# Patient Record
Sex: FEMALE | Race: WHITE | NOT HISPANIC OR LATINO | Employment: FULL TIME | URBAN - NONMETROPOLITAN AREA
[De-identification: names, ages, dates, MRNs, and addresses within clinical notes are randomized per-mention and may not be internally consistent; named-entity substitution may affect disease eponyms.]

---

## 2017-01-03 ENCOUNTER — TREATMENT (OUTPATIENT)
Dept: CARDIAC REHAB | Facility: HOSPITAL | Age: 57
End: 2017-01-03

## 2017-01-03 VITALS — DIASTOLIC BLOOD PRESSURE: 56 MMHG | SYSTOLIC BLOOD PRESSURE: 114 MMHG | HEART RATE: 79 BPM

## 2017-01-03 DIAGNOSIS — I21.09 ST ELEVATION MYOCARDIAL INFARCTION (STEMI) OF ANTERIOR WALL (HCC): Primary | ICD-10-CM

## 2017-01-03 PROCEDURE — 93798 PHYS/QHP OP CAR RHAB W/ECG: CPT

## 2017-01-03 NOTE — PROGRESS NOTES
Phase II patient, see  [facility] documentation for exercise data documentation.  Dr. Gonzalez physician immediately available. NAD note, skin w/p/d, denies CP, SOA, tolerated exercise well. V/S WIDL.

## 2017-01-05 ENCOUNTER — TREATMENT (OUTPATIENT)
Dept: CARDIAC REHAB | Facility: HOSPITAL | Age: 57
End: 2017-01-05

## 2017-01-05 VITALS — DIASTOLIC BLOOD PRESSURE: 60 MMHG | HEART RATE: 81 BPM | SYSTOLIC BLOOD PRESSURE: 110 MMHG

## 2017-01-05 DIAGNOSIS — I21.09 ST ELEVATION MYOCARDIAL INFARCTION (STEMI) OF ANTERIOR WALL (HCC): Primary | ICD-10-CM

## 2017-01-05 PROCEDURE — 93798 PHYS/QHP OP CAR RHAB W/ECG: CPT

## 2017-01-06 NOTE — PROGRESS NOTES
Phase II patient, see  [facility] documentation for exercise data documentation.  Dr. Zuñiga physician immediately available. NAD note, skin w/p/d, denies CP, SOA, tolerated exercise well. V/S WIDL.

## 2017-01-10 ENCOUNTER — APPOINTMENT (OUTPATIENT)
Dept: CARDIAC REHAB | Facility: HOSPITAL | Age: 57
End: 2017-01-10

## 2017-01-11 ENCOUNTER — TREATMENT (OUTPATIENT)
Dept: CARDIAC REHAB | Facility: HOSPITAL | Age: 57
End: 2017-01-11

## 2017-01-11 VITALS — SYSTOLIC BLOOD PRESSURE: 114 MMHG | HEART RATE: 72 BPM | DIASTOLIC BLOOD PRESSURE: 60 MMHG

## 2017-01-11 DIAGNOSIS — I21.09 ST ELEVATION MYOCARDIAL INFARCTION (STEMI) OF ANTERIOR WALL (HCC): Primary | ICD-10-CM

## 2017-01-11 PROCEDURE — 93798 PHYS/QHP OP CAR RHAB W/ECG: CPT

## 2017-01-11 NOTE — PROGRESS NOTES
Phase II patient, see  [place] documentation for exercise data documentation.  Dr. Zuñiga physician immediately available. NAD note, skin w/p/d, denies CP, SOA, tolerated exercise well. V/S WIDL. BG after treadmill 73.  Pt asymptomatic, no changes in assessment.  Given 8 oz of juice, rechecked, BG 67, continues to be asymptomatic.  No changes in assessment.  Pt given 2 glucose tablets. Rechecked, BG 86. No changes in assessment, pt states she is going to eat as soon as she leaves cardiac rehab.  Will call and notify Dr. Huerta.

## 2017-01-12 ENCOUNTER — APPOINTMENT (OUTPATIENT)
Dept: CARDIAC REHAB | Facility: HOSPITAL | Age: 57
End: 2017-01-12

## 2017-01-12 ENCOUNTER — DOCUMENTATION (OUTPATIENT)
Dept: CARDIAC REHAB | Facility: HOSPITAL | Age: 57
End: 2017-01-12

## 2017-01-12 NOTE — PROGRESS NOTES
Spoke with Portia in Dr. Huerta's office.  Notified of pt's recent hypoglycemic episodes.  Attempted to fax glucose log numerous times without success.  Will attempt to fax tomorrow.  Pt notified of call to office about BG.

## 2017-01-17 ENCOUNTER — APPOINTMENT (OUTPATIENT)
Dept: CARDIAC REHAB | Facility: HOSPITAL | Age: 57
End: 2017-01-17

## 2017-01-19 ENCOUNTER — APPOINTMENT (OUTPATIENT)
Dept: CARDIAC REHAB | Facility: HOSPITAL | Age: 57
End: 2017-01-19

## 2017-01-24 ENCOUNTER — APPOINTMENT (OUTPATIENT)
Dept: CARDIAC REHAB | Facility: HOSPITAL | Age: 57
End: 2017-01-24

## 2017-01-26 ENCOUNTER — APPOINTMENT (OUTPATIENT)
Dept: CARDIAC REHAB | Facility: HOSPITAL | Age: 57
End: 2017-01-26

## 2017-01-31 ENCOUNTER — APPOINTMENT (OUTPATIENT)
Dept: CARDIAC REHAB | Facility: HOSPITAL | Age: 57
End: 2017-01-31

## 2017-02-02 ENCOUNTER — APPOINTMENT (OUTPATIENT)
Dept: CARDIAC REHAB | Facility: HOSPITAL | Age: 57
End: 2017-02-02

## 2017-02-07 ENCOUNTER — APPOINTMENT (OUTPATIENT)
Dept: CARDIAC REHAB | Facility: HOSPITAL | Age: 57
End: 2017-02-07

## 2017-02-09 ENCOUNTER — OFFICE VISIT (OUTPATIENT)
Dept: CARDIOLOGY | Facility: CLINIC | Age: 57
End: 2017-02-09

## 2017-02-09 ENCOUNTER — APPOINTMENT (OUTPATIENT)
Dept: CARDIAC REHAB | Facility: HOSPITAL | Age: 57
End: 2017-02-09

## 2017-02-09 VITALS
WEIGHT: 271 LBS | HEIGHT: 66 IN | RESPIRATION RATE: 16 BRPM | HEART RATE: 79 BPM | DIASTOLIC BLOOD PRESSURE: 74 MMHG | BODY MASS INDEX: 43.55 KG/M2 | SYSTOLIC BLOOD PRESSURE: 113 MMHG

## 2017-02-09 DIAGNOSIS — I25.10 ASCVD (ARTERIOSCLEROTIC CARDIOVASCULAR DISEASE): Primary | ICD-10-CM

## 2017-02-09 PROCEDURE — 99213 OFFICE O/P EST LOW 20 MIN: CPT | Performed by: INTERNAL MEDICINE

## 2017-02-09 PROCEDURE — 93000 ELECTROCARDIOGRAM COMPLETE: CPT | Performed by: INTERNAL MEDICINE

## 2017-02-09 NOTE — PROGRESS NOTES
Akhil Huerta MD  Jannette Tsanggs  1960 12/08/2016    Patient Active Problem List   Diagnosis   • ST elevation myocardial infarction (STEMI) of anterior wall, 11/29/16 s/p stenting proximal LAD occlusion, clinically stable.   • Type 2 diabetes mellitus   • Dyslipidemia       Dear Akhil Huerta MD:    Enrique Onofre is a 56 y.o. female with the problems as listed above, presents for cardiology follow-up for ASCVD.        History of Present Illness: Ms. Onofre is a 56-year-old  female with recent anterior wall ST elevation or infarction, status post stenting of the LAD on 11/29/2016.  She denies any recent chest pains or shortness of breath, PND, orthopnea or pedal edema.she denies any palpitations, dizziness or syncope.  She says she is feeling well and is continuing to exercise on her exercise bike at home, after completing a few visits of cardiac rehab.     No Known Allergies:      Current Outpatient Prescriptions:   •  amLODIPine (NORVASC) 10 MG tablet, Take 1 tablet by mouth Daily., Disp: 90 tablet, Rfl: 3  •  aspirin 81 MG chewable tablet, Chew 4 tablets Daily. (Patient taking differently: Chew 325 mg Daily.), Disp: 100 tablet, Rfl: 3  •  atorvastatin (LIPITOR) 80 MG tablet, Take 1 tablet by mouth Every Night. (Patient taking differently: Take 80 mg by mouth Daily.), Disp: 90 tablet, Rfl: 3  •  carvedilol (COREG) 25 MG tablet, Take 1 tablet by mouth Every 12 (Twelve) Hours., Disp: 180 tablet, Rfl: 3  •  clopidogrel (PLAVIX) 75 MG tablet, Take 1 tablet by mouth Daily., Disp: 90 tablet, Rfl: 3  •  glipiZIDE (GLUCOTROL) 10 MG 24 hr tablet, Take 1 tablet by mouth 2 (Two) Times a Day., Disp: 180 tablet, Rfl: 4  •  glucose blood test strip, Use as instructed twice daily., Disp: 300 each, Rfl: 3  •  Insulin Lispro Prot & Lispro (HUMALOG MIX 50/50 KWIKPEN) (50-50) 100 UNIT/ML suspension pen-injector, Inject 40 units Subcutaneously before meals. (Patient taking differently: Inject 30  "Units under the skin 3 (Three) Times a Day Before Meals.), Disp: 30 mL, Rfl: 5  •  lisinopril (PRINIVIL,ZESTRIL) 20 MG tablet, Take 1 tablet by mouth Daily. (Patient taking differently: Take 20 mg by mouth Daily.), Disp: 90 tablet, Rfl: 3  •  metFORMIN (GLUCOPHAGE) 1000 MG tablet, Take 1 tablet by mouth 2 (Two) Times a Day., Disp: 180 tablet, Rfl: 5  •  TANZEUM 30 MG pen-injector, Inject 30 mg under the skin 1 (One) Time Per Week. (Patient taking differently: Inject 30 mg under the skin 1 (One) Time Per Week. Patient takes on Wednesdays.), Disp: 12 each, Rfl: 6  •  TRUEPLUS LANCETS 28G misc, USE TO TEST BLOOD SUGAR TWICE DAILY, Disp: 300 each, Rfl: 3  •  nitrofurantoin, macrocrystal-monohydrate, (MACROBID) 100 MG capsule, Take 1 capsule by mouth 2 (Two) Times a Day., Disp: 10 capsule, Rfl: 0      The following portions of the patient's history were reviewed and updated as appropriate: allergies, current medications, past family history, past medical history, past social history, past surgical history and problem list.    Social History   Substance Use Topics   • Smoking status: Former Smoker     Packs/day: 0.25     Types: Cigarettes     Quit date: 6/27/1989   • Smokeless tobacco: Never Used   • Alcohol use No       Review of Systems   Constitution: Negative for chills and fever.   HENT: Negative for headaches, nosebleeds and sore throat.    Cardiovascular: Positive for leg swelling. Negative for chest pain.   Respiratory: Positive for shortness of breath. Negative for cough, hemoptysis and wheezing.    Gastrointestinal: Negative for abdominal pain, hematemesis, hematochezia, melena, nausea and vomiting.   Genitourinary: Negative for dysuria and hematuria.   Neurological: Positive for dizziness.       Objective   Vitals:    02/09/17 1531   BP: 113/74   Pulse: 79   Resp: 16   Weight: 271 lb (123 kg)   Height: 66\" (167.6 cm)     Body mass index is 43.74 kg/(m^2).        Physical Exam   Constitutional: She is oriented " to person, place, and time. She appears well-developed and well-nourished.   HENT:   Head: Normocephalic.   Mouth/Throat: Oropharynx is clear and moist.   Eyes: Conjunctivae and EOM are normal. Pupils are equal, round, and reactive to light.   Neck: Normal range of motion. Neck supple. No JVD present. No tracheal deviation present. No thyromegaly present.   Cardiovascular: Normal rate, regular rhythm, S1 normal and S2 normal.  Exam reveals no gallop and no friction rub.    No murmur heard.  Pulmonary/Chest: Effort normal and breath sounds normal. No respiratory distress. She has no wheezes. She has no rales.   Abdominal: Soft. Bowel sounds are normal. She exhibits no mass. There is no tenderness.   Musculoskeletal: Normal range of motion. She exhibits no edema.   Lymphadenopathy:     She has no cervical adenopathy.   Neurological: She is alert and oriented to person, place, and time. No cranial nerve deficit.   Skin: Skin is warm and dry. No rash noted.   Psychiatric: She has a normal mood and affect.       Lab Results   Component Value Date     12/29/2016    K 4.5 12/29/2016     12/29/2016    CO2 25.3 12/29/2016    BUN 20 12/29/2016    CREATININE 0.89 12/29/2016    GLUCOSE 147 (H) 12/29/2016    CALCIUM 10.2 (H) 12/29/2016    AST 22 12/29/2016    ALT 19 12/29/2016    ALKPHOS 69 12/29/2016    LABIL2 1.5 12/29/2016     Lab Results   Component Value Date    CKTOTAL 80 12/01/2016     Lab Results   Component Value Date    WBC 4.67 12/29/2016    HGB 12.3 12/29/2016    HCT 39.1 12/29/2016     12/29/2016     Lab Results   Component Value Date    INR 0.89 11/29/2016    INR 0.82 11/27/2016       Lab Results   Component Value Date    CHLPL 213 (H) 03/14/2016    TRIG 732 (H) 10/26/2016    HDL 36 (L) 10/26/2016          ECG 12 Lead  Date/Time: 2/9/2017 3:42 PM  Performed by: IRASEMA ROSENBERG  Authorized by: IRASEMA ROSENBERG   Rhythm: sinus rhythm  BPM: 81  Other findings: LVH  Comments: QTc  429            Assessment/Plan :  1. ST elevation myocardial infarction (STEMI) of anterior wall, 11/29/16 s/p stenting proxismal % occlusion, clinically stable.   continue with  Plavix, atorvastatin, carvedilol and lisinopril at the current doses.  Increase aspirin to 325 mg daily.     2. Dyslipidemia with hypertriglyceridemia.    continue with atorvastatin 80 mg daily.     3. Type 2 diabetes mellitus with complication, unspecified long term insulin use status       Ms. Onofre seems to be doing very well from cardiac standpoint post anterior wall STEMI with acute PCI.  She was encouraged to start cardiac rehabilitation soon.  She was concerned about the expense but she is willing to try.       Recommendations:     Continue Aspirin, Plavix and Lipitor.  Take Aspirin 325 mg po daily.  May d/c Isosorbide 60 mg po daily.  Encouraged regular activities as tolerated.     Return in about 5 months (around 7/9/2017) for or sooner if needed.    As always, I appreciate very much the opportunity to participate in the cardiovascular care of your patients.      With Best Regards,    Lebron Ogden MD, FACC  2/9/2017  4:14 PM

## 2017-02-23 ENCOUNTER — LAB (OUTPATIENT)
Dept: LAB | Facility: HOSPITAL | Age: 57
End: 2017-02-23
Attending: INTERNAL MEDICINE

## 2017-02-23 ENCOUNTER — TRANSCRIBE ORDERS (OUTPATIENT)
Dept: ADMINISTRATIVE | Facility: HOSPITAL | Age: 57
End: 2017-02-23

## 2017-02-23 DIAGNOSIS — R53.83 MALAISE AND FATIGUE: ICD-10-CM

## 2017-02-23 DIAGNOSIS — I10 ESSENTIAL HYPERTENSION, BENIGN: ICD-10-CM

## 2017-02-23 DIAGNOSIS — E78.5 HYPERLIPIDEMIA, UNSPECIFIED HYPERLIPIDEMIA TYPE: ICD-10-CM

## 2017-02-23 DIAGNOSIS — R53.83 MALAISE AND FATIGUE: Primary | ICD-10-CM

## 2017-02-23 DIAGNOSIS — R53.81 MALAISE AND FATIGUE: ICD-10-CM

## 2017-02-23 DIAGNOSIS — E11.9 DIABETES MELLITUS WITHOUT COMPLICATION (HCC): ICD-10-CM

## 2017-02-23 DIAGNOSIS — R53.81 MALAISE AND FATIGUE: Primary | ICD-10-CM

## 2017-02-23 LAB
ALBUMIN SERPL-MCNC: 4.3 G/DL (ref 3.5–5)
ALBUMIN UR-MCNC: 7.6 MG/L
ALBUMIN/GLOB SERPL: 1.4 G/DL (ref 1.5–2.5)
ALP SERPL-CCNC: 77 U/L (ref 35–104)
ALT SERPL W P-5'-P-CCNC: 21 U/L (ref 10–36)
ANION GAP SERPL CALCULATED.3IONS-SCNC: 8.3 MMOL/L (ref 3.6–11.2)
AST SERPL-CCNC: 20 U/L (ref 10–30)
BASOPHILS # BLD AUTO: 0.03 10*3/MM3 (ref 0–0.3)
BASOPHILS NFR BLD AUTO: 0.5 % (ref 0–2)
BILIRUB SERPL-MCNC: 0.5 MG/DL (ref 0.2–1.8)
BUN BLD-MCNC: 18 MG/DL (ref 7–21)
BUN/CREAT SERPL: 16.5 (ref 7–25)
CALCIUM SPEC-SCNC: 9.8 MG/DL (ref 7.7–10)
CHLORIDE SERPL-SCNC: 106 MMOL/L (ref 99–112)
CHOLEST SERPL-MCNC: 133 MG/DL (ref 0–200)
CO2 SERPL-SCNC: 25.7 MMOL/L (ref 24.3–31.9)
CREAT BLD-MCNC: 1.09 MG/DL (ref 0.43–1.29)
CREAT UR-MCNC: 135.6 MG/DL
DEPRECATED RDW RBC AUTO: 43 FL (ref 37–54)
EOSINOPHIL # BLD AUTO: 0.13 10*3/MM3 (ref 0–0.7)
EOSINOPHIL NFR BLD AUTO: 2 % (ref 0–5)
ERYTHROCYTE [DISTWIDTH] IN BLOOD BY AUTOMATED COUNT: 13.4 % (ref 11.5–14.5)
GFR SERPL CREATININE-BSD FRML MDRD: 52 ML/MIN/1.73
GLOBULIN UR ELPH-MCNC: 3 GM/DL
GLUCOSE BLD-MCNC: 269 MG/DL (ref 70–110)
HBA1C MFR BLD: 7 % (ref 4.5–5.7)
HCT VFR BLD AUTO: 40.2 % (ref 37–47)
HDLC SERPL-MCNC: 30 MG/DL (ref 60–100)
HGB BLD-MCNC: 12.8 G/DL (ref 12–16)
IMM GRANULOCYTES # BLD: 0.01 10*3/MM3 (ref 0–0.03)
IMM GRANULOCYTES NFR BLD: 0.2 % (ref 0–0.5)
LDLC SERPL CALC-MCNC: ABNORMAL MG/DL (ref 0–100)
LDLC/HDLC SERPL: ABNORMAL {RATIO}
LYMPHOCYTES # BLD AUTO: 1.51 10*3/MM3 (ref 1–3)
LYMPHOCYTES NFR BLD AUTO: 23.7 % (ref 21–51)
MCH RBC QN AUTO: 28.4 PG (ref 27–33)
MCHC RBC AUTO-ENTMCNC: 31.8 G/DL (ref 33–37)
MCV RBC AUTO: 89.1 FL (ref 80–94)
MICROALBUMIN/CREAT UR: 5.6 MG/G
MONOCYTES # BLD AUTO: 0.41 10*3/MM3 (ref 0.1–0.9)
MONOCYTES NFR BLD AUTO: 6.4 % (ref 0–10)
NEUTROPHILS # BLD AUTO: 4.27 10*3/MM3 (ref 1.4–6.5)
NEUTROPHILS NFR BLD AUTO: 67.2 % (ref 30–70)
OSMOLALITY SERPL CALC.SUM OF ELEC: 290.8 MOSM/KG (ref 273–305)
PLATELET # BLD AUTO: 212 10*3/MM3 (ref 130–400)
PMV BLD AUTO: 10.3 FL (ref 6–10)
POTASSIUM BLD-SCNC: 4.8 MMOL/L (ref 3.5–5.3)
PROT SERPL-MCNC: 7.3 G/DL (ref 6–8)
RBC # BLD AUTO: 4.51 10*6/MM3 (ref 4.2–5.4)
SODIUM BLD-SCNC: 140 MMOL/L (ref 135–153)
TRIGL SERPL-MCNC: 431 MG/DL (ref 0–150)
VLDLC SERPL-MCNC: ABNORMAL MG/DL
WBC NRBC COR # BLD: 6.36 10*3/MM3 (ref 4.5–12.5)

## 2017-02-23 PROCEDURE — 80053 COMPREHEN METABOLIC PANEL: CPT | Performed by: INTERNAL MEDICINE

## 2017-02-23 PROCEDURE — 36415 COLL VENOUS BLD VENIPUNCTURE: CPT

## 2017-02-23 PROCEDURE — 80061 LIPID PANEL: CPT | Performed by: INTERNAL MEDICINE

## 2017-02-23 PROCEDURE — 83036 HEMOGLOBIN GLYCOSYLATED A1C: CPT | Performed by: INTERNAL MEDICINE

## 2017-02-23 PROCEDURE — 85025 COMPLETE CBC W/AUTO DIFF WBC: CPT | Performed by: INTERNAL MEDICINE

## 2017-02-23 PROCEDURE — 82570 ASSAY OF URINE CREATININE: CPT | Performed by: INTERNAL MEDICINE

## 2017-02-23 PROCEDURE — 82043 UR ALBUMIN QUANTITATIVE: CPT | Performed by: INTERNAL MEDICINE

## 2017-03-13 ENCOUNTER — TRANSCRIBE ORDERS (OUTPATIENT)
Dept: ADMINISTRATIVE | Facility: HOSPITAL | Age: 57
End: 2017-03-13

## 2017-03-13 DIAGNOSIS — R09.89 BILATERAL CAROTID BRUITS: Primary | ICD-10-CM

## 2017-03-14 ENCOUNTER — HOSPITAL ENCOUNTER (OUTPATIENT)
Dept: ULTRASOUND IMAGING | Facility: HOSPITAL | Age: 57
Discharge: HOME | End: 2017-03-14
Attending: INTERNAL MEDICINE | Admitting: INTERNAL MEDICINE

## 2017-03-14 ENCOUNTER — OFFICE VISIT (OUTPATIENT)
Dept: BARIATRICS/WEIGHT MGMT | Facility: CLINIC | Age: 57
End: 2017-03-14

## 2017-03-14 VITALS
OXYGEN SATURATION: 99 % | BODY MASS INDEX: 42.91 KG/M2 | RESPIRATION RATE: 18 BRPM | WEIGHT: 267 LBS | HEIGHT: 66 IN | DIASTOLIC BLOOD PRESSURE: 79 MMHG | SYSTOLIC BLOOD PRESSURE: 127 MMHG | HEART RATE: 79 BPM | TEMPERATURE: 98.2 F

## 2017-03-14 DIAGNOSIS — Z98.84 S/P BARIATRIC SURGERY: ICD-10-CM

## 2017-03-14 DIAGNOSIS — I21.09 ST ELEVATION MYOCARDIAL INFARCTION (STEMI) OF ANTERIOR WALL (HCC): Primary | ICD-10-CM

## 2017-03-14 DIAGNOSIS — R09.89 BILATERAL CAROTID BRUITS: ICD-10-CM

## 2017-03-14 DIAGNOSIS — E11.8 TYPE 2 DIABETES MELLITUS WITH COMPLICATION, UNSPECIFIED LONG TERM INSULIN USE STATUS: ICD-10-CM

## 2017-03-14 DIAGNOSIS — Z99.89 OSA ON CPAP: ICD-10-CM

## 2017-03-14 DIAGNOSIS — E66.01 MORBID OBESITY, UNSPECIFIED OBESITY TYPE (HCC): ICD-10-CM

## 2017-03-14 DIAGNOSIS — G47.33 OSA ON CPAP: ICD-10-CM

## 2017-03-14 PROCEDURE — S2083 ADJUSTMENT GASTRIC BAND: HCPCS | Performed by: PHYSICIAN ASSISTANT

## 2017-03-14 PROCEDURE — 93880 EXTRACRANIAL BILAT STUDY: CPT | Performed by: RADIOLOGY

## 2017-03-14 PROCEDURE — 99214 OFFICE O/P EST MOD 30 MIN: CPT | Performed by: PHYSICIAN ASSISTANT

## 2017-03-14 PROCEDURE — 93880 EXTRACRANIAL BILAT STUDY: CPT

## 2017-03-14 NOTE — PROGRESS NOTES
[facility]  [address]  [facility] [address] [phone]        Patient Name: Jannette Onofre.  YOB: 1960      Date of Visit: 3/17/2017      Reason for Visit:  AGB f/up    HPI:  Jannette Onofre is a 56 y.o. female s/p LAGB 4/2013 by Dr. Russell.  Transferred care to [facility] 7/2016 b/c she says Dr. Russell made her feel like a failure.    Presurgery weight 340 lbs.  Lowest weight achieved 224 lbs in first 18 months.  Then started to regain d/t stressors at work w/ associated poor food choices and bad habits.  Was working to get back on track, but since LOV she had an MI and is now s/p LAD stenting, on ASA + Plavix.  Says this has really set her into a depression and she just can't seem to get refocused.  Worries all the time that she is going to have another heart attack.  Knows she needs to get healthy to prevent future events, but has not been able to motivate herself.  Wants a band adjustment - feels that will help her both mentally and physically.    Band vol unknown, suspected 5cc per patient report (no ADJ office notes obtainable).  Says she is able to eat too much, but other times is too depressed to even think about eating.  Denies dysphagia/N/V/AP.  Last UGI 7/2016 okay.    Past Medical History   Diagnosis Date   • Coronary artery disease      s/p LAD stenting, follows w/ Dr. Ogden   • Diabetes mellitus      dx 1998, initially improved s/p AGB but now back on insulin    • Fatigue    • Hyperlipidemia    • Hypertension    • Melanoma      1998   • Morbid obesity    • Myocardial infarction      11/2016   • BRICE on CPAP      Past Surgical History   Procedure Laterality Date   • Laparoscopic gastric banding  04/2013     s/p LAGB by Dr Russell   • Pr rt/lt heart catheters N/A 11/29/2016     Procedure: Percutaneous Coronary Intervention;  Surgeon: Akhil Zuñiga MD;  Location: [facility] INVASIVE LOCATION;  Service: Cardiology   • Cardiac  catheterization N/A 2016     Procedure: Left Heart Cath;  Surgeon: Akhil Zuñiga MD;  Location:  [facility] CATH INVASIVE LOCATION;  Service:    • Laparoscopic cholecystectomy     • Umbilical hernia repair     •  section     • Colonoscopy     • Other surgical history       melanoma removal/back   • Cataract extraction N/A      rt eye , left eye    • Septoplasty         Current Outpatient Prescriptions:   •  clopidogrel (PLAVIX) 75 MG tablet, Take 1 tablet by mouth Daily., Disp: 90 tablet, Rfl: 3  •  Albiglutide 30 MG pen-injector, Inject 1 Pen Subcutaneously Once a Week., Disp: 12 each, Rfl: 6  •  amLODIPine (NORVASC) 10 MG tablet, Take 1 tablet by mouth Daily., Disp: 90 tablet, Rfl: 3  •  amLODIPine (NORVASC) 10 MG tablet, Take 1 tablet by mouth Daily., Disp: 90 tablet, Rfl: 4  •  aspirin 81 MG chewable tablet, Chew 4 tablets Daily. (Patient taking differently: Chew 325 mg Daily.), Disp: 100 tablet, Rfl: 3  •  atorvastatin (LIPITOR) 80 MG tablet, Take 1 tablet by mouth Every Night. (Patient taking differently: Take 80 mg by mouth Daily.), Disp: 90 tablet, Rfl: 3  •  atorvastatin (LIPITOR) 80 MG tablet, Take 1 tablet by mouth Daily., Disp: 90 tablet, Rfl: 5  •  azithromycin (ZITHROMAX [place]ON) 500 MG tablet, Take 1 tablet by mouth Daily., Disp: 3 tablet, Rfl: 0  •  carvedilol (COREG) 25 MG tablet, Take 1 tablet by mouth Every 12 (Twelve) Hours., Disp: 180 tablet, Rfl: 3  •  carvedilol (COREG) 25 MG tablet, Take 1 tablet by mouth 2 (Two) Times a Day., Disp: 180 tablet, Rfl: 4  •  clopidogrel (PLAVIX) 75 MG tablet, Take 1 tablet by mouth Daily., Disp: 90 tablet, Rfl: 5  •  Empagliflozin 25 MG tablet, Take 1 tablet by mouth Daily., Disp: 90 tablet, Rfl: 6  •  glipiZIDE (GLUCOTROL) 10 MG 24 hr tablet, Take 1 tablet by mouth 2 (Two) Times a Day., Disp: 180 tablet, Rfl: 4  •  glucose blood test strip, Use as instructed twice daily., Disp: 300 each, Rfl: 3  •  Insulin Lispro  Prot & Lispro (HUMALOG MIX 50/50 KWIKPEN) (50-50) 100 UNIT/ML suspension pen-injector, Inject 40 units Subcutaneously before meals. (Patient taking differently: Inject 30 Units under the skin 3 (Three) Times a Day Before Meals.), Disp: 30 mL, Rfl: 5  •  Insulin Lispro Prot & Lispro (HUMALOG MIX 50/50 KWIKPEN) (50-50) 100 UNIT/ML suspension pen-injector, Inject 30 units Subcutaneous before Meals., Disp: 30 mL, Rfl: 5  •  lisinopril (PRINIVIL,ZESTRIL) 20 MG tablet, Take 1 tablet by mouth Daily. (Patient taking differently: Take 20 mg by mouth Daily.), Disp: 90 tablet, Rfl: 3  •  lisinopril (PRINIVIL,ZESTRIL) 20 MG tablet, Take 1 tablet by mouth Daily., Disp: 90 tablet, Rfl: 3  •  meclizine (ANTIVERT) 25 MG tablet, Take 1 tablet by mouth 3 (Three) Times a Day As Needed fir Dizziness., Disp: 90 tablet, Rfl: 0  •  metFORMIN (GLUCOPHAGE) 1000 MG tablet, Take 1 tablet by mouth 2 (Two) Times a Day., Disp: 180 tablet, Rfl: 5  •  metFORMIN (GLUCOPHAGE) 1000 MG tablet, Take 1 tablet by mouth 2 (Two) Times a Day., Disp: 180 tablet, Rfl: 3  •  nitrofurantoin, macrocrystal-monohydrate, (MACROBID) 100 MG capsule, Take 1 capsule by mouth 2 (Two) Times a Day., Disp: 10 capsule, Rfl: 0  •  predniSONE (DELTASONE) 20 MG tablet, Take 1 tablet by mouth Daily., Disp: 5 tablet, Rfl: 0  •  TANZEUM 30 MG pen-injector, Inject 30 mg under the skin 1 (One) Time Per Week. (Patient taking differently: Inject 30 mg under the skin 1 (One) Time Per Week. Patient takes on Wednesdays.), Disp: 12 each, Rfl: 6  •  TRUEPLUS LANCETS 28G misc, USE TO TEST BLOOD SUGAR TWICE DAILY, Disp: 300 each, Rfl: 3     No Known Allergies    Social History     Social History   • Marital status:      Spouse name: N/A   • Number of children: N/A   • Years of education: N/A     Occupational History   • Not on file.     Social History Main Topics   • Smoking status: Former Smoker     Packs/day: 0.25     Types: Cigarettes     Quit date: 6/27/1989   • Smokeless  "tobacco: Never Used   • Alcohol use No   • Drug use: No   • Sexual activity: Defer     Other Topics Concern   • Not on file     Social History Narrative    .  Lives in [facility].   @ [employer].       Visit Vitals   • /79 (BP Location: Left arm, Patient Position: Sitting, Cuff Size: Large Adult)   • Pulse 79   • Temp 98.2 °F (36.8 °C)   • Resp 18   • Ht 66\" (167.6 cm)   • Wt 267 lb (121 kg)   • SpO2 99%   • BMI 43.09 kg/m2       Physical Exam   Constitutional: She appears well-developed and well-nourished. She is cooperative.   HENT:   Mouth/Throat: Oropharynx is clear and moist and mucous membranes are normal.   Eyes: Conjunctivae are normal. No scleral icterus.   Cardiovascular: Normal rate.    Pulmonary/Chest: Effort normal.   Abdominal: Soft. There is no tenderness.   RLQ port - site unremarkable   Musculoskeletal: Normal range of motion. She exhibits no edema.   Neurological: She is alert.   Skin: Skin is warm and dry. No rash noted.   Psychiatric: She has a normal mood and affect. Judgment normal.       Band Adjustment Info   Band Type: ?? APS ??   Port Location: RLQ   Procedure Position: supine   Needle Type: short    Local Anesthesia: with 1% lidocaine and bicarb       Amount Expected (cc): ?? 5.0 ??   Amount Added (cc):      0.5   Amount Removed (cc):     Total Amount (cc): ?? 5.5 ??       Able to lashae water after ADJ? Yes   Other Notes: No [facility] office notes available to confirm band vol.  At least 2.5cc aspirated prior to fill.           Assessment:  56 y.o. female s/p LAGB 4/2013 by Dr. Russell    ICD-10-CM ICD-9-CM   1. ST elevation myocardial infarction (STEMI) of anterior wall, 11/29/16 s/p stenting proxismal % occlusion, clinically stable. I21.09 410.10   2. Type 2 diabetes mellitus with complication, unspecified long term insulin use status E11.8 250.90   3. BRICE on CPAP G47.33 327.23   4. Morbid obesity, unspecified obesity type E66.01 278.01   5. S/P " bariatric surgery Z98.84 V45.86       Plan: Long discussion today.  Again reviewed necessary dietary/lifestyle modifications.  Encouraged patient to pursue counseling as planned.  Increase routine exercise.  Focus on healthy habits.      Lapband adjustment today w/out difficulty.  Reviewed green zone/red zone.  Instructed to RTC for dysphagia/N/V/AP.  Discussed eating w/ the band and reminded patient that it is only a tool, and should not be used as a physical stop.    The patient was instructed to follow up on an as needed basis..     note: approx 15 of the 25 minute visit was spent counseling on dietary/lifestyle modifications

## 2017-04-02 ENCOUNTER — OFFICE VISIT (OUTPATIENT)
Dept: RETAIL CLINIC | Facility: CLINIC | Age: 57
End: 2017-04-02

## 2017-04-02 VITALS
OXYGEN SATURATION: 96 % | HEART RATE: 90 BPM | WEIGHT: 267 LBS | TEMPERATURE: 97.8 F | HEIGHT: 66 IN | DIASTOLIC BLOOD PRESSURE: 82 MMHG | SYSTOLIC BLOOD PRESSURE: 128 MMHG | BODY MASS INDEX: 42.91 KG/M2

## 2017-04-02 DIAGNOSIS — J06.9 ACUTE URI: Primary | ICD-10-CM

## 2017-04-02 PROCEDURE — 99213 OFFICE O/P EST LOW 20 MIN: CPT | Performed by: NURSE PRACTITIONER

## 2017-04-02 RX ORDER — ISOSORBIDE MONONITRATE 60 MG/1
60 TABLET, EXTENDED RELEASE ORAL DAILY PRN
COMMUNITY
End: 2018-02-05

## 2017-04-02 RX ORDER — BENZONATATE 100 MG/1
100 CAPSULE ORAL 3 TIMES DAILY PRN
Qty: 21 CAPSULE | Refills: 0 | Status: SHIPPED | OUTPATIENT
Start: 2017-04-02 | End: 2017-04-10

## 2017-04-02 RX ORDER — AMOXICILLIN 875 MG/1
875 TABLET, COATED ORAL 2 TIMES DAILY
Qty: 14 TABLET | Refills: 0 | Status: SHIPPED | OUTPATIENT
Start: 2017-04-02 | End: 2017-04-10

## 2017-04-02 NOTE — PROGRESS NOTES
"Subjective   Jannette Onofre is a 56 y.o. female. Presents to the clinic today with a chief complaint of   Chief Complaint   Patient presents with   • Cough   • Sinusitis        History of Present Illness     Ms Onofre presents with three day history of cough and congestion.  Symptoms have worsened today and she states that her chest is tight.  She reports productive cough and shortness of breath.  She denies fever.  She has taken Robitussin for the cough but it does not seem to help.  See ROS    The following portions of the patient's history were reviewed and updated as appropriate: allergies, current medications, past family history, past medical history, past social history, past surgical history and problem list.    Review of Systems   Constitutional: Positive for appetite change (decreased). Negative for chills, diaphoresis and fever.   HENT: Positive for congestion, ear pain (feels like the air is just going straight thru them), rhinorrhea and sore throat (feels swollen). Negative for ear discharge, hearing loss, postnasal drip, sinus pressure, trouble swallowing and voice change.    Eyes: Negative for redness and itching.   Respiratory: Positive for cough (productive of green sputum) and shortness of breath. Negative for chest tightness and wheezing.    Gastrointestinal: Positive for nausea. Negative for abdominal pain, diarrhea and vomiting.   Musculoskeletal: Positive for myalgias.   Skin: Negative for rash.   Neurological: Positive for headaches. Negative for dizziness.       Objective   No Known Allergies  Blood pressure 128/82, pulse 90, temperature 97.8 °F (36.6 °C), height 66\" (167.6 cm), weight 267 lb (121 kg), SpO2 96 %.    Physical Exam  General Appearance: Mildly ill appearing, No acute distress, Pleasant and Cooperative  Head/face:  Normocephalic, atraumatic  Eyes:   no conjunctival injection and non-icteric  Ears:   Bilateral ear canals without erythema, edema, scaling, or drainage  Nose/Sinuses:   " clear nasal drainage  Mouth/Throat:  Posterior pharynx is mildly erythematous with a small amount of clear drainage  Neck:  Supple without lymphadenopathy; trachea is midline  Lungs:  Clear to auscultation bilaterally  Heart:   Regular rate and rhythm without murmur    Assessment/Plan     URI    Tessalon Perles 100 mg  TID as needed for cough  Amoxicillin 875 mg BID x 7 days               Tracee Padilla, APRN

## 2017-04-02 NOTE — PATIENT INSTRUCTIONS
"Upper Respiratory Infection, Adult  Most upper respiratory infections (URIs) are caused by a virus. A URI affects the nose, throat, and upper air passages. The most common type of URI is often called \"the common cold.\"  HOME CARE   · Take medicines only as told by your doctor.  · Gargle warm saltwater or take cough drops to comfort your throat as told by your doctor.  · Use a warm mist humidifier or inhale steam from a shower to increase air moisture. This may make it easier to breathe.  · Drink enough fluid to keep your pee (urine) clear or pale yellow.  · Eat soups and other clear broths.  · Have a healthy diet.  · Rest as needed.  · Go back to work when your fever is gone or your doctor says it is okay.  ¨ You may need to stay home longer to avoid giving your URI to others.  ¨ You can also wear a face mask and wash your hands often to prevent spread of the virus.  · Use your inhaler more if you have asthma.  · Do not use any tobacco products, including cigarettes, chewing tobacco, or electronic cigarettes. If you need help quitting, ask your doctor.  GET HELP IF:  · You are getting worse, not better.  · Your symptoms are not helped by medicine.  · You have chills.  · You are getting more short of breath.  · You have brown or red mucus.  · You have yellow or brown discharge from your nose.  · You have pain in your face, especially when you bend forward.  · You have a fever.  · You have puffy (swollen) neck glands.  · You have pain while swallowing.  · You have white areas in the back of your throat.  GET HELP RIGHT AWAY IF:   · You have very bad or constant:    Headache.    Ear pain.    Pain in your forehead, behind your eyes, and over your cheekbones (sinus pain).    Chest pain.  · You have long-lasting (chronic) lung disease and any of the following:    Wheezing.    Long-lasting cough.    Coughing up blood.    A change in your usual mucus.  · You have a stiff neck.  · You have changes in your:    Vision.   "  Hearing.    Thinking.    Mood.  MAKE SURE YOU:   · Understand these instructions.  · Will watch your condition.  · Will get help right away if you are not doing well or get worse.     This information is not intended to replace advice given to you by your health care provider. Make sure you discuss any questions you have with your health care provider.     Document Released: 06/05/2009 Document Revised: 05/03/2016 Document Reviewed: 03/25/2015  [employer] Interactive Patient Education ©2016 [employer]

## 2017-04-10 ENCOUNTER — OFFICE VISIT (OUTPATIENT)
Dept: PSYCHIATRY | Facility: CLINIC | Age: 57
End: 2017-04-10

## 2017-04-10 VITALS
HEIGHT: 66 IN | DIASTOLIC BLOOD PRESSURE: 68 MMHG | WEIGHT: 268 LBS | SYSTOLIC BLOOD PRESSURE: 111 MMHG | HEART RATE: 74 BPM | BODY MASS INDEX: 43.07 KG/M2

## 2017-04-10 DIAGNOSIS — F43.23 ADJUSTMENT DISORDER WITH MIXED ANXIETY AND DEPRESSED MOOD: Primary | ICD-10-CM

## 2017-04-10 PROCEDURE — 90791 PSYCH DIAGNOSTIC EVALUATION: CPT | Performed by: COUNSELOR
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
## 2017-04-25 ENCOUNTER — OFFICE VISIT (OUTPATIENT)
Dept: BARIATRICS/WEIGHT MGMT | Facility: CLINIC | Age: 57
End: 2017-04-25

## 2017-04-25 VITALS
HEART RATE: 78 BPM | RESPIRATION RATE: 18 BRPM | TEMPERATURE: 98.2 F | BODY MASS INDEX: 43.31 KG/M2 | HEIGHT: 66 IN | WEIGHT: 269.5 LBS | OXYGEN SATURATION: 99 % | SYSTOLIC BLOOD PRESSURE: 122 MMHG | DIASTOLIC BLOOD PRESSURE: 76 MMHG

## 2017-04-25 DIAGNOSIS — R10.13 DYSPEPSIA: Primary | ICD-10-CM

## 2017-04-25 DIAGNOSIS — E66.01 MORBID OBESITY, UNSPECIFIED OBESITY TYPE (HCC): ICD-10-CM

## 2017-04-25 DIAGNOSIS — Z98.84 S/P BARIATRIC SURGERY: ICD-10-CM

## 2017-04-25 PROCEDURE — 99213 OFFICE O/P EST LOW 20 MIN: CPT | Performed by: PHYSICIAN ASSISTANT

## 2017-04-25 NOTE — PROGRESS NOTES
[facility] Bariatric Surgery  [address]  [facility] [address]  [phone]        Patient Name: Jannette Onofre.  YOB: 1960      Date of Visit: 4/25/2017      Reason for Visit:  AGB f/up    HPI:  Jannette Onofre is a 56 y.o. female s/p LAGB 4/2013 by Dr. Russell.  Transferred care to [facility] 7/2016 b/c she says Dr. Russell made her feel like a failure.    Presurgery weight 340 lbs.  Lowest weight achieved 224 lbs in first 18 months.  Then started to regain d/t stressors at work w/ associated poor food choices and bad habits.  Was working to get back on track, but then at the end of 2016 she had an MI w/ LAD stenting, now on ASA + Plavix.  Returned to us last month saying that really set her back.  She was struggling w/ depression and anxiety and she just couldn't seem to get refocused on getting healthy.  She wanted a band adjustment b/c she felt that would help her both mentally and physically.    Unfortunately actual band vol is unknown b/c no ADJ office notes are available from Dr. Russell, but patien suspected she had a 5cc band vol.  We were able to aspirate at least 2.5cc at last visit prior to fill, but band vol was not measured d/t concern for reactive swelling/tightness if a fill was then attempted.  Added 0.5cc to band at last visit w/out difficulty.  Last UGI 7/2016 okay.    Says she feels like it really helped.  Has better satiety/fullness.  Denies any issues w/ dysphagia/N/V/AP.  Has also gone to therapy since LOV and now has a much better outcome.  Has realized it is her choice as to whether or not she is healthy.  Has stopped blaming her frustrations on other things.  Is trying not to compare herself to her friends who have had gastric sleeve and her losing more weight than she is.      Since LOV has modified her diet and is making healthy food choices.      Diet Recall:  B- boiled eggs  L- grilled chicken and ashford beans  Snack - 1/2 protein  bar  D-grilled pork chop w/ salad  Drinking 64oz water w/ SF jasmine-aid     Is also exercising more consistently - walking 2 days/week.  Has lost 3.5 lbs of body fat since last visit.  Is overall feeling really good.      Past Medical History:   Diagnosis Date   • Anxiety    • Coronary artery disease     s/p LAD stenting, follows w/ Dr. Ogden   • Depression    • Diabetes mellitus     dx , initially improved s/p AGB but now back on insulin    • Fatigue    • Hyperlipidemia    • Hypertension    • Melanoma        • Morbid obesity    • Myocardial infarction     2016   • BRICE on CPAP      Past Surgical History:   Procedure Laterality Date   • CARDIAC CATHETERIZATION N/A 2016    Procedure: Left Heart Cath;  Surgeon: Akhil Zuñiga MD;  Location:  [facility] CATH INVASIVE LOCATION;  Service:    • CATARACT EXTRACTION N/A     rt eye , left eye    •  SECTION     • COLONOSCOPY     • LAPAROSCOPIC CHOLECYSTECTOMY     • LAPAROSCOPIC GASTRIC BANDING  2013    s/p LAGB by Dr Russell   • OTHER SURGICAL HISTORY      melanoma removal/back   • AR RT/LT HEART CATHETERS N/A 2016    Procedure: Percutaneous Coronary Intervention;  Surgeon: Akhil Zuñiga MD;  Location:  [facility] CATH INVASIVE LOCATION;  Service: Cardiology   • SEPTOPLASTY     • UMBILICAL HERNIA REPAIR         Current Outpatient Prescriptions:   •  Albiglutide 30 MG pen-injector, Inject 1 Pen Subcutaneously Once a Week., Disp: 12 each, Rfl: 6  •  amLODIPine (NORVASC) 10 MG tablet, Take 1 tablet by mouth Daily., Disp: 90 tablet, Rfl: 3  •  aspirin 81 MG chewable tablet, Chew 4 tablets Daily. (Patient taking differently: Chew 325 mg Daily.), Disp: 100 tablet, Rfl: 3  •  atorvastatin (LIPITOR) 80 MG tablet, Take 1 tablet by mouth Every Night. (Patient taking differently: Take 80 mg by mouth Daily.), Disp: 90 tablet, Rfl: 3  •  carvedilol (COREG) 25 MG tablet, Take 1 tablet by mouth Every 12 (Twelve) Hours., Disp: 180  tablet, Rfl: 3  •  clopidogrel (PLAVIX) 75 MG tablet, Take 1 tablet by mouth Daily., Disp: 90 tablet, Rfl: 3  •  glucose blood test strip, Use as instructed twice daily., Disp: 300 each, Rfl: 3  •  Insulin Lispro Prot & Lispro (HUMALOG MIX 50/50 KWIKPEN) (50-50) 100 UNIT/ML suspension pen-injector, Inject 40 units Subcutaneously before meals. (Patient taking differently: Inject 30 Units under the skin 3 (Three) Times a Day Before Meals.), Disp: 30 mL, Rfl: 5  •  isosorbide mononitrate (IMDUR) 60 MG 24 hr tablet, Take 60 mg by mouth Daily., Disp: , Rfl:   •  lisinopril (PRINIVIL,ZESTRIL) 20 MG tablet, Take 1 tablet by mouth Daily., Disp: 90 tablet, Rfl: 3  •  meclizine (ANTIVERT) 25 MG tablet, Take 1 tablet by mouth 3 (Three) Times a Day As Needed fir Dizziness., Disp: 90 tablet, Rfl: 0  •  metFORMIN (GLUCOPHAGE) 1000 MG tablet, Take 1 tablet by mouth 2 (Two) Times a Day., Disp: 180 tablet, Rfl: 3  •  TANZEUM 30 MG pen-injector, Inject 30 mg under the skin 1 (One) Time Per Week. (Patient taking differently: Inject 30 mg under the skin 1 (One) Time Per Week. Patient takes on Wednesdays.), Disp: 12 each, Rfl: 6  •  TRUEPLUS LANCETS 28G misc, USE TO TEST BLOOD SUGAR TWICE DAILY, Disp: 300 each, Rfl: 3     No Known Allergies    Social History     Social History   • Marital status:      Spouse name: N/A   • Number of children: N/A   • Years of education: N/A     Occupational History   • Not on file.     Social History Main Topics   • Smoking status: Former Smoker     Packs/day: 0.25     Types: Cigarettes     Quit date: 6/27/1989   • Smokeless tobacco: Never Used   • Alcohol use No   • Drug use: No   • Sexual activity: Defer     Other Topics Concern   • Not on file     Social History Narrative    .  Lives in [facility].   @ [employer].       /76 (BP Location: Left arm, Patient Position: Sitting, Cuff Size: Large Adult)  Pulse 78  Temp 98.2 °F (36.8 °C) (Temporal Artery )   Resp  "18  Ht 66\" (167.6 cm)  Wt 269 lb 8 oz (122 kg)  SpO2 99%  BMI 43.5 kg/m2    Physical Exam   Constitutional: She appears well-developed and well-nourished. She is cooperative.   HENT:   Mouth/Throat: Oropharynx is clear and moist and mucous membranes are normal.   Eyes: Conjunctivae are normal. No scleral icterus.   Cardiovascular: Normal rate.    Pulmonary/Chest: Effort normal.   Abdominal: Soft. There is no tenderness.   RLQ port - site unremarkable   Musculoskeletal: Normal range of motion. She exhibits no edema.   Neurological: She is alert.   Skin: Skin is warm and dry. No rash noted.   Psychiatric: She has a normal mood and affect. Judgment normal.       Band Adjustment Info   Band Type: ?? APS ??   Port Location: RLQ   Procedure Position: supine   Needle Type: short    Local Anesthesia: with 1% lidocaine and bicarb       Amount Expected (cc): ?? 5.5 ??  (suspected)   Amount Added (cc):         Amount Removed (cc):     Total Amount (cc): ?? 5.5 ??  (suspected)       Able to lashae water after ADJ?    Other Notes: No Yvonne office notes available to confirm band vol.             Assessment:  56 y.o. female s/p LAGB 4/2013 by Dr. Russell    ICD-10-CM ICD-9-CM   1. Dyspepsia R10.13 536.8   2. Morbid obesity, unspecified obesity type E66.01 278.01   3. S/P bariatric surgery Z98.84 V45.86       Plan:  No ADJ today.  Encouraged to continue w/ positive progress.  Increase routine exercise.  Focus on healthy habits.  Call w/ issues/concerns.    Follow up in 3 months - after cardiology eval.          "

## 2017-06-16 ENCOUNTER — LAB (OUTPATIENT)
Dept: LAB | Facility: HOSPITAL | Age: 57
End: 2017-06-16
Attending: INTERNAL MEDICINE

## 2017-06-16 ENCOUNTER — TRANSCRIBE ORDERS (OUTPATIENT)
Dept: ADMINISTRATIVE | Facility: HOSPITAL | Age: 57
End: 2017-06-16

## 2017-06-16 DIAGNOSIS — E11.9 DIABETES MELLITUS WITHOUT COMPLICATION (HCC): ICD-10-CM

## 2017-06-16 DIAGNOSIS — E78.5 HYPERLIPIDEMIA, UNSPECIFIED HYPERLIPIDEMIA TYPE: ICD-10-CM

## 2017-06-16 DIAGNOSIS — E53.8 VITAMIN B 12 DEFICIENCY: ICD-10-CM

## 2017-06-16 DIAGNOSIS — R53.83 MALAISE AND FATIGUE: ICD-10-CM

## 2017-06-16 DIAGNOSIS — I10 BENIGN HYPERTENSION: ICD-10-CM

## 2017-06-16 DIAGNOSIS — R53.81 MALAISE AND FATIGUE: ICD-10-CM

## 2017-06-16 DIAGNOSIS — E11.9 DIABETES MELLITUS WITHOUT COMPLICATION (HCC): Primary | ICD-10-CM

## 2017-06-16 LAB
ALBUMIN SERPL-MCNC: 4.4 G/DL (ref 3.5–5)
ALBUMIN/GLOB SERPL: 1.5 G/DL (ref 1.5–2.5)
ALP SERPL-CCNC: 69 U/L (ref 35–104)
ALT SERPL W P-5'-P-CCNC: 18 U/L (ref 10–36)
ANION GAP SERPL CALCULATED.3IONS-SCNC: 3.4 MMOL/L (ref 3.6–11.2)
AST SERPL-CCNC: 20 U/L (ref 10–30)
BASOPHILS # BLD AUTO: 0.03 10*3/MM3 (ref 0–0.3)
BASOPHILS NFR BLD AUTO: 0.4 % (ref 0–2)
BILIRUB SERPL-MCNC: 0.7 MG/DL (ref 0.2–1.8)
BUN BLD-MCNC: 22 MG/DL (ref 7–21)
BUN/CREAT SERPL: 19.1 (ref 7–25)
CALCIUM SPEC-SCNC: 9.8 MG/DL (ref 7.7–10)
CHLORIDE SERPL-SCNC: 112 MMOL/L (ref 99–112)
CHOLEST SERPL-MCNC: 122 MG/DL (ref 0–200)
CO2 SERPL-SCNC: 25.6 MMOL/L (ref 24.3–31.9)
CREAT BLD-MCNC: 1.15 MG/DL (ref 0.43–1.29)
DEPRECATED RDW RBC AUTO: 46.1 FL (ref 37–54)
EOSINOPHIL # BLD AUTO: 0.18 10*3/MM3 (ref 0–0.7)
EOSINOPHIL NFR BLD AUTO: 2.5 % (ref 0–5)
ERYTHROCYTE [DISTWIDTH] IN BLOOD BY AUTOMATED COUNT: 14.4 % (ref 11.5–14.5)
FOLATE SERPL-MCNC: 15.03 NG/ML (ref 5.4–20)
GFR SERPL CREATININE-BSD FRML MDRD: 48 ML/MIN/1.73
GLOBULIN UR ELPH-MCNC: 2.9 GM/DL
GLUCOSE BLD-MCNC: 255 MG/DL (ref 70–110)
HBA1C MFR BLD: 8.7 % (ref 4.5–5.7)
HCT VFR BLD AUTO: 40 % (ref 37–47)
HDLC SERPL-MCNC: 29 MG/DL (ref 60–100)
HGB BLD-MCNC: 12.6 G/DL (ref 12–16)
IMM GRANULOCYTES # BLD: 0.01 10*3/MM3 (ref 0–0.03)
IMM GRANULOCYTES NFR BLD: 0.1 % (ref 0–0.5)
LDLC SERPL CALC-MCNC: 41 MG/DL (ref 0–100)
LDLC/HDLC SERPL: 1.4 {RATIO}
LYMPHOCYTES # BLD AUTO: 1.97 10*3/MM3 (ref 1–3)
LYMPHOCYTES NFR BLD AUTO: 27.9 % (ref 21–51)
MCH RBC QN AUTO: 27.9 PG (ref 27–33)
MCHC RBC AUTO-ENTMCNC: 31.5 G/DL (ref 33–37)
MCV RBC AUTO: 88.5 FL (ref 80–94)
MONOCYTES # BLD AUTO: 0.41 10*3/MM3 (ref 0.1–0.9)
MONOCYTES NFR BLD AUTO: 5.8 % (ref 0–10)
NEUTROPHILS # BLD AUTO: 4.46 10*3/MM3 (ref 1.4–6.5)
NEUTROPHILS NFR BLD AUTO: 63.3 % (ref 30–70)
OSMOLALITY SERPL CALC.SUM OF ELEC: 293.3 MOSM/KG (ref 273–305)
PLATELET # BLD AUTO: 203 10*3/MM3 (ref 130–400)
PMV BLD AUTO: 10.5 FL (ref 6–10)
POTASSIUM BLD-SCNC: 5.1 MMOL/L (ref 3.5–5.3)
PROT SERPL-MCNC: 7.3 G/DL (ref 6–8)
RBC # BLD AUTO: 4.52 10*6/MM3 (ref 4.2–5.4)
SODIUM BLD-SCNC: 141 MMOL/L (ref 135–153)
TRIGL SERPL-MCNC: 262 MG/DL (ref 0–150)
TSH SERPL DL<=0.05 MIU/L-ACNC: 2.35 MIU/ML (ref 0.55–4.78)
VIT B12 BLD-MCNC: 363 PG/ML (ref 211–911)
VLDLC SERPL-MCNC: 52.4 MG/DL
WBC NRBC COR # BLD: 7.06 10*3/MM3 (ref 4.5–12.5)

## 2017-06-16 PROCEDURE — 36415 COLL VENOUS BLD VENIPUNCTURE: CPT

## 2017-06-16 PROCEDURE — 82746 ASSAY OF FOLIC ACID SERUM: CPT | Performed by: INTERNAL MEDICINE

## 2017-06-16 PROCEDURE — 80061 LIPID PANEL: CPT | Performed by: INTERNAL MEDICINE

## 2017-06-16 PROCEDURE — 83036 HEMOGLOBIN GLYCOSYLATED A1C: CPT | Performed by: INTERNAL MEDICINE

## 2017-06-16 PROCEDURE — 85025 COMPLETE CBC W/AUTO DIFF WBC: CPT | Performed by: INTERNAL MEDICINE

## 2017-06-16 PROCEDURE — 80053 COMPREHEN METABOLIC PANEL: CPT | Performed by: INTERNAL MEDICINE

## 2017-06-16 PROCEDURE — 84443 ASSAY THYROID STIM HORMONE: CPT | Performed by: INTERNAL MEDICINE

## 2017-06-16 PROCEDURE — 82607 VITAMIN B-12: CPT | Performed by: INTERNAL MEDICINE

## 2017-06-19 ENCOUNTER — OFFICE VISIT (OUTPATIENT)
Dept: PSYCHIATRY | Facility: CLINIC | Age: 57
End: 2017-06-19

## 2017-06-19 DIAGNOSIS — F43.23 ADJUSTMENT DISORDER WITH MIXED ANXIETY AND DEPRESSED MOOD: Primary | ICD-10-CM

## 2017-06-19 PROCEDURE — 90834 PSYTX W PT 45 MINUTES: CPT | Performed by: COUNSELOR
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
## 2017-07-05 ENCOUNTER — DOCUMENTATION (OUTPATIENT)
Dept: PSYCHIATRY | Facility: CLINIC | Age: 57
End: 2017-07-05

## 2017-07-05 NOTE — TREATMENT PLAN
Multi-Disciplinary Problems (from Behavioral Health Treatment Plan)    Active Problems     Problem: Anger (Priority: --)  (Start Date: 07/05/17) (Resolve Date: --)    Problem Details:  The patient self-scales this problem as a 8 with 10 being the worst.         Goal Start Date End Date    Patient will develop specific, socially acceptable way to manage anger. 07/05/17 --    Goal Details:  Progress toward goal:  The patient self-scales their progress related to this goal as a 6 with 10 being the worst.         Goal Intervention Frequency Start Date End Date    Process patient's angry feelings or outbursts that have recently occurred and review alternative behaviors Weekly 07/05/17 --    Intervention Details:  Duration of treatment until until remission of symptoms.         Goal Intervention Frequency Start Date End Date    Work with patient to develop constructive way to handle anger. Weekly 07/05/17 --    Intervention Details:  Duration of treatment until until discharged.               Problem: Ineffective Communication (Priority: --)  (Start Date: 07/05/17) (Resolve Date: --)    Problem Details:  The patient self-scales this problem as a 8 with 10 being the worst.         Goal Start Date End Date    Patient will demonstrate the ability to initiate new communication patterns outside of sessions on a consistent basis. 07/05/17 --    Goal Details:  Progress toward goal:  The patient self-scales their progress related to this goal as a 8 with 10 being the worst.         Goal Intervention Frequency Start Date End Date    Encourage understanding of past experiences that affect current communication style and educate about healthy communication patterns. Weekly 07/05/17 --    Intervention Details:  Duration of treatment until until remission of symptoms.               Problem: Relationship Issues (Priority: --)  (Start Date: 07/05/17) (Resolve Date: --)    Problem Details:  The patient self-scales this problem as a 10  with 10 being the worst.         Goal Start Date End Date    Patient will initiate personal change to improve relationships. 07/05/17 --    Goal Details:  Progress toward goal:  The patient self-scales their progress related to this goal as a 8 with 10 being the worst.         Goal Intervention Frequency Start Date End Date    Assist patient in identifying behaviors that focus on relationship building and process the changes needed to improve relationships. Weekly 07/05/17 --    Intervention Details:  Duration of treatment until until discharged.                     Reviewed By     Rcahelle Branham [facility] 07/05/17 2142                 I have discussed and reviewed this treatment plan with the patient.  It has been printed for signatures.

## 2017-07-13 ENCOUNTER — OFFICE VISIT (OUTPATIENT)
Dept: PSYCHIATRY | Facility: CLINIC | Age: 57
End: 2017-07-13

## 2017-07-13 VITALS
BODY MASS INDEX: 42.43 KG/M2 | HEIGHT: 66 IN | HEART RATE: 78 BPM | SYSTOLIC BLOOD PRESSURE: 124 MMHG | DIASTOLIC BLOOD PRESSURE: 79 MMHG | WEIGHT: 264 LBS

## 2017-07-13 DIAGNOSIS — F43.23 ADJUSTMENT DISORDER WITH MIXED ANXIETY AND DEPRESSED MOOD: ICD-10-CM

## 2017-07-13 DIAGNOSIS — F99 INSOMNIA DUE TO OTHER MENTAL DISORDER: ICD-10-CM

## 2017-07-13 DIAGNOSIS — Z79.899 MEDICATION MANAGEMENT: Primary | ICD-10-CM

## 2017-07-13 DIAGNOSIS — F51.05 INSOMNIA DUE TO OTHER MENTAL DISORDER: ICD-10-CM

## 2017-07-13 LAB
AMPHETAMINE CUT-OFF: NORMAL
BENZODIAZIPINE CUT-OFF: NORMAL
BUPRENORPHINE CUT-OFF: NORMAL
COCAINE CUT-OFF: NORMAL
EXTERNAL AMPHETAMINE SCREEN URINE: NEGATIVE
EXTERNAL BENZODIAZEPINE SCREEN URINE: NEGATIVE
EXTERNAL BUPRENORPHINE SCREEN URINE: NEGATIVE
EXTERNAL COCAINE SCREEN URINE: NEGATIVE
EXTERNAL MDMA: NEGATIVE
EXTERNAL METHADONE SCREEN URINE: NEGATIVE
EXTERNAL METHAMPHETAMINE SCREEN URINE: NEGATIVE
EXTERNAL OPIATES SCREEN URINE: NEGATIVE
EXTERNAL OXYCODONE SCREEN URINE: NEGATIVE
EXTERNAL THC SCREEN URINE: NEGATIVE
MDMA CUT-OFF: NORMAL
METHADONE CUT-OFF: NORMAL
METHAMPHETAMINE CUT-OFF: NORMAL
OPIATES CUT-OFF: NORMAL
OXYCODONE CUT-OFF: NORMAL
THC CUT-OFF: NORMAL

## 2017-07-13 PROCEDURE — 99214 OFFICE O/P EST MOD 30 MIN: CPT | Performed by: NURSE PRACTITIONER

## 2017-07-13 RX ORDER — MIRTAZAPINE 15 MG/1
15 TABLET, FILM COATED ORAL NIGHTLY
Qty: 30 TABLET | Refills: 0 | Status: SHIPPED | OUTPATIENT
Start: 2017-07-13 | End: 2017-08-14
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
## 2017-07-20 ENCOUNTER — OFFICE VISIT (OUTPATIENT)
Dept: CARDIOLOGY | Facility: CLINIC | Age: 57
End: 2017-07-20

## 2017-07-20 VITALS
HEIGHT: 66 IN | WEIGHT: 262.8 LBS | RESPIRATION RATE: 16 BRPM | DIASTOLIC BLOOD PRESSURE: 83 MMHG | SYSTOLIC BLOOD PRESSURE: 128 MMHG | BODY MASS INDEX: 42.23 KG/M2 | HEART RATE: 81 BPM

## 2017-07-20 DIAGNOSIS — I25.10 ASCVD (ARTERIOSCLEROTIC CARDIOVASCULAR DISEASE): ICD-10-CM

## 2017-07-20 DIAGNOSIS — I21.09 ST ELEVATION MYOCARDIAL INFARCTION (STEMI) OF ANTERIOR WALL (HCC): ICD-10-CM

## 2017-07-20 DIAGNOSIS — I21.02 ST ELEVATION MYOCARDIAL INFARCTION INVOLVING LEFT ANTERIOR DESCENDING (LAD) CORONARY ARTERY (HCC): ICD-10-CM

## 2017-07-20 DIAGNOSIS — E11.8 TYPE 2 DIABETES MELLITUS WITH COMPLICATION, UNSPECIFIED LONG TERM INSULIN USE STATUS: ICD-10-CM

## 2017-07-20 DIAGNOSIS — E78.5 HYPERLIPIDEMIA, UNSPECIFIED HYPERLIPIDEMIA TYPE: ICD-10-CM

## 2017-07-20 DIAGNOSIS — E78.5 DYSLIPIDEMIA: ICD-10-CM

## 2017-07-20 DIAGNOSIS — R06.09 DYSPNEA ON EXERTION: ICD-10-CM

## 2017-07-20 DIAGNOSIS — I10 ESSENTIAL HYPERTENSION: ICD-10-CM

## 2017-07-20 DIAGNOSIS — E66.01 MORBID OBESITY, UNSPECIFIED OBESITY TYPE (HCC): ICD-10-CM

## 2017-07-20 DIAGNOSIS — R53.83 FATIGUE, UNSPECIFIED TYPE: ICD-10-CM

## 2017-07-20 DIAGNOSIS — I20.0 UNSTABLE ANGINA PECTORIS (HCC): Primary | ICD-10-CM

## 2017-07-20 PROCEDURE — 99213 OFFICE O/P EST LOW 20 MIN: CPT | Performed by: INTERNAL MEDICINE

## 2017-07-20 PROCEDURE — 93000 ELECTROCARDIOGRAM COMPLETE: CPT | Performed by: INTERNAL MEDICINE

## 2017-07-20 RX ORDER — AMLODIPINE BESYLATE 10 MG/1
5 TABLET ORAL DAILY
Qty: 90 TABLET | Refills: 3 | Status: SHIPPED | OUTPATIENT
Start: 2017-07-20 | End: 2018-06-11 | Stop reason: ALTCHOICE

## 2017-07-20 NOTE — PROGRESS NOTES
Akhil Huerta MD  Jannette Onofre  1960 07/20/2017    Patient Active Problem List   Diagnosis   • Unstable angina pectoris   • ST elevation myocardial infarction (STEMI) of anterior wall, 11/29/16 s/p stenting proxismal % occlusion, clinically stable.   • Type 2 diabetes mellitus   • Dyslipidemia   • Chest pain   • Diabetes mellitus   • Coronary artery disease   • Fatigue   • Hyperlipidemia   • Hypertension   • Morbid obesity   • Myocardial infarction   • Dyspnea on exertion (NYHA class 2-3)       Dear Akhil Huerta MD:    Subjective     Jannette Onofre is a 57 y.o. female with the problems as listed above, presents      History of Present Illness:Ms. Onofre is a very pleasant 57-year-old  female with history of known ASCVD, status post acute anterior wall ST elevation myocardial infarction followed by stenting of the LAD in November 2016.  She is here for regular cardiac follow-up.  She denies any chest pains since the last visit.  She says she sometimes gets short of breath.  She also has some intermittent leg edema.  She says her blood pressure tends to run low at times, as low as 80 mm Hg on the top and she feels real drained out.  She  denies any syncope as such.  She feels tired frequently.  He states he has had some episodes of depression for which she has obtained counseling.  She is taking Remeron to help her sleep.  Still has problem falling asleep.        No Known Allergies:      Current Outpatient Prescriptions:   •  amLODIPine (NORVASC) 10 MG tablet, Take 1/2 tablet by mouth Daily., Disp: 90 tablet, Rfl: 3  •  aspirin 81 MG chewable tablet, Chew 4 tablets Daily. (Patient taking differently: Chew 325 mg Daily.), Disp: 100 tablet, Rfl: 3  •  atorvastatin (LIPITOR) 80 MG tablet, Take 1 tablet by mouth Daily., Disp: 90 tablet, Rfl: 3  •  carvedilol (COREG) 25 MG tablet, Take 1 tablet by mouth Every 12 (Twelve) Hours., Disp: 180 tablet, Rfl: 3  •  clopidogrel (PLAVIX) 75 MG tablet,  Take 1 tablet by mouth Daily., Disp: 90 tablet, Rfl: 3  •  cyclobenzaprine (FLEXERIL) 10 MG tablet, Take 1 tablet by mouth 2 (Two) Times a Day As Needed for back pain., Disp: 60 tablet, Rfl: 1  •  Empagliflozin (JARDIANCE) 25 MG tablet, Take one tablet by mouth Daily., Disp: 90 tablet, Rfl: 4  •  glucose blood test strip, Use as instructed twice daily., Disp: 300 each, Rfl: 3  •  Insulin Lispro Prot & Lispro (HUMALOG MIX 50/50 KWIKPEN) (50-50) 100 UNIT/ML suspension pen-injector, Inject 40 units Subcutaneously before meals. (Patient taking differently: Inject 30 Units under the skin 3 (Three) Times a Day Before Meals.), Disp: 30 mL, Rfl: 5  •  isosorbide mononitrate (IMDUR) 60 MG 24 hr tablet, Take 60 mg by mouth Daily., Disp: , Rfl:   •  lisinopril (PRINIVIL,ZESTRIL) 20 MG tablet, Take 1 tablet by mouth Daily., Disp: 90 tablet, Rfl: 3  •  meclizine (ANTIVERT) 25 MG tablet, Take 1 tablet by mouth 3 (Three) Times a Day As Needed fir Dizziness., Disp: 90 tablet, Rfl: 0  •  metFORMIN (GLUCOPHAGE) 1000 MG tablet, Take 1 tablet by mouth 2 (Two) Times a Day., Disp: 180 tablet, Rfl: 3  •  mirtazapine (REMERON) 15 MG tablet, Take 1 tablet by mouth Every Night., Disp: 30 tablet, Rfl: 0  •  TANZEUM 30 MG pen-injector, Inject 30 mg under the skin 1 (One) Time Per Week. (Patient taking differently: Inject 30 mg under the skin 1 (One) Time Per Week. Patient takes on Wednesdays.), Disp: 12 each, Rfl: 6  •  TRUEPLUS LANCETS 28G misc, USE TO TEST BLOOD SUGAR TWICE DAILY, Disp: 300 each, Rfl: 3      The following portions of the patient's history were reviewed and updated as appropriate: allergies, current medications, past family history, past medical history, past social history, past surgical history and problem list.    Social History   Substance Use Topics   • Smoking status: Former Smoker     Packs/day: 0.25     Types: Cigarettes     Quit date: 6/27/1989   • Smokeless tobacco: Never Used   • Alcohol use No       Review of  "Systems   Constitution: Negative for chills and fever.   HENT: Negative for headaches, nosebleeds and sore throat.    Cardiovascular: Positive for leg swelling. Negative for chest pain, palpitations and syncope.   Respiratory: Positive for shortness of breath. Negative for cough, hemoptysis and wheezing.    Gastrointestinal: Negative for abdominal pain, hematemesis, hematochezia, melena, nausea and vomiting.   Genitourinary: Negative for dysuria and hematuria.   Neurological: Positive for dizziness.       Objective   Vitals:    07/20/17 1501   BP: 128/83   BP Location: Left arm   Pulse: 81   Resp: 16   Weight: 262 lb 12.8 oz (119 kg)   Height: 66\" (167.6 cm)     Body mass index is 42.42 kg/(m^2).        Physical Exam   Constitutional: She is oriented to person, place, and time. She appears well-developed and well-nourished.   HENT:   Head: Normocephalic.   Eyes: Conjunctivae and EOM are normal.   Neck: Normal range of motion. Neck supple. No JVD present. No tracheal deviation present. No thyromegaly present.   Cardiovascular: Normal rate and regular rhythm.  Exam reveals no gallop and no friction rub.    No murmur heard.  Pulses:       Dorsalis pedis pulses are 1+ on the right side, and 1+ on the left side.        Posterior tibial pulses are 1+ on the right side, and 1+ on the left side.   Pulmonary/Chest: Breath sounds normal. No respiratory distress. She has no wheezes. She has no rales.   Abdominal: Soft. Bowel sounds are normal. She exhibits no mass. There is no tenderness.   Musculoskeletal: She exhibits no edema.   Neurological: She is alert and oriented to person, place, and time. No cranial nerve deficit.   Skin: Skin is warm and dry.   Psychiatric: She has a normal mood and affect.       Lab Results   Component Value Date     06/16/2017    K 5.1 06/16/2017     06/16/2017    CO2 25.6 06/16/2017    BUN 22 (H) 06/16/2017    CREATININE 1.15 06/16/2017    GLUCOSE 255 (H) 06/16/2017    CALCIUM 9.8 " 06/16/2017    AST 20 06/16/2017    ALT 18 06/16/2017    ALKPHOS 69 06/16/2017    LABIL2 1.5 06/16/2017     Lab Results   Component Value Date    CKTOTAL 80 12/01/2016     Lab Results   Component Value Date    WBC 7.06 06/16/2017    HGB 12.6 06/16/2017    HCT 40.0 06/16/2017     06/16/2017     Lab Results   Component Value Date    INR 0.89 11/29/2016    INR 0.82 11/27/2016     No results found for: MG  Lab Results   Component Value Date    TSH 2.353 06/16/2017    CHLPL 213 (H) 03/14/2016    TRIG 262 (H) 06/16/2017    HDL 29 (L) 06/16/2017    LDL No Calculation 03/14/2016      Lab Results   Component Value Date    BNP 27.0 12/29/2016     Echo   Lab Results   Component Value Date    ECHOEFEST 65 11/28/2016       ECG 12 Lead  Date/Time: 7/20/2017 3:08 PM  Performed by: IRASEMA ROSENBERG  Authorized by: IRASEMA ROSENBERG   Rhythm: sinus rhythm  Conduction: right bundle branch block  Other findings: PRWP  Comments: Probable progression with low voltage QRS complexes in leads V3 through V6.  This is unchanged from previous EKGs.  The computer reading of acute ST elevation is not true.            Assessment/Plan      1. ASCVD, S/P acute ST elevation myocardial infarction (STEMI) of anterior wall, 11/29/16 s/p stenting proximal % occlusion, clinically stable.     2. Hyperlipidemia, unspecified hyperlipidemia type     3. Essential hypertension     4. ST elevation myocardial infarction involving left anterior descending (LAD) coronary artery     5. Type 2 diabetes mellitus with complication, unspecified long term insulin use status     6. Dyslipidemia     7. Fatigue, unspecified type     9.  Obesity    10. Dyspnea on exertion (NYHA class 2-3)            Recommendations:    1. Decrease amlodipine to 5 mg daily since her blood pressure seems to be running low at home.  2. We will obtain echo Doppler study to evaluate her LV wall motion and systolic function as patient is complaining of some shortness of  breath.  3. Continue with aspirin, Plavix, carvedilol, lisinopril and atorvastatin at the same doses for now.  4. Follow-up in 3 months.       Return in about 3 months (around 10/20/2017).    As always, I appreciate very much the opportunity to participate in the cardiovascular care of your patients.      With Best Regards,    Lebron Ogden MD, [facility]    Dragon disclaimer:  Much of this encounter note is an electronic transcription/translation of spoken language to printed text. The electronic translation of spoken language may permit erroneous, or at times, nonsensical words or phrases to be inadvertently transcribed; Although I have reviewed the note for such errors, some may still exist.

## 2017-07-21 ENCOUNTER — TRANSCRIBE ORDERS (OUTPATIENT)
Dept: CARDIOLOGY | Facility: HOSPITAL | Age: 57
End: 2017-07-21

## 2017-07-24 ENCOUNTER — TELEPHONE (OUTPATIENT)
Dept: BARIATRICS/WEIGHT MGMT | Facility: CLINIC | Age: 57
End: 2017-07-24

## 2017-07-24 ENCOUNTER — OFFICE VISIT (OUTPATIENT)
Dept: BARIATRICS/WEIGHT MGMT | Facility: CLINIC | Age: 57
End: 2017-07-24

## 2017-07-24 VITALS
HEIGHT: 66 IN | SYSTOLIC BLOOD PRESSURE: 130 MMHG | TEMPERATURE: 96.5 F | OXYGEN SATURATION: 96 % | RESPIRATION RATE: 16 BRPM | BODY MASS INDEX: 41.62 KG/M2 | WEIGHT: 259 LBS | HEART RATE: 82 BPM | DIASTOLIC BLOOD PRESSURE: 86 MMHG

## 2017-07-24 DIAGNOSIS — R10.13 DYSPEPSIA: Primary | ICD-10-CM

## 2017-07-24 DIAGNOSIS — E66.01 MORBID OBESITY, UNSPECIFIED OBESITY TYPE (HCC): ICD-10-CM

## 2017-07-24 DIAGNOSIS — Z98.84 S/P BARIATRIC SURGERY: ICD-10-CM

## 2017-07-24 PROCEDURE — 99214 OFFICE O/P EST MOD 30 MIN: CPT | Performed by: PHYSICIAN ASSISTANT

## 2017-07-24 NOTE — PROGRESS NOTES
[facility]  [address]  [facility] [address]  [phone]        Patient Name: Jannette Onofre.  YOB: 1960      Date of Visit: 2017      Reason for Visit:  AGB f/up    HPI:  Jannette Onofre is a 57 y.o. female s/p LAGB 2013 by Dr. Russell.  Transferred care to [facility] 2016 b/c she says Dr. Russell made her feel like a failure.    Presurgery weight 340 lbs.  Lowest weight achieved 224 lbs in first 18 months.  Then started to regain d/t stressors at work w/ associated poor food choices and bad habits.  Was working to get back on track, but then at the end of  she had an MI w/ LAD stenting that really set her back.    Now working to get back on track.  Following w/ a therapist and her cardiologist.  Committed to getting healthy again.  Making good food choices and is exercising routinely.    Expected band vol 5.5cc.  Denies dysphagia/N/V/AP.  Last UGI 2016 okay.  Down 10 lbs in the last 3 months.    Diet Recall:  B- 2 boiled eggs, satisfied  L- steak and veggies, satisfied  D- steak and veggies    No snacking.  Drinking 64oz water w/ SF jasmine-aid     Past Medical History:   Diagnosis Date   • Anxiety    • Coronary artery disease     s/p LAD stenting, follows w/ Dr. Ogden   • Depression    • Diabetes mellitus     dx , initially improved s/p AGB but now back on insulin    • Fatigue    • Hyperlipidemia    • Hypertension    • Melanoma        • Morbid obesity    • Myocardial infarction     2016   • BRICE on CPAP      Past Surgical History:   Procedure Laterality Date   • CARDIAC CATHETERIZATION N/A 2016    Procedure: Left Heart Cath;  Surgeon: Akhil Zuñiga MD;  Location: [facility] CATH INVASIVE LOCATION;  Service:    • CATARACT EXTRACTION N/A     rt eye , left eye    •  SECTION     • COLONOSCOPY     • LAPAROSCOPIC CHOLECYSTECTOMY     • LAPAROSCOPIC GASTRIC BANDING  2013    s/p LAGB by Dr Russell   •  OTHER SURGICAL HISTORY  1998    melanoma removal/back   • MO RT/LT HEART CATHETERS N/A 11/29/2016    Procedure: Percutaneous Coronary Intervention;  Surgeon: Akhil Zuñiga MD;  Location: [facility] INVASIVE LOCATION;  Service: Cardiology   • SEPTOPLASTY  2006   • UMBILICAL HERNIA REPAIR  2002       Current Outpatient Prescriptions:   •  amLODIPine (NORVASC) 10 MG tablet, Take 1/2 tablet by mouth Daily., Disp: 90 tablet, Rfl: 3  •  aspirin 81 MG chewable tablet, Chew 4 tablets Daily. (Patient taking differently: Chew 325 mg Daily.), Disp: 100 tablet, Rfl: 3  •  atorvastatin (LIPITOR) 80 MG tablet, Take 1 tablet by mouth Daily., Disp: 90 tablet, Rfl: 3  •  carvedilol (COREG) 25 MG tablet, Take 1 tablet by mouth Every 12 (Twelve) Hours., Disp: 180 tablet, Rfl: 3  •  clopidogrel (PLAVIX) 75 MG tablet, Take 1 tablet by mouth Daily., Disp: 90 tablet, Rfl: 3  •  cyclobenzaprine (FLEXERIL) 10 MG tablet, Take 1 tablet by mouth 2 (Two) Times a Day As Needed for back pain., Disp: 60 tablet, Rfl: 1  •  Empagliflozin (JARDIANCE) 25 MG tablet, Take one tablet by mouth Daily., Disp: 90 tablet, Rfl: 4  •  glucose blood test strip, Use as instructed twice daily., Disp: 300 each, Rfl: 3  •  Insulin Lispro Prot & Lispro (HUMALOG MIX 50/50 KWIKPEN) (50-50) 100 UNIT/ML suspension pen-injector, Inject 40 units Subcutaneously before meals. (Patient taking differently: Inject 30 Units under the skin 3 (Three) Times a Day Before Meals.), Disp: 30 mL, Rfl: 5  •  isosorbide mononitrate (IMDUR) 60 MG 24 hr tablet, Take 60 mg by mouth Daily., Disp: , Rfl:   •  lisinopril (PRINIVIL,ZESTRIL) 20 MG tablet, Take 1 tablet by mouth Daily., Disp: 90 tablet, Rfl: 3  •  meclizine (ANTIVERT) 25 MG tablet, Take 1 tablet by mouth 3 (Three) Times a Day As Needed fir Dizziness., Disp: 90 tablet, Rfl: 0  •  metFORMIN (GLUCOPHAGE) 1000 MG tablet, Take 1 tablet by mouth 2 (Two) Times a Day., Disp: 180 tablet, Rfl: 3  •  mirtazapine (REMERON) 15 MG tablet,  "Take 1 tablet by mouth Every Night., Disp: 30 tablet, Rfl: 0  •  TANZEUM 30 MG pen-injector, Inject 30 mg under the skin 1 (One) Time Per Week. (Patient taking differently: Inject 30 mg under the skin 1 (One) Time Per Week. Patient takes on Wednesdays.), Disp: 12 each, Rfl: 6  •  TRUEPLUS LANCETS 28G misc, USE TO TEST BLOOD SUGAR TWICE DAILY, Disp: 300 each, Rfl: 3     No Known Allergies    Social History     Social History   • Marital status:      Spouse name: N/A   • Number of children: N/A   • Years of education: N/A     Occupational History   • Not on file.     Social History Main Topics   • Smoking status: Former Smoker     Packs/day: 0.25     Types: Cigarettes     Quit date: 6/27/1989   • Smokeless tobacco: Never Used   • Alcohol use No   • Drug use: No   • Sexual activity: Defer     Other Topics Concern   • Not on file     Social History Narrative    .  Lives in [facility].   @ [employer].       /86 (BP Location: Right arm, Patient Position: Sitting, Cuff Size: Large Adult)  Pulse 82  Temp 96.5 °F (35.8 °C) (Temporal Artery )   Resp 16  Ht 66\" (167.6 cm)  Wt 259 lb (117 kg)  SpO2 96%  BMI 41.8 kg/m2    Physical Exam   Constitutional: She appears well-developed and well-nourished. She is cooperative.   HENT:   Mouth/Throat: Oropharynx is clear and moist and mucous membranes are normal.   Eyes: Conjunctivae are normal. No scleral icterus.   Cardiovascular: Normal rate.    Pulmonary/Chest: Effort normal.   Abdominal: Soft. There is no tenderness.   RLQ port - site unremarkable   Musculoskeletal: Normal range of motion. She exhibits no edema.   Neurological: She is alert.   Skin: Skin is warm and dry. No rash noted.   Psychiatric: She has a normal mood and affect. Judgment normal.       Band Adjustment Info   Band Type: ?? APS ??   Port Location: RLQ   Procedure Position: supine   Needle Type: short    Local Anesthesia: with 1% lidocaine and bicarb       Amount " Expected (cc): ?? 5.5 ??  (suspected)   Amount Added (cc):         Amount Removed (cc):     Total Amount (cc): ?? 5.5 ??  (suspected)       Able to lashae water after ADJ?    Other Notes: No Yvonne office notes available to confirm band vol.             Assessment:  56 y.o. female s/p LAGB 4/2013 by Dr. Russell    ICD-10-CM ICD-9-CM   1. Dyspepsia R10.13 536.8   2. Morbid obesity, unspecified obesity type E66.01 278.01   3. S/P bariatric surgery Z98.84 V45.86       Plan:  No ADJ today.  Annual UGI ordered.  Encouraged to continue w/ positive progress.  Increase routine exercise.  Focus on healthy habits.  Call w/ issues/concerns.    Follow up in 4 months - after cardiology eval.

## 2017-07-24 NOTE — TELEPHONE ENCOUNTER
----- Message from JOSHUA Aguillon sent at 7/24/2017  3:13 PM EDT -----  Regarding: HILTON @ [facility] ordered today.  Please schedule @ [facility] in next 2 weeks.

## 2017-07-27 ENCOUNTER — HOSPITAL ENCOUNTER (OUTPATIENT)
Dept: GENERAL RADIOLOGY | Facility: HOSPITAL | Age: 57
Discharge: HOME | End: 2017-07-27
Admitting: PHYSICIAN ASSISTANT

## 2017-07-27 DIAGNOSIS — R10.13 DYSPEPSIA: ICD-10-CM

## 2017-07-27 PROCEDURE — 74241 FL UPPER GI SINGLE CONTRAST W KUB: CPT | Performed by: RADIOLOGY

## 2017-07-27 PROCEDURE — 74241: CPT

## 2017-08-01 ENCOUNTER — APPOINTMENT (OUTPATIENT)
Dept: CT IMAGING | Facility: HOSPITAL | Age: 57
End: 2017-08-01

## 2017-08-01 ENCOUNTER — APPOINTMENT (OUTPATIENT)
Dept: GENERAL RADIOLOGY | Facility: HOSPITAL | Age: 57
End: 2017-08-01

## 2017-08-01 ENCOUNTER — HOSPITAL ENCOUNTER (EMERGENCY)
Facility: HOSPITAL | Age: 57
Discharge: HOME | End: 2017-08-01
Attending: EMERGENCY MEDICINE | Admitting: EMERGENCY MEDICINE

## 2017-08-01 VITALS
DIASTOLIC BLOOD PRESSURE: 68 MMHG | HEIGHT: 66 IN | OXYGEN SATURATION: 99 % | BODY MASS INDEX: 43.23 KG/M2 | HEART RATE: 93 BPM | RESPIRATION RATE: 18 BRPM | WEIGHT: 269 LBS | TEMPERATURE: 98.5 F | SYSTOLIC BLOOD PRESSURE: 131 MMHG

## 2017-08-01 DIAGNOSIS — S62.347A CLOSED NONDISPLACED FRACTURE OF BASE OF FIFTH METACARPAL BONE OF LEFT HAND, INITIAL ENCOUNTER: ICD-10-CM

## 2017-08-01 DIAGNOSIS — R73.9 HYPERGLYCEMIA: Primary | ICD-10-CM

## 2017-08-01 DIAGNOSIS — N28.9 RENAL INSUFFICIENCY: ICD-10-CM

## 2017-08-01 LAB
6-ACETYL MORPHINE: NEGATIVE
ALBUMIN SERPL-MCNC: 4.3 G/DL (ref 3.5–5)
ALBUMIN/GLOB SERPL: 1.6 G/DL (ref 1.5–2.5)
ALP SERPL-CCNC: 68 U/L (ref 35–104)
ALT SERPL W P-5'-P-CCNC: 29 U/L (ref 10–36)
AMPHET+METHAMPHET UR QL: NEGATIVE
AMYLASE SERPL-CCNC: 17 U/L (ref 28–100)
ANION GAP SERPL CALCULATED.3IONS-SCNC: 8.5 MMOL/L (ref 3.6–11.2)
AST SERPL-CCNC: 30 U/L (ref 10–30)
BARBITURATES UR QL SCN: NEGATIVE
BASOPHILS # BLD AUTO: 0.01 10*3/MM3 (ref 0–0.3)
BASOPHILS NFR BLD AUTO: 0.2 % (ref 0–2)
BENZODIAZ UR QL SCN: NEGATIVE
BILIRUB SERPL-MCNC: 1.3 MG/DL (ref 0.2–1.8)
BILIRUB UR QL STRIP: NEGATIVE
BUN BLD-MCNC: 20 MG/DL (ref 7–21)
BUN/CREAT SERPL: 14.7 (ref 7–25)
BUPRENORPHINE SERPL-MCNC: NEGATIVE NG/ML
CALCIUM SPEC-SCNC: 9.1 MG/DL (ref 7.7–10)
CANNABINOIDS SERPL QL: NEGATIVE
CHLORIDE SERPL-SCNC: 109 MMOL/L (ref 99–112)
CK MB SERPL-CCNC: 0.73 NG/ML (ref 0–5)
CK MB SERPL-RTO: 0.6 % (ref 0–3)
CK SERPL-CCNC: 132 U/L (ref 24–173)
CLARITY UR: CLEAR
CO2 SERPL-SCNC: 19.5 MMOL/L (ref 24.3–31.9)
COCAINE UR QL: NEGATIVE
COLOR UR: YELLOW
CREAT BLD-MCNC: 1.36 MG/DL (ref 0.43–1.29)
DEPRECATED RDW RBC AUTO: 45.3 FL (ref 37–54)
EOSINOPHIL # BLD AUTO: 0.02 10*3/MM3 (ref 0–0.7)
EOSINOPHIL NFR BLD AUTO: 0.4 % (ref 0–5)
ERYTHROCYTE [DISTWIDTH] IN BLOOD BY AUTOMATED COUNT: 14.4 % (ref 11.5–14.5)
GFR SERPL CREATININE-BSD FRML MDRD: 40 ML/MIN/1.73
GLOBULIN UR ELPH-MCNC: 2.7 GM/DL
GLUCOSE BLD-MCNC: 502 MG/DL (ref 70–110)
GLUCOSE BLDC GLUCOMTR-MCNC: 362 MG/DL (ref 70–130)
GLUCOSE UR STRIP-MCNC: ABNORMAL MG/DL
HCT VFR BLD AUTO: 42.5 % (ref 37–47)
HGB BLD-MCNC: 13.2 G/DL (ref 12–16)
HGB UR QL STRIP.AUTO: NEGATIVE
IMM GRANULOCYTES # BLD: 0.01 10*3/MM3 (ref 0–0.03)
IMM GRANULOCYTES NFR BLD: 0.2 % (ref 0–0.5)
KETONES UR QL STRIP: ABNORMAL
LEUKOCYTE ESTERASE UR QL STRIP.AUTO: NEGATIVE
LIPASE SERPL-CCNC: 41 U/L (ref 13–60)
LYMPHOCYTES # BLD AUTO: 0.65 10*3/MM3 (ref 1–3)
LYMPHOCYTES NFR BLD AUTO: 12.4 % (ref 21–51)
MCH RBC QN AUTO: 27.6 PG (ref 27–33)
MCHC RBC AUTO-ENTMCNC: 31.1 G/DL (ref 33–37)
MCV RBC AUTO: 88.7 FL (ref 80–94)
METHADONE UR QL SCN: NEGATIVE
MONOCYTES # BLD AUTO: 0.33 10*3/MM3 (ref 0.1–0.9)
MONOCYTES NFR BLD AUTO: 6.3 % (ref 0–10)
MYOGLOBIN SERPL-MCNC: 160 NG/ML (ref 0–109)
NEUTROPHILS # BLD AUTO: 4.23 10*3/MM3 (ref 1.4–6.5)
NEUTROPHILS NFR BLD AUTO: 80.5 % (ref 30–70)
NITRITE UR QL STRIP: NEGATIVE
OPIATES UR QL: NEGATIVE
OSMOLALITY SERPL CALC.SUM OF ELEC: 298.9 MOSM/KG (ref 273–305)
OXYCODONE UR QL SCN: NEGATIVE
PCP UR QL SCN: NEGATIVE
PH UR STRIP.AUTO: <=5 [PH] (ref 5–8)
PLATELET # BLD AUTO: 171 10*3/MM3 (ref 130–400)
PMV BLD AUTO: 10.9 FL (ref 6–10)
POTASSIUM BLD-SCNC: 4.3 MMOL/L (ref 3.5–5.3)
PROT SERPL-MCNC: 7 G/DL (ref 6–8)
PROT UR QL STRIP: NEGATIVE
RBC # BLD AUTO: 4.79 10*6/MM3 (ref 4.2–5.4)
SODIUM BLD-SCNC: 137 MMOL/L (ref 135–153)
SP GR UR STRIP: >1.03 (ref 1–1.03)
TROPONIN I SERPL-MCNC: <0.006 NG/ML
UROBILINOGEN UR QL STRIP: ABNORMAL
WBC NRBC COR # BLD: 5.25 10*3/MM3 (ref 4.5–12.5)

## 2017-08-01 PROCEDURE — 36415 COLL VENOUS BLD VENIPUNCTURE: CPT

## 2017-08-01 PROCEDURE — 63710000001 INSULIN REGULAR HUMAN PER 5 UNITS: Performed by: PHYSICIAN ASSISTANT

## 2017-08-01 PROCEDURE — 83874 ASSAY OF MYOGLOBIN: CPT | Performed by: PHYSICIAN ASSISTANT

## 2017-08-01 PROCEDURE — 85025 COMPLETE CBC W/AUTO DIFF WBC: CPT | Performed by: PHYSICIAN ASSISTANT

## 2017-08-01 PROCEDURE — 80053 COMPREHEN METABOLIC PANEL: CPT | Performed by: PHYSICIAN ASSISTANT

## 2017-08-01 PROCEDURE — 73630 X-RAY EXAM OF FOOT: CPT

## 2017-08-01 PROCEDURE — 82150 ASSAY OF AMYLASE: CPT | Performed by: PHYSICIAN ASSISTANT

## 2017-08-01 PROCEDURE — 81003 URINALYSIS AUTO W/O SCOPE: CPT | Performed by: PHYSICIAN ASSISTANT

## 2017-08-01 PROCEDURE — 70450 CT HEAD/BRAIN W/O DYE: CPT | Performed by: RADIOLOGY

## 2017-08-01 PROCEDURE — 80307 DRUG TEST PRSMV CHEM ANLYZR: CPT | Performed by: PHYSICIAN ASSISTANT

## 2017-08-01 PROCEDURE — 73562 X-RAY EXAM OF KNEE 3: CPT

## 2017-08-01 PROCEDURE — 73630 X-RAY EXAM OF FOOT: CPT | Performed by: RADIOLOGY

## 2017-08-01 PROCEDURE — 70450 CT HEAD/BRAIN W/O DYE: CPT

## 2017-08-01 PROCEDURE — 82550 ASSAY OF CK (CPK): CPT | Performed by: PHYSICIAN ASSISTANT

## 2017-08-01 PROCEDURE — 71010 HC CHEST PA OR AP: CPT

## 2017-08-01 PROCEDURE — 96374 THER/PROPH/DIAG INJ IV PUSH: CPT

## 2017-08-01 PROCEDURE — 96361 HYDRATE IV INFUSION ADD-ON: CPT

## 2017-08-01 PROCEDURE — 71010 XR CHEST 1 VW: CPT | Performed by: RADIOLOGY

## 2017-08-01 PROCEDURE — 25010000002 MORPHINE PER 10 MG: Performed by: EMERGENCY MEDICINE

## 2017-08-01 PROCEDURE — 82553 CREATINE MB FRACTION: CPT | Performed by: PHYSICIAN ASSISTANT

## 2017-08-01 PROCEDURE — 73562 X-RAY EXAM OF KNEE 3: CPT | Performed by: RADIOLOGY

## 2017-08-01 PROCEDURE — 84484 ASSAY OF TROPONIN QUANT: CPT | Performed by: PHYSICIAN ASSISTANT

## 2017-08-01 PROCEDURE — 99284 EMERGENCY DEPT VISIT MOD MDM: CPT

## 2017-08-01 PROCEDURE — 83690 ASSAY OF LIPASE: CPT | Performed by: PHYSICIAN ASSISTANT

## 2017-08-01 PROCEDURE — 93010 ELECTROCARDIOGRAM REPORT: CPT | Performed by: INTERNAL MEDICINE

## 2017-08-01 PROCEDURE — 73130 X-RAY EXAM OF HAND: CPT | Performed by: RADIOLOGY

## 2017-08-01 PROCEDURE — 82962 GLUCOSE BLOOD TEST: CPT

## 2017-08-01 PROCEDURE — 73130 X-RAY EXAM OF HAND: CPT

## 2017-08-01 PROCEDURE — 93005 ELECTROCARDIOGRAM TRACING: CPT | Performed by: PHYSICIAN ASSISTANT

## 2017-08-01 RX ORDER — SODIUM CHLORIDE 0.9 % (FLUSH) 0.9 %
10 SYRINGE (ML) INJECTION AS NEEDED
Status: DISCONTINUED | OUTPATIENT
Start: 2017-08-01 | End: 2017-08-01 | Stop reason: HOSPADM

## 2017-08-01 RX ORDER — ONDANSETRON 4 MG/1
4 TABLET, ORALLY DISINTEGRATING ORAL EVERY 6 HOURS PRN
Qty: 12 TABLET | Refills: 0 | Status: ON HOLD | OUTPATIENT
Start: 2017-08-01 | End: 2017-10-29

## 2017-08-01 RX ORDER — HYDROCODONE BITARTRATE AND ACETAMINOPHEN 5; 325 MG/1; MG/1
1 TABLET ORAL EVERY 6 HOURS PRN
Qty: 12 TABLET | Refills: 0 | Status: SHIPPED | OUTPATIENT
Start: 2017-08-01 | End: 2017-08-30 | Stop reason: ALTCHOICE

## 2017-08-01 RX ADMIN — MORPHINE SULFATE 4 MG: 4 INJECTION, SOLUTION INTRAMUSCULAR; INTRAVENOUS at 09:17

## 2017-08-01 RX ADMIN — HUMAN INSULIN 8 UNITS: 100 INJECTION, SOLUTION SUBCUTANEOUS at 07:36

## 2017-08-01 RX ADMIN — SODIUM CHLORIDE 1000 ML: 9 INJECTION, SOLUTION INTRAVENOUS at 07:38

## 2017-08-01 NOTE — ED PROVIDER NOTES
Subjective   History of Present Illness    Review of Systems    Past Medical History:   Diagnosis Date   • Anxiety    • Coronary artery disease     s/p LAD stenting, follows w/ Dr. Ogden   • Depression    • Diabetes mellitus     dx , initially improved s/p AGB but now back on insulin    • Fatigue    • Hyperlipidemia    • Hypertension    • Melanoma        • Morbid obesity    • Myocardial infarction     2016   • BRICE on CPAP    • Past heart attack        No Known Allergies    Past Surgical History:   Procedure Laterality Date   • CARDIAC CATHETERIZATION N/A 2016    Procedure: Left Heart Cath;  Surgeon: Akhil Zuñiga MD;  Location:  [facility] LOCATION;  Service:    • CATARACT EXTRACTION N/A     rt eye , left eye    •  SECTION     • COLONOSCOPY     • LAPAROSCOPIC CHOLECYSTECTOMY     • LAPAROSCOPIC GASTRIC BANDING  2013    s/p LAGB by Dr Russell   • OTHER SURGICAL HISTORY      melanoma removal/back   • NE RT/LT HEART CATHETERS N/A 2016    Procedure: Percutaneous Coronary Intervention;  Surgeon: Akhil Zuñiga MD;  Location:  [facility] LOCATION;  Service: Cardiology   • SEPTOPLASTY     • UMBILICAL HERNIA REPAIR         Family History   Problem Relation Age of Onset   • Cancer Mother      skin   • Diabetes Mother    • Heart disease Mother    • Hyperlipidemia Mother    • Hypertension Mother    • COPD Father    • Heart disease Father    • Hyperlipidemia Father    • Heart attack Father    • Diabetes Maternal Aunt    • Diabetes Maternal Grandmother    • Heart disease Maternal Grandfather    • Heart attack Maternal Grandfather    • Hypertension Maternal Grandfather    • Hypertension Sister        Social History     Social History   • Marital status:      Spouse name: N/A   • Number of children: N/A   • Years of education: N/A     Social History Main Topics   • Smoking status: Former Smoker     Packs/day: 0.25     Types: Cigarettes      Quit date: 6/27/1989   • Smokeless tobacco: Never Used   • Alcohol use No   • Drug use: No   • Sexual activity: Defer     Other Topics Concern   • None     Social History Narrative    .  Lives in [facility].   @ [employer].           Objective   Physical Exam    Procedures         ED Course  ED Course   Value Comment By Time   ECG 12 Lead 0600- Reviewed by Dr. Mendieta , rate 91, NSR, no ischemia JOSHUA Wilson 08/01 0644    Patient refused orthostatics JOSHUA Wilson 08/01 0651    Negative head CT per JOSHUA Kirkland 08/01 0733    Patient has had no episodes of vomiting or diarrhea in ED.  Reports last episode of each was yesterday before bed. Discussed case with Dr. Kaur, recommends fluid replacement and dc home to f/u with Dr. Huerta. JOSHUA Wilson 08/01 0734    Patient denies any need for pain medication at this time. JOSHUA Wilson 08/01 0747                  MDM  Number of Diagnoses or Management Options  Closed nondisplaced fracture of base of fifth metacarpal bone of left hand, initial encounter: new and requires workup  Hyperglycemia: new and requires workup  Renal insufficiency: new and requires workup     Amount and/or Complexity of Data Reviewed  Clinical lab tests: reviewed and ordered  Tests in the radiology section of CPT®: ordered and reviewed  Decide to obtain previous medical records or to obtain history from someone other than the patient: yes  Independent visualization of images, tracings, or specimens: yes    Risk of Complications, Morbidity, and/or Mortality  Presenting problems: moderate  Diagnostic procedures: moderate  Management options: moderate    Patient Progress  Patient progress: stable      Final diagnoses:   Hyperglycemia   Closed nondisplaced fracture of base of fifth metacarpal bone of left hand, initial encounter   Renal insufficiency            Emanuel Norman PA-C  08/02/17  0295

## 2017-08-01 NOTE — ED PROVIDER NOTES
Subjective   HPI Comments: Patient presents to the ED after two syncopal episodes and complaining of left hand, knee, and foot pain after falling. Patient reports that she has had diarrhea all day with a stomach virus and two episodes of vomiting.  Patient stated that has also subsided but she thinks that she might be dehydrated because she did not eat or drink very much yesterday due to the stomach virus.  Patient states that she fell and hit her head, left hand, knee, and foot when she passed out. Patient reports that she is taking plavix for CAD.      Patient is a 57 y.o. female presenting with syncope.   History provided by:  Patient   used: No    Syncope   Episode history:  Multiple  Most recent episode:  Today  Timing:  Constant  Progression:  Unchanged  Chronicity:  New  Context: dehydration and standing up    Witnessed: no    Relieved by:  Nothing  Worsened by:  Nothing  Ineffective treatments:  None tried      Review of Systems   Constitutional: Negative.    HENT: Negative.    Eyes: Negative.    Respiratory: Negative.    Cardiovascular: Positive for syncope.   Gastrointestinal: Negative.    Endocrine: Negative.    Genitourinary: Negative.    Musculoskeletal: Negative.    Skin: Negative.    Allergic/Immunologic: Negative.    Hematological: Negative.    Psychiatric/Behavioral: Negative.    All other systems reviewed and are negative.      Past Medical History:   Diagnosis Date   • Anxiety    • Coronary artery disease     s/p LAD stenting, follows w/ Dr. Ogden   • Depression    • Diabetes mellitus     dx 1998, initially improved s/p AGB but now back on insulin    • Fatigue    • Hyperlipidemia    • Hypertension    • Melanoma     1998   • Morbid obesity    • Myocardial infarction     11/2016   • BRICE on CPAP        No Known Allergies    Past Surgical History:   Procedure Laterality Date   • CARDIAC CATHETERIZATION N/A 11/29/2016    Procedure: Left Heart Cath;  Surgeon: Akhil Zuñiga MD;   Location:  [facility];  Service:    • CATARACT EXTRACTION N/A     rt eye , left eye    •  SECTION     • COLONOSCOPY     • LAPAROSCOPIC CHOLECYSTECTOMY     • LAPAROSCOPIC GASTRIC BANDING  2013    s/p LAGB by Dr Russell   • OTHER SURGICAL HISTORY      melanoma removal/back   • OK RT/LT HEART CATHETERS N/A 2016    Procedure: Percutaneous Coronary Intervention;  Surgeon: Akhil Zuñiga MD;  Location: [facility] INVASIVE LOCATION;  Service: Cardiology   • SEPTOPLASTY     • UMBILICAL HERNIA REPAIR         Family History   Problem Relation Age of Onset   • Cancer Mother      skin   • Diabetes Mother    • Heart disease Mother    • Hyperlipidemia Mother    • Hypertension Mother    • COPD Father    • Heart disease Father    • Hyperlipidemia Father    • Heart attack Father    • Diabetes Maternal Aunt    • Diabetes Maternal Grandmother    • Heart disease Maternal Grandfather    • Heart attack Maternal Grandfather    • Hypertension Maternal Grandfather    • Hypertension Sister        Social History     Social History   • Marital status:      Spouse name: N/A   • Number of children: N/A   • Years of education: N/A     Social History Main Topics   • Smoking status: Former Smoker     Packs/day: 0.25     Types: Cigarettes     Quit date: 1989   • Smokeless tobacco: Never Used   • Alcohol use No   • Drug use: No   • Sexual activity: Defer     Other Topics Concern   • None     Social History Narrative    .  Lives in [facility].   @ [employer].           Objective   Physical Exam   Constitutional: She is oriented to person, place, and time. She appears well-developed and well-nourished.   HENT:   Head: Normocephalic and atraumatic.   Right Ear: External ear normal.   Left Ear: External ear normal.   Nose: Nose normal.   Mouth/Throat: Oropharynx is clear and moist.   Eyes: Conjunctivae and EOM are normal. Pupils are equal, round,  and reactive to light.   Neck: Normal range of motion. Neck supple.   Cardiovascular: Normal rate, regular rhythm, normal heart sounds and intact distal pulses.    Pulmonary/Chest: Effort normal and breath sounds normal.   Abdominal: Soft. Bowel sounds are normal.   Musculoskeletal: Normal range of motion.   Neurological: She is alert and oriented to person, place, and time.   Skin: Skin is warm and dry.   Psychiatric: She has a normal mood and affect. Her behavior is normal. Judgment and thought content normal.   Nursing note and vitals reviewed.      Procedures         ED Course  ED Course   Value Comment By Time   ECG 12 Lead 0600- Reviewed by Dr. Mendieta , rate 91, NSR, no ischemia JOSHUA Wilson 08/01 0644    Patient refused orthostatics JOSHUA Wilson 08/01 0651    Negative head CT per JOSHUA Kirkland 08/01 0707    Patient has had no episodes of vomiting or diarrhea in ED.  Reports last episode of each was yesterday before bed. Discussed case with Dr. Kaur, recommends fluid replacement and dc home to f/u with Dr. Huerta. JOSHUA Wilson 08/01 0701    Patient denies any need for pain medication at this time. JOSHUA Wilson 08/01 0778                  MDM    Final diagnoses:   Hyperglycemia   Closed nondisplaced fracture of base of fifth metacarpal bone of left hand, initial encounter   Renal insufficiency            JOSHUA Wilson  08/01/17 [id number]

## 2017-08-02 ENCOUNTER — TELEPHONE (OUTPATIENT)
Dept: ORTHOPEDIC SURGERY | Facility: CLINIC | Age: 57
End: 2017-08-02

## 2017-08-02 ENCOUNTER — PREP FOR SURGERY (OUTPATIENT)
Dept: OTHER | Facility: HOSPITAL | Age: 57
End: 2017-08-02

## 2017-08-02 ENCOUNTER — OFFICE VISIT (OUTPATIENT)
Dept: ORTHOPEDIC SURGERY | Facility: CLINIC | Age: 57
End: 2017-08-02

## 2017-08-02 VITALS
HEIGHT: 66 IN | HEART RATE: 84 BPM | BODY MASS INDEX: 43.23 KG/M2 | SYSTOLIC BLOOD PRESSURE: 84 MMHG | WEIGHT: 269 LBS | DIASTOLIC BLOOD PRESSURE: 59 MMHG

## 2017-08-02 DIAGNOSIS — S62.327A CLOSED DISPLACED FRACTURE OF SHAFT OF FIFTH METACARPAL BONE OF LEFT HAND, INITIAL ENCOUNTER: Primary | ICD-10-CM

## 2017-08-02 PROCEDURE — 99204 OFFICE O/P NEW MOD 45 MIN: CPT | Performed by: ORTHOPAEDIC SURGERY

## 2017-08-02 RX ORDER — SODIUM CHLORIDE 0.9 % (FLUSH) 0.9 %
1-10 SYRINGE (ML) INJECTION AS NEEDED
Status: CANCELLED | OUTPATIENT
Start: 2017-08-03

## 2017-08-02 NOTE — PROGRESS NOTES
History and Physical      Patient: Jannette Onofre  YOB: 1960  Date of Encounter: 08/02/2017      HPI:    Mrs. Onofre is a 57-year-old white female seen today referred by [facility] emergency department due to an acute fall injury suffered 8/1/17. The patient states that she gotten up around midnight to go to the bathroom and upon getting up she lost her balance and stumbled striking her foot on a dresser as well as her knee and jamming her left hand into this as well. Patient had immediate pain and decreased range of motion as well as progressive swelling into the left hand painful range of motion weightbearing to the left foot and knee. She presented to the emergency department which radiographs were obtained of the left foot, knee, hand. CT scan of the head was also completed. Patient was placed in an ulnar gutter splint due to notable findings of fifth metacarpal fracture. She was as follow with orthopedics. pain medication was provided from the ER physician. Today, patient complains of progressive swelling and bruising to the volar aspect of the hand. She denies any paresthesias. She has continued to ambulate on the left knee and foot however she states that it is painful. Patient is right-hand dominant. Nonsmoker.    Past Medical History:   Diagnosis Date   • Anxiety    • Coronary artery disease     s/p LAD stenting, follows w/ Dr. Ogden   • Depression    • Diabetes mellitus     dx 1998, initially improved s/p AGB but now back on insulin    • Fatigue    • Hyperlipidemia    • Hypertension    • Melanoma     1998   • Morbid obesity    • Myocardial infarction     11/2016   • BRICE on CPAP    • Past heart attack      Patient Active Problem List   Diagnosis   • Unstable angina pectoris   • ST elevation myocardial infarction (STEMI) of anterior wall, 11/29/16 s/p stenting proxismal % occlusion, clinically stable.   • Type 2 diabetes mellitus   • Dyslipidemia   • Chest pain   • Diabetes  mellitus   • Coronary artery disease   • Fatigue   • Hyperlipidemia   • Hypertension   • Morbid obesity   • Myocardial infarction   • Dyspnea on exertion (NYHA class 2-3)   • Closed displaced fracture of shaft of fifth metacarpal bone of left hand         Past Surgical History:   Procedure Laterality Date   • CARDIAC CATHETERIZATION N/A 2016    Procedure: Left Heart Cath;  Surgeon: Akhil Zuñiga MD;  Location:  [facility];  Service:    • CATARACT EXTRACTION N/A     rt eye , left eye    •  SECTION     • COLONOSCOPY     • LAPAROSCOPIC CHOLECYSTECTOMY     • LAPAROSCOPIC GASTRIC BANDING  2013    s/p LAGB by Dr Russell   • OTHER SURGICAL HISTORY      melanoma removal/back   • MA RT/LT HEART CATHETERS N/A 2016    Procedure: Percutaneous Coronary Intervention;  Surgeon: Akhil Zuñiga MD;  Location:  [facility];  Service: Cardiology   • SEPTOPLASTY     • UMBILICAL HERNIA REPAIR         Family History   Problem Relation Age of Onset   • Cancer Mother      skin   • Diabetes Mother    • Heart disease Mother    • Hyperlipidemia Mother    • Hypertension Mother    • COPD Father    • Heart disease Father    • Hyperlipidemia Father    • Heart attack Father    • Diabetes Maternal Aunt    • Diabetes Maternal Grandmother    • Heart disease Maternal Grandfather    • Heart attack Maternal Grandfather    • Hypertension Maternal Grandfather    • Hypertension Sister        Social History     Social History   • Marital status:      Spouse name: N/A   • Number of children: N/A   • Years of education: N/A     Occupational History   • Not on file.     Social History Main Topics   • Smoking status: Former Smoker     Packs/day: 0.25     Types: Cigarettes     Quit date: 1989   • Smokeless tobacco: Never Used   • Alcohol use No   • Drug use: No   • Sexual activity: Defer     Other Topics Concern   • Not on file     Social History Narrative     .  Lives in [facility].   @ [employer].       Current Outpatient Prescriptions   Medication Sig Dispense Refill   • amLODIPine (NORVASC) 10 MG tablet Take 1/2 tablet by mouth Daily. 90 tablet 3   • aspirin 81 MG chewable tablet Chew 4 tablets Daily. (Patient taking differently: Chew 325 mg Daily.) 100 tablet 3   • atorvastatin (LIPITOR) 80 MG tablet Take 1 tablet by mouth Daily. 90 tablet 3   • carvedilol (COREG) 25 MG tablet Take 1 tablet by mouth Every 12 (Twelve) Hours. 180 tablet 3   • clopidogrel (PLAVIX) 75 MG tablet Take 1 tablet by mouth Daily. 90 tablet 3   • cyclobenzaprine (FLEXERIL) 10 MG tablet Take 1 tablet by mouth 2 (Two) Times a Day As Needed for back pain. 60 tablet 1   • Empagliflozin (JARDIANCE) 25 MG tablet Take one tablet by mouth Daily. 90 tablet 4   • glucose blood test strip Use as instructed twice daily. 300 each 3   • HYDROcodone-acetaminophen (NORCO) 5-325 MG per tablet Take 1 tablet by mouth Every 6 (Six) Hours As Needed for Mild Pain. 12 tablet 0   • Insulin Lispro Prot & Lispro (HUMALOG MIX 50/50 KWIKPEN) (50-50) 100 UNIT/ML suspension pen-injector Inject 40 units Subcutaneously before meals. (Patient taking differently: Inject 30 Units under the skin 3 (Three) Times a Day Before Meals.) 30 mL 5   • isosorbide mononitrate (IMDUR) 60 MG 24 hr tablet Take 60 mg by mouth Daily.     • lisinopril (PRINIVIL,ZESTRIL) 20 MG tablet Take 1 tablet by mouth Daily. 90 tablet 3   • meclizine (ANTIVERT) 25 MG tablet Take 1 tablet by mouth 3 (Three) Times a Day As Needed fir Dizziness. 90 tablet 0   • metFORMIN (GLUCOPHAGE) 1000 MG tablet Take 1 tablet by mouth 2 (Two) Times a Day. 180 tablet 3   • mirtazapine (REMERON) 15 MG tablet Take 1 tablet by mouth Every Night. 30 tablet 0   • ondansetron ODT (ZOFRAN-ODT) 4 MG disintegrating tablet Take 1 tablet by mouth Every 6 (Six) Hours As Needed for Nausea or Vomiting. 12 tablet 0   • TANZEUM 30 MG pen-injector Inject 30 mg  "under the skin 1 (One) Time Per Week. (Patient taking differently: Inject 30 mg under the skin 1 (One) Time Per Week. Patient takes on Wednesdays.) 12 each 6   • TRUEPLUS LANCETS 28G misc USE TO TEST BLOOD SUGAR TWICE DAILY 300 each 3     No current facility-administered medications for this visit.        No Known Allergies    Review of Systems   Constitution: Negative. Negative for fever and malaise/fatigue.        Activity change   HENT: Negative.  Negative for congestion, ear discharge, ear pain, headaches, hearing loss, nosebleeds, odynophagia and sore throat.    Eyes: Negative.    Cardiovascular: Positive for leg swelling. Negative for chest pain and palpitations.   Respiratory: Negative.  Negative for cough, shortness of breath, sputum production and wheezing.    Endocrine: Positive for cold intolerance. Negative for polydipsia, polyphagia and polyuria.   Hematologic/Lymphatic: Negative.  Negative for adenopathy and bleeding problem. Does not bruise/bleed easily.   Skin: Negative.    Musculoskeletal:        Pertinent positives listed in HPI   Gastrointestinal: Negative.  Negative for bloating, abdominal pain, constipation, diarrhea, heartburn, nausea and vomiting.   Genitourinary: Negative.    Neurological: Positive for loss of balance. Negative for dizziness, focal weakness, light-headedness and vertigo.   Psychiatric/Behavioral: Negative.  Negative for altered mental status, depression and memory loss. The patient is not nervous/anxious.    Allergic/Immunologic: Negative.          Vitals:    08/02/17 0809   BP: (!) 84/59   BP Location: Right arm   Patient Position: Sitting   Pulse: 84   Weight: 269 lb (122 kg)   Height: 66\" (167.6 cm)             Physical Exam   Constitutional: She is oriented to person, place, and time. She appears well-developed and well-nourished. No distress.   HENT:   Head: Normocephalic and atraumatic.   Nose: Nose normal.   Mouth/Throat: Oropharynx is clear and moist.   Eyes: " Conjunctivae and EOM are normal. Pupils are equal, round, and reactive to light. No scleral icterus.   Neck: Normal range of motion. Neck supple. No JVD present. No tracheal deviation present. No thyromegaly present.   Cardiovascular: Normal rate, regular rhythm, normal heart sounds and intact distal pulses.  Exam reveals no gallop and no friction rub.    No murmur heard.  Pulmonary/Chest: Effort normal and breath sounds normal. No respiratory distress. She has no wheezes. She has no rales.   Abdominal: Soft. Bowel sounds are normal.   Musculoskeletal:   Left foot on examination today reveals no significant swelling, ecchymosis or erythema. The patient does have pain upon palpation at the IP joint. Limited range of motion upon dorsiflexion plantarflexion of the great toe. Examination of the patient's left knee reveals there is a mild generalized swelling and ecchymosis to the anterior portion of the knee with a small abrasion. Patient exhibits full range of motion with good integrity of the collateral and cruciate ligaments. No significant joint line tenderness.  Examination the patient's left hand today reveals there is mild generalized swelling to the dorsum and palmar aspect of the hand and palm or ecchymosis. Patient has a loss of prominence fifth MCP joint. Patient quickly make a full fist with slight rotational underlapping. Patient can fully extend. Neurovascular status grossly intact left upper and lower extremities.   Lymphadenopathy:     She has no cervical adenopathy.   Neurological: She is alert and oriented to person, place, and time.   Skin: Skin is warm and dry. No erythema.   Psychiatric: She has a normal mood and affect.   Nursing note and vitals reviewed.      Radiology/Labs:       AP, lateral, oblique x-rays of the left hand reveals comminuted proximal 20% displaced and volarly angulated fifth metacarpal shaft fracture. Questionable rotational abnormality.    3 views x-ray left foot reveals no  acute fractures or dislocations. No significant degenerative changes.    AP, lateral x-rays left knee reveals no acute abnormalities. No visible fracture/dislocations    Procedures          Assessment:    ICD-10-CM ICD-9-CM   1. Closed displaced fracture of shaft of fifth metacarpal bone of left hand, initial encounter S62.327A 815.03         Plan:    Situation was discussed with the patient in regards to the degree of displacement as well as the rotational component on exam with the under lapping of the fifth digit into the fourth. For surgical correction of this the patient will undergo closed reduction percutaneous pinning. I reviewed details of procedure with patient today and discussed risks, benefits, alternatives, and limitations of the procedure in laymen's terms with the risks including but not limited to:  neurovascular damage, bleeding, infection, chronic pain, worsening of pain, swelling, loss of motion, weakness, stiffness, instability, DVT, loss of life/limb, and potential need for additional procedures.  No guarantees were given regarding results of surgery.     Patient was given the opportunity to ask and have all questions answered today.  The patient voiced understanding of the risks, benefits, and recalcitrance from conservative forms of treatment that were discussed and the patient consents to proceed with Closed reduction with percutaneous pinning left fifth metacarpal shaft. Patient was given instructions on nothing by mouth status after midnight. Patient will be taken to the OR 8/3/17.      Discussion/Summary:    Written by Antony Stern PA-C, acting as scribe for Dr. León      This document was signed by Oscar León MD.  08/02/20171:01 PM    Cc: Akhil Huerta MD

## 2017-08-02 NOTE — TELEPHONE ENCOUNTER
Notified patient of surgery arrival time 8-3-17 @ 8:30 nothing to eat or drink after midnight hold blood pressure medication per anesthesia but bring with her and gave directions to hospital. Patient verbalized understanding.

## 2017-08-02 NOTE — H&P
History and Physical      Patient: Jannette Onofre  YOB: 1960  Date of Encounter: 08/02/2017      HPI:    Mrs. Onofre is a 57-year-old white female seen today referred by [facility] emergency department due to an acute fall injury suffered 8/1/17. The patient states that she gotten up around midnight to go to the bathroom and upon getting up she lost her balance and stumbled striking her foot on a dresser as well as her knee and jamming her left hand into this as well. Patient had immediate pain and decreased range of motion as well as progressive swelling into the left hand painful range of motion weightbearing to the left foot and knee. She presented to the emergency department which radiographs were obtained of the left foot, knee, hand. CT scan of the head was also completed. Patient was placed in an ulnar gutter splint due to notable findings of fifth metacarpal fracture. She was as follow with orthopedics. pain medication was provided from the ER physician. Today, patient complains of progressive swelling and bruising to the volar aspect of the hand. She denies any paresthesias. She has continued to ambulate on the left knee and foot however she states that it is painful. Patient is right-hand dominant. Nonsmoker.    Past Medical History:   Diagnosis Date   • Anxiety    • Coronary artery disease     s/p LAD stenting, follows w/ Dr. Ogden   • Depression    • Diabetes mellitus     dx 1998, initially improved s/p AGB but now back on insulin    • Fatigue    • Hyperlipidemia    • Hypertension    • Melanoma     1998   • Morbid obesity    • Myocardial infarction     11/2016   • BRICE on CPAP    • Past heart attack      Patient Active Problem List   Diagnosis   • Unstable angina pectoris   • ST elevation myocardial infarction (STEMI) of anterior wall, 11/29/16 s/p stenting proxismal % occlusion, clinically stable.   • Type 2 diabetes mellitus   • Dyslipidemia   • Chest pain   • Diabetes  mellitus   • Coronary artery disease   • Fatigue   • Hyperlipidemia   • Hypertension   • Morbid obesity   • Myocardial infarction   • Dyspnea on exertion (NYHA class 2-3)   • Closed displaced fracture of shaft of fifth metacarpal bone of left hand         Past Surgical History:   Procedure Laterality Date   • CARDIAC CATHETERIZATION N/A 2016    Procedure: Left Heart Cath;  Surgeon: Akhil Zuñiga MD;  Location:  [facility]ATH [facility]VAS[facility] LOCATION;  Service:    • CATARACT EXTRACTION N/A     rt eye , left eye    •  SECTION     • COLONOSCOPY     • LAPAROSCOPIC CHOLECYSTECTOMY     • LAPAROSCOPIC GASTRIC BANDING  2013    s/p LAGB by Dr Russell   • OTHER SURGICAL HISTORY      melanoma removal/back   • MS RT/LT HEART CATHETERS N/A 2016    Procedure: Percutaneous Coronary Intervention;  Surgeon: Akhil Zuñiga MD;  Location:  [facility]ATH [facility];  Service: Cardiology   • SEPTOPLASTY     • UMBILICAL HERNIA REPAIR         Family History   Problem Relation Age of Onset   • Cancer Mother      skin   • Diabetes Mother    • Heart disease Mother    • Hyperlipidemia Mother    • Hypertension Mother    • COPD Father    • Heart disease Father    • Hyperlipidemia Father    • Heart attack Father    • Diabetes Maternal Aunt    • Diabetes Maternal Grandmother    • Heart disease Maternal Grandfather    • Heart attack Maternal Grandfather    • Hypertension Maternal Grandfather    • Hypertension Sister        Social History     Social History   • Marital status:      Spouse name: N/A   • Number of children: N/A   • Years of education: N/A     Occupational History   • Not on file.     Social History Main Topics   • Smoking status: Former Smoker     Packs/day: 0.25     Types: Cigarettes     Quit date: 1989   • Smokeless tobacco: Never Used   • Alcohol use No   • Drug use: No   • Sexual activity: Defer     Other Topics Concern   • Not on file     Social History Narrative     .  Lives in [facility].   @ [employer].       Current Outpatient Prescriptions   Medication Sig Dispense Refill   • amLODIPine (NORVASC) 10 MG tablet Take 1/2 tablet by mouth Daily. 90 tablet 3   • aspirin 81 MG chewable tablet Chew 4 tablets Daily. (Patient taking differently: Chew 325 mg Daily.) 100 tablet 3   • atorvastatin (LIPITOR) 80 MG tablet Take 1 tablet by mouth Daily. 90 tablet 3   • carvedilol (COREG) 25 MG tablet Take 1 tablet by mouth Every 12 (Twelve) Hours. 180 tablet 3   • clopidogrel (PLAVIX) 75 MG tablet Take 1 tablet by mouth Daily. 90 tablet 3   • cyclobenzaprine (FLEXERIL) 10 MG tablet Take 1 tablet by mouth 2 (Two) Times a Day As Needed for back pain. 60 tablet 1   • Empagliflozin (JARDIANCE) 25 MG tablet Take one tablet by mouth Daily. 90 tablet 4   • glucose blood test strip Use as instructed twice daily. 300 each 3   • HYDROcodone-acetaminophen (NORCO) 5-325 MG per tablet Take 1 tablet by mouth Every 6 (Six) Hours As Needed for Mild Pain. 12 tablet 0   • Insulin Lispro Prot & Lispro (HUMALOG MIX 50/50 KWIKPEN) (50-50) 100 UNIT/ML suspension pen-injector Inject 40 units Subcutaneously before meals. (Patient taking differently: Inject 30 Units under the skin 3 (Three) Times a Day Before Meals.) 30 mL 5   • isosorbide mononitrate (IMDUR) 60 MG 24 hr tablet Take 60 mg by mouth Daily.     • lisinopril (PRINIVIL,ZESTRIL) 20 MG tablet Take 1 tablet by mouth Daily. 90 tablet 3   • meclizine (ANTIVERT) 25 MG tablet Take 1 tablet by mouth 3 (Three) Times a Day As Needed fir Dizziness. 90 tablet 0   • metFORMIN (GLUCOPHAGE) 1000 MG tablet Take 1 tablet by mouth 2 (Two) Times a Day. 180 tablet 3   • mirtazapine (REMERON) 15 MG tablet Take 1 tablet by mouth Every Night. 30 tablet 0   • ondansetron ODT (ZOFRAN-ODT) 4 MG disintegrating tablet Take 1 tablet by mouth Every 6 (Six) Hours As Needed for Nausea or Vomiting. 12 tablet 0   • TANZEUM 30 MG pen-injector Inject 30 mg  "under the skin 1 (One) Time Per Week. (Patient taking differently: Inject 30 mg under the skin 1 (One) Time Per Week. Patient takes on Wednesdays.) 12 each 6   • TRUEPLUS LANCETS 28G misc USE TO TEST BLOOD SUGAR TWICE DAILY 300 each 3     No current facility-administered medications for this visit.        No Known Allergies    Review of Systems   Constitution: Negative. Negative for fever and malaise/fatigue.        Activity change   HENT: Negative.  Negative for congestion, ear discharge, ear pain, headaches, hearing loss, nosebleeds, odynophagia and sore throat.    Eyes: Negative.    Cardiovascular: Positive for leg swelling. Negative for chest pain and palpitations.   Respiratory: Negative.  Negative for cough, shortness of breath, sputum production and wheezing.    Endocrine: Positive for cold intolerance. Negative for polydipsia, polyphagia and polyuria.   Hematologic/Lymphatic: Negative.  Negative for adenopathy and bleeding problem. Does not bruise/bleed easily.   Skin: Negative.    Musculoskeletal:        Pertinent positives listed in HPI   Gastrointestinal: Negative.  Negative for bloating, abdominal pain, constipation, diarrhea, heartburn, nausea and vomiting.   Genitourinary: Negative.    Neurological: Positive for loss of balance. Negative for dizziness, focal weakness, light-headedness and vertigo.   Psychiatric/Behavioral: Negative.  Negative for altered mental status, depression and memory loss. The patient is not nervous/anxious.    Allergic/Immunologic: Negative.          Vitals:    08/02/17 0809   BP: (!) 84/59   BP Location: Right arm   Patient Position: Sitting   Pulse: 84   Weight: 269 lb (122 kg)   Height: 66\" (167.6 cm)             Physical Exam   Constitutional: She is oriented to person, place, and time. She appears well-developed and well-nourished. No distress.   HENT:   Head: Normocephalic and atraumatic.   Nose: Nose normal.   Mouth/Throat: Oropharynx is clear and moist.   Eyes: " Conjunctivae and EOM are normal. Pupils are equal, round, and reactive to light. No scleral icterus.   Neck: Normal range of motion. Neck supple. No JVD present. No tracheal deviation present. No thyromegaly present.   Cardiovascular: Normal rate, regular rhythm, normal heart sounds and intact distal pulses.  Exam reveals no gallop and no friction rub.    No murmur heard.  Pulmonary/Chest: Effort normal and breath sounds normal. No respiratory distress. She has no wheezes. She has no rales.   Abdominal: Soft. Bowel sounds are normal.   Musculoskeletal:   Left foot on examination today reveals no significant swelling, ecchymosis or erythema. The patient does have pain upon palpation at the IP joint. Limited range of motion upon dorsiflexion plantarflexion of the great toe. Examination of the patient's left knee reveals there is a mild generalized swelling and ecchymosis to the anterior portion of the knee with a small abrasion. Patient exhibits full range of motion with good integrity of the collateral and cruciate ligaments. No significant joint line tenderness.  Examination the patient's left hand today reveals there is mild generalized swelling to the dorsum and palmar aspect of the hand and palm or ecchymosis. Patient has a loss of prominence fifth MCP joint. Patient quickly make a full fist with slight rotational underlapping. Patient can fully extend. Neurovascular status grossly intact left upper and lower extremities.   Lymphadenopathy:     She has no cervical adenopathy.   Neurological: She is alert and oriented to person, place, and time.   Skin: Skin is warm and dry. No erythema.   Psychiatric: She has a normal mood and affect.   Nursing note and vitals reviewed.      Radiology/Labs:       AP, lateral, oblique x-rays of the left hand reveals comminuted proximal 20% displaced and volarly angulated fifth metacarpal shaft fracture. Questionable rotational abnormality.    3 views x-ray left foot reveals no  acute fractures or dislocations. No significant degenerative changes.    AP, lateral x-rays left knee reveals no acute abnormalities. No visible fracture/dislocations    Procedures          Assessment:    ICD-10-CM ICD-9-CM   1. Closed displaced fracture of shaft of fifth metacarpal bone of left hand, initial encounter S62.327A 815.03         Plan:    Situation was discussed with the patient in regards to the degree of displacement as well as the rotational component on exam with the under lapping of the fifth digit into the fourth. For surgical correction of this the patient will undergo closed reduction percutaneous pinning. I reviewed details of procedure with patient today and discussed risks, benefits, alternatives, and limitations of the procedure in laymen's terms with the risks including but not limited to:  neurovascular damage, bleeding, infection, chronic pain, worsening of pain, swelling, loss of motion, weakness, stiffness, instability, DVT, loss of life/limb, and potential need for additional procedures.  No guarantees were given regarding results of surgery.     Patient was given the opportunity to ask and have all questions answered today.  The patient voiced understanding of the risks, benefits, and recalcitrance from conservative forms of treatment that were discussed and the patient consents to proceed with Closed reduction with percutaneous pinning left fifth metacarpal shaft. Patient was given instructions on nothing by mouth status after midnight. Patient will be taken to the OR 8/3/17.      Discussion/Summary:    Written by Antony Stern PA-C, acting as scribe for Dr. León      This document was signed by Oscar León MD.  08/02/20171:01 PM    Cc: Akhil Huerta MD

## 2017-08-03 ENCOUNTER — APPOINTMENT (OUTPATIENT)
Dept: GENERAL RADIOLOGY | Facility: HOSPITAL | Age: 57
End: 2017-08-03

## 2017-08-03 ENCOUNTER — HOSPITAL ENCOUNTER (OUTPATIENT)
Facility: HOSPITAL | Age: 57
Setting detail: HOSPITAL OUTPATIENT SURGERY
Discharge: HOME | End: 2017-08-03
Attending: ORTHOPAEDIC SURGERY | Admitting: ORTHOPAEDIC SURGERY

## 2017-08-03 ENCOUNTER — ANESTHESIA EVENT (OUTPATIENT)
Dept: PERIOP | Facility: HOSPITAL | Age: 57
End: 2017-08-03

## 2017-08-03 ENCOUNTER — ANESTHESIA (OUTPATIENT)
Dept: PERIOP | Facility: HOSPITAL | Age: 57
End: 2017-08-03

## 2017-08-03 ENCOUNTER — APPOINTMENT (OUTPATIENT)
Dept: PREADMISSION TESTING | Facility: HOSPITAL | Age: 57
End: 2017-08-03

## 2017-08-03 VITALS
OXYGEN SATURATION: 98 % | TEMPERATURE: 98.9 F | RESPIRATION RATE: 20 BRPM | HEART RATE: 78 BPM | WEIGHT: 259 LBS | SYSTOLIC BLOOD PRESSURE: 119 MMHG | BODY MASS INDEX: 41.62 KG/M2 | DIASTOLIC BLOOD PRESSURE: 65 MMHG | HEIGHT: 66 IN

## 2017-08-03 DIAGNOSIS — S62.327A CLOSED DISPLACED FRACTURE OF SHAFT OF FIFTH METACARPAL BONE OF LEFT HAND, INITIAL ENCOUNTER: ICD-10-CM

## 2017-08-03 LAB
GLUCOSE BLDC GLUCOMTR-MCNC: 196 MG/DL (ref 70–130)
GLUCOSE BLDC GLUCOMTR-MCNC: 205 MG/DL (ref 70–130)
GLUCOSE BLDC GLUCOMTR-MCNC: 240 MG/DL (ref 70–130)

## 2017-08-03 PROCEDURE — 25010000002 MIDAZOLAM PER 1 MG: Performed by: NURSE ANESTHETIST, CERTIFIED REGISTERED

## 2017-08-03 PROCEDURE — 25010000002 PHENYLEPHRINE PER 1 ML: Performed by: NURSE ANESTHETIST, CERTIFIED REGISTERED

## 2017-08-03 PROCEDURE — 26615 TREAT METACARPAL FRACTURE: CPT | Performed by: ORTHOPAEDIC SURGERY

## 2017-08-03 PROCEDURE — 76000 FLUOROSCOPY <1 HR PHYS/QHP: CPT

## 2017-08-03 PROCEDURE — C1713 ANCHOR/SCREW BN/BN,TIS/BN: HCPCS | Performed by: ORTHOPAEDIC SURGERY

## 2017-08-03 PROCEDURE — 25010000002 ONDANSETRON PER 1 MG: Performed by: NURSE ANESTHETIST, CERTIFIED REGISTERED

## 2017-08-03 PROCEDURE — 73120 X-RAY EXAM OF HAND: CPT | Performed by: RADIOLOGY

## 2017-08-03 PROCEDURE — 82962 GLUCOSE BLOOD TEST: CPT

## 2017-08-03 PROCEDURE — 63710000001 INSULIN REGULAR HUMAN PER 5 UNITS: Performed by: ANESTHESIOLOGY

## 2017-08-03 PROCEDURE — 25010000002 FENTANYL CITRATE (PF) 100 MCG/2ML SOLUTION: Performed by: NURSE ANESTHETIST, CERTIFIED REGISTERED

## 2017-08-03 PROCEDURE — 25010000002 PROPOFOL 10 MG/ML EMULSION: Performed by: NURSE ANESTHETIST, CERTIFIED REGISTERED

## 2017-08-03 PROCEDURE — 25010000002 DEXAMETHASONE PER 1 MG: Performed by: NURSE ANESTHETIST, CERTIFIED REGISTERED

## 2017-08-03 RX ORDER — SODIUM CHLORIDE, SODIUM LACTATE, POTASSIUM CHLORIDE, CALCIUM CHLORIDE 600; 310; 30; 20 MG/100ML; MG/100ML; MG/100ML; MG/100ML
125 INJECTION, SOLUTION INTRAVENOUS CONTINUOUS
Status: DISCONTINUED | OUTPATIENT
Start: 2017-08-03 | End: 2017-08-03 | Stop reason: HOSPADM

## 2017-08-03 RX ORDER — FENTANYL CITRATE 50 UG/ML
50 INJECTION, SOLUTION INTRAMUSCULAR; INTRAVENOUS
Status: DISCONTINUED | OUTPATIENT
Start: 2017-08-03 | End: 2017-08-03 | Stop reason: HOSPADM

## 2017-08-03 RX ORDER — ONDANSETRON 2 MG/ML
INJECTION INTRAMUSCULAR; INTRAVENOUS AS NEEDED
Status: DISCONTINUED | OUTPATIENT
Start: 2017-08-03 | End: 2017-08-03 | Stop reason: SURG

## 2017-08-03 RX ORDER — LIDOCAINE HYDROCHLORIDE 20 MG/ML
INJECTION, SOLUTION INFILTRATION; PERINEURAL AS NEEDED
Status: DISCONTINUED | OUTPATIENT
Start: 2017-08-03 | End: 2017-08-03 | Stop reason: SURG

## 2017-08-03 RX ORDER — PROPOFOL 10 MG/ML
VIAL (ML) INTRAVENOUS AS NEEDED
Status: DISCONTINUED | OUTPATIENT
Start: 2017-08-03 | End: 2017-08-03 | Stop reason: SURG

## 2017-08-03 RX ORDER — SODIUM CHLORIDE 0.9 % (FLUSH) 0.9 %
1-10 SYRINGE (ML) INJECTION AS NEEDED
Status: DISCONTINUED | OUTPATIENT
Start: 2017-08-03 | End: 2017-08-03 | Stop reason: HOSPADM

## 2017-08-03 RX ORDER — OXYCODONE HYDROCHLORIDE AND ACETAMINOPHEN 5; 325 MG/1; MG/1
1 TABLET ORAL ONCE AS NEEDED
Status: DISCONTINUED | OUTPATIENT
Start: 2017-08-03 | End: 2017-08-03 | Stop reason: HOSPADM

## 2017-08-03 RX ORDER — ONDANSETRON 2 MG/ML
4 INJECTION INTRAMUSCULAR; INTRAVENOUS ONCE AS NEEDED
Status: DISCONTINUED | OUTPATIENT
Start: 2017-08-03 | End: 2017-08-03 | Stop reason: HOSPADM

## 2017-08-03 RX ORDER — FENTANYL CITRATE 50 UG/ML
INJECTION, SOLUTION INTRAMUSCULAR; INTRAVENOUS AS NEEDED
Status: DISCONTINUED | OUTPATIENT
Start: 2017-08-03 | End: 2017-08-03 | Stop reason: SURG

## 2017-08-03 RX ORDER — MEPERIDINE HYDROCHLORIDE 25 MG/ML
12.5 INJECTION INTRAMUSCULAR; INTRAVENOUS; SUBCUTANEOUS
Status: DISCONTINUED | OUTPATIENT
Start: 2017-08-03 | End: 2017-08-03 | Stop reason: HOSPADM

## 2017-08-03 RX ORDER — FAMOTIDINE 10 MG/ML
INJECTION, SOLUTION INTRAVENOUS AS NEEDED
Status: DISCONTINUED | OUTPATIENT
Start: 2017-08-03 | End: 2017-08-03 | Stop reason: SURG

## 2017-08-03 RX ORDER — SODIUM CHLORIDE 9 MG/ML
INJECTION, SOLUTION INTRAVENOUS CONTINUOUS PRN
Status: DISCONTINUED | OUTPATIENT
Start: 2017-08-03 | End: 2017-08-03 | Stop reason: SURG

## 2017-08-03 RX ORDER — DEXAMETHASONE SODIUM PHOSPHATE 10 MG/ML
INJECTION INTRAMUSCULAR; INTRAVENOUS AS NEEDED
Status: DISCONTINUED | OUTPATIENT
Start: 2017-08-03 | End: 2017-08-03 | Stop reason: SURG

## 2017-08-03 RX ORDER — IPRATROPIUM BROMIDE AND ALBUTEROL SULFATE 2.5; .5 MG/3ML; MG/3ML
3 SOLUTION RESPIRATORY (INHALATION) ONCE AS NEEDED
Status: DISCONTINUED | OUTPATIENT
Start: 2017-08-03 | End: 2017-08-03 | Stop reason: HOSPADM

## 2017-08-03 RX ORDER — MIDAZOLAM HYDROCHLORIDE 1 MG/ML
INJECTION INTRAMUSCULAR; INTRAVENOUS AS NEEDED
Status: DISCONTINUED | OUTPATIENT
Start: 2017-08-03 | End: 2017-08-03 | Stop reason: SURG

## 2017-08-03 RX ORDER — OXYCODONE HYDROCHLORIDE AND ACETAMINOPHEN 5; 325 MG/1; MG/1
1-2 TABLET ORAL EVERY 4 HOURS PRN
Qty: 12 TABLET | Refills: 0 | Status: SHIPPED | OUTPATIENT
Start: 2017-08-03 | End: 2017-08-30

## 2017-08-03 RX ADMIN — DEXAMETHASONE SODIUM PHOSPHATE 4 MG: 10 INJECTION INTRAMUSCULAR; INTRAVENOUS at 11:40

## 2017-08-03 RX ADMIN — PHENYLEPHRINE HYDROCHLORIDE 100 MCG: 10 INJECTION, SOLUTION INTRAMUSCULAR; INTRAVENOUS; SUBCUTANEOUS at 11:53

## 2017-08-03 RX ADMIN — PHENYLEPHRINE HYDROCHLORIDE 100 MCG: 10 INJECTION, SOLUTION INTRAMUSCULAR; INTRAVENOUS; SUBCUTANEOUS at 11:38

## 2017-08-03 RX ADMIN — HUMAN INSULIN 3 UNITS: 100 INJECTION, SOLUTION SUBCUTANEOUS at 09:53

## 2017-08-03 RX ADMIN — PHENYLEPHRINE HYDROCHLORIDE 100 MCG: 10 INJECTION, SOLUTION INTRAMUSCULAR; INTRAVENOUS; SUBCUTANEOUS at 11:25

## 2017-08-03 RX ADMIN — MIDAZOLAM HYDROCHLORIDE 2 MG: 1 INJECTION, SOLUTION INTRAMUSCULAR; INTRAVENOUS at 11:20

## 2017-08-03 RX ADMIN — FENTANYL CITRATE 100 MCG: 50 INJECTION INTRAMUSCULAR; INTRAVENOUS at 11:20

## 2017-08-03 RX ADMIN — ONDANSETRON 4 MG: 2 INJECTION, SOLUTION INTRAMUSCULAR; INTRAVENOUS at 11:40

## 2017-08-03 RX ADMIN — SODIUM CHLORIDE, POTASSIUM CHLORIDE, SODIUM LACTATE AND CALCIUM CHLORIDE: 600; 310; 30; 20 INJECTION, SOLUTION INTRAVENOUS at 11:20

## 2017-08-03 RX ADMIN — FAMOTIDINE 20 MG: 10 INJECTION, SOLUTION INTRAVENOUS at 11:40

## 2017-08-03 RX ADMIN — PROPOFOL 100 MG: 10 INJECTION, EMULSION INTRAVENOUS at 11:25

## 2017-08-03 RX ADMIN — PHENYLEPHRINE HYDROCHLORIDE 100 MCG: 10 INJECTION, SOLUTION INTRAMUSCULAR; INTRAVENOUS; SUBCUTANEOUS at 12:04

## 2017-08-03 RX ADMIN — PHENYLEPHRINE HYDROCHLORIDE 100 MCG: 10 INJECTION, SOLUTION INTRAMUSCULAR; INTRAVENOUS; SUBCUTANEOUS at 11:34

## 2017-08-03 RX ADMIN — SODIUM CHLORIDE, POTASSIUM CHLORIDE, SODIUM LACTATE AND CALCIUM CHLORIDE 125 ML/HR: 600; 310; 30; 20 INJECTION, SOLUTION INTRAVENOUS at 09:33

## 2017-08-03 RX ADMIN — SODIUM CHLORIDE: 9 INJECTION, SOLUTION INTRAVENOUS at 11:45

## 2017-08-03 RX ADMIN — LIDOCAINE HYDROCHLORIDE 40 MG: 20 INJECTION, SOLUTION INFILTRATION; PERINEURAL at 11:20

## 2017-08-03 RX ADMIN — PHENYLEPHRINE HYDROCHLORIDE 100 MCG: 10 INJECTION, SOLUTION INTRAMUSCULAR; INTRAVENOUS; SUBCUTANEOUS at 11:44

## 2017-08-03 RX ADMIN — FENTANYL CITRATE 50 MCG: 50 INJECTION INTRAMUSCULAR; INTRAVENOUS at 11:48

## 2017-08-03 RX ADMIN — PHENYLEPHRINE HYDROCHLORIDE 100 MCG: 10 INJECTION, SOLUTION INTRAMUSCULAR; INTRAVENOUS; SUBCUTANEOUS at 11:59

## 2017-08-03 NOTE — OP NOTE
DATE OF SURGERY: 08/03/17    PREOPERATIVE DIAGNOSIS: Displaced left fifth metacarpal shaft fracture     POSTOPERATIVE DIAGNOSIS: Same     OPERATIONS PERFORMED: Intramedullary pinning left fifth metacarpal shaft fracture     SURGEON: Oscar León MD      ASSISTANT: red erickson     ANESTHESIA: Gen.     ESTIMATED BLOOD LOSS: None     ANTIBIOTICS: Ancef 2 g     DESCRIPTION OF PROCEDURE: With the patient in the operating theater general anesthetic administered and 2 g of administered intravenously left hand and arm sterilely prepped and draped in the usual manner.  The extremity was exsanguinated and the tourniquet inflated to 200 mmHg.  Under image discussion the fracture was reduced.  A small aspect of the fifth metacarpal was identified 1 cm longitudinal incision made directly over the fifth metacarpal base.  Tracting the extensor tendons in an ulnar direction the proximal aspect of the fifth metacarpal exposed all was introduced and then the 1.1 and innovations metacarpal pin was positioned past intramedullary observed to transverse the fracture site and engaged the distal portion of the fifth metacarpal.  Views with image since for both AP lateral showed anatomic reduction.  The 1.1 medullary pin was bent and cut and was protruding through the proximal dorsal aspect of the fifth metacarpal base about 1 mm.  The extensor tendons were allowed to return to the original position and had no interference from the pin.  The wound was irrigated closed in a single layer 4-0 Monocryl.  Steri-Strips and a sterile dressing applied she was lightened from anesthetic and taken to recovery room in stable condition       Dictator Signature:___________________________  Oscar León M.D.

## 2017-08-03 NOTE — ANESTHESIA PROCEDURE NOTES
Airway  Urgency: elective    Date/Time: 8/3/2017 11:42 AM  Airway not difficult    General Information and Staff    Patient location during procedure: OR  Anesthesiologist: DARRIUS LIU  CRNA: ELDER VALDEZ    Indications and Patient Condition  Indications for airway management: airway protection    Preoxygenated: yes  Mask difficulty assessment: 0 - not attempted    Final Airway Details  Final airway type: supraglottic airway      Successful airway: unique  Size 4    Number of attempts at approach: 1    Additional Comments  No trauma or change to dentition

## 2017-08-03 NOTE — ANESTHESIA PREPROCEDURE EVALUATION
Anesthesia Evaluation     Patient summary reviewed and Nursing notes reviewed   no history of anesthetic complications:    NPO Liquid Status: > 8 hours     Airway   Mallampati: II  TM distance: >3 FB  Neck ROM: full  no difficulty expected  Dental - normal exam     Pulmonary    (+) a smoker Former,   (-) asthma, shortness of breath, sleep apnea, decreased breath sounds  Cardiovascular - normal exam  Exercise tolerance: good (4-7 METS)    NYHA Classification: III    (+) hypertension, past MI (nov 2016)  6-12 months, CAD, cardiac stents (x1 pklaced nov 2016) within the past 12 months angina, hyperlipidemia  (-) dysrhythmias, CHF      Neuro/Psych  (+) syncope, psychiatric history Depression,    (-) seizures, CVA  GI/Hepatic/Renal/Endo    (+) morbid obesity, diabetes mellitus using insulin,   (-)  obesity    Musculoskeletal (-) negative ROS    Abdominal  - normal exam    Bowel sounds: normal.   Substance History - negative use     OB/GYN negative ob/gyn ROS         Other      history of cancer                                    Anesthesia Plan    ASA 3     general     intravenous induction   Anesthetic plan and risks discussed with patient and spouse/significant other.  Use of blood products discussed with patient and spouse/significant other  Consented to blood products.

## 2017-08-03 NOTE — ANESTHESIA POSTPROCEDURE EVALUATION
Patient: Jannette Onofre    Procedure Summary     Date Anesthesia Start Anesthesia Stop Room / Location    08/03/17 1121 1211  [facility] OR 03 / [facility]       Procedure Diagnosis Surgeon Provider    CLOSED REDUCTION PERCUTANEOUS PINNING LEFT FIFTH METACARPAL SHAFT FRACTURE (Left Fingers) Closed displaced fracture of shaft of fifth metacarpal bone of left hand, initial encounter  (Closed displaced fracture of shaft of fifth metacarpal bone of left hand, initial encounter [S62.327A]) MD Ranjeet Foreman MD          Anesthesia Type: general  Last vitals  BP   124/73 (08/03/17 1241)    Temp   99.1 °F (37.3 °C) (08/03/17 1211)    Pulse   81 (08/03/17 1241)   Resp   23 (08/03/17 1241)    SpO2   96 % (08/03/17 1241)      Post Anesthesia Care and Evaluation    Patient location during evaluation: PHASE II  Patient participation: complete - patient participated  Level of consciousness: awake and alert  Pain score: 1  Pain management: adequate  Airway patency: patent  Anesthetic complications: No anesthetic complications  PONV Status: controlled  Cardiovascular status: acceptable  Respiratory status: acceptable  Hydration status: acceptable

## 2017-08-03 NOTE — BRIEF OP NOTE
FINGER/THUMB CLOSED REDUCTION  Procedure Note    Jannette Onofre  8/3/2017    Pre-op Diagnosis:   Closed displaced fracture of shaft of fifth metacarpal bone of left hand, initial encounter [S62.327A]    Post-op Diagnosis:     Post-Op Diagnosis Codes:     * Closed displaced fracture of shaft of fifth metacarpal bone of left hand, initial encounter [S62.327A]    Procedure/CPT® Codes:      Procedure(s):  CLOSED REDUCTION PERCUTANEOUS PINNING LEFT FIFTH METACARPAL SHAFT FRACTURE    Surgeon(s):  Oscar León MD    Anesthesia: general    Staff:   Circulator: Portia Juárez RN; Du Strickland RN  Radiology Technologist: Bianka Duffy  Scrub Person: Sandy Monroy  Assistant: Danielle Ruiz    Estimated Blood Loss: *No blood loss documented*  Urine Voided: * No values recorded between 8/3/2017 11:20 AM and 8/3/2017 12:04 PM *    Specimens:                * No specimens in log *      Drains:           Findings:   Complications:       Oscar León MD     Date: 8/3/2017  Time: 12:09 PM

## 2017-08-03 NOTE — H&P (VIEW-ONLY)
History and Physical      Patient: Jannette Onofre  YOB: 1960  Date of Encounter: 08/02/2017      HPI:    Mrs. Onofre is a 57-year-old white female seen today referred by [facility] emergency department due to an acute fall injury suffered 8/1/17. The patient states that she gotten up around midnight to go to the bathroom and upon getting up she lost her balance and stumbled striking her foot on a dresser as well as her knee and jamming her left hand into this as well. Patient had immediate pain and decreased range of motion as well as progressive swelling into the left hand painful range of motion weightbearing to the left foot and knee. She presented to the emergency department which radiographs were obtained of the left foot, knee, hand. CT scan of the head was also completed. Patient was placed in an ulnar gutter splint due to notable findings of fifth metacarpal fracture. She was as follow with orthopedics. pain medication was provided from the ER physician. Today, patient complains of progressive swelling and bruising to the volar aspect of the hand. She denies any paresthesias. She has continued to ambulate on the left knee and foot however she states that it is painful. Patient is right-hand dominant. Nonsmoker.    Past Medical History:   Diagnosis Date   • Anxiety    • Coronary artery disease     s/p LAD stenting, follows w/ Dr. Ogden   • Depression    • Diabetes mellitus     dx 1998, initially improved s/p AGB but now back on insulin    • Fatigue    • Hyperlipidemia    • Hypertension    • Melanoma     1998   • Morbid obesity    • Myocardial infarction     11/2016   • BRICE on CPAP    • Past heart attack      Patient Active Problem List   Diagnosis   • Unstable angina pectoris   • ST elevation myocardial infarction (STEMI) of anterior wall, 11/29/16 s/p stenting proxismal % occlusion, clinically stable.   • Type 2 diabetes mellitus   • Dyslipidemia   • Chest pain   • Diabetes  mellitus   • Coronary artery disease   • Fatigue   • Hyperlipidemia   • Hypertension   • Morbid obesity   • Myocardial infarction   • Dyspnea on exertion (NYHA class 2-3)   • Closed displaced fracture of shaft of fifth metacarpal bone of left hand         Past Surgical History:   Procedure Laterality Date   • CARDIAC CATHETERIZATION N/A 2016    Procedure: Left Heart Cath;  Surgeon: Akhil Zuñiga MD;  Location:  [facility] CATH INV[facility];  Service:    • CATARACT EXTRACTION N/A     rt eye , left eye    •  SECTION     • COLONOSCOPY     • LAPAROSCOPIC CHOLECYSTECTOMY     • LAPAROSCOPIC GASTRIC BANDING  2013    s/p LAGB by Dr Russell   • OTHER SURGICAL HISTORY      melanoma removal/back   • IN RT/LT HEART CATHETERS N/A 2016    Procedure: Percutaneous Coronary Intervention;  Surgeon: Akhil Zuñiga MD;  Location:  [facility];  Service: Cardiology   • SEPTOPLASTY     • UMBILICAL HERNIA REPAIR         Family History   Problem Relation Age of Onset   • Cancer Mother      skin   • Diabetes Mother    • Heart disease Mother    • Hyperlipidemia Mother    • Hypertension Mother    • COPD Father    • Heart disease Father    • Hyperlipidemia Father    • Heart attack Father    • Diabetes Maternal Aunt    • Diabetes Maternal Grandmother    • Heart disease Maternal Grandfather    • Heart attack Maternal Grandfather    • Hypertension Maternal Grandfather    • Hypertension Sister        Social History     Social History   • Marital status:      Spouse name: N/A   • Number of children: N/A   • Years of education: N/A     Occupational History   • Not on file.     Social History Main Topics   • Smoking status: Former Smoker     Packs/day: 0.25     Types: Cigarettes     Quit date: 1989   • Smokeless tobacco: Never Used   • Alcohol use No   • Drug use: No   • Sexual activity: Defer     Other Topics Concern   • Not on file     Social History Narrative     .  Lives in [facility].   @ [employer].       Current Outpatient Prescriptions   Medication Sig Dispense Refill   • amLODIPine (NORVASC) 10 MG tablet Take 1/2 tablet by mouth Daily. 90 tablet 3   • aspirin 81 MG chewable tablet Chew 4 tablets Daily. (Patient taking differently: Chew 325 mg Daily.) 100 tablet 3   • atorvastatin (LIPITOR) 80 MG tablet Take 1 tablet by mouth Daily. 90 tablet 3   • carvedilol (COREG) 25 MG tablet Take 1 tablet by mouth Every 12 (Twelve) Hours. 180 tablet 3   • clopidogrel (PLAVIX) 75 MG tablet Take 1 tablet by mouth Daily. 90 tablet 3   • cyclobenzaprine (FLEXERIL) 10 MG tablet Take 1 tablet by mouth 2 (Two) Times a Day As Needed for back pain. 60 tablet 1   • Empagliflozin (JARDIANCE) 25 MG tablet Take one tablet by mouth Daily. 90 tablet 4   • glucose blood test strip Use as instructed twice daily. 300 each 3   • HYDROcodone-acetaminophen (NORCO) 5-325 MG per tablet Take 1 tablet by mouth Every 6 (Six) Hours As Needed for Mild Pain. 12 tablet 0   • Insulin Lispro Prot & Lispro (HUMALOG MIX 50/50 KWIKPEN) (50-50) 100 UNIT/ML suspension pen-injector Inject 40 units Subcutaneously before meals. (Patient taking differently: Inject 30 Units under the skin 3 (Three) Times a Day Before Meals.) 30 mL 5   • isosorbide mononitrate (IMDUR) 60 MG 24 hr tablet Take 60 mg by mouth Daily.     • lisinopril (PRINIVIL,ZESTRIL) 20 MG tablet Take 1 tablet by mouth Daily. 90 tablet 3   • meclizine (ANTIVERT) 25 MG tablet Take 1 tablet by mouth 3 (Three) Times a Day As Needed fir Dizziness. 90 tablet 0   • metFORMIN (GLUCOPHAGE) 1000 MG tablet Take 1 tablet by mouth 2 (Two) Times a Day. 180 tablet 3   • mirtazapine (REMERON) 15 MG tablet Take 1 tablet by mouth Every Night. 30 tablet 0   • ondansetron ODT (ZOFRAN-ODT) 4 MG disintegrating tablet Take 1 tablet by mouth Every 6 (Six) Hours As Needed for Nausea or Vomiting. 12 tablet 0   • TANZEUM 30 MG pen-injector Inject 30 mg  "under the skin 1 (One) Time Per Week. (Patient taking differently: Inject 30 mg under the skin 1 (One) Time Per Week. Patient takes on Wednesdays.) 12 each 6   • TRUEPLUS LANCETS 28G misc USE TO TEST BLOOD SUGAR TWICE DAILY 300 each 3     No current facility-administered medications for this visit.        No Known Allergies    Review of Systems   Constitution: Negative. Negative for fever and malaise/fatigue.        Activity change   HENT: Negative.  Negative for congestion, ear discharge, ear pain, headaches, hearing loss, nosebleeds, odynophagia and sore throat.    Eyes: Negative.    Cardiovascular: Positive for leg swelling. Negative for chest pain and palpitations.   Respiratory: Negative.  Negative for cough, shortness of breath, sputum production and wheezing.    Endocrine: Positive for cold intolerance. Negative for polydipsia, polyphagia and polyuria.   Hematologic/Lymphatic: Negative.  Negative for adenopathy and bleeding problem. Does not bruise/bleed easily.   Skin: Negative.    Musculoskeletal:        Pertinent positives listed in HPI   Gastrointestinal: Negative.  Negative for bloating, abdominal pain, constipation, diarrhea, heartburn, nausea and vomiting.   Genitourinary: Negative.    Neurological: Positive for loss of balance. Negative for dizziness, focal weakness, light-headedness and vertigo.   Psychiatric/Behavioral: Negative.  Negative for altered mental status, depression and memory loss. The patient is not nervous/anxious.    Allergic/Immunologic: Negative.          Vitals:    08/02/17 0809   BP: (!) 84/59   BP Location: Right arm   Patient Position: Sitting   Pulse: 84   Weight: 269 lb (122 kg)   Height: 66\" (167.6 cm)             Physical Exam   Constitutional: She is oriented to person, place, and time. She appears well-developed and well-nourished. No distress.   HENT:   Head: Normocephalic and atraumatic.   Nose: Nose normal.   Mouth/Throat: Oropharynx is clear and moist.   Eyes: " Conjunctivae and EOM are normal. Pupils are equal, round, and reactive to light. No scleral icterus.   Neck: Normal range of motion. Neck supple. No JVD present. No tracheal deviation present. No thyromegaly present.   Cardiovascular: Normal rate, regular rhythm, normal heart sounds and intact distal pulses.  Exam reveals no gallop and no friction rub.    No murmur heard.  Pulmonary/Chest: Effort normal and breath sounds normal. No respiratory distress. She has no wheezes. She has no rales.   Abdominal: Soft. Bowel sounds are normal.   Musculoskeletal:   Left foot on examination today reveals no significant swelling, ecchymosis or erythema. The patient does have pain upon palpation at the IP joint. Limited range of motion upon dorsiflexion plantarflexion of the great toe. Examination of the patient's left knee reveals there is a mild generalized swelling and ecchymosis to the anterior portion of the knee with a small abrasion. Patient exhibits full range of motion with good integrity of the collateral and cruciate ligaments. No significant joint line tenderness.  Examination the patient's left hand today reveals there is mild generalized swelling to the dorsum and palmar aspect of the hand and palm or ecchymosis. Patient has a loss of prominence fifth MCP joint. Patient quickly make a full fist with slight rotational underlapping. Patient can fully extend. Neurovascular status grossly intact left upper and lower extremities.   Lymphadenopathy:     She has no cervical adenopathy.   Neurological: She is alert and oriented to person, place, and time.   Skin: Skin is warm and dry. No erythema.   Psychiatric: She has a normal mood and affect.   Nursing note and vitals reviewed.      Radiology/Labs:       AP, lateral, oblique x-rays of the left hand reveals comminuted proximal 20% displaced and volarly angulated fifth metacarpal shaft fracture. Questionable rotational abnormality.    3 views x-ray left foot reveals no  acute fractures or dislocations. No significant degenerative changes.    AP, lateral x-rays left knee reveals no acute abnormalities. No visible fracture/dislocations    Procedures          Assessment:    ICD-10-CM ICD-9-CM   1. Closed displaced fracture of shaft of fifth metacarpal bone of left hand, initial encounter S62.327A 815.03         Plan:    Situation was discussed with the patient in regards to the degree of displacement as well as the rotational component on exam with the under lapping of the fifth digit into the fourth. For surgical correction of this the patient will undergo closed reduction percutaneous pinning. I reviewed details of procedure with patient today and discussed risks, benefits, alternatives, and limitations of the procedure in laymen's terms with the risks including but not limited to:  neurovascular damage, bleeding, infection, chronic pain, worsening of pain, swelling, loss of motion, weakness, stiffness, instability, DVT, loss of life/limb, and potential need for additional procedures.  No guarantees were given regarding results of surgery.     Patient was given the opportunity to ask and have all questions answered today.  The patient voiced understanding of the risks, benefits, and recalcitrance from conservative forms of treatment that were discussed and the patient consents to proceed with Closed reduction with percutaneous pinning left fifth metacarpal shaft. Patient was given instructions on nothing by mouth status after midnight. Patient will be taken to the OR 8/3/17.      Discussion/Summary:    Written by Antony Stern PA-C, acting as scribe for Dr. León      This document was signed by Oscar León MD.  08/02/20171:01 PM    Cc: Akhil Huerta MD

## 2017-08-07 DIAGNOSIS — S62.357D CLOSED NONDISPLACED FRACTURE OF SHAFT OF FIFTH METACARPAL BONE OF LEFT HAND WITH ROUTINE HEALING, SUBSEQUENT ENCOUNTER: Primary | ICD-10-CM

## 2017-08-09 ENCOUNTER — OFFICE VISIT (OUTPATIENT)
Dept: ORTHOPEDIC SURGERY | Facility: CLINIC | Age: 57
End: 2017-08-09

## 2017-08-09 ENCOUNTER — HOSPITAL ENCOUNTER (OUTPATIENT)
Dept: GENERAL RADIOLOGY | Facility: HOSPITAL | Age: 57
Discharge: HOME | End: 2017-08-09
Attending: ORTHOPAEDIC SURGERY | Admitting: ORTHOPAEDIC SURGERY

## 2017-08-09 VITALS — HEIGHT: 66 IN | BODY MASS INDEX: 41.62 KG/M2 | WEIGHT: 259 LBS

## 2017-08-09 DIAGNOSIS — S93.602A FOOT SPRAIN, LEFT, INITIAL ENCOUNTER: ICD-10-CM

## 2017-08-09 DIAGNOSIS — S62.357D CLOSED NONDISPLACED FRACTURE OF SHAFT OF FIFTH METACARPAL BONE OF LEFT HAND WITH ROUTINE HEALING, SUBSEQUENT ENCOUNTER: Primary | ICD-10-CM

## 2017-08-09 DIAGNOSIS — S62.357D CLOSED NONDISPLACED FRACTURE OF SHAFT OF FIFTH METACARPAL BONE OF LEFT HAND WITH ROUTINE HEALING, SUBSEQUENT ENCOUNTER: ICD-10-CM

## 2017-08-09 PROCEDURE — 73130 X-RAY EXAM OF HAND: CPT | Performed by: RADIOLOGY

## 2017-08-09 PROCEDURE — 73130 X-RAY EXAM OF HAND: CPT

## 2017-08-09 PROCEDURE — 99024 POSTOP FOLLOW-UP VISIT: CPT | Performed by: ORTHOPAEDIC SURGERY

## 2017-08-09 NOTE — PROGRESS NOTES
Jannette Onofre   :1960    Date of encounter:2017        HPI:  Jannette Onofre is a 57 y.o. female seen today in follow up of intramedullary pinning left 5th metacarpal shaft fracture on 2017, now 6 days out. Date of Injury: 2017. She is doing well.     She continues to have pain in her left foot when walking, though she does report this is beginning to improve.     PMH:   Patient Active Problem List   Diagnosis   • Unstable angina pectoris   • ST elevation myocardial infarction (STEMI) of anterior wall, 16 s/p stenting proxismal % occlusion, clinically stable.   • Type 2 diabetes mellitus   • Dyslipidemia   • Chest pain   • Diabetes mellitus   • Coronary artery disease   • Fatigue   • Hyperlipidemia   • Hypertension   • Morbid obesity   • Myocardial infarction   • Dyspnea on exertion (NYHA class 2-3)   • Closed displaced fracture of shaft of fifth metacarpal bone of left hand         Exam:  Left Hand: Steri-strips in place, no erythema surrounding the incision.     Left Foot: Generalized swelling with some ecchymoses over the dorsal aspect of the distal foot.  She has localized tenderness over the dorsal proximal aspect of the foot in the region of the metatarsal heads.  There is also mild tenderness over the medial aspect of the navicular.  No instability is detected.  Neurovascular grossly intact.    Radiology:  XR HAND 3+ VIEW LEFT-      REASON FOR EXAM:  Left 5 carpal fracture; S62.357D-Nondisplaced fracture  of shaft of 5th metacarpal bone, left hand, subsequent encounter for  fracture with routine healing.      COMPARISON:  Today's study is compared with a previous study done  2017.      FINDINGS: A metallic pin has been placed across the oblique fracture of  the 5th metacarpal. The fracture is in near anatomic alignment. A splint  has been applied.      IMPRESSION:  Negative postoperative left hand.      This report was finalized on 2017 10:07 AM by Dr. Fitzgerald  Jamie CASTLE MD.    Assessment:  57-year-old female doing well following intramedullary pinning left 5th metacarpal shaft 6 days ago.     ICD-10-CM ICD-9-CM   1. Closed nondisplaced fracture of shaft of fifth metacarpal bone of left hand with routine healing, subsequent encounter S62.357D V54.19   2. Foot sprain, left, initial encounter S93.602A 845.10       Plan:  She will return to work full duty without restriction 08/14/17. Will reevaluate in 6 weeks.     This document was signed by Oscar León MD.  08/09/201711:54 AM    Cc: Akhil Huerta MD                              81

## 2017-08-14 RX ORDER — MIRTAZAPINE 15 MG/1
15 TABLET, FILM COATED ORAL NIGHTLY
Qty: 30 TABLET | Refills: 0 | Status: SHIPPED | OUTPATIENT
Start: 2017-08-14 | End: 2017-08-30 | Stop reason: SDUPTHER

## 2017-08-25 ENCOUNTER — HOSPITAL ENCOUNTER (OUTPATIENT)
Dept: CARDIOLOGY | Facility: HOSPITAL | Age: 57
Discharge: HOME | End: 2017-08-25
Attending: INTERNAL MEDICINE | Admitting: INTERNAL MEDICINE

## 2017-08-25 DIAGNOSIS — R06.09 DYSPNEA ON EXERTION: ICD-10-CM

## 2017-08-25 PROCEDURE — 93306 TTE W/DOPPLER COMPLETE: CPT

## 2017-08-25 PROCEDURE — 93306 TTE W/DOPPLER COMPLETE: CPT | Performed by: INTERNAL MEDICINE

## 2017-08-27 LAB
BH CV ECHO MEAS - % IVS THICK: 8.1 %
BH CV ECHO MEAS - % LVPW THICK: 58.3 %
BH CV ECHO MEAS - ACS: 2.4 CM
BH CV ECHO MEAS - AO ROOT AREA (BSA CORRECTED): 1.6
BH CV ECHO MEAS - AO ROOT AREA: 9.7 CM^2
BH CV ECHO MEAS - AO ROOT DIAM: 3.5 CM
BH CV ECHO MEAS - BSA(HAYCOCK): 2.4 M^2
BH CV ECHO MEAS - BSA: 2.2 M^2
BH CV ECHO MEAS - BZI_BMI: 41.8 KILOGRAMS/M^2
BH CV ECHO MEAS - BZI_METRIC_HEIGHT: 167.6 CM
BH CV ECHO MEAS - BZI_METRIC_WEIGHT: 117.5 KG
BH CV ECHO MEAS - CONTRAST EF 4CH: 64.9 ML/M^2
BH CV ECHO MEAS - EDV(CUBED): 67.6 ML
BH CV ECHO MEAS - EDV(MOD-SP4): 57 ML
BH CV ECHO MEAS - EDV(TEICH): 73.1 ML
BH CV ECHO MEAS - EF(CUBED): 75.7 %
BH CV ECHO MEAS - EF(MOD-SP4): 64.9 %
BH CV ECHO MEAS - EF(TEICH): 68.2 %
BH CV ECHO MEAS - ESV(CUBED): 16.4 ML
BH CV ECHO MEAS - ESV(MOD-SP4): 20 ML
BH CV ECHO MEAS - ESV(TEICH): 23.2 ML
BH CV ECHO MEAS - FS: 37.6 %
BH CV ECHO MEAS - IVS/LVPW: 1.5
BH CV ECHO MEAS - IVSD: 1.5 CM
BH CV ECHO MEAS - IVSS: 1.6 CM
BH CV ECHO MEAS - LA DIMENSION: 2.5 CM
BH CV ECHO MEAS - LA/AO: 0.7
BH CV ECHO MEAS - LV DIASTOLIC VOL/BSA (35-75): 25.5 ML/M^2
BH CV ECHO MEAS - LV MASS(C)D: 176.4 GRAMS
BH CV ECHO MEAS - LV MASS(C)DI: 79 GRAMS/M^2
BH CV ECHO MEAS - LV MASS(C)S: 140 GRAMS
BH CV ECHO MEAS - LV MASS(C)SI: 62.7 GRAMS/M^2
BH CV ECHO MEAS - LV SYSTOLIC VOL/BSA (12-30): 9 ML/M^2
BH CV ECHO MEAS - LVIDD: 4.1 CM
BH CV ECHO MEAS - LVIDS: 2.5 CM
BH CV ECHO MEAS - LVLD AP4: 6.5 CM
BH CV ECHO MEAS - LVLS AP4: 5.9 CM
BH CV ECHO MEAS - LVOT AREA (M): 3.1 CM^2
BH CV ECHO MEAS - LVOT AREA: 3.1 CM^2
BH CV ECHO MEAS - LVOT DIAM: 2 CM
BH CV ECHO MEAS - LVPWD: 0.97 CM
BH CV ECHO MEAS - LVPWS: 1.5 CM
BH CV ECHO MEAS - MV A MAX VEL: 93.2 CM/SEC
BH CV ECHO MEAS - MV DEC SLOPE: 450.9 CM/SEC^2
BH CV ECHO MEAS - MV E MAX VEL: 93.2 CM/SEC
BH CV ECHO MEAS - MV E/A: 1
BH CV ECHO MEAS - MV P1/2T MAX VEL: 94.6 CM/SEC
BH CV ECHO MEAS - MV P1/2T: 61.5 MSEC
BH CV ECHO MEAS - MVA P1/2T LCG: 2.3 CM^2
BH CV ECHO MEAS - MVA(P1/2T): 3.6 CM^2
BH CV ECHO MEAS - RAP SYSTOLE: 10 MMHG
BH CV ECHO MEAS - RVDD: 2 CM
BH CV ECHO MEAS - RVSP: 29.1 MMHG
BH CV ECHO MEAS - SI(CUBED): 22.9 ML/M^2
BH CV ECHO MEAS - SI(MOD-SP4): 16.6 ML/M^2
BH CV ECHO MEAS - SI(TEICH): 22.3 ML/M^2
BH CV ECHO MEAS - SV(CUBED): 51.2 ML
BH CV ECHO MEAS - SV(MOD-SP4): 37 ML
BH CV ECHO MEAS - SV(TEICH): 49.8 ML
BH CV ECHO MEAS - TR MAX VEL: 218.5 CM/SEC

## 2017-08-30 ENCOUNTER — OFFICE VISIT (OUTPATIENT)
Dept: PSYCHIATRY | Facility: CLINIC | Age: 57
End: 2017-08-30

## 2017-08-30 VITALS
BODY MASS INDEX: 40.98 KG/M2 | WEIGHT: 255 LBS | SYSTOLIC BLOOD PRESSURE: 125 MMHG | HEART RATE: 86 BPM | HEIGHT: 66 IN | DIASTOLIC BLOOD PRESSURE: 85 MMHG

## 2017-08-30 DIAGNOSIS — F43.23 ADJUSTMENT DISORDER WITH MIXED ANXIETY AND DEPRESSED MOOD: Primary | ICD-10-CM

## 2017-08-30 DIAGNOSIS — F51.05 INSOMNIA DUE TO MENTAL CONDITION: ICD-10-CM

## 2017-08-30 PROCEDURE — 99213 OFFICE O/P EST LOW 20 MIN: CPT | Performed by: NURSE PRACTITIONER

## 2017-08-30 RX ORDER — MIRTAZAPINE 15 MG/1
15 TABLET, FILM COATED ORAL NIGHTLY
Qty: 30 TABLET | Refills: 2 | Status: ON HOLD | OUTPATIENT
Start: 2017-08-30 | End: 2017-10-29

## 2017-08-30 NOTE — PROGRESS NOTES
Subjective   Jannette Onofre is a 57 y.o. female is here today for medication management follow-up at the [facility], she was referred by her therapist for anxiety/sleeping issues.    Chief Complaint: Anxiety/insomnia    History of Present Illness   She states that she missed her last appointment because she passed out X 2 at home and broke her finger- she needed to have pin placed in her 5th digit on her left finger.  She states that she went to ER, injured her foot and knee.  She denies any SE or problems with the medications.   She states that she has been sleeping better but with recent injury it has effected her sleep.  She states that she is sleeping between 5 hours per night, no longer having dreams.  She denies any symptoms of depression or anxiety.  She states that her appetite has been good, lap-band with about 4 pound weight loss.  She had an echo to determine whether or not she can have surgery- she had surgery on her finger so if she wanted lap band removed it would be safe.  She denies any AV hallucinations, denies any SI/HI.      Previous medications: unknown    The following portions of the patient's history were reviewed and updated as appropriate: allergies, current medications, past family history, past medical history, past social history, past surgical history and problem list.    Review of Systems   Constitutional: Negative for appetite change, chills, diaphoresis, fatigue, fever and unexpected weight change.   HENT: Negative for hearing loss, sore throat, trouble swallowing and voice change.    Eyes: Negative for photophobia and visual disturbance.   Respiratory: Negative for cough, chest tightness and shortness of breath.    Cardiovascular: Negative for chest pain and palpitations.        Recent MI in November 2016   Gastrointestinal: Negative for abdominal pain, constipation, nausea and vomiting.   Endocrine: Negative for cold intolerance and heat intolerance.   Genitourinary: Negative  "for dysuria and frequency.   Musculoskeletal: Negative for arthralgias, back pain, joint swelling and neck stiffness.   Skin: Negative for color change and wound.   Allergic/Immunologic: Negative for environmental allergies and immunocompromised state.   Neurological: Negative for dizziness, tremors, seizures, syncope, weakness, light-headedness and headaches.   Hematological: Negative for adenopathy. Does not bruise/bleed easily.       Objective   Physical Exam   Constitutional: She appears well-developed and well-nourished. No distress.   Neurological: She is alert. Coordination and gait normal.   Vitals reviewed.    Blood pressure 125/85, pulse 86, height 66\" (167.6 cm), weight 255 lb (116 kg).    Medication List:   Current Outpatient Prescriptions   Medication Sig Dispense Refill   • amLODIPine (NORVASC) 10 MG tablet Take 1/2 tablet by mouth Daily. 90 tablet 3   • aspirin 81 MG chewable tablet Chew 4 tablets Daily. (Patient taking differently: Chew 325 mg Daily.) 100 tablet 3   • atorvastatin (LIPITOR) 80 MG tablet Take 1 tablet by mouth Daily. (Patient taking differently: Take 80 mg by mouth Every Night.) 90 tablet 3   • carvedilol (COREG) 25 MG tablet Take 1 tablet by mouth Every 12 (Twelve) Hours. 180 tablet 3   • clopidogrel (PLAVIX) 75 MG tablet Take 1 tablet by mouth Daily. 90 tablet 3   • cyclobenzaprine (FLEXERIL) 10 MG tablet Take 1 tablet by mouth 2 (Two) Times a Day As Needed for back pain. 60 tablet 1   • Empagliflozin (JARDIANCE) 25 MG tablet Take one tablet by mouth Daily. 90 tablet 4   • glucose blood test strip Use as instructed twice daily. 300 each 3   • Insulin Lispro Prot & Lispro (HUMALOG MIX 50/50 KWIKPEN) (50-50) 100 UNIT/ML suspension pen-injector Inject 40 units Subcutaneously before meals. (Patient taking differently: Inject 30 Units under the skin 3 (Three) Times a Day Before Meals.) 30 mL 5   • isosorbide mononitrate (IMDUR) 60 MG 24 hr tablet Take 60 mg by mouth Daily As Needed.   "   • lisinopril (PRINIVIL,ZESTRIL) 20 MG tablet Take 1 tablet by mouth Daily. 90 tablet 3   • meclizine (ANTIVERT) 25 MG tablet Take 1 tablet by mouth 3 (Three) Times a Day As Needed fir Dizziness. 90 tablet 0   • metFORMIN (GLUCOPHAGE) 1000 MG tablet Take 1 tablet by mouth 2 (Two) Times a Day. 180 tablet 3   • mirtazapine (REMERON) 15 MG tablet Take 1 tablet by mouth Every Night. 30 tablet 2   • ondansetron ODT (ZOFRAN-ODT) 4 MG disintegrating tablet Take 1 tablet by mouth Every 6 (Six) Hours As Needed for Nausea or Vomiting. 12 tablet 0   • TANZEUM 30 MG pen-injector Inject 30 mg under the skin 1 (One) Time Per Week. (Patient taking differently: Inject 30 mg under the skin 1 (One) Time Per Week. Patient takes on Wednesdays.) 12 each 6   • TRUEPLUS LANCETS 28G misc USE TO TEST BLOOD SUGAR TWICE DAILY 300 each 3     No current facility-administered medications for this visit.        Mental Status Exam:   Hygiene:   good  Cooperation:  Cooperative  Eye Contact:  Good  Psychomotor Behavior:  Appropriate  Affect:  Appropriate  Hopelessness: 2  Speech:  Normal  Thought Process:  Linear  Thought Content:  Normal  Suicidal:  None  Homicidal:  None  Hallucinations:  None  Delusion:  None  Memory:  Intact  Orientation:  Person, Place, Time and Situation  Reliability:  fair  Insight:  Fair  Judgement:  Fair  Impulse Control:  Fair  Physical/Medical Issues:  Yes heart disease     Assessment/Plan   Diagnoses and all orders for this visit:    Adjustment disorder with mixed anxiety and depressed mood    Insomnia due to mental condition    Other orders  -     mirtazapine (REMERON) 15 MG tablet; Take 1 tablet by mouth Every Night.      Discussed medication options. Continue remeron 15mg qhs to help with depression and sleep. Reviewed the risks, benefits, and side effects of the medications; patient acknowledged and verbally consented.  Patient is agreeable to call the [facility].  Patient is aware to call 911 or go to the  nearest ER should begin having SI/HI.     Return in 12 weeks

## 2017-09-05 ENCOUNTER — TRANSCRIBE ORDERS (OUTPATIENT)
Dept: MAMMOGRAPHY | Facility: HOSPITAL | Age: 57
End: 2017-09-05

## 2017-09-18 DIAGNOSIS — S62.357D CLOSED NONDISPLACED FRACTURE OF SHAFT OF FIFTH METACARPAL BONE OF LEFT HAND WITH ROUTINE HEALING: Primary | ICD-10-CM

## 2017-09-20 ENCOUNTER — OFFICE VISIT (OUTPATIENT)
Dept: ORTHOPEDIC SURGERY | Facility: CLINIC | Age: 57
End: 2017-09-20

## 2017-09-20 ENCOUNTER — HOSPITAL ENCOUNTER (OUTPATIENT)
Dept: GENERAL RADIOLOGY | Facility: HOSPITAL | Age: 57
Discharge: HOME | End: 2017-09-20
Attending: ORTHOPAEDIC SURGERY | Admitting: ORTHOPAEDIC SURGERY

## 2017-09-20 VITALS — BODY MASS INDEX: 40.98 KG/M2 | HEIGHT: 66 IN | WEIGHT: 255 LBS

## 2017-09-20 DIAGNOSIS — S62.357D CLOSED NONDISPLACED FRACTURE OF SHAFT OF FIFTH METACARPAL BONE OF LEFT HAND WITH ROUTINE HEALING: ICD-10-CM

## 2017-09-20 DIAGNOSIS — S93.602D FOOT SPRAIN, LEFT, SUBSEQUENT ENCOUNTER: ICD-10-CM

## 2017-09-20 DIAGNOSIS — S62.357D CLOSED NONDISPLACED FRACTURE OF SHAFT OF FIFTH METACARPAL BONE OF LEFT HAND WITH ROUTINE HEALING, SUBSEQUENT ENCOUNTER: Primary | ICD-10-CM

## 2017-09-20 PROCEDURE — 73120 X-RAY EXAM OF HAND: CPT

## 2017-09-20 PROCEDURE — 73120 X-RAY EXAM OF HAND: CPT | Performed by: RADIOLOGY

## 2017-09-20 PROCEDURE — 99212 OFFICE O/P EST SF 10 MIN: CPT | Performed by: ORTHOPAEDIC SURGERY

## 2017-09-20 PROCEDURE — 99024 POSTOP FOLLOW-UP VISIT: CPT | Performed by: ORTHOPAEDIC SURGERY

## 2017-09-20 NOTE — PROGRESS NOTES
"Jannette Onofre   :1960    Date of encounter:2017        HPI:  Jannette Onofre is a 57 y.o. female who returns here today for follow-up status post pinning of the left fifth metacarpal.  She is now 7 weeks post injury.  She also sustained a left foot sprain.  She states that both her foot and hand are doing well without any complaints of pain or swelling.  She denies paresthesias.    PMH:   Patient Active Problem List   Diagnosis   • Unstable angina pectoris   • ST elevation myocardial infarction (STEMI) of anterior wall, 16 s/p stenting proxismal % occlusion, clinically stable.   • Type 2 diabetes mellitus   • Dyslipidemia   • Chest pain   • Diabetes mellitus   • Coronary artery disease   • Fatigue   • Hyperlipidemia   • Hypertension   • Morbid obesity   • Myocardial infarction   • Dyspnea on exertion (NYHA class 2-3)   • Closed displaced fracture of shaft of fifth metacarpal bone of left hand       Exam:  General Appearance:    57 y.o. female  cooperative, in no acute distress.  Alert and oriented x 3,                   Vitals:    17 0806   Weight: 255 lb (116 kg)   Height: 66\" (167.6 cm)       Examination of the left hand reveals a well-healed incision.  She is no swelling or ecchymosis.  No tenderness on palpation.  She has full range of motion of the fifth finger.  Neurovascular status is intact.    Examination of the foot reveals no swelling or ecchymosis.  No tenderness on palpation.    Radiology:   2 views of the left hand were reviewed revealing the fifth metacarpal fracture with unchanged alignment and continued evidence of healing.    Assessment    ICD-10-CM ICD-9-CM   1. Closed nondisplaced fracture of shaft of fifth metacarpal bone of left hand with routine healing, subsequent encounter S62.357D V54.19   2. Foot sprain, left, subsequent encounter S93.602D V58.89     845.10       Plan:   49-year-old female 7 weeks status post pinning of the left fifth metacarpal.  X-rays " today reveal no change in alignment with continued evidence of healing.  Clinically she is doing well and released all activities as tolerated.  Regards to her left foot sprain she's having no further pain or swelling.  She can bear weight as tolerated.  She'll return back here on an as-needed basis for any further difficulties.    Written by, Adeola LEWIS, acting as a scribe for Dr. Mau Huerta

## 2017-10-19 ENCOUNTER — OFFICE VISIT (OUTPATIENT)
Dept: PSYCHIATRY | Facility: CLINIC | Age: 57
End: 2017-10-19

## 2017-10-19 DIAGNOSIS — F33.1 MAJOR DEPRESSIVE DISORDER, RECURRENT EPISODE, MODERATE (HCC): Primary | ICD-10-CM

## 2017-10-19 PROCEDURE — 90834 PSYTX W PT 45 MINUTES: CPT | Performed by: COUNSELOR

## 2017-10-19 NOTE — PROGRESS NOTES
"Date of Service: October 19, 2017  Time In: 1:25 PM  Time Out: 2:10 PM      PROGRESS NOTE  Data:  Jannette Onofre is a 57 y.o. female who met with the undersigned for a regularly scheduled individual outpatient psychotherapy session at the [facility].  She rated her anxiety and depression levels as a 9 on a scale from 1-10 with 10 being the most.     HPI: Patient states, \"I'm not suicidal but I wish life would end.\"  She has felt extremely depressed for the past month.  She is concerned about ongoing medical issues.  She found out that she has fluid around her heart but her cardiologist canceled today's appointment and rescheduled it for December.  Despite taking multiple medications for diabetes her blood sugar continues to run in the 300s.  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  She has been isolating and goes to bed immediately after work.  She is experiencing anhedonia and fatigue.  She feels irritable and states, \"I have lost my juan manuel.\"  Her  listens to her but doesn't understand depression.  She is upset that she can't bounce back as quickly from health problems as she did when she was younger.  She stated, \"I won't make it through the next time.\"    Clinical Maneuvering/Intervention:  Assisted patient in processing above session content; acknowledged and normalized patient’s thoughts, feelings, and concerns.  It was suggested that she start keeping a journal and that she bring it to the session next time so that she and the therapist could work on restructuring negative thoughts that contribute to her depressive symptoms.  She was also encouraged to increase her exercise level and to only go to her room at bedtime.    Allowed patient to freely discuss issues without interruption or judgment. Provided safe, confidential environment to facilitate the development of positive therapeutic relationship and encourage open, honest communication. " Assisted patient in identifying risk factors which would indicate the need for higher level of care including thoughts to harm self or others and/or self-harming behavior and encouraged patient to contact this office, call 911, or present to the nearest emergency room should any of these events occur. Discussed crisis intervention services and means to access.  Patient adamantly and convincingly denies current suicidal or homicidal ideation or perceptual disturbance.    Assessment     Diagnoses and all orders for this visit:    Major depressive disorder, recurrent episode, moderate         Mental Status Exam  Hygiene:  good  Dress:  casual  Attitude:  Cooperative  Motor Activity:  Appropriate  Speech:  Normal  Mood:  depressed  Affect:  flat  Thought Processes:  Circum  Thought Content:  Hopelessness and helplessness  Suicidal Thoughts:  denies  Homicidal Thoughts:  denies  Crisis Safety Plan: yes, to come to the emergency room.  Hallucinations:  denies    Patient's Support Network Includes:  , son and friends    Progress toward goal: Not at goal    Functional Status: Moderate impairment     Prognosis: Good with Ongoing Treatment     Plan     Patient will adhere to medication regimen as prescribed and report any side effects. Patient will contact this office, call 911 or present to the nearest emergency room should suicidal or homicidal ideations occur. Provide Cognitive Behavioral Therapy and Solution Focused Therapy to improve functioning, maintain stability, and avoid decompensation and the need for higher level of care.          Return in about 2 weeks (around 11/02/2017).    Rachelle Branham, GEOFF, [facility] Center[facility] October 19, 2017

## 2017-10-28 ENCOUNTER — HOSPITAL ENCOUNTER (INPATIENT)
Facility: HOSPITAL | Age: 57
LOS: 4 days | Discharge: HOME | End: 2017-11-01
Attending: FAMILY MEDICINE | Admitting: INTERNAL MEDICINE

## 2017-10-28 ENCOUNTER — APPOINTMENT (OUTPATIENT)
Dept: GENERAL RADIOLOGY | Facility: HOSPITAL | Age: 57
End: 2017-10-28

## 2017-10-28 DIAGNOSIS — I20.9 ANGINA, CLASS III (HCC): ICD-10-CM

## 2017-10-28 DIAGNOSIS — I20.9 ANGINA PECTORIS (HCC): Primary | ICD-10-CM

## 2017-10-28 LAB
A-A DO2: 26.4 MMHG (ref 0–300)
ALBUMIN SERPL-MCNC: 3.8 G/DL (ref 3.5–5)
ALBUMIN/GLOB SERPL: 1.2 G/DL (ref 1.5–2.5)
ALP SERPL-CCNC: 84 U/L (ref 35–104)
ALT SERPL W P-5'-P-CCNC: 19 U/L (ref 10–36)
ANION GAP SERPL CALCULATED.3IONS-SCNC: 10.1 MMOL/L (ref 3.6–11.2)
APTT PPP: 27.7 SECONDS (ref 23.8–36.1)
ARTERIAL PATENCY WRIST A: POSITIVE
AST SERPL-CCNC: 15 U/L (ref 10–30)
ATMOSPHERIC PRESS: 720 MMHG
BASE EXCESS BLDA CALC-SCNC: -5.7 MMOL/L
BASOPHILS # BLD AUTO: 0.04 10*3/MM3 (ref 0–0.3)
BASOPHILS NFR BLD AUTO: 0.7 % (ref 0–2)
BDY SITE: ABNORMAL
BILIRUB SERPL-MCNC: 0.3 MG/DL (ref 0.2–1.8)
BILIRUB UR QL STRIP: NEGATIVE
BODY TEMPERATURE: 98.6 C
BUN BLD-MCNC: 21 MG/DL (ref 7–21)
BUN/CREAT SERPL: 13.5 (ref 7–25)
CALCIUM SPEC-SCNC: 9.4 MG/DL (ref 7.7–10)
CHLORIDE SERPL-SCNC: 111 MMOL/L (ref 99–112)
CK MB SERPL-CCNC: <0.18 NG/ML (ref 0–5)
CK MB SERPL-RTO: NORMAL % (ref 0–3)
CK SERPL-CCNC: 43 U/L (ref 24–173)
CLARITY UR: CLEAR
CO2 SERPL-SCNC: 20.9 MMOL/L (ref 24.3–31.9)
COHGB MFR BLD: 1.7 % (ref 0–5)
COLOR UR: YELLOW
CREAT BLD-MCNC: 1.56 MG/DL (ref 0.43–1.29)
D-LACTATE SERPL-SCNC: 1.7 MMOL/L (ref 0.5–2)
D-LACTATE SERPL-SCNC: 2.8 MMOL/L (ref 0.5–2)
DEPRECATED RDW RBC AUTO: 44.4 FL (ref 37–54)
EOSINOPHIL # BLD AUTO: 0.11 10*3/MM3 (ref 0–0.7)
EOSINOPHIL NFR BLD AUTO: 1.9 % (ref 0–5)
ERYTHROCYTE [DISTWIDTH] IN BLOOD BY AUTOMATED COUNT: 14 % (ref 11.5–14.5)
GFR SERPL CREATININE-BSD FRML MDRD: 34 ML/MIN/1.73
GLOBULIN UR ELPH-MCNC: 3.1 GM/DL
GLUCOSE BLD-MCNC: 442 MG/DL (ref 70–110)
GLUCOSE BLDC GLUCOMTR-MCNC: 335 MG/DL (ref 70–130)
GLUCOSE UR STRIP-MCNC: ABNORMAL MG/DL
HCO3 BLDA-SCNC: 18.6 MMOL/L (ref 22–26)
HCT VFR BLD AUTO: 37.8 % (ref 37–47)
HCT VFR BLD CALC: 37 % (ref 37–47)
HGB BLD-MCNC: 12 G/DL (ref 12–16)
HGB BLDA-MCNC: 12.5 G/DL (ref 12–16)
HGB UR QL STRIP.AUTO: NEGATIVE
HOLD SPECIMEN: NORMAL
HOROWITZ INDEX BLD+IHG-RTO: 21 %
IMM GRANULOCYTES # BLD: 0.01 10*3/MM3 (ref 0–0.03)
IMM GRANULOCYTES NFR BLD: 0.2 % (ref 0–0.5)
INR PPP: 0.98 (ref 0.9–1.1)
KETONES UR QL STRIP: NEGATIVE
LEUKOCYTE ESTERASE UR QL STRIP.AUTO: NEGATIVE
LYMPHOCYTES # BLD AUTO: 2.38 10*3/MM3 (ref 1–3)
LYMPHOCYTES NFR BLD AUTO: 41.8 % (ref 21–51)
MCH RBC QN AUTO: 28.2 PG (ref 27–33)
MCHC RBC AUTO-ENTMCNC: 31.7 G/DL (ref 33–37)
MCV RBC AUTO: 88.9 FL (ref 80–94)
METHGB BLD QL: 0.2 % (ref 0–3)
MODALITY: ABNORMAL
MONOCYTES # BLD AUTO: 0.48 10*3/MM3 (ref 0.1–0.9)
MONOCYTES NFR BLD AUTO: 8.4 % (ref 0–10)
NEUTROPHILS # BLD AUTO: 2.67 10*3/MM3 (ref 1.4–6.5)
NEUTROPHILS NFR BLD AUTO: 47 % (ref 30–70)
NITRITE UR QL STRIP: NEGATIVE
OSMOLALITY SERPL CALC.SUM OF ELEC: 305.2 MOSM/KG (ref 273–305)
OXYHGB MFR BLDV: 93.4 % (ref 85–100)
PCO2 BLDA: 32.5 MM HG (ref 35–45)
PH BLDA: 7.38 PH UNITS (ref 7.35–7.45)
PH UR STRIP.AUTO: <=5 [PH] (ref 5–8)
PLATELET # BLD AUTO: 198 10*3/MM3 (ref 130–400)
PMV BLD AUTO: 11 FL (ref 6–10)
PO2 BLDA: 76 MM HG (ref 80–100)
POTASSIUM BLD-SCNC: 4.1 MMOL/L (ref 3.5–5.3)
PROT SERPL-MCNC: 6.9 G/DL (ref 6–8)
PROT UR QL STRIP: NEGATIVE
PROTHROMBIN TIME: 13.1 SECONDS (ref 11–15.4)
RBC # BLD AUTO: 4.25 10*6/MM3 (ref 4.2–5.4)
SAO2 % BLDCOA: 95.2 % (ref 90–100)
SODIUM BLD-SCNC: 142 MMOL/L (ref 135–153)
SP GR UR STRIP: >1.03 (ref 1–1.03)
TROPONIN I SERPL-MCNC: <0.006 NG/ML
TROPONIN I SERPL-MCNC: <0.006 NG/ML
UROBILINOGEN UR QL STRIP: ABNORMAL
WBC NRBC COR # BLD: 5.69 10*3/MM3 (ref 4.5–12.5)

## 2017-10-28 PROCEDURE — 82805 BLOOD GASES W/O2 SATURATION: CPT | Performed by: FAMILY MEDICINE

## 2017-10-28 PROCEDURE — 83605 ASSAY OF LACTIC ACID: CPT | Performed by: FAMILY MEDICINE

## 2017-10-28 PROCEDURE — 82550 ASSAY OF CK (CPK): CPT | Performed by: FAMILY MEDICINE

## 2017-10-28 PROCEDURE — 93005 ELECTROCARDIOGRAM TRACING: CPT | Performed by: FAMILY MEDICINE

## 2017-10-28 PROCEDURE — 85025 COMPLETE CBC W/AUTO DIFF WBC: CPT | Performed by: FAMILY MEDICINE

## 2017-10-28 PROCEDURE — 82375 ASSAY CARBOXYHB QUANT: CPT | Performed by: FAMILY MEDICINE

## 2017-10-28 PROCEDURE — 87186 SC STD MICRODIL/AGAR DIL: CPT | Performed by: FAMILY MEDICINE

## 2017-10-28 PROCEDURE — 85730 THROMBOPLASTIN TIME PARTIAL: CPT | Performed by: FAMILY MEDICINE

## 2017-10-28 PROCEDURE — 93010 ELECTROCARDIOGRAM REPORT: CPT | Performed by: INTERNAL MEDICINE

## 2017-10-28 PROCEDURE — 96361 HYDRATE IV INFUSION ADD-ON: CPT

## 2017-10-28 PROCEDURE — 71010 XR CHEST 1 VW: CPT | Performed by: RADIOLOGY

## 2017-10-28 PROCEDURE — G0378 HOSPITAL OBSERVATION PER HR: HCPCS

## 2017-10-28 PROCEDURE — 80053 COMPREHEN METABOLIC PANEL: CPT | Performed by: FAMILY MEDICINE

## 2017-10-28 PROCEDURE — 84484 ASSAY OF TROPONIN QUANT: CPT | Performed by: FAMILY MEDICINE

## 2017-10-28 PROCEDURE — 82962 GLUCOSE BLOOD TEST: CPT

## 2017-10-28 PROCEDURE — 99284 EMERGENCY DEPT VISIT MOD MDM: CPT

## 2017-10-28 PROCEDURE — 82553 CREATINE MB FRACTION: CPT | Performed by: FAMILY MEDICINE

## 2017-10-28 PROCEDURE — 36600 WITHDRAWAL OF ARTERIAL BLOOD: CPT | Performed by: FAMILY MEDICINE

## 2017-10-28 PROCEDURE — 96360 HYDRATION IV INFUSION INIT: CPT

## 2017-10-28 PROCEDURE — 85610 PROTHROMBIN TIME: CPT | Performed by: FAMILY MEDICINE

## 2017-10-28 PROCEDURE — 87147 CULTURE TYPE IMMUNOLOGIC: CPT | Performed by: FAMILY MEDICINE

## 2017-10-28 PROCEDURE — 87040 BLOOD CULTURE FOR BACTERIA: CPT | Performed by: FAMILY MEDICINE

## 2017-10-28 PROCEDURE — 71010 HC CHEST PA OR AP: CPT

## 2017-10-28 PROCEDURE — 87077 CULTURE AEROBIC IDENTIFY: CPT | Performed by: FAMILY MEDICINE

## 2017-10-28 PROCEDURE — 81003 URINALYSIS AUTO W/O SCOPE: CPT | Performed by: FAMILY MEDICINE

## 2017-10-28 PROCEDURE — 83050 HGB METHEMOGLOBIN QUAN: CPT | Performed by: FAMILY MEDICINE

## 2017-10-28 RX ORDER — NITROGLYCERIN 0.4 MG/1
0.4 TABLET SUBLINGUAL
Status: DISCONTINUED | OUTPATIENT
Start: 2017-10-28 | End: 2017-11-01 | Stop reason: HOSPADM

## 2017-10-28 RX ORDER — SODIUM CHLORIDE 0.9 % (FLUSH) 0.9 %
10 SYRINGE (ML) INJECTION AS NEEDED
Status: DISCONTINUED | OUTPATIENT
Start: 2017-10-28 | End: 2017-11-01 | Stop reason: HOSPADM

## 2017-10-28 RX ORDER — NICOTINE POLACRILEX 4 MG
15 LOZENGE BUCCAL
Status: DISCONTINUED | OUTPATIENT
Start: 2017-10-28 | End: 2017-11-01 | Stop reason: HOSPADM

## 2017-10-28 RX ORDER — DEXTROSE MONOHYDRATE 25 G/50ML
25 INJECTION, SOLUTION INTRAVENOUS
Status: DISCONTINUED | OUTPATIENT
Start: 2017-10-28 | End: 2017-11-01 | Stop reason: HOSPADM

## 2017-10-28 RX ORDER — ASPIRIN 81 MG/1
324 TABLET, CHEWABLE ORAL ONCE
Status: DISCONTINUED | OUTPATIENT
Start: 2017-10-28 | End: 2017-10-28

## 2017-10-28 RX ADMIN — SODIUM CHLORIDE 1000 ML: 9 INJECTION, SOLUTION INTRAVENOUS at 18:47

## 2017-10-28 RX ADMIN — NITROGLYCERIN 1 INCH: 20 OINTMENT TOPICAL at 17:19

## 2017-10-28 RX ADMIN — INSULIN ASPART 10 UNITS: 100 INJECTION, SOLUTION INTRAVENOUS; SUBCUTANEOUS at 23:59

## 2017-10-29 LAB
BILIRUB UR QL STRIP: NEGATIVE
CK MB SERPL-CCNC: <0.18 NG/ML (ref 0–5)
CK MB SERPL-RTO: NORMAL % (ref 0–3)
CK SERPL-CCNC: 35 U/L (ref 24–173)
CLARITY UR: CLEAR
COLOR UR: YELLOW
D DIMER PPP FEU-MCNC: <0.27 MCGFEU/ML (ref 0–0.5)
GLUCOSE BLDC GLUCOMTR-MCNC: 131 MG/DL (ref 70–130)
GLUCOSE BLDC GLUCOMTR-MCNC: 227 MG/DL (ref 70–130)
GLUCOSE BLDC GLUCOMTR-MCNC: 272 MG/DL (ref 70–130)
GLUCOSE BLDC GLUCOMTR-MCNC: 276 MG/DL (ref 70–130)
GLUCOSE UR STRIP-MCNC: ABNORMAL MG/DL
HGB UR QL STRIP.AUTO: NEGATIVE
KETONES UR QL STRIP: NEGATIVE
LEUKOCYTE ESTERASE UR QL STRIP.AUTO: NEGATIVE
MYOGLOBIN SERPL-MCNC: 34 NG/ML (ref 0–109)
NITRITE UR QL STRIP: NEGATIVE
PH UR STRIP.AUTO: <=5 [PH] (ref 5–8)
PROT UR QL STRIP: NEGATIVE
SP GR UR STRIP: >1.03 (ref 1–1.03)
TROPONIN I SERPL-MCNC: <0.006 NG/ML
TSH SERPL DL<=0.05 MIU/L-ACNC: 1.26 MIU/ML (ref 0.55–4.78)
UROBILINOGEN UR QL STRIP: ABNORMAL

## 2017-10-29 PROCEDURE — 84484 ASSAY OF TROPONIN QUANT: CPT | Performed by: INTERNAL MEDICINE

## 2017-10-29 PROCEDURE — G0009 ADMIN PNEUMOCOCCAL VACCINE: HCPCS | Performed by: INTERNAL MEDICINE

## 2017-10-29 PROCEDURE — 82553 CREATINE MB FRACTION: CPT | Performed by: INTERNAL MEDICINE

## 2017-10-29 PROCEDURE — 85379 FIBRIN DEGRADATION QUANT: CPT | Performed by: INTERNAL MEDICINE

## 2017-10-29 PROCEDURE — 82550 ASSAY OF CK (CPK): CPT | Performed by: INTERNAL MEDICINE

## 2017-10-29 PROCEDURE — 90732 PPSV23 VACC 2 YRS+ SUBQ/IM: CPT | Performed by: INTERNAL MEDICINE

## 2017-10-29 PROCEDURE — 83874 ASSAY OF MYOGLOBIN: CPT | Performed by: INTERNAL MEDICINE

## 2017-10-29 PROCEDURE — 82962 GLUCOSE BLOOD TEST: CPT

## 2017-10-29 PROCEDURE — 84443 ASSAY THYROID STIM HORMONE: CPT | Performed by: INTERNAL MEDICINE

## 2017-10-29 PROCEDURE — 81003 URINALYSIS AUTO W/O SCOPE: CPT | Performed by: INTERNAL MEDICINE

## 2017-10-29 PROCEDURE — 63710000001 INSULIN ASPART PER 5 UNITS: Performed by: INTERNAL MEDICINE

## 2017-10-29 PROCEDURE — 25010000002 ENOXAPARIN PER 10 MG: Performed by: INTERNAL MEDICINE

## 2017-10-29 PROCEDURE — 94799 UNLISTED PULMONARY SVC/PX: CPT

## 2017-10-29 PROCEDURE — 84484 ASSAY OF TROPONIN QUANT: CPT | Performed by: FAMILY MEDICINE

## 2017-10-29 PROCEDURE — 25010000002 PNEUMOCOCCAL VAC POLYVALENT PER 0.5 ML: Performed by: INTERNAL MEDICINE

## 2017-10-29 PROCEDURE — 99254 IP/OBS CNSLTJ NEW/EST MOD 60: CPT | Performed by: INTERNAL MEDICINE

## 2017-10-29 PROCEDURE — 93005 ELECTROCARDIOGRAM TRACING: CPT | Performed by: INTERNAL MEDICINE

## 2017-10-29 PROCEDURE — 93010 ELECTROCARDIOGRAM REPORT: CPT | Performed by: INTERNAL MEDICINE

## 2017-10-29 RX ORDER — DEXTROSE MONOHYDRATE 25 G/50ML
25 INJECTION, SOLUTION INTRAVENOUS
Status: DISCONTINUED | OUTPATIENT
Start: 2017-10-29 | End: 2017-11-01 | Stop reason: HOSPADM

## 2017-10-29 RX ORDER — ASPIRIN 325 MG
325 TABLET ORAL DAILY
COMMUNITY
End: 2019-10-20

## 2017-10-29 RX ORDER — AMLODIPINE BESYLATE 5 MG/1
5 TABLET ORAL DAILY
Status: DISCONTINUED | OUTPATIENT
Start: 2017-10-29 | End: 2017-11-01 | Stop reason: HOSPADM

## 2017-10-29 RX ORDER — INSULIN ASPART 100 [IU]/ML
30 INJECTION, SUSPENSION SUBCUTANEOUS 2 TIMES DAILY WITH MEALS
Status: DISCONTINUED | OUTPATIENT
Start: 2017-10-29 | End: 2017-10-30

## 2017-10-29 RX ORDER — CLOPIDOGREL BISULFATE 75 MG/1
75 TABLET ORAL DAILY
Status: DISCONTINUED | OUTPATIENT
Start: 2017-10-29 | End: 2017-11-01 | Stop reason: HOSPADM

## 2017-10-29 RX ORDER — ATORVASTATIN CALCIUM 40 MG/1
80 TABLET, FILM COATED ORAL NIGHTLY
Status: DISCONTINUED | OUTPATIENT
Start: 2017-10-29 | End: 2017-11-01 | Stop reason: HOSPADM

## 2017-10-29 RX ORDER — CLOPIDOGREL BISULFATE 75 MG/1
TABLET ORAL
Status: COMPLETED
Start: 2017-10-29 | End: 2017-10-29

## 2017-10-29 RX ORDER — NICOTINE POLACRILEX 4 MG
15 LOZENGE BUCCAL
Status: DISCONTINUED | OUTPATIENT
Start: 2017-10-29 | End: 2017-11-01 | Stop reason: HOSPADM

## 2017-10-29 RX ORDER — LISINOPRIL 10 MG/1
20 TABLET ORAL DAILY
Status: DISCONTINUED | OUTPATIENT
Start: 2017-10-29 | End: 2017-11-01 | Stop reason: HOSPADM

## 2017-10-29 RX ORDER — ISOSORBIDE MONONITRATE 60 MG/1
60 TABLET, EXTENDED RELEASE ORAL DAILY PRN
Status: DISCONTINUED | OUTPATIENT
Start: 2017-10-29 | End: 2017-11-01 | Stop reason: HOSPADM

## 2017-10-29 RX ORDER — ASPIRIN 325 MG
325 TABLET ORAL DAILY
Status: DISCONTINUED | OUTPATIENT
Start: 2017-10-29 | End: 2017-11-01 | Stop reason: HOSPADM

## 2017-10-29 RX ORDER — CARVEDILOL 25 MG/1
25 TABLET ORAL 2 TIMES DAILY WITH MEALS
Status: DISCONTINUED | OUTPATIENT
Start: 2017-10-29 | End: 2017-11-01 | Stop reason: HOSPADM

## 2017-10-29 RX ADMIN — ASPIRIN 325 MG: 325 TABLET ORAL at 08:00

## 2017-10-29 RX ADMIN — INSULIN ASPART 5 UNITS: 100 INJECTION, SOLUTION INTRAVENOUS; SUBCUTANEOUS at 07:54

## 2017-10-29 RX ADMIN — CLOPIDOGREL 75 MG: 75 TABLET, FILM COATED ORAL at 07:57

## 2017-10-29 RX ADMIN — PNEUMOCOCCAL VACCINE POLYVALENT 0.5 ML
25; 25; 25; 25; 25; 25; 25; 25; 25; 25; 25; 25; 25; 25; 25; 25; 25; 25; 25; 25; 25; 25; 25 INJECTION, SOLUTION INTRAMUSCULAR; SUBCUTANEOUS at 09:00

## 2017-10-29 RX ADMIN — ATORVASTATIN CALCIUM 80 MG: 40 TABLET, FILM COATED ORAL at 20:17

## 2017-10-29 RX ADMIN — CARVEDILOL 25 MG: 25 TABLET, FILM COATED ORAL at 07:57

## 2017-10-29 RX ADMIN — LISINOPRIL 20 MG: 10 TABLET ORAL at 08:00

## 2017-10-29 RX ADMIN — INSULIN ASPART 30 UNITS: 100 INJECTION, SUSPENSION SUBCUTANEOUS at 12:08

## 2017-10-29 RX ADMIN — INSULIN ASPART 6 UNITS: 100 INJECTION, SOLUTION INTRAVENOUS; SUBCUTANEOUS at 20:17

## 2017-10-29 RX ADMIN — ENOXAPARIN SODIUM 40 MG: 40 INJECTION SUBCUTANEOUS at 10:35

## 2017-10-29 RX ADMIN — CARVEDILOL 25 MG: 25 TABLET, FILM COATED ORAL at 16:41

## 2017-10-29 RX ADMIN — AMLODIPINE BESYLATE 5 MG: 5 TABLET ORAL at 08:00

## 2017-10-29 RX ADMIN — INSULIN ASPART 6 UNITS: 100 INJECTION, SOLUTION INTRAVENOUS; SUBCUTANEOUS at 11:23

## 2017-10-30 ENCOUNTER — APPOINTMENT (OUTPATIENT)
Dept: CARDIOLOGY | Facility: HOSPITAL | Age: 57
End: 2017-10-30
Attending: INTERNAL MEDICINE

## 2017-10-30 LAB
ALBUMIN SERPL-MCNC: 4.1 G/DL (ref 3.5–5)
ALBUMIN/GLOB SERPL: 1.4 G/DL (ref 1.5–2.5)
ALP SERPL-CCNC: 82 U/L (ref 35–104)
ALT SERPL W P-5'-P-CCNC: 29 U/L (ref 10–36)
ANION GAP SERPL CALCULATED.3IONS-SCNC: 11.1 MMOL/L (ref 3.6–11.2)
AST SERPL-CCNC: 26 U/L (ref 10–30)
BILIRUB SERPL-MCNC: 0.5 MG/DL (ref 0.2–1.8)
BUN BLD-MCNC: 9 MG/DL (ref 7–21)
BUN/CREAT SERPL: 10.1 (ref 7–25)
CALCIUM SPEC-SCNC: 9.8 MG/DL (ref 7.7–10)
CHLORIDE SERPL-SCNC: 112 MMOL/L (ref 99–112)
CHOLEST SERPL-MCNC: 156 MG/DL (ref 0–200)
CO2 SERPL-SCNC: 18.9 MMOL/L (ref 24.3–31.9)
CREAT BLD-MCNC: 0.89 MG/DL (ref 0.43–1.29)
GFR SERPL CREATININE-BSD FRML MDRD: 65 ML/MIN/1.73
GLOBULIN UR ELPH-MCNC: 3 GM/DL
GLUCOSE BLD-MCNC: 233 MG/DL (ref 70–110)
GLUCOSE BLDC GLUCOMTR-MCNC: 184 MG/DL (ref 70–130)
GLUCOSE BLDC GLUCOMTR-MCNC: 188 MG/DL (ref 70–130)
GLUCOSE BLDC GLUCOMTR-MCNC: 208 MG/DL (ref 70–130)
GLUCOSE BLDC GLUCOMTR-MCNC: 220 MG/DL (ref 70–130)
GLUCOSE BLDC GLUCOMTR-MCNC: 225 MG/DL (ref 70–130)
HBA1C MFR BLD: 11.6 % (ref 4.5–5.7)
HDLC SERPL-MCNC: 24 MG/DL (ref 60–100)
LDLC SERPL CALC-MCNC: 56 MG/DL (ref 0–100)
LDLC/HDLC SERPL: 2.33 {RATIO}
OSMOLALITY SERPL CALC.SUM OF ELEC: 289.3 MOSM/KG (ref 273–305)
POTASSIUM BLD-SCNC: 4 MMOL/L (ref 3.5–5.3)
PROT SERPL-MCNC: 7.1 G/DL (ref 6–8)
SODIUM BLD-SCNC: 142 MMOL/L (ref 135–153)
T4 SERPL-MCNC: 6.8 MCG/DL (ref 4.5–10.9)
TRIGL SERPL-MCNC: 381 MG/DL (ref 0–150)
VLDLC SERPL-MCNC: 76.2 MG/DL

## 2017-10-30 PROCEDURE — 83036 HEMOGLOBIN GLYCOSYLATED A1C: CPT | Performed by: INTERNAL MEDICINE

## 2017-10-30 PROCEDURE — 80053 COMPREHEN METABOLIC PANEL: CPT | Performed by: INTERNAL MEDICINE

## 2017-10-30 PROCEDURE — 84436 ASSAY OF TOTAL THYROXINE: CPT | Performed by: INTERNAL MEDICINE

## 2017-10-30 PROCEDURE — 25010000002 ENOXAPARIN PER 10 MG: Performed by: INTERNAL MEDICINE

## 2017-10-30 PROCEDURE — 80061 LIPID PANEL: CPT | Performed by: INTERNAL MEDICINE

## 2017-10-30 PROCEDURE — 63710000001 INSULIN ASPART PER 5 UNITS: Performed by: INTERNAL MEDICINE

## 2017-10-30 PROCEDURE — 82962 GLUCOSE BLOOD TEST: CPT

## 2017-10-30 PROCEDURE — 93005 ELECTROCARDIOGRAM TRACING: CPT | Performed by: INTERNAL MEDICINE

## 2017-10-30 PROCEDURE — 94799 UNLISTED PULMONARY SVC/PX: CPT

## 2017-10-30 PROCEDURE — 99232 SBSQ HOSP IP/OBS MODERATE 35: CPT | Performed by: INTERNAL MEDICINE

## 2017-10-30 RX ORDER — SODIUM CHLORIDE 9 MG/ML
100 INJECTION, SOLUTION INTRAVENOUS CONTINUOUS
Status: DISPENSED | OUTPATIENT
Start: 2017-10-30 | End: 2017-10-31

## 2017-10-30 RX ORDER — INSULIN ASPART 100 [IU]/ML
40 INJECTION, SUSPENSION SUBCUTANEOUS 2 TIMES DAILY WITH MEALS
Status: DISCONTINUED | OUTPATIENT
Start: 2017-10-30 | End: 2017-11-01 | Stop reason: HOSPADM

## 2017-10-30 RX ORDER — DOBUTAMINE HYDROCHLORIDE 200 MG/100ML
10 INJECTION INTRAVENOUS CONTINUOUS
Status: DISCONTINUED | OUTPATIENT
Start: 2017-10-30 | End: 2017-11-01 | Stop reason: HOSPADM

## 2017-10-30 RX ADMIN — LISINOPRIL 20 MG: 10 TABLET ORAL at 09:32

## 2017-10-30 RX ADMIN — INSULIN ASPART 40 UNITS: 100 INJECTION, SUSPENSION SUBCUTANEOUS at 18:03

## 2017-10-30 RX ADMIN — ATORVASTATIN CALCIUM 80 MG: 40 TABLET, FILM COATED ORAL at 20:15

## 2017-10-30 RX ADMIN — AMLODIPINE BESYLATE 5 MG: 5 TABLET ORAL at 09:32

## 2017-10-30 RX ADMIN — CARVEDILOL 25 MG: 25 TABLET, FILM COATED ORAL at 18:02

## 2017-10-30 RX ADMIN — INSULIN ASPART 4 UNITS: 100 INJECTION, SOLUTION INTRAVENOUS; SUBCUTANEOUS at 18:02

## 2017-10-30 RX ADMIN — CARVEDILOL 25 MG: 25 TABLET, FILM COATED ORAL at 09:33

## 2017-10-30 RX ADMIN — SODIUM CHLORIDE 100 ML/HR: 9 INJECTION, SOLUTION INTRAVENOUS at 18:02

## 2017-10-30 RX ADMIN — ASPIRIN 325 MG: 325 TABLET ORAL at 09:33

## 2017-10-30 RX ADMIN — ENOXAPARIN SODIUM 40 MG: 40 INJECTION SUBCUTANEOUS at 09:32

## 2017-10-30 RX ADMIN — INSULIN ASPART 4 UNITS: 100 INJECTION, SOLUTION INTRAVENOUS; SUBCUTANEOUS at 09:32

## 2017-10-30 RX ADMIN — INSULIN ASPART 4 UNITS: 100 INJECTION, SOLUTION INTRAVENOUS; SUBCUTANEOUS at 20:15

## 2017-10-30 RX ADMIN — CLOPIDOGREL 75 MG: 75 TABLET, FILM COATED ORAL at 09:32

## 2017-10-31 PROBLEM — I20.9 ANGINA, CLASS III (HCC): Status: ACTIVE | Noted: 2017-10-28

## 2017-10-31 LAB
ALBUMIN SERPL-MCNC: 3.5 G/DL (ref 3.5–5)
ALBUMIN/GLOB SERPL: 1.8 G/DL (ref 1.5–2.5)
ALP SERPL-CCNC: 76 U/L (ref 35–104)
ALT SERPL W P-5'-P-CCNC: 21 U/L (ref 10–36)
ANION GAP SERPL CALCULATED.3IONS-SCNC: 8.1 MMOL/L (ref 3.6–11.2)
AST SERPL-CCNC: 21 U/L (ref 10–30)
BASOPHILS # BLD AUTO: 0.02 10*3/MM3 (ref 0–0.3)
BASOPHILS NFR BLD AUTO: 0.3 % (ref 0–2)
BILIRUB SERPL-MCNC: 0.3 MG/DL (ref 0.2–1.8)
BUN BLD-MCNC: 15 MG/DL (ref 7–21)
BUN/CREAT SERPL: 16.5 (ref 7–25)
CALCIUM SPEC-SCNC: 8.7 MG/DL (ref 7.7–10)
CHLORIDE SERPL-SCNC: 114 MMOL/L (ref 99–112)
CO2 SERPL-SCNC: 19.9 MMOL/L (ref 24.3–31.9)
CREAT BLD-MCNC: 0.91 MG/DL (ref 0.43–1.29)
CRP SERPL-MCNC: 0.51 MG/DL (ref 0–0.99)
DEPRECATED RDW RBC AUTO: 46.5 FL (ref 37–54)
EOSINOPHIL # BLD AUTO: 0.12 10*3/MM3 (ref 0–0.7)
EOSINOPHIL NFR BLD AUTO: 2.1 % (ref 0–5)
ERYTHROCYTE [DISTWIDTH] IN BLOOD BY AUTOMATED COUNT: 14.2 % (ref 11.5–14.5)
GFR SERPL CREATININE-BSD FRML MDRD: 63 ML/MIN/1.73
GLOBULIN UR ELPH-MCNC: 2 GM/DL
GLUCOSE BLD-MCNC: 124 MG/DL (ref 70–110)
GLUCOSE BLDC GLUCOMTR-MCNC: 114 MG/DL (ref 70–130)
GLUCOSE BLDC GLUCOMTR-MCNC: 205 MG/DL (ref 70–130)
HCT VFR BLD AUTO: 37 % (ref 37–47)
HGB BLD-MCNC: 11.4 G/DL (ref 12–16)
IMM GRANULOCYTES # BLD: 0.01 10*3/MM3 (ref 0–0.03)
IMM GRANULOCYTES NFR BLD: 0.2 % (ref 0–0.5)
LYMPHOCYTES # BLD AUTO: 2.61 10*3/MM3 (ref 1–3)
LYMPHOCYTES NFR BLD AUTO: 45.2 % (ref 21–51)
MCH RBC QN AUTO: 28.4 PG (ref 27–33)
MCHC RBC AUTO-ENTMCNC: 30.8 G/DL (ref 33–37)
MCV RBC AUTO: 92 FL (ref 80–94)
MONOCYTES # BLD AUTO: 0.41 10*3/MM3 (ref 0.1–0.9)
MONOCYTES NFR BLD AUTO: 7.1 % (ref 0–10)
NEUTROPHILS # BLD AUTO: 2.6 10*3/MM3 (ref 1.4–6.5)
NEUTROPHILS NFR BLD AUTO: 45.1 % (ref 30–70)
OSMOLALITY SERPL CALC.SUM OF ELEC: 285.4 MOSM/KG (ref 273–305)
PLATELET # BLD AUTO: 145 10*3/MM3 (ref 130–400)
PMV BLD AUTO: 10.3 FL (ref 6–10)
POTASSIUM BLD-SCNC: 3.8 MMOL/L (ref 3.5–5.3)
PROT SERPL-MCNC: 5.5 G/DL (ref 6–8)
RBC # BLD AUTO: 4.02 10*6/MM3 (ref 4.2–5.4)
SODIUM BLD-SCNC: 142 MMOL/L (ref 135–153)
WBC NRBC COR # BLD: 5.77 10*3/MM3 (ref 4.5–12.5)

## 2017-10-31 PROCEDURE — C1769 GUIDE WIRE: HCPCS | Performed by: INTERNAL MEDICINE

## 2017-10-31 PROCEDURE — 82962 GLUCOSE BLOOD TEST: CPT

## 2017-10-31 PROCEDURE — 80053 COMPREHEN METABOLIC PANEL: CPT | Performed by: INTERNAL MEDICINE

## 2017-10-31 PROCEDURE — C1894 INTRO/SHEATH, NON-LASER: HCPCS | Performed by: INTERNAL MEDICINE

## 2017-10-31 PROCEDURE — 25010000002 HEPARIN (PORCINE) PER 1000 UNITS: Performed by: INTERNAL MEDICINE

## 2017-10-31 PROCEDURE — 25010000002 ADENOSINE 3 MG (12 MCG/ML) IN SODIUM CHLORIDE 0.9% 250 ML 12 MCG/ML SOLUTION: Performed by: INTERNAL MEDICINE

## 2017-10-31 PROCEDURE — 86140 C-REACTIVE PROTEIN: CPT | Performed by: INTERNAL MEDICINE

## 2017-10-31 PROCEDURE — 0 IOPAMIDOL PER 1 ML: Performed by: INTERNAL MEDICINE

## 2017-10-31 PROCEDURE — B2111ZZ FLUOROSCOPY OF MULTIPLE CORONARY ARTERIES USING LOW OSMOLAR CONTRAST: ICD-10-PCS | Performed by: INTERNAL MEDICINE

## 2017-10-31 PROCEDURE — 93458 L HRT ARTERY/VENTRICLE ANGIO: CPT | Performed by: INTERNAL MEDICINE

## 2017-10-31 PROCEDURE — 93571 IV DOP VEL&/PRESS C FLO 1ST: CPT | Performed by: INTERNAL MEDICINE

## 2017-10-31 PROCEDURE — 25010000002 BIVALIRUDIN PER 1 MG: Performed by: INTERNAL MEDICINE

## 2017-10-31 PROCEDURE — 85025 COMPLETE CBC W/AUTO DIFF WBC: CPT | Performed by: INTERNAL MEDICINE

## 2017-10-31 PROCEDURE — C1760 CLOSURE DEV, VASC: HCPCS | Performed by: INTERNAL MEDICINE

## 2017-10-31 PROCEDURE — C1887 CATHETER, GUIDING: HCPCS | Performed by: INTERNAL MEDICINE

## 2017-10-31 PROCEDURE — B2151ZZ FLUOROSCOPY OF LEFT HEART USING LOW OSMOLAR CONTRAST: ICD-10-PCS | Performed by: INTERNAL MEDICINE

## 2017-10-31 PROCEDURE — 94799 UNLISTED PULMONARY SVC/PX: CPT

## 2017-10-31 PROCEDURE — 4A023N7 MEASUREMENT OF CARDIAC SAMPLING AND PRESSURE, LEFT HEART, PERCUTANEOUS APPROACH: ICD-10-PCS | Performed by: INTERNAL MEDICINE

## 2017-10-31 PROCEDURE — 99232 SBSQ HOSP IP/OBS MODERATE 35: CPT | Performed by: INTERNAL MEDICINE

## 2017-10-31 PROCEDURE — 63710000001 INSULIN ASPART PER 5 UNITS: Performed by: INTERNAL MEDICINE

## 2017-10-31 RX ORDER — HEPARIN SODIUM 1000 [USP'U]/ML
INJECTION, SOLUTION INTRAVENOUS; SUBCUTANEOUS AS NEEDED
Status: DISCONTINUED | OUTPATIENT
Start: 2017-10-31 | End: 2017-10-31 | Stop reason: HOSPADM

## 2017-10-31 RX ORDER — DIAZEPAM 5 MG/1
5 TABLET ORAL ONCE
Status: COMPLETED | OUTPATIENT
Start: 2017-10-31 | End: 2017-10-31

## 2017-10-31 RX ORDER — SODIUM CHLORIDE 9 MG/ML
INJECTION, SOLUTION INTRAVENOUS CONTINUOUS PRN
Status: DISCONTINUED | OUTPATIENT
Start: 2017-10-31 | End: 2017-10-31 | Stop reason: HOSPADM

## 2017-10-31 RX ORDER — SODIUM CHLORIDE 9 MG/ML
100 INJECTION, SOLUTION INTRAVENOUS CONTINUOUS
Status: DISCONTINUED | OUTPATIENT
Start: 2017-10-31 | End: 2017-11-01 | Stop reason: HOSPADM

## 2017-10-31 RX ORDER — LIDOCAINE HYDROCHLORIDE 20 MG/ML
INJECTION, SOLUTION INFILTRATION; PERINEURAL AS NEEDED
Status: DISCONTINUED | OUTPATIENT
Start: 2017-10-31 | End: 2017-10-31 | Stop reason: HOSPADM

## 2017-10-31 RX ADMIN — CARVEDILOL 25 MG: 25 TABLET, FILM COATED ORAL at 17:30

## 2017-10-31 RX ADMIN — DIAZEPAM 5 MG: 5 TABLET ORAL at 08:55

## 2017-10-31 RX ADMIN — ASPIRIN 325 MG: 325 TABLET ORAL at 08:55

## 2017-10-31 RX ADMIN — AMLODIPINE BESYLATE 5 MG: 5 TABLET ORAL at 08:55

## 2017-10-31 RX ADMIN — CLOPIDOGREL 75 MG: 75 TABLET, FILM COATED ORAL at 08:55

## 2017-10-31 RX ADMIN — INSULIN ASPART 4 UNITS: 100 INJECTION, SOLUTION INTRAVENOUS; SUBCUTANEOUS at 21:21

## 2017-10-31 RX ADMIN — ATORVASTATIN CALCIUM 80 MG: 40 TABLET, FILM COATED ORAL at 21:20

## 2017-10-31 RX ADMIN — CARVEDILOL 25 MG: 25 TABLET, FILM COATED ORAL at 08:55

## 2017-10-31 RX ADMIN — SODIUM CHLORIDE 100 ML/HR: 9 INJECTION, SOLUTION INTRAVENOUS at 05:07

## 2017-10-31 RX ADMIN — SODIUM CHLORIDE 100 ML/HR: 9 INJECTION, SOLUTION INTRAVENOUS at 15:30

## 2017-10-31 RX ADMIN — LISINOPRIL 20 MG: 10 TABLET ORAL at 08:55

## 2017-11-01 VITALS
RESPIRATION RATE: 18 BRPM | HEIGHT: 65 IN | TEMPERATURE: 98.4 F | BODY MASS INDEX: 44.55 KG/M2 | SYSTOLIC BLOOD PRESSURE: 129 MMHG | HEART RATE: 68 BPM | OXYGEN SATURATION: 97 % | WEIGHT: 267.4 LBS | DIASTOLIC BLOOD PRESSURE: 91 MMHG

## 2017-11-01 LAB
ALBUMIN SERPL-MCNC: 3.3 G/DL (ref 3.5–5)
ALBUMIN/GLOB SERPL: 1.4 G/DL (ref 1.5–2.5)
ALP SERPL-CCNC: 65 U/L (ref 35–104)
ALT SERPL W P-5'-P-CCNC: 29 U/L (ref 10–36)
ANION GAP SERPL CALCULATED.3IONS-SCNC: 7.3 MMOL/L (ref 3.6–11.2)
AST SERPL-CCNC: 30 U/L (ref 10–30)
BASOPHILS # BLD AUTO: 0.02 10*3/MM3 (ref 0–0.3)
BASOPHILS NFR BLD AUTO: 0.4 % (ref 0–2)
BILIRUB SERPL-MCNC: 0.3 MG/DL (ref 0.2–1.8)
BUN BLD-MCNC: 9 MG/DL (ref 7–21)
BUN/CREAT SERPL: 12.7 (ref 7–25)
CALCIUM SPEC-SCNC: 8.5 MG/DL (ref 7.7–10)
CHLORIDE SERPL-SCNC: 114 MMOL/L (ref 99–112)
CO2 SERPL-SCNC: 19.7 MMOL/L (ref 24.3–31.9)
CREAT BLD-MCNC: 0.71 MG/DL (ref 0.43–1.29)
DEPRECATED RDW RBC AUTO: 46 FL (ref 37–54)
EOSINOPHIL # BLD AUTO: 0.09 10*3/MM3 (ref 0–0.7)
EOSINOPHIL NFR BLD AUTO: 1.8 % (ref 0–5)
ERYTHROCYTE [DISTWIDTH] IN BLOOD BY AUTOMATED COUNT: 14.2 % (ref 11.5–14.5)
GFR SERPL CREATININE-BSD FRML MDRD: 85 ML/MIN/1.73
GLOBULIN UR ELPH-MCNC: 2.3 GM/DL
GLUCOSE BLD-MCNC: 173 MG/DL (ref 70–110)
GLUCOSE BLDC GLUCOMTR-MCNC: 166 MG/DL (ref 70–130)
HCT VFR BLD AUTO: 35.9 % (ref 37–47)
HGB BLD-MCNC: 11.1 G/DL (ref 12–16)
IMM GRANULOCYTES # BLD: 0.01 10*3/MM3 (ref 0–0.03)
IMM GRANULOCYTES NFR BLD: 0.2 % (ref 0–0.5)
LYMPHOCYTES # BLD AUTO: 1.74 10*3/MM3 (ref 1–3)
LYMPHOCYTES NFR BLD AUTO: 35.7 % (ref 21–51)
MCH RBC QN AUTO: 28.2 PG (ref 27–33)
MCHC RBC AUTO-ENTMCNC: 30.9 G/DL (ref 33–37)
MCV RBC AUTO: 91.3 FL (ref 80–94)
MONOCYTES # BLD AUTO: 0.36 10*3/MM3 (ref 0.1–0.9)
MONOCYTES NFR BLD AUTO: 7.4 % (ref 0–10)
NEUTROPHILS # BLD AUTO: 2.66 10*3/MM3 (ref 1.4–6.5)
NEUTROPHILS NFR BLD AUTO: 54.5 % (ref 30–70)
OSMOLALITY SERPL CALC.SUM OF ELEC: 284.1 MOSM/KG (ref 273–305)
PLATELET # BLD AUTO: 140 10*3/MM3 (ref 130–400)
PMV BLD AUTO: 10.5 FL (ref 6–10)
POTASSIUM BLD-SCNC: 3.6 MMOL/L (ref 3.5–5.3)
PROT SERPL-MCNC: 5.6 G/DL (ref 6–8)
RBC # BLD AUTO: 3.93 10*6/MM3 (ref 4.2–5.4)
SODIUM BLD-SCNC: 141 MMOL/L (ref 135–153)
WBC NRBC COR # BLD: 4.88 10*3/MM3 (ref 4.5–12.5)

## 2017-11-01 PROCEDURE — 94799 UNLISTED PULMONARY SVC/PX: CPT

## 2017-11-01 PROCEDURE — 25010000002 ENOXAPARIN PER 10 MG: Performed by: INTERNAL MEDICINE

## 2017-11-01 PROCEDURE — 80053 COMPREHEN METABOLIC PANEL: CPT | Performed by: INTERNAL MEDICINE

## 2017-11-01 PROCEDURE — 82962 GLUCOSE BLOOD TEST: CPT

## 2017-11-01 PROCEDURE — 99232 SBSQ HOSP IP/OBS MODERATE 35: CPT | Performed by: INTERNAL MEDICINE

## 2017-11-01 PROCEDURE — 63710000001 INSULIN ASPART PER 5 UNITS: Performed by: INTERNAL MEDICINE

## 2017-11-01 PROCEDURE — 85025 COMPLETE CBC W/AUTO DIFF WBC: CPT | Performed by: INTERNAL MEDICINE

## 2017-11-01 RX ORDER — NITROGLYCERIN 0.4 MG/1
0.4 TABLET SUBLINGUAL
Qty: 25 TABLET | Refills: 12 | Status: SHIPPED | OUTPATIENT
Start: 2017-11-01 | End: 2020-08-24 | Stop reason: SDUPTHER

## 2017-11-01 RX ADMIN — INSULIN ASPART 40 UNITS: 100 INJECTION, SUSPENSION SUBCUTANEOUS at 08:27

## 2017-11-01 RX ADMIN — ENOXAPARIN SODIUM 40 MG: 40 INJECTION SUBCUTANEOUS at 08:27

## 2017-11-01 RX ADMIN — CLOPIDOGREL 75 MG: 75 TABLET, FILM COATED ORAL at 08:27

## 2017-11-01 RX ADMIN — LISINOPRIL 20 MG: 10 TABLET ORAL at 08:27

## 2017-11-01 RX ADMIN — CARVEDILOL 25 MG: 25 TABLET, FILM COATED ORAL at 08:27

## 2017-11-01 RX ADMIN — AMLODIPINE BESYLATE 5 MG: 5 TABLET ORAL at 08:27

## 2017-11-01 RX ADMIN — SODIUM CHLORIDE 100 ML/HR: 9 INJECTION, SOLUTION INTRAVENOUS at 04:44

## 2017-11-01 RX ADMIN — ASPIRIN 325 MG: 325 TABLET ORAL at 08:27

## 2017-11-01 RX ADMIN — INSULIN ASPART 3 UNITS: 100 INJECTION, SOLUTION INTRAVENOUS; SUBCUTANEOUS at 08:27

## 2017-11-01 NOTE — DISCHARGE SUMMARY
Date of Admission:   10/28/2017  5:01 PM    Date of Discharge:   11/01/2017     Discharge Diagnosis:   Principal Problem:   1) chest pain/ angina pectoris  2) atherosclerotic heart disease with previous myocardial infarction November 2016 requiring drug-eluting stent (100% occluded proximal LAD).  3) hypertension  4) hyperlipidemia  5) diabetes mellitus type 2-uncontrolled  6) obesity  7) osteoarthritis      Presenting Problem/History of Present Illness  See History and Physical for Presenting Problems / Illnesses.       Hospital Course  Patient is a 57 y.o. female presented with complaints of chest pain and history of known coronary artery disease.  During her hospital course initially ruled out for myocardial infarction with serial cardiac markers.  Cardiologist was consulted.  Stress testing was performed and patient has symptoms of chest pain during this test.  Heart catheterization that was later performed on October 31.  Heart catheterization showed nonobstructive coronary artery disease.  Patient was felt to be candidate for medical management.  The remainder of her hospital course has been unremarkable.  She continues to have intermittent glucose elevations associated with her diabetes.  She has had no chest pain following the heart catheterization.  I'll plan to discharge her today if okay with cardiology.      Procedures Performed  Procedure(s):  Left Heart Cath  Functional Flow Reserve       Consults:   Consults     Date and Time Order Name Status Description    10/29/2017 0843 Inpatient Consult to Cardiology Completed     10/28/2017 8974 Internal Medicine (Gopi/Abraham/Bradley)            Condition on Discharge:   Fair    Vital Signs  Temp:  [98.2 °F (36.8 °C)-98.5 °F (36.9 °C)] 98.5 °F (36.9 °C)  Heart Rate:  [56-74] 68  Resp:  [14-18] 18  BP: (121-143)/(73-89) 121/85    Physical Exam:     General Appearance:    Alert, cooperative, in no acute distress   Head:    Normocephalic, without obvious  abnormality, atraumatic   Eyes:            Lids and lashes normal, conjunctivae and sclerae normal, no   icterus, no pallor, corneas clear, PERRLA   Ears:    Ears appear intact with no abnormalities noted   Throat:   No oral lesions, no thrush, oral mucosa moist   Neck:   No adenopathy, supple, trachea midline, no thyromegaly, no     carotid bruit, no JVD   Back:     No kyphosis present, no scoliosis present, no skin lesions,       erythema or scars, no tenderness to percussion or                   palpation,   range of motion normal   Lungs:     Clear to auscultation,respirations regular, even and                   unlabored    Heart:    Regular rhythm and normal rate, normal S1 and S2, no            murmur, no gallop, no rub, no click   Breast Exam:    Deferred   Abdomen:     Normal bowel sounds, no masses, no organomegaly, soft        non-tender, non-distended, no guarding, no rebound                 tenderness   Genitalia:    Deferred   Extremities:   Moves all extremities well, no edema, no cyanosis, no              redness   Pulses:   Pulses palpable and equal bilaterally   Skin:   No bleeding, bruising or rash   Lymph nodes:   No palpable adenopathy   Neurologic:   Cranial nerves 2 - 12 grossly intact, sensation intact, DTR        present and equal bilaterally         Discharge DispositionHome or Self Care    Discharge Medications   Jannette Onofre   Home Medication Instructions ANGELIQUE:[id number]    Printed on:11/01/17 0649   Medication Information                      Albiglutide 30 MG pen-injector  Inject 30 mg under the skin 1 (One) Time Per Week.             amLODIPine (NORVASC) 10 MG tablet  Take 1/2 tablet by mouth Daily.             aspirin 325 MG tablet  Take 325 mg by mouth Daily.             atorvastatin (LIPITOR) 80 MG tablet  Take 1 tablet by mouth Daily.             carvedilol (COREG) 25 MG tablet  Take 1 tablet by mouth Every 12 (Twelve) Hours.             clopidogrel (PLAVIX) 75 MG tablet  Take 1  tablet by mouth Daily.             Empagliflozin (JARDIANCE) 25 MG tablet  Take one tablet by mouth Daily.             insulin aspart (novoLOG) 100 UNIT/ML injection  Inject 0-9 Units under the skin 4 (Four) Times a Day Before Meals & at Bedtime.             Insulin Lispro Prot & Lispro (HUMALOG MIX 50/50 KWIKPEN) (50-50) 100 UNIT/ML suspension pen-injector  Inject 40 units Subcutaneously before meals.             isosorbide mononitrate (IMDUR) 60 MG 24 hr tablet  Take 60 mg by mouth Daily As Needed.             lisinopril (PRINIVIL,ZESTRIL) 20 MG tablet  Take 1 tablet by mouth Daily.             metFORMIN (GLUCOPHAGE) 1000 MG tablet  Take 1 tablet by mouth 2 (Two) Times a Day.             nitroglycerin (NITROSTAT) 0.4 MG SL tablet  Place 1 tablet under the tongue Every 5 (Five) Minutes As Needed for Chest Pain. Take no more than 3 doses in 15 minutes.                 Discharge Diet:   Diet Instructions     Diet: Consistent Carbohydrate; Thin       Discharge Diet:  Consistent Carbohydrate   Fluid Consistency:  Thin                 Activity at Discharge:   Activity Instructions     Activity as Tolerated                     Follow-up Appointments  Additional Instructions for the Follow-ups that You Need to Schedule     Discharge Follow-up with PCP    As directed    Follow Up Details:  Dr. Huerta-2 weeks       Discharge Follow-up with Specialty    As directed    Specialty:  Cardiology-Dr. Ogden   Follow Up:  2 Weeks             Follow-up Information     Follow up with Akhil Huerta MD .    Specialty:  Internal Medicine    Why:  Dr. Huerta-2 weeks    Contact information:    [address] [place] [address]  [phone]             Akhil Huerta MD  11/01/17  6:49 AM    Time: Discharge 32 min

## 2017-11-01 NOTE — PROGRESS NOTES
LOS: 3 days     Name: Jannette Onofre  Age/Sex: 57 y.o. female  :  1960        PCP: Akhil Huerta MD  REF: No ref. provider found    Principal Problem:    Angina, class III  Active Problems:    Angina pectoris      Reason for follow-up: Chest pains of known ASCVD.    Subjective  Jannette Onofre is a 57 y.o. female with past medical history significant for atherosclerotic cardiovascular disease status post previous ST elevation myocardial infarction in 2016 status post stenting of a 100% occlusion of the LAD.  She also has hypertension, dyslipidemia, and insulin-dependent diabetes mellitus.  She presented to the emergency department [facility] on 10/28/17 with complaints of chest pain.  She states that yesterday she developed left shoulder discomfort and she awoke from sleep which then radiated around into her left chest.  She had associated shortness of breath.  No associated nausea, vomiting, or diaphoresis.  At its maximum severity it was described as a 6 out of 10 on a pain scale.  She did take one sublingual nitroglycerin which initially relieved the pain, however, the pain did return and she therefore came to the emergency department for further evaluation.  This morning, she has a dull aching sensation in the chest which is described as a 2 out of 10.  She does report having shortness of breath with mild exertion such as walking across the parking lot at work.  This has been present for the last 2 weeks.  She denies any cough or congestion.  No fever or chills.  Denies any known history of COPD, asthma, or other lung disease.  Denies any previous chemical, dust, or fume exposure that she is aware of.  She has a light history of tobacco abuse for one to one half years, however, she quit 27 years ago.  She denies any orthopnea, significant weight gain, or recent increase in edema of the lower extremities.  She states she has actually lost weight recently.  Denies any known  history of DVT or PE.  She does state that she sits for long hours while at work. Since admission, she was ruled out for acute myocardial infarction with troponin I ultra.  She has been noted to have some acute worsening of chronic kidney disease. Due to the patient's known history of coronary artery disease with complaints of chest pain, cardiology was consulted.      Interval History:    ROS    Vital Signs  Temp:  [98.2 °F (36.8 °C)-98.5 °F (36.9 °C)] 98.4 °F (36.9 °C)  Heart Rate:  [56-74] 68  Resp:  [14-18] 18  BP: (121-143)/(73-91) 129/91  Vital Signs (last 72 hrs)       10/29 0700  -  10/30 0659 10/30 0700  -  10/31 0659 10/31 0700  -  11/01 0659 11/01 0700  -  11/01 0928   Most Recent    Temp (°F) 97.4 -  98.2    97.8 -  98.2    98.2 -  98.5       98.4 (36.9)    Heart Rate 70 -  77    62 -  82    56 -  74       68    Resp   18      18    14 -  18       18    /71 -  134/83    119/83 -  151/77    121/85 -  143/75       129/91    SpO2 (%) 93 -  96    94 -  99    94 -  99      97     97        Body mass index is 44.5 kg/(m^2).    Intake/Output Summary (Last 24 hours) at 11/01/17 0928  Last data filed at 11/01/17 0800   Gross per 24 hour   Intake          1535.77 ml   Output                0 ml   Net          1535.77 ml     Objective        Physical Exam:     General Appearance:    Alert, cooperative, in no acute distress   Head:    Normocephalic, without obvious abnormality, atraumatic   Eyes:            Conjunctivae and sclerae normal, no   icterus, no pallor, corneas clear.   Neck:   No adenopathy, supple, trachea midline, no thyromegaly, no   carotid bruit, no JVD   Lungs:     Clear to auscultation,respirations regular, even and                  unlabored    Heart:    Regular rhythm and normal rate, normal S1 and S2, no            murmur, no gallop, no rub, no click   Chest Wall:    No abnormalities observed   Abdomen:     Normal bowel sounds, no masses, no organomegaly, soft        non-tender,  non-distended, no guarding, no rebound                tenderness   Extremities:   Moves all extremities well, no edema, no cyanosis, no             redness   Pulses:   Pulses palpable and equal bilaterally   Skin:   No bleeding, bruising or rash              Procedures    Results Review:     Results from last 7 days  Lab Units 11/01/17  0122 10/31/17  0219 10/28/17  1747   WBC 10*3/mm3 4.88 5.77 5.69   HEMOGLOBIN g/dL 11.1* 11.4* 12.0   PLATELETS 10*3/mm3 140 145 198       Results from last 7 days  Lab Units 11/01/17  0122 10/31/17  0219 10/30/17  1721 10/28/17  1747   SODIUM mmol/L 141 142 142 142   POTASSIUM mmol/L 3.6 3.8 4.0 4.1   CHLORIDE mmol/L 114* 114* 112 111   CO2 mmol/L 19.7* 19.9* 18.9* 20.9*   BUN mg/dL 9 15 9 21   CREATININE mg/dL 0.71 0.91 0.89 1.56*   CALCIUM mg/dL 8.5 8.7 9.8 9.4   GLUCOSE mg/dL 173* 124* 233* 442*   ALT (SGPT) U/L 29 21 29 19   AST (SGOT) U/L 30 21 26 15       Results from last 7 days  Lab Units 10/29/17  1048 10/29/17  0646 10/29/17  0512 10/29/17  0032 10/28/17  1952 10/28/17  1747   CK TOTAL U/L  --   --   --  35  --  43   TROPONIN I ng/mL <0.006 <0.006 <0.006 <0.006 <0.006 <0.006   CKMB ng/mL  --   --   --  <0.18  --  <0.18   MYOGLOBIN ng/mL  --   --   --  34.0  --   --      Lab Results   Component Value Date    INR 0.98 10/28/2017    INR 0.89 11/29/2016    INR 0.82 11/27/2016     No results found for: MG  Lab Results   Component Value Date    TSH 1.257 10/29/2017    CHLPL 213 (H) 03/14/2016    TRIG 381 (H) 10/30/2017    HDL 24 (L) 10/30/2017    LDL No Calculation 03/14/2016      Lab Results   Component Value Date    BNP 27.0 12/29/2016           Echo   Lab Results   Component Value Date    ECHOEFEST 65 11/28/2016        I reviewed the patient's new clinical results.    Telemetry:Sinus rhythm.       Medication Review:     Albiglutide 30 mg Subcutaneous Weekly   amLODIPine 5 mg Oral Daily   aspirin 325 mg Oral Daily   atorvastatin 80 mg Oral Nightly   carvedilol 25 mg Oral BID  With Meals   clopidogrel 75 mg Oral Daily   Empagliflozin 25 mg Oral Daily   enoxaparin 40 mg Subcutaneous Daily   insulin aspart 0-9 Units Subcutaneous 4x Daily AC & at Bedtime   insulin aspart prot-insulin aspart 40 Units Subcutaneous BID With Meals   lisinopril 20 mg Oral Daily         DOBUTamine 10 mcg/kg/min    sodium chloride 100 mL/hr Last Rate: 100 mL/hr (11/01/17 0444)       Assessment:  1. Chest pains with features of angina class 3-4.  2. Known history of atherosclerotic cardiovascular disease status post ST elevation myocardial infarction November 2016 with subsequent stenting of a 100% occluded proximal LAD with a 3.25 x 23 mm Alpine drug eluting stent.  3. Nonobstructive coronary artery disease with a patent stent in the LAD on coronary angiography on 10/31/2017.  4. Essential hypertension.  5. Dyslipidemia.  6. Insulin-dependent diabetes mellitus.  7. Very light history of tobacco abuse, quit 27 years ago.  8. Obesity     Recommendations:  1. Continue with current medical therapy.  She seems stable for discharge home.  2. We will follow-up in the office as scheduled in the first week of December.  3. She may be excused from work for the next couple of weeks.    I discussed the patients findings and my recommendations with patient and family        ANGELA Bernal  11/01/17  9:28 AM    Dragon disclaimer:  Much of this encounter note is an electronic transcription/translation of spoken language to printed text. The electronic translation of spoken language may permit erroneous, or at times, nonsensical words or phrases to be inadvertently transcribed; Although I have reviewed the note for such errors, some may still exist.

## 2017-11-03 LAB
BACTERIA SPEC AEROBE CULT: ABNORMAL
BACTERIA SPEC AEROBE CULT: ABNORMAL
GRAM STN SPEC: ABNORMAL
ISOLATED FROM: ABNORMAL

## 2017-11-06 LAB
BACTERIA SPEC AEROBE CULT: ABNORMAL
GRAM STN SPEC: ABNORMAL
GRAM STN SPEC: ABNORMAL
ISOLATED FROM: ABNORMAL

## 2017-12-26 ENCOUNTER — TRANSCRIBE ORDERS (OUTPATIENT)
Dept: PULMONOLOGY | Facility: HOSPITAL | Age: 57
End: 2017-12-26

## 2017-12-26 DIAGNOSIS — Z12.39 SCREENING BREAST EXAMINATION: Primary | ICD-10-CM

## 2018-02-05 ENCOUNTER — OFFICE VISIT (OUTPATIENT)
Dept: BARIATRICS/WEIGHT MGMT | Facility: CLINIC | Age: 58
End: 2018-02-05

## 2018-02-05 VITALS
HEIGHT: 65 IN | RESPIRATION RATE: 18 BRPM | OXYGEN SATURATION: 99 % | SYSTOLIC BLOOD PRESSURE: 133 MMHG | DIASTOLIC BLOOD PRESSURE: 79 MMHG | WEIGHT: 252.01 LBS | TEMPERATURE: 97.5 F | BODY MASS INDEX: 41.99 KG/M2 | HEART RATE: 81 BPM

## 2018-02-05 DIAGNOSIS — Z99.89 OSA ON CPAP: ICD-10-CM

## 2018-02-05 DIAGNOSIS — G47.33 OSA ON CPAP: ICD-10-CM

## 2018-02-05 DIAGNOSIS — Z98.84 S/P BARIATRIC SURGERY: ICD-10-CM

## 2018-02-05 DIAGNOSIS — E11.8 TYPE 2 DIABETES MELLITUS WITH COMPLICATION, UNSPECIFIED LONG TERM INSULIN USE STATUS: ICD-10-CM

## 2018-02-05 DIAGNOSIS — Z98.84 STATUS POST BARIATRIC SURGERY: ICD-10-CM

## 2018-02-05 DIAGNOSIS — E66.01 MORBID OBESITY, UNSPECIFIED OBESITY TYPE (HCC): ICD-10-CM

## 2018-02-05 DIAGNOSIS — R53.83 FATIGUE, UNSPECIFIED TYPE: Primary | ICD-10-CM

## 2018-02-05 DIAGNOSIS — R10.13 DYSPEPSIA: ICD-10-CM

## 2018-02-05 PROCEDURE — 99214 OFFICE O/P EST MOD 30 MIN: CPT | Performed by: SURGERY

## 2018-02-05 NOTE — PROGRESS NOTES
[facility] Bariatric Surgery  [address]  Formerly [facility] [id number] [phone]        Patient Name:  Jannette Onofre.  :  1960      Date of Visit: 2018      Reason for Visit:  AGB followup    HPI:  Jannette Onofre is a 57 y.o. female s/p LAGB 2013 by Dr. Russell.  Transferred care to [facility] 2016.  Last office visit with JOSHUA Jones 17.  Expected band volume was 5.5.  Did not receive a fill b/c needed yearly UGI.  Last UGI 2017 looked good, images reviewed in Epic.    Weight before band: 340.  Lowest weight 224.  Current weight 252, down 15 pounds since last office visit.       Jannette Onofre denies dysphagia, reflux, nausea, vomiting, or abdominal pain.  Is here for yearly follow up.  Was going to come to an appointment in 2017 but was sorting out many medical bills/issues.  Has to eat slow, occasionally has to stop and finish eating.  Eats 4-6 oz. protein first which usually fills her up.  Enjoys sweets, but tries to avoid except for special occasions.  Denies calorie beverages.  Reports difficulty controlling blood glucose.  At sandra of 230, off all but one medicine.      Does not desire a fill today.    Updates to medical history: MI , cath , back injury 2017, syncope 2017 and broke hand/injured foot.  Hasn't been able to exercise until recently, joined gym last month.  Goes every day on her way home from work.  She does bicycle/treadmill for up to 45 minutes, but has to limit her heart rate.      Past Medical History:   Diagnosis Date   • Anxiety    • Coronary artery disease     s/p LAD stenting, follows w/ Dr. Ogden   • Depression    • Diabetes mellitus     dx , initially improved s/p AGB but now back on insulin    • Fatigue    • Hyperlipidemia    • Hypertension    • Melanoma        • Morbid obesity    • Myocardial infarction     2016   • BRICE on CPAP    • Past heart attack    • Syncopal episodes      Past Surgical History:    Procedure Laterality Date   • CARDIAC CATHETERIZATION N/A 2016    Procedure: Left Heart Cath;  Surgeon: Akhil Zuñiga MD;  Location:  [facility] CATH INVASIVE LOC[facility];  Service:    • CARDIAC CATHETERIZATION N/A 10/31/2017    Procedure: Left Heart Cath;  Surgeon: Lebron Ogden MD;  Location:  [facility];  Service:    • CARDIAC CATHETERIZATION  10/31/2017    Procedure: Functional Flow [place];  Surgeon: Denilson Pena MD;  Location:  [facility] CATH [facility] LOC[facility];  Service:    • CATARACT EXTRACTION N/A     rt eye , left eye    •  SECTION     • COLONOSCOPY     • EYE SURGERY     • FINGER/THUMB CLOSED REDUCTION Left 8/3/2017    Procedure: CLOSED REDUCTION PERCUTANEOUS PINNING LEFT FIFTH METACARPAL SHAFT FRACTURE;  Surgeon: Oscar León MD;  Location: [facility] OR;  Service:    • LAPAROSCOPIC CHOLECYSTECTOMY     • LAPAROSCOPIC GASTRIC BANDING  2013    s/p LAGB by Dr Russell   • OTHER SURGICAL HISTORY      melanoma removal/back   • SD RT/LT HEART CATHETERS N/A 2016    Procedure: Percutaneous Coronary Intervention;  Surgeon: Akhil Zuñiga MD;  Location:  [facility] CATH INVASIVE LOCATION;  Service: Cardiology   • SEPTOPLASTY     • SEPTOPLASTY     • UMBILICAL HERNIA REPAIR         No Known Allergies    Outpatient Prescriptions Marked as Taking for the 18 encounter (Office Visit) with Swapna Dinh MD   Medication Sig Dispense Refill   • Albiglutide 30 MG pen-injector Inject 30 mg under the skin 1 (One) Time Per Week. (Patient taking differently: Inject 30 mg under the skin 1 (One) Time Per Week. Every wednesday) 12 each 6   • amLODIPine (NORVASC) 10 MG tablet Take 1/2 tablet by mouth Daily. 90 tablet 3   • amLODIPine (NORVASC) 5 MG tablet Take 1 tablet by mouth Daily. 90 tablet 4   • aspirin 325 MG tablet Take 325 mg by mouth Daily.     • atorvastatin (LIPITOR) 80 MG tablet Take 1 tablet by mouth Daily. (Patient taking  "differently: Take 80 mg by mouth Every Night.) 90 tablet 3   • carvedilol (COREG) 25 MG tablet Take 1 tablet by mouth Every 12 (Twelve) Hours. (Patient taking differently: Take 25 mg by mouth Every 12 (Twelve) Hours.) 180 tablet 3   • carvedilol (COREG) 25 MG tablet Take 1 tablet by mouth 2 (Two) Times a Day. 180 tablet 4   • carvedilol (COREG) 25 MG tablet Take 1 tablet by mouth 2 (Two) Times a Day. 180 tablet 4   • clopidogrel (PLAVIX) 75 MG tablet Take 1 tablet by mouth Daily. 90 tablet 3   • cyclobenzaprine (FLEXERIL) 10 MG tablet Take 1 tablet by mouth 2 (Two) Times a Day As Needed for back pain. 60 tablet 1   • diclofenac (VOLTAREN) 25 MG EC tablet Take 1 tablet by mouth 2 (Two) Times a Day As Needed for pain. Take with food. 60 tablet 0   • diclofenac (VOLTAREN) 3 % gel gel Apply topically to affected area tid as needed. 100 g 2   • Empagliflozin (JARDIANCE) 25 MG tablet Take one tablet by mouth Daily. 90 tablet 4   • glucose blood (FREESTYLE LITE) test strip Use to check blood sugar 3 times daily as needed. 100 each 5   • insulin lispro (humaLOG) 100 UNIT/ML injection Inject 0-9 Units under the skin 4 (Four) Times a Day (Before Meals & at Bedtime). 10 mL 12   • Insulin Lispro Prot & Lispro (HUMALOG MIX 50/50 KWIKPEN) (50-50) 100 UNIT/ML suspension pen-injector Inject 30 units subcutaneously 3  Times a Day Before Meals 90 mL 5   • Insulin Pen Needle (BD ULTRA-FINE PEN NEEDLES) 29G X 12.7MM misc USE WITH HUMALOG MIX 3 TIMES DAILY. 100 each 6   • Insulin Syringe-Needle U-100 (BD INSULIN SYRINGE ULTRAFINE) 31G X 5/16\" 0.3 ML misc USE WITH SLIDING SCALE 4 TIMES DAILY. 100 each 6   • lisinopril (PRINIVIL,ZESTRIL) 20 MG tablet Take 1 tablet by mouth Daily. 90 tablet 3   • metFORMIN (GLUCOPHAGE) 1000 MG tablet Take 1 tablet by mouth 2 (Two) Times a Day. 180 tablet 3   • nitroglycerin (NITROSTAT) 0.4 MG SL tablet Place 1 tablet under the tongue Every 5 (Five) Minutes As Needed for Chest Pain (max of 3 doses in 15 " "minutes. If no relief, go to ER. 25 tablet 12       Social History     Social History   • Marital status:      Spouse name: N/A   • Number of children: N/A   • Years of education: N/A     Occupational History   • Not on file.     Social History Main Topics   • Smoking status: Former Smoker     Packs/day: 0.25     Types: Cigarettes     Quit date: 6/27/1989   • Smokeless tobacco: Never Used   • Alcohol use No   • Drug use: No   • Sexual activity: Defer     Other Topics Concern   • Not on file     Social History Narrative    .  Lives in [facility].   @ [employer].         /79 (BP Location: Left arm, Patient Position: Sitting, Cuff Size: Large Adult)  Pulse 81  Temp 97.5 °F (36.4 °C) (Temporal Artery )   Resp 18  Ht 165.1 cm (65\")  Wt 114 kg (252 lb 0.2 oz)  SpO2 99%  BMI 41.94 kg/m2    Physical Exam   Constitutional: She is oriented to person, place, and time. She appears well-developed and well-nourished.   HENT:   Head: Normocephalic and atraumatic.   Mouth/Throat: No oropharyngeal exudate.   Pulmonary/Chest: Effort normal. No respiratory distress.   Abdominal: Soft. She exhibits no distension. There is no tenderness.   Band port palpable in RUQ, no tenderness/fluctuance/erythema.   Neurological: She is alert and oriented to person, place, and time. No cranial nerve deficit.   Skin: Skin is warm and dry. No rash noted. She is not diaphoretic. No erythema.   Psychiatric: She has a normal mood and affect. Her behavior is normal. Judgment and thought content normal.       Assessment: s/p LAGB    Plan:    • No adjustment today.  • Discussed eating with the band.  • Encouraged good food choices - high protein, low carb.  • Encouraged to continue working on dietary/lifestyle modifications.  • Encouraged routine exercise.  • Reviewed green zone/red zone.  Needs yearly UGI in 7/2018.      The patient was instructed to follow up in 6 months .       Swapna Dinh MD    "

## 2018-02-06 ENCOUNTER — HOSPITAL ENCOUNTER (OUTPATIENT)
Dept: MAMMOGRAPHY | Facility: HOSPITAL | Age: 58
Discharge: HOME | End: 2018-02-06
Admitting: INTERNAL MEDICINE

## 2018-02-06 ENCOUNTER — LAB (OUTPATIENT)
Dept: LAB | Facility: HOSPITAL | Age: 58
End: 2018-02-06
Attending: INTERNAL MEDICINE

## 2018-02-06 ENCOUNTER — TRANSCRIBE ORDERS (OUTPATIENT)
Dept: ADMINISTRATIVE | Facility: HOSPITAL | Age: 58
End: 2018-02-06

## 2018-02-06 DIAGNOSIS — R53.81 MALAISE AND FATIGUE: ICD-10-CM

## 2018-02-06 DIAGNOSIS — E53.8 VITAMIN B 12 DEFICIENCY: ICD-10-CM

## 2018-02-06 DIAGNOSIS — R53.83 MALAISE AND FATIGUE: ICD-10-CM

## 2018-02-06 DIAGNOSIS — E11.69 DIABETES MELLITUS ASSOCIATED WITH HORMONAL ETIOLOGY (HCC): ICD-10-CM

## 2018-02-06 DIAGNOSIS — I10 BENIGN HYPERTENSION: Primary | ICD-10-CM

## 2018-02-06 DIAGNOSIS — E78.5 HYPERLIPIDEMIA, UNSPECIFIED HYPERLIPIDEMIA TYPE: ICD-10-CM

## 2018-02-06 DIAGNOSIS — Z12.39 SCREENING BREAST EXAMINATION: ICD-10-CM

## 2018-02-06 DIAGNOSIS — I10 BENIGN HYPERTENSION: ICD-10-CM

## 2018-02-06 LAB
ALBUMIN SERPL-MCNC: 4.4 G/DL (ref 3.5–5)
ALBUMIN/GLOB SERPL: 1.8 G/DL (ref 1.5–2.5)
ALP SERPL-CCNC: 80 U/L (ref 35–104)
ALT SERPL W P-5'-P-CCNC: 21 U/L (ref 10–36)
ANION GAP SERPL CALCULATED.3IONS-SCNC: 8.6 MMOL/L (ref 3.6–11.2)
AST SERPL-CCNC: 17 U/L (ref 10–30)
BASOPHILS # BLD AUTO: 0.03 10*3/MM3 (ref 0–0.3)
BASOPHILS NFR BLD AUTO: 0.5 % (ref 0–2)
BILIRUB SERPL-MCNC: 0.5 MG/DL (ref 0.2–1.8)
BUN BLD-MCNC: 19 MG/DL (ref 7–21)
BUN/CREAT SERPL: 17.9 (ref 7–25)
CALCIUM SPEC-SCNC: 9.8 MG/DL (ref 7.7–10)
CHLORIDE SERPL-SCNC: 112 MMOL/L (ref 99–112)
CHOLEST SERPL-MCNC: 144 MG/DL (ref 0–200)
CO2 SERPL-SCNC: 24.4 MMOL/L (ref 24.3–31.9)
CREAT BLD-MCNC: 1.06 MG/DL (ref 0.43–1.29)
DEPRECATED RDW RBC AUTO: 47.2 FL (ref 37–54)
EOSINOPHIL # BLD AUTO: 0.11 10*3/MM3 (ref 0–0.7)
EOSINOPHIL NFR BLD AUTO: 1.7 % (ref 0–5)
ERYTHROCYTE [DISTWIDTH] IN BLOOD BY AUTOMATED COUNT: 14.9 % (ref 11.5–14.5)
FOLATE SERPL-MCNC: 11.96 NG/ML (ref 5.4–20)
GFR SERPL CREATININE-BSD FRML MDRD: 53 ML/MIN/1.73
GLOBULIN UR ELPH-MCNC: 2.5 GM/DL
GLUCOSE BLD-MCNC: 233 MG/DL (ref 70–110)
HBA1C MFR BLD: 10.5 % (ref 4.5–5.7)
HCT VFR BLD AUTO: 42.4 % (ref 37–47)
HDLC SERPL-MCNC: 39 MG/DL (ref 60–100)
HGB BLD-MCNC: 13.6 G/DL (ref 12–16)
IMM GRANULOCYTES # BLD: 0.02 10*3/MM3 (ref 0–0.03)
IMM GRANULOCYTES NFR BLD: 0.3 % (ref 0–0.5)
LDLC SERPL CALC-MCNC: 50 MG/DL (ref 0–100)
LDLC/HDLC SERPL: 1.29 {RATIO}
LYMPHOCYTES # BLD AUTO: 1.84 10*3/MM3 (ref 1–3)
LYMPHOCYTES NFR BLD AUTO: 28.2 % (ref 21–51)
MCH RBC QN AUTO: 28.4 PG (ref 27–33)
MCHC RBC AUTO-ENTMCNC: 32.1 G/DL (ref 33–37)
MCV RBC AUTO: 88.5 FL (ref 80–94)
MONOCYTES # BLD AUTO: 0.39 10*3/MM3 (ref 0.1–0.9)
MONOCYTES NFR BLD AUTO: 6 % (ref 0–10)
NEUTROPHILS # BLD AUTO: 4.13 10*3/MM3 (ref 1.4–6.5)
NEUTROPHILS NFR BLD AUTO: 63.3 % (ref 30–70)
OSMOLALITY SERPL CALC.SUM OF ELEC: 298.4 MOSM/KG (ref 273–305)
PLATELET # BLD AUTO: 224 10*3/MM3 (ref 130–400)
PMV BLD AUTO: 10.3 FL (ref 6–10)
POTASSIUM BLD-SCNC: 4.4 MMOL/L (ref 3.5–5.3)
PROT SERPL-MCNC: 6.9 G/DL (ref 6–8)
RBC # BLD AUTO: 4.79 10*6/MM3 (ref 4.2–5.4)
SODIUM BLD-SCNC: 145 MMOL/L (ref 135–153)
TRIGL SERPL-MCNC: 273 MG/DL (ref 0–150)
VIT B12 BLD-MCNC: 480 PG/ML (ref 211–911)
VLDLC SERPL-MCNC: 54.6 MG/DL
WBC NRBC COR # BLD: 6.52 10*3/MM3 (ref 4.5–12.5)

## 2018-02-06 PROCEDURE — 77063 BREAST TOMOSYNTHESIS BI: CPT | Performed by: RADIOLOGY

## 2018-02-06 PROCEDURE — 36415 COLL VENOUS BLD VENIPUNCTURE: CPT

## 2018-02-06 PROCEDURE — 80053 COMPREHEN METABOLIC PANEL: CPT

## 2018-02-06 PROCEDURE — 82746 ASSAY OF FOLIC ACID SERUM: CPT

## 2018-02-06 PROCEDURE — 77063 BREAST TOMOSYNTHESIS BI: CPT

## 2018-02-06 PROCEDURE — 82607 VITAMIN B-12: CPT

## 2018-02-06 PROCEDURE — 77067 SCR MAMMO BI INCL CAD: CPT

## 2018-02-06 PROCEDURE — 77067 SCR MAMMO BI INCL CAD: CPT | Performed by: RADIOLOGY

## 2018-02-06 PROCEDURE — 83036 HEMOGLOBIN GLYCOSYLATED A1C: CPT

## 2018-02-06 PROCEDURE — 80061 LIPID PANEL: CPT

## 2018-02-06 PROCEDURE — 85025 COMPLETE CBC W/AUTO DIFF WBC: CPT

## 2018-02-07 ENCOUNTER — OFFICE VISIT (OUTPATIENT)
Dept: CARDIOLOGY | Facility: CLINIC | Age: 58
End: 2018-02-07

## 2018-02-07 VITALS
BODY MASS INDEX: 42.49 KG/M2 | RESPIRATION RATE: 16 BRPM | HEIGHT: 65 IN | WEIGHT: 255 LBS | SYSTOLIC BLOOD PRESSURE: 106 MMHG | DIASTOLIC BLOOD PRESSURE: 74 MMHG | HEART RATE: 80 BPM

## 2018-02-07 DIAGNOSIS — E78.2 MIXED HYPERLIPIDEMIA: ICD-10-CM

## 2018-02-07 DIAGNOSIS — E11.8 TYPE 2 DIABETES MELLITUS WITH COMPLICATION, UNSPECIFIED LONG TERM INSULIN USE STATUS: ICD-10-CM

## 2018-02-07 DIAGNOSIS — I10 ESSENTIAL HYPERTENSION: ICD-10-CM

## 2018-02-07 DIAGNOSIS — I21.09 ST ELEVATION MYOCARDIAL INFARCTION (STEMI) OF ANTERIOR WALL (HCC): Primary | ICD-10-CM

## 2018-02-07 DIAGNOSIS — R06.09 DYSPNEA ON EXERTION: ICD-10-CM

## 2018-02-07 PROCEDURE — 93000 ELECTROCARDIOGRAM COMPLETE: CPT | Performed by: INTERNAL MEDICINE

## 2018-02-07 PROCEDURE — 99213 OFFICE O/P EST LOW 20 MIN: CPT | Performed by: INTERNAL MEDICINE

## 2018-02-07 NOTE — PROGRESS NOTES
Akhil Huerta MD  Jannette Tsanggs  1960 02/07/2018    Patient Active Problem List   Diagnosis   • Unstable angina pectoris   • ST elevation myocardial infarction (STEMI) of anterior wall, 11/29/16 s/p stenting proxismal % occlusion, clinically stable.   • Type 2 diabetes mellitus   • Dyslipidemia   • Chest pain   • Diabetes mellitus   • Coronary artery disease   • Fatigue   • Hyperlipidemia   • Hypertension   • Morbid obesity   • Myocardial infarction   • Dyspnea on exertion (NYHA class 2-3)   • Closed displaced fracture of shaft of fifth metacarpal bone of left hand   • Angina pectoris   • Angina, class III       Dear Akhil Huerta MD:    Subjective     Chief complaint: Follow-up of ASCVD.      History of Present Illness:Ms. Ewing is a very pleasant 57-year-old  female with history of known ASCVD, status post acute anterior wall ST elevation myocardial infarction followed by stenting of the LAD in November 2016.  She is here for a cardiology follow up.  She denies any complaints of chest pains or significant shortness of breath although she does get short of breath with moderate exertion.  She has been going to gym and actually has been doing better with exercise.  She has some intermittent episodes of hypotension with associated with dizziness.  She had an episode of syncope back in August 2017 but none since.  She says she checks her blood pressure at home and usually runs in the 120s over 60s.    No Known Allergies:      Current Outpatient Prescriptions:   •  Albiglutide 30 MG pen-injector, Inject 30 mg under the skin 1 (One) Time Per Week. (Patient taking differently: Inject 30 mg under the skin 1 (One) Time Per Week. Every wednesday), Disp: 12 each, Rfl: 6  •  amLODIPine (NORVASC) 5 MG tablet, Take 1 tablet by mouth Daily., Disp: 90 tablet, Rfl: 4  •  aspirin 325 MG tablet, Take 325 mg by mouth Daily., Disp: , Rfl:   •  atorvastatin (LIPITOR) 80 MG tablet, Take 1 tablet by mouth  Daily. (Patient taking differently: Take 80 mg by mouth Every Night.), Disp: 90 tablet, Rfl: 3  •  carvedilol (COREG) 25 MG tablet, Take 1 tablet by mouth Every 12 (Twelve) Hours. (Patient taking differently: Take 25 mg by mouth Daily. Patient verifies taking 1 tab daily), Disp: 180 tablet, Rfl: 3  •  clopidogrel (PLAVIX) 75 MG tablet, Take 1 tablet by mouth Daily., Disp: 90 tablet, Rfl: 3  •  cyclobenzaprine (FLEXERIL) 10 MG tablet, Take 1 tablet by mouth 2 (Two) Times a Day As Needed for back pain., Disp: 60 tablet, Rfl: 1  •  diclofenac (VOLTAREN) 25 MG EC tablet, Take 1 tablet by mouth 2 (Two) Times a Day As Needed for pain. Take with food., Disp: 60 tablet, Rfl: 0  •  Empagliflozin (JARDIANCE) 25 MG tablet, Take one tablet by mouth Daily., Disp: 90 tablet, Rfl: 4  •  insulin lispro (humaLOG) 100 UNIT/ML injection, Inject 0-9 Units under the skin 4 (Four) Times a Day (Before Meals & at Bedtime)., Disp: 10 mL, Rfl: 12  •  Insulin Lispro Prot & Lispro (HUMALOG MIX 50/50 KWIKPEN) (50-50) 100 UNIT/ML suspension pen-injector, Inject 30 units subcutaneously 3  Times a Day Before Meals, Disp: 90 mL, Rfl: 5  •  lisinopril (PRINIVIL,ZESTRIL) 20 MG tablet, Take 1 tablet by mouth Daily., Disp: 90 tablet, Rfl: 3  •  metFORMIN (GLUCOPHAGE) 1000 MG tablet, Take 1 tablet by mouth 2 (Two) Times a Day., Disp: 180 tablet, Rfl: 3  •  nitroglycerin (NITROSTAT) 0.4 MG SL tablet, Place 1 tablet under the tongue Every 5 (Five) Minutes As Needed for Chest Pain (max of 3 doses in 15 minutes. If no relief, go to ER., Disp: 25 tablet, Rfl: 12  •  amLODIPine (NORVASC) 10 MG tablet, Take 1/2 tablet by mouth Daily., Disp: 90 tablet, Rfl: 3  •  carvedilol (COREG) 25 MG tablet, Take 1 tablet by mouth 2 (Two) Times a Day., Disp: 180 tablet, Rfl: 4  •  carvedilol (COREG) 25 MG tablet, Take 1 tablet by mouth 2 (Two) Times a Day., Disp: 180 tablet, Rfl: 4  •  diclofenac (VOLTAREN) 3 % gel gel, Apply topically to affected area tid as needed., Disp:  "100 g, Rfl: 2  •  glucose blood (FREESTYLE LITE) test strip, Use to check blood sugar 3 times daily as needed., Disp: 100 each, Rfl: 5  •  Insulin Pen Needle (BD ULTRA-FINE PEN NEEDLES) 29G X 12.7MM misc, USE WITH HUMALOG MIX 3 TIMES DAILY., Disp: 100 each, Rfl: 6  •  Insulin Syringe-Needle U-100 (BD INSULIN SYRINGE ULTRAFINE) 31G X 5/16\" 0.3 ML misc, USE WITH SLIDING SCALE 4 TIMES DAILY., Disp: 100 each, Rfl: 6      The following portions of the patient's history were reviewed and updated as appropriate: allergies, current medications, past family history, past medical history, past social history, past surgical history and problem list.    Social History   Substance Use Topics   • Smoking status: Former Smoker     Packs/day: 0.25     Types: Cigarettes     Quit date: 6/27/1989   • Smokeless tobacco: Never Used   • Alcohol use No       Review of Systems   Constitution: Negative for chills and fever.   HENT: Negative for nosebleeds and sore throat.    Respiratory: Positive for shortness of breath. Negative for cough, hemoptysis and wheezing.    Gastrointestinal: Negative for abdominal pain, hematemesis, hematochezia, melena, nausea and vomiting.   Genitourinary: Negative for dysuria and hematuria.   Neurological: Positive for dizziness. Negative for headaches.       Objective   Vitals:    02/07/18 1434   BP: 106/74   Pulse: 80   Resp: 16   Weight: 116 kg (255 lb)   Height: 165.1 cm (65\")     Body mass index is 42.43 kg/(m^2).        Physical Exam   Constitutional: She is oriented to person, place, and time. She appears well-developed and well-nourished.   HENT:   Head: Normocephalic.   Eyes: Conjunctivae and EOM are normal.   Neck: Normal range of motion. Neck supple. No JVD present. No tracheal deviation present. No thyromegaly present.   Cardiovascular: Normal rate and regular rhythm.  Exam reveals no gallop, no S3, no S4 and no friction rub.    No murmur heard.  Pulmonary/Chest: Breath sounds normal. No " respiratory distress. She has no wheezes. She has no rales.   Abdominal: Soft. Bowel sounds are normal. She exhibits no mass. There is no tenderness.   Musculoskeletal: She exhibits no edema.   Neurological: She is alert and oriented to person, place, and time. No cranial nerve deficit.   Skin: Skin is warm and dry.   Psychiatric: She has a normal mood and affect.       Lab Results   Component Value Date     02/06/2018    K 4.4 02/06/2018     02/06/2018    CO2 24.4 02/06/2018    BUN 19 02/06/2018    CREATININE 1.06 02/06/2018    GLUCOSE 233 (H) 02/06/2018    CALCIUM 9.8 02/06/2018    AST 17 02/06/2018    ALT 21 02/06/2018    ALKPHOS 80 02/06/2018    LABIL2 1.8 02/06/2018     Lab Results   Component Value Date    CKTOTAL 35 10/29/2017     Lab Results   Component Value Date    WBC 6.52 02/06/2018    HGB 13.6 02/06/2018    HCT 42.4 02/06/2018     02/06/2018     Lab Results   Component Value Date    INR 0.98 10/28/2017    INR 0.89 11/29/2016    INR 0.82 11/27/2016     No results found for: MG  Lab Results   Component Value Date    TSH 1.257 10/29/2017    CHLPL 213 (H) 03/14/2016    TRIG 273 (H) 02/06/2018    HDL 39 (L) 02/06/2018    LDL No Calculation 03/14/2016      Lab Results   Component Value Date    BNP 27.0 12/29/2016     Echo   Lab Results   Component Value Date    ECHOEFEST 65 11/28/2016       ECG 12 Lead  Date/Time: 2/7/2018 2:36 PM  Performed by: LEBRON ROSENBERG  Authorized by: LEBRON ROSENBERG   Rhythm: sinus rhythm  Conduction: right bundle branch block and LPFB            [facility]   Echocardiogram   Order# [id number]   Reading physician:   Lebron Rosenberg MD Ordering physician:   Lebron Rosenberg MD Study date: 8/25/17   Patient Information     · Normal left ventricular cavity size and wall thickness noted. All left ventricular wall segments contract normally.  · Estimated EF appears to be in the range of 61 - 65%  · The aortic valve is structurally normal. No aortic valve regurgitation  is present. No aortic valve stenosis is present.  · The mitral valve is normal in structure. No mitral valve regurgitation is present. No significant mitral valve stenosis is present  · The tricuspid valve is normal. No tricuspid valve stenosis is present. No tricuspid valve regurgitation is present. Estimated right ventricular systolic pressure from tricuspid regurgitation is normal (<35 mmHg).  · There is a small (<1cm) pericardial effusion adjacent to the left ventricle. There is no evidence of cardiac tamponade.     Conclusion and comments:  Ms. Onofre is a 57 year old  female with the known ASCVD, status post previous stenting of the proximal LAD after a STEMI, presents with chest pains that she says are similar to what she had prior to previous stenting.  Her cardiac catheterization reveals:      1. Normal left main coronary artery   2. Left anterior descending coronary artery has a widely patent stent in the proximal segment with a stenosis of about 50-70% in the mid segment with ROBERTO grade 2 flow into the distal segment.    3. Left circumflex coronary artery has tortuosity with mild narrowing proximally.    4. Right coronary artery is dominant for posterior ablation with minimal plaquing disease.    5. Normal left chamber size wall motion and systolic function with a visually estimated ejection fraction about 60-65%.     Recommendations: Since she has fairly typical class 3-4 anginal symptoms and with possible significant stenosis in the mid LAD, I have asked  (interventional cardiologist) to review her coronary angiograms to see if she would need further evaluation with FFR further stenosis in the mid LAD and consider further intervention as needed.  Dr. Nielsen has reviewed her coronary angiograms and concurs with the need for FFR which is now being performed.  A separate report will follow from the interventional cardiologist.       Addendum: FFR which was performed by Dr. Nielsen  revealed to be normal at 0.95.  Hence no further coronary intervention was performed    Assessment/Plan  :    1. ST elevation myocardial infarction (STEMI) of anterior wall, 11/29/16 s/p stenting proxismal % occlusion with recent cardiac catheterization revealing patent stent in the LAD and nonobstructive disease elsewhere., clinically stable.     2. Essential hypertension, With relatively low blood pressure.      3. Mixed hyperlipidemia     4. Dyspnea on exertion (NYHA class 2-3)     5. Type 2 diabetes mellitus with complication, unspecified long term insulin use status          Recommendations:    1. Since her blood pressure  tends to run low and with intermittent dizziness, we'll go ahead and discontinue the amlodipine.   2. I've asked her to keep a check on the blood pressure at home and if it tends to run more than 130 on the top, may have to go back on amlodipine at half the dose (2.5 mg daily).  3. Follow-up in about 4-5 months or sooner if needed.    Return in about 4 months (around 6/7/2018).    As always, I appreciate very much the opportunity to participate in the cardiovascular care of your patients.      With Best Regards,    Lebron Ogden MD, [facility]    Dragon disclaimer:  Much of this encounter note is an electronic transcription/translation of spoken language to printed text. The electronic translation of spoken language may permit erroneous, or at times, nonsensical words or phrases to be inadvertently transcribed; Although I have reviewed the note for such errors, some may still exist.

## 2018-02-12 ENCOUNTER — APPOINTMENT (OUTPATIENT)
Dept: GENERAL RADIOLOGY | Facility: HOSPITAL | Age: 58
End: 2018-02-12

## 2018-04-02 ENCOUNTER — LAB (OUTPATIENT)
Dept: LAB | Facility: HOSPITAL | Age: 58
End: 2018-04-02
Attending: OBSTETRICS & GYNECOLOGY

## 2018-04-02 ENCOUNTER — TRANSCRIBE ORDERS (OUTPATIENT)
Dept: ADMINISTRATIVE | Facility: HOSPITAL | Age: 58
End: 2018-04-02

## 2018-04-02 DIAGNOSIS — R68.82 DECREASED SEX DRIVE: Primary | ICD-10-CM

## 2018-04-02 DIAGNOSIS — R68.82 DECREASED SEX DRIVE: ICD-10-CM

## 2018-04-02 LAB
25(OH)D3 SERPL-MCNC: 12 NG/ML
ALBUMIN SERPL-MCNC: 4.1 G/DL (ref 3.5–5)
ALBUMIN/GLOB SERPL: 1.6 G/DL (ref 1.5–2.5)
ALP SERPL-CCNC: 80 U/L (ref 35–104)
ALT SERPL W P-5'-P-CCNC: 28 U/L (ref 10–36)
ANION GAP SERPL CALCULATED.3IONS-SCNC: 9.8 MMOL/L (ref 3.6–11.2)
AST SERPL-CCNC: 16 U/L (ref 10–30)
BASOPHILS # BLD AUTO: 0.04 10*3/MM3 (ref 0–0.3)
BASOPHILS NFR BLD AUTO: 0.6 % (ref 0–2)
BILIRUB SERPL-MCNC: 0.5 MG/DL (ref 0.2–1.8)
BUN BLD-MCNC: 28 MG/DL (ref 7–21)
BUN/CREAT SERPL: 23.1 (ref 7–25)
CALCIUM SPEC-SCNC: 9.7 MG/DL (ref 7.7–10)
CHLORIDE SERPL-SCNC: 108 MMOL/L (ref 99–112)
CO2 SERPL-SCNC: 21.2 MMOL/L (ref 24.3–31.9)
CREAT BLD-MCNC: 1.21 MG/DL (ref 0.43–1.29)
DEPRECATED RDW RBC AUTO: 46.9 FL (ref 37–54)
EOSINOPHIL # BLD AUTO: 0.12 10*3/MM3 (ref 0–0.7)
EOSINOPHIL NFR BLD AUTO: 1.7 % (ref 0–5)
ERYTHROCYTE [DISTWIDTH] IN BLOOD BY AUTOMATED COUNT: 14.7 % (ref 11.5–14.5)
ESTRADIOL SERPL HS-MCNC: <11.8 PG/ML
FSH SERPL-ACNC: 74.1 MIU/ML
GFR SERPL CREATININE-BSD FRML MDRD: 46 ML/MIN/1.73
GLOBULIN UR ELPH-MCNC: 2.6 GM/DL
GLUCOSE BLD-MCNC: 400 MG/DL (ref 70–110)
HCT VFR BLD AUTO: 41.4 % (ref 37–47)
HGB BLD-MCNC: 13.2 G/DL (ref 12–16)
IMM GRANULOCYTES # BLD: 0.01 10*3/MM3 (ref 0–0.03)
IMM GRANULOCYTES NFR BLD: 0.1 % (ref 0–0.5)
LH SERPL-ACNC: 52.2 MIU/ML
LYMPHOCYTES # BLD AUTO: 2.2 10*3/MM3 (ref 1–3)
LYMPHOCYTES NFR BLD AUTO: 31.8 % (ref 21–51)
MCH RBC QN AUTO: 28.3 PG (ref 27–33)
MCHC RBC AUTO-ENTMCNC: 31.9 G/DL (ref 33–37)
MCV RBC AUTO: 88.8 FL (ref 80–94)
MONOCYTES # BLD AUTO: 0.48 10*3/MM3 (ref 0.1–0.9)
MONOCYTES NFR BLD AUTO: 6.9 % (ref 0–10)
NEUTROPHILS # BLD AUTO: 4.06 10*3/MM3 (ref 1.4–6.5)
NEUTROPHILS NFR BLD AUTO: 58.9 % (ref 30–70)
OSMOLALITY SERPL CALC.SUM OF ELEC: 299.8 MOSM/KG (ref 273–305)
PLATELET # BLD AUTO: 201 10*3/MM3 (ref 130–400)
PMV BLD AUTO: 10.2 FL (ref 6–10)
POTASSIUM BLD-SCNC: 4.7 MMOL/L (ref 3.5–5.3)
PROGEST SERPL-MCNC: <0.15 NG/ML
PROT SERPL-MCNC: 6.7 G/DL (ref 6–8)
RBC # BLD AUTO: 4.66 10*6/MM3 (ref 4.2–5.4)
SODIUM BLD-SCNC: 139 MMOL/L (ref 135–153)
T4 FREE SERPL-MCNC: 1.21 NG/DL (ref 0.89–1.76)
TESTOST SERPL-MCNC: 11.99 NG/DL
TSH SERPL DL<=0.05 MIU/L-ACNC: 1.7 MIU/ML (ref 0.55–4.78)
WBC NRBC COR # BLD: 6.91 10*3/MM3 (ref 4.5–12.5)

## 2018-04-02 PROCEDURE — 36415 COLL VENOUS BLD VENIPUNCTURE: CPT

## 2018-04-02 PROCEDURE — 84403 ASSAY OF TOTAL TESTOSTERONE: CPT

## 2018-04-02 PROCEDURE — 84439 ASSAY OF FREE THYROXINE: CPT

## 2018-04-02 PROCEDURE — 82670 ASSAY OF TOTAL ESTRADIOL: CPT

## 2018-04-02 PROCEDURE — 85025 COMPLETE CBC W/AUTO DIFF WBC: CPT

## 2018-04-02 PROCEDURE — 80053 COMPREHEN METABOLIC PANEL: CPT

## 2018-04-02 PROCEDURE — 84443 ASSAY THYROID STIM HORMONE: CPT

## 2018-04-02 PROCEDURE — 82306 VITAMIN D 25 HYDROXY: CPT

## 2018-04-02 PROCEDURE — 83001 ASSAY OF GONADOTROPIN (FSH): CPT

## 2018-04-02 PROCEDURE — 83002 ASSAY OF GONADOTROPIN (LH): CPT

## 2018-04-02 PROCEDURE — 84144 ASSAY OF PROGESTERONE: CPT

## 2018-06-11 ENCOUNTER — OFFICE VISIT (OUTPATIENT)
Dept: CARDIOLOGY | Facility: CLINIC | Age: 58
End: 2018-06-11

## 2018-06-11 ENCOUNTER — TELEPHONE (OUTPATIENT)
Dept: CARDIOLOGY | Facility: CLINIC | Age: 58
End: 2018-06-11

## 2018-06-11 VITALS
RESPIRATION RATE: 16 BRPM | BODY MASS INDEX: 40.82 KG/M2 | HEART RATE: 71 BPM | OXYGEN SATURATION: 98 % | HEIGHT: 66 IN | WEIGHT: 254 LBS | SYSTOLIC BLOOD PRESSURE: 140 MMHG | DIASTOLIC BLOOD PRESSURE: 84 MMHG

## 2018-06-11 DIAGNOSIS — I20.9 ANGINA, CLASS IV (HCC): ICD-10-CM

## 2018-06-11 DIAGNOSIS — E11.8 TYPE 2 DIABETES MELLITUS WITH COMPLICATION, UNSPECIFIED LONG TERM INSULIN USE STATUS: ICD-10-CM

## 2018-06-11 DIAGNOSIS — E78.5 DYSLIPIDEMIA: ICD-10-CM

## 2018-06-11 DIAGNOSIS — I10 ESSENTIAL HYPERTENSION: ICD-10-CM

## 2018-06-11 DIAGNOSIS — I21.09 ST ELEVATION MYOCARDIAL INFARCTION (STEMI) OF ANTERIOR WALL (HCC): Primary | ICD-10-CM

## 2018-06-11 DIAGNOSIS — E78.2 MIXED HYPERLIPIDEMIA: ICD-10-CM

## 2018-06-11 PROCEDURE — 93000 ELECTROCARDIOGRAM COMPLETE: CPT | Performed by: NURSE PRACTITIONER

## 2018-06-11 PROCEDURE — 99214 OFFICE O/P EST MOD 30 MIN: CPT | Performed by: NURSE PRACTITIONER

## 2018-06-11 RX ORDER — ESTRADIOL 0.1 MG/G
2 CREAM VAGINAL DAILY
COMMUNITY
End: 2019-03-07

## 2018-06-11 RX ORDER — ISOSORBIDE MONONITRATE 60 MG/1
60 TABLET, EXTENDED RELEASE ORAL DAILY
Qty: 90 TABLET | Refills: 1 | Status: SHIPPED | OUTPATIENT
Start: 2018-06-11 | End: 2018-07-16 | Stop reason: SDUPTHER

## 2018-06-11 RX ORDER — MELATONIN 10 MG
10000 TABLET, SUBLINGUAL SUBLINGUAL DAILY
COMMUNITY

## 2018-06-11 NOTE — PROGRESS NOTES
"Akhil Huerta MD  Jannette Onofre  1960 06/11/2018    Patient Active Problem List   Diagnosis   • Unstable angina pectoris   • ST elevation myocardial infarction (STEMI) of anterior wall, 11/29/16 s/p stenting proxismal % occlusion, clinically stable.   • Type 2 diabetes mellitus   • Dyslipidemia   • Chest pain   • Diabetes mellitus   • Coronary artery disease   • Fatigue   • Hyperlipidemia   • Hypertension   • Morbid obesity   • Myocardial infarction   • Dyspnea on exertion (NYHA class 2-3)   • Closed displaced fracture of shaft of fifth metacarpal bone of left hand   • Angina pectoris   • Angina, class III       Dear Akhil Huerta MD:    Subjective     Chief Complaint   Patient presents with   • Follow-up     med adjustment   • Chest Pain     \"discomfort\" today    • Shortness of Breath     routine activity   • Med Management     list provided           History of Present Illness:    Jannette Onofre is a 57 y.o. female with a history of known ASCVD, status post acute anterior wall ST elevation myocardial infarction followed by stenting of the LAD in November of 2016. She is here for cardiology follow up. She began experiencing chest tightness in the left chest about 10 a.m. today. The pain did not change with exertion. She has also had some mild shortness of breath today. She rates the chest pain as 4/10. This is not similar to pain she had with the previous MI. She denies nausea, diaphoresis, and jaw pain. She has not tried anything for the pain today. She has been monitoring BP at home with readings mostly 130-140 systolic. Her last stress test was in December of 2016. She did have a cardiac catheterization in October of 2017 which revealed a widely patent stent in the proximal segment of the LAD with a stenosis of 50-70%. At that time FFR was normal at 0.95. She has not had any chest pain until today.        No Known Allergies:      Current Outpatient Prescriptions:   •  aspirin 325 MG tablet, " Take 325 mg by mouth Daily., Disp: , Rfl:   •  atorvastatin (LIPITOR) 80 MG tablet, Take 1 tablet by mouth Daily., Disp: 90 tablet, Rfl: 3  •  carvedilol (COREG) 25 MG tablet, Take 1 (one) Tablet, by mouth two times a day., Disp: 180 tablet, Rfl: 4  •  Cholecalciferol (VITAMIN D3) 51737 units tablet, Take 10,000 Units by mouth Daily., Disp: , Rfl:   •  clopidogrel (PLAVIX) 75 MG tablet, Take 1 (one) tablet by mouth every day., Disp: 90 tablet, Rfl: 4  •  cyclobenzaprine (FLEXERIL) 10 MG tablet, Take 1 tablet by mouth 2 (Two) Times a Day As Needed for back pain., Disp: 60 tablet, Rfl: 1  •  diclofenac (VOLTAREN) 3 % gel gel, Apply topically to affected area tid as needed., Disp: 100 g, Rfl: 2  •  Empagliflozin (JARDIANCE) 25 MG tablet, Take one tablet by mouth Daily., Disp: 90 tablet, Rfl: 4  •  estradiol (ESTRACE) 0.1 MG/GM vaginal cream, Insert 2 g into the vagina Daily., Disp: , Rfl:   •  glucose blood (FREESTYLE LITE) test strip, Use to check blood sugar 3 times daily as needed., Disp: 100 each, Rfl: 5  •  insulin lispro (humaLOG) 100 UNIT/ML injection, Inject 0-9 Units under the skin 4 (Four) Times a Day (Before Meals & at Bedtime)., Disp: 10 mL, Rfl: 12  •  Insulin Lispro Prot & Lispro (HUMALOG MIX 50/50 KWIKPEN) (50-50) 100 UNIT/ML suspension pen-injector, Inject 30 units subcutaneously 3  Times a Day Before Meals, Disp: 90 mL, Rfl: 5  •  lisinopril (PRINIVIL,ZESTRIL) 20 MG tablet, Take 1 (one) tablet by mouth every day., Disp: 90 tablet, Rfl: 3  •  metFORMIN (GLUCOPHAGE) 1000 MG tablet, Take 1 tablet by mouth 2 (Two) Times a Day., Disp: 180 tablet, Rfl: 3  •  Multiple Vitamin (MULTI-VITAMIN DAILY PO), Take  by mouth., Disp: , Rfl:   •  nitroglycerin (NITROSTAT) 0.4 MG SL tablet, Place 1 tablet under the tongue Every 5 (Five) Minutes As Needed for Chest Pain (max of 3 doses in 15 minutes. If no relief, go to ER., Disp: 25 tablet, Rfl: 12  •  Albiglutide 30 MG pen-injector, Inject 30 mg under the skin 1 (One)  "Time Per Week. (Patient taking differently: Inject 30 mg under the skin 1 (One) Time Per Week. Every wednesday), Disp: 12 each, Rfl: 6  •  diclofenac (VOLTAREN) 25 MG EC tablet, Take 1 tablet by mouth 2 (Two) Times a Day As Needed for pain. Take with food., Disp: 60 tablet, Rfl: 0  •  Insulin Pen Needle (BD ULTRA-FINE PEN NEEDLES) 29G X 12.7MM misc, USE WITH HUMALOG MIX 3 TIMES DAILY., Disp: 100 each, Rfl: 6  •  Insulin Syringe-Needle U-100 (BD INSULIN SYRINGE ULTRAFINE) 31G X 5/16\" 0.3 ML misc, USE WITH SLIDING SCALE 4 TIMES DAILY., Disp: 100 each, Rfl: 6  •  isosorbide mononitrate (IMDUR) 60 MG 24 hr tablet, Take 1 tablet by mouth Daily., Disp: 90 tablet, Rfl: 1      The following portions of the patient's history were reviewed and updated as appropriate: allergies, current medications, past family history, past medical history, past social history, past surgical history and problem list.    Social History   Substance Use Topics   • Smoking status: Former Smoker     Packs/day: 0.25     Types: Cigarettes     Quit date: 6/27/1989   • Smokeless tobacco: Never Used   • Alcohol use No       Review of Systems   Constitution: Negative for chills and fever.   HENT: Negative for nosebleeds and sore throat.    Cardiovascular: Positive for chest pain. Negative for claudication, dyspnea on exertion, irregular heartbeat, leg swelling, near-syncope, orthopnea, palpitations and syncope.   Respiratory: Positive for shortness of breath. Negative for cough, hemoptysis and wheezing.    Gastrointestinal: Negative for abdominal pain, hematemesis, hematochezia, melena, nausea and vomiting.   Genitourinary: Negative for dysuria and hematuria.   Neurological: Negative for dizziness and headaches.       Objective   Vitals:    06/11/18 1422   BP: 140/84   Pulse: 71   Resp: 16   SpO2: 98%   Weight: 115 kg (254 lb)   Height: 167.6 cm (66\")     Body mass index is 41 kg/m².        Physical Exam   Constitutional: She is oriented to person, " place, and time. She appears well-developed and well-nourished.   HENT:   Head: Normocephalic and atraumatic.   Cardiovascular: Normal rate, regular rhythm and normal heart sounds.    Pulmonary/Chest: Effort normal and breath sounds normal.   Abdominal: Soft. Bowel sounds are normal.   Neurological: She is alert and oriented to person, place, and time.   Skin: Skin is warm and dry.   Psychiatric: She has a normal mood and affect. Her behavior is normal.               ECG 12 Lead  Date/Time: 6/11/2018 2:36 PM  Performed by: VARUN SAGASTUME  Authorized by: VARUN SAGASTUME   Comparison: compared with previous ECG   Similar to previous ECG  Rhythm: sinus rhythm  Comments: right bundle branch block and LPFB              Assessment/Plan    Diagnosis Plan   1. ST elevation myocardial infarction (STEMI) of anterior wall, 11/29/16 s/p stenting proxismal % occlusion, clinically stable.  ECG 12 Lead    isosorbide mononitrate (IMDUR) 60 MG 24 hr tablet    Treadmill Stress Test   2. Mixed hyperlipidemia  ECG 12 Lead    Treadmill Stress Test   3. Essential hypertension  ECG 12 Lead    isosorbide mononitrate (IMDUR) 60 MG 24 hr tablet    Treadmill Stress Test   4. Type 2 diabetes mellitus with complication, unspecified long term insulin use status  ECG 12 Lead    Treadmill Stress Test   5. Dyslipidemia  ECG 12 Lead   6. Angina, class IV  ECG 12 Lead    isosorbide mononitrate (IMDUR) 60 MG 24 hr tablet    Treadmill Stress Test                Recommendations:    1. I examined the patient with Dr. Ogden today. She did take a sublingual nitroglycerin in the office which did resolve the chest pain.    2. Will start Imdur 60 mg daily and order a treadmill stress test to be done urgently.    3. If she has any further episodes of chest pain, we have advised her to call or go to ER.           Patient's Body mass index is 41 kg/m². BMI is above normal parameters. Recommendations include: educational material.         Return in  about 4 weeks (around 7/9/2018) for Recheck.    As always, I appreciate very much the opportunity to participate in the cardiovascular care of your patients.      With Best Regards,    BRYNN Valencia

## 2018-06-13 ENCOUNTER — HOSPITAL ENCOUNTER (OUTPATIENT)
Dept: CARDIOLOGY | Facility: HOSPITAL | Age: 58
Discharge: HOME | End: 2018-06-13
Admitting: NURSE PRACTITIONER

## 2018-06-13 DIAGNOSIS — I20.9 ANGINA, CLASS IV (HCC): ICD-10-CM

## 2018-06-13 DIAGNOSIS — E78.2 MIXED HYPERLIPIDEMIA: ICD-10-CM

## 2018-06-13 DIAGNOSIS — I21.09 ST ELEVATION MYOCARDIAL INFARCTION (STEMI) OF ANTERIOR WALL (HCC): ICD-10-CM

## 2018-06-13 DIAGNOSIS — E11.8 TYPE 2 DIABETES MELLITUS WITH COMPLICATION, UNSPECIFIED LONG TERM INSULIN USE STATUS: ICD-10-CM

## 2018-06-13 DIAGNOSIS — I10 ESSENTIAL HYPERTENSION: ICD-10-CM

## 2018-06-13 PROCEDURE — 93017 CV STRESS TEST TRACING ONLY: CPT

## 2018-06-13 PROCEDURE — 93018 CV STRESS TEST I&R ONLY: CPT | Performed by: INTERNAL MEDICINE

## 2018-06-14 ENCOUNTER — LAB (OUTPATIENT)
Dept: LAB | Facility: HOSPITAL | Age: 58
End: 2018-06-14

## 2018-06-14 ENCOUNTER — TRANSCRIBE ORDERS (OUTPATIENT)
Dept: LAB | Facility: HOSPITAL | Age: 58
End: 2018-06-14

## 2018-06-14 DIAGNOSIS — Z79.890 HORMONE REPLACEMENT THERAPY: ICD-10-CM

## 2018-06-14 DIAGNOSIS — Z79.890 HORMONE REPLACEMENT THERAPY: Primary | ICD-10-CM

## 2018-06-14 LAB
25(OH)D3 SERPL-MCNC: 40 NG/ML
BH CV STRESS BP STAGE 1: NORMAL
BH CV STRESS BP STAGE 2: NORMAL
BH CV STRESS DURATION MIN STAGE 1: 3
BH CV STRESS DURATION MIN STAGE 2: 3
BH CV STRESS DURATION SEC STAGE 1: 0
BH CV STRESS DURATION SEC STAGE 2: 0
BH CV STRESS GRADE STAGE 1: 10
BH CV STRESS GRADE STAGE 2: 12
BH CV STRESS HR STAGE 1: 111
BH CV STRESS HR STAGE 2: 111
BH CV STRESS METS STAGE 1: 5
BH CV STRESS METS STAGE 2: 7.5
BH CV STRESS PROTOCOL 1: NORMAL
BH CV STRESS RECOVERY BP: NORMAL MMHG
BH CV STRESS RECOVERY HR: 76 BPM
BH CV STRESS SPEED STAGE 1: 1.7
BH CV STRESS SPEED STAGE 2: 2.5
BH CV STRESS STAGE 1: 1
BH CV STRESS STAGE 2: 2
ESTRADIOL SERPL HS-MCNC: 30.3 PG/ML
FSH SERPL-ACNC: 45.7 MIU/ML
MAXIMAL PREDICTED HEART RATE: 161 BPM
PERCENT MAX PREDICTED HR: 68.94 %
PROGEST SERPL-MCNC: 1.1 NG/ML
STRESS BASELINE BP: NORMAL MMHG
STRESS BASELINE HR: 66 BPM
STRESS PERCENT HR: 81 %
STRESS POST ESTIMATED WORKLOAD: 7 METS
STRESS POST EXERCISE DUR MIN: 4 MIN
STRESS POST EXERCISE DUR SEC: 13 SEC
STRESS POST PEAK BP: NORMAL MMHG
STRESS POST PEAK HR: 111 BPM
STRESS TARGET HR: 137 BPM

## 2018-06-14 PROCEDURE — 82306 VITAMIN D 25 HYDROXY: CPT

## 2018-06-14 PROCEDURE — 82670 ASSAY OF TOTAL ESTRADIOL: CPT

## 2018-06-14 PROCEDURE — 84144 ASSAY OF PROGESTERONE: CPT

## 2018-06-14 PROCEDURE — 84403 ASSAY OF TOTAL TESTOSTERONE: CPT

## 2018-06-14 PROCEDURE — 83001 ASSAY OF GONADOTROPIN (FSH): CPT

## 2018-06-14 PROCEDURE — 84402 ASSAY OF FREE TESTOSTERONE: CPT

## 2018-06-14 PROCEDURE — 36415 COLL VENOUS BLD VENIPUNCTURE: CPT

## 2018-06-15 DIAGNOSIS — E78.2 MIXED HYPERLIPIDEMIA: ICD-10-CM

## 2018-06-15 DIAGNOSIS — I21.09 ST ELEVATION MYOCARDIAL INFARCTION (STEMI) OF ANTERIOR WALL (HCC): Primary | ICD-10-CM

## 2018-06-15 DIAGNOSIS — I20.9 ANGINA PECTORIS (HCC): ICD-10-CM

## 2018-06-15 DIAGNOSIS — I10 ESSENTIAL HYPERTENSION: ICD-10-CM

## 2018-06-15 LAB
TESTOST FREE SERPL-MCNC: 2.6 PG/ML (ref 0–4.2)
TESTOST SERPL-MCNC: 120 NG/DL (ref 3–41)

## 2018-06-21 ENCOUNTER — TRANSCRIBE ORDERS (OUTPATIENT)
Dept: ADMINISTRATIVE | Facility: HOSPITAL | Age: 58
End: 2018-06-21

## 2018-06-21 ENCOUNTER — LAB (OUTPATIENT)
Dept: LAB | Facility: HOSPITAL | Age: 58
End: 2018-06-21
Attending: INTERNAL MEDICINE

## 2018-06-21 DIAGNOSIS — E11.69 DIABETES MELLITUS ASSOCIATED WITH HORMONAL ETIOLOGY (HCC): Primary | ICD-10-CM

## 2018-06-21 DIAGNOSIS — R53.81 MALAISE AND FATIGUE: ICD-10-CM

## 2018-06-21 DIAGNOSIS — R53.83 MALAISE AND FATIGUE: ICD-10-CM

## 2018-06-21 DIAGNOSIS — I10 BENIGN HYPERTENSION: ICD-10-CM

## 2018-06-21 DIAGNOSIS — E78.5 HYPERLIPIDEMIA, UNSPECIFIED HYPERLIPIDEMIA TYPE: ICD-10-CM

## 2018-06-21 DIAGNOSIS — E11.69 DIABETES MELLITUS ASSOCIATED WITH HORMONAL ETIOLOGY (HCC): ICD-10-CM

## 2018-06-21 LAB
ALBUMIN SERPL-MCNC: 4.4 G/DL (ref 3.5–5)
ALBUMIN UR-MCNC: 2.8 MG/L
ALBUMIN/GLOB SERPL: 1.7 G/DL (ref 1.5–2.5)
ALP SERPL-CCNC: 75 U/L (ref 35–104)
ALT SERPL W P-5'-P-CCNC: 30 U/L (ref 10–36)
ANION GAP SERPL CALCULATED.3IONS-SCNC: 5.7 MMOL/L (ref 3.6–11.2)
AST SERPL-CCNC: 21 U/L (ref 10–30)
BILIRUB SERPL-MCNC: 0.7 MG/DL (ref 0.2–1.8)
BUN BLD-MCNC: 17 MG/DL (ref 7–21)
BUN/CREAT SERPL: 17 (ref 7–25)
CALCIUM SPEC-SCNC: 8.8 MG/DL (ref 7.7–10)
CHLORIDE SERPL-SCNC: 111 MMOL/L (ref 99–112)
CHOLEST SERPL-MCNC: 138 MG/DL (ref 0–200)
CO2 SERPL-SCNC: 24.3 MMOL/L (ref 24.3–31.9)
CREAT BLD-MCNC: 1 MG/DL (ref 0.43–1.29)
GFR SERPL CREATININE-BSD FRML MDRD: 57 ML/MIN/1.73
GLOBULIN UR ELPH-MCNC: 2.6 GM/DL
GLUCOSE BLD-MCNC: 227 MG/DL (ref 70–110)
HBA1C MFR BLD: 11.5 % (ref 4.5–5.7)
HDLC SERPL-MCNC: 36 MG/DL (ref 60–100)
LDLC SERPL CALC-MCNC: 50 MG/DL (ref 0–100)
LDLC/HDLC SERPL: 1.39 {RATIO}
OSMOLALITY SERPL CALC.SUM OF ELEC: 289.9 MOSM/KG (ref 273–305)
POTASSIUM BLD-SCNC: 4 MMOL/L (ref 3.5–5.3)
PROT SERPL-MCNC: 7 G/DL (ref 6–8)
SODIUM BLD-SCNC: 141 MMOL/L (ref 135–153)
TRIGL SERPL-MCNC: 260 MG/DL (ref 0–150)
VLDLC SERPL-MCNC: 52 MG/DL

## 2018-06-21 PROCEDURE — 84681 ASSAY OF C-PEPTIDE: CPT

## 2018-06-21 PROCEDURE — 82043 UR ALBUMIN QUANTITATIVE: CPT

## 2018-06-21 PROCEDURE — 83036 HEMOGLOBIN GLYCOSYLATED A1C: CPT

## 2018-06-21 PROCEDURE — 36415 COLL VENOUS BLD VENIPUNCTURE: CPT

## 2018-06-21 PROCEDURE — 80053 COMPREHEN METABOLIC PANEL: CPT

## 2018-06-21 PROCEDURE — 80061 LIPID PANEL: CPT

## 2018-06-22 LAB — C PEPTIDE SERPL-MCNC: 1.6 NG/ML (ref 1.1–4.4)

## 2018-07-02 ENCOUNTER — HOSPITAL ENCOUNTER (OUTPATIENT)
Dept: NUCLEAR MEDICINE | Facility: HOSPITAL | Age: 58
Discharge: HOME | End: 2018-07-02

## 2018-07-02 ENCOUNTER — HOSPITAL ENCOUNTER (OUTPATIENT)
Dept: CARDIOLOGY | Facility: HOSPITAL | Age: 58
Discharge: HOME | End: 2018-07-02

## 2018-07-02 DIAGNOSIS — E78.2 MIXED HYPERLIPIDEMIA: ICD-10-CM

## 2018-07-02 DIAGNOSIS — I21.09 ST ELEVATION MYOCARDIAL INFARCTION (STEMI) OF ANTERIOR WALL (HCC): ICD-10-CM

## 2018-07-02 DIAGNOSIS — I10 ESSENTIAL HYPERTENSION: ICD-10-CM

## 2018-07-02 DIAGNOSIS — I20.9 ANGINA PECTORIS (HCC): ICD-10-CM

## 2018-07-02 PROCEDURE — A9500 TC99M SESTAMIBI: HCPCS | Performed by: NURSE PRACTITIONER

## 2018-07-02 PROCEDURE — 0 TECHNETIUM SESTAMIBI: Performed by: NURSE PRACTITIONER

## 2018-07-02 PROCEDURE — 93018 CV STRESS TEST I&R ONLY: CPT | Performed by: INTERNAL MEDICINE

## 2018-07-02 PROCEDURE — 78452 HT MUSCLE IMAGE SPECT MULT: CPT | Performed by: INTERNAL MEDICINE

## 2018-07-02 PROCEDURE — 78452 HT MUSCLE IMAGE SPECT MULT: CPT

## 2018-07-02 PROCEDURE — 25010000002 REGADENOSON 0.4 MG/5ML SOLUTION: Performed by: NURSE PRACTITIONER

## 2018-07-02 PROCEDURE — 93017 CV STRESS TEST TRACING ONLY: CPT

## 2018-07-02 RX ADMIN — TECHNETIUM TC 99M SESTAMIBI 1 DOSE: 1 INJECTION INTRAVENOUS at 09:15

## 2018-07-02 RX ADMIN — REGADENOSON 0.4 MG: 0.08 INJECTION, SOLUTION INTRAVENOUS at 09:15

## 2018-07-02 RX ADMIN — TECHNETIUM TC 99M SESTAMIBI 1 DOSE: 1 INJECTION INTRAVENOUS at 07:45

## 2018-07-03 LAB
BH CV NUCLEAR PRIOR STUDY: 3
BH CV STRESS BP STAGE 1: NORMAL
BH CV STRESS BP STAGE 2: NORMAL
BH CV STRESS COMMENTS STAGE 1: NORMAL
BH CV STRESS COMMENTS STAGE 2: NORMAL
BH CV STRESS DOSE REGADENOSON STAGE 1: 0.4
BH CV STRESS DURATION MIN STAGE 1: 0
BH CV STRESS DURATION MIN STAGE 2: 4
BH CV STRESS DURATION SEC STAGE 1: 10
BH CV STRESS DURATION SEC STAGE 2: 0
BH CV STRESS HR STAGE 1: 63
BH CV STRESS HR STAGE 2: 60
BH CV STRESS PROTOCOL 1: NORMAL
BH CV STRESS RECOVERY BP: NORMAL MMHG
BH CV STRESS RECOVERY HR: 65 BPM
BH CV STRESS STAGE 1: 1
BH CV STRESS STAGE 2: 2
MAXIMAL PREDICTED HEART RATE: 161 BPM
PERCENT MAX PREDICTED HR: 39.13 %
STRESS BASELINE BP: NORMAL MMHG
STRESS BASELINE HR: 60 BPM
STRESS PERCENT HR: 46 %
STRESS POST PEAK BP: NORMAL MMHG
STRESS POST PEAK HR: 63 BPM
STRESS TARGET HR: 137 BPM

## 2018-07-05 ENCOUNTER — HOSPITAL ENCOUNTER (OUTPATIENT)
Dept: GENERAL RADIOLOGY | Facility: HOSPITAL | Age: 58
Discharge: HOME | End: 2018-07-05
Admitting: SURGERY

## 2018-07-05 DIAGNOSIS — R10.13 DYSPEPSIA: ICD-10-CM

## 2018-07-05 PROCEDURE — 74241 FL UPPER GI SINGLE CONTRAST W KUB: CPT | Performed by: RADIOLOGY

## 2018-07-05 PROCEDURE — 74241: CPT

## 2018-07-09 ENCOUNTER — OFFICE VISIT (OUTPATIENT)
Dept: CARDIOLOGY | Facility: CLINIC | Age: 58
End: 2018-07-09

## 2018-07-09 VITALS
BODY MASS INDEX: 40.66 KG/M2 | HEIGHT: 66 IN | SYSTOLIC BLOOD PRESSURE: 109 MMHG | DIASTOLIC BLOOD PRESSURE: 68 MMHG | WEIGHT: 253 LBS | RESPIRATION RATE: 16 BRPM | HEART RATE: 85 BPM

## 2018-07-09 DIAGNOSIS — I10 ESSENTIAL (PRIMARY) HYPERTENSION: ICD-10-CM

## 2018-07-09 DIAGNOSIS — I21.09 ST ELEVATION MYOCARDIAL INFARCTION (STEMI) OF ANTERIOR WALL (HCC): Primary | ICD-10-CM

## 2018-07-09 DIAGNOSIS — I10 ESSENTIAL HYPERTENSION: ICD-10-CM

## 2018-07-09 DIAGNOSIS — E78.2 MIXED HYPERLIPIDEMIA: ICD-10-CM

## 2018-07-09 DIAGNOSIS — E11.8 TYPE 2 DIABETES MELLITUS WITH COMPLICATION, UNSPECIFIED LONG TERM INSULIN USE STATUS: ICD-10-CM

## 2018-07-09 DIAGNOSIS — E78.5 DYSLIPIDEMIA: ICD-10-CM

## 2018-07-09 DIAGNOSIS — R06.09 DYSPNEA ON EXERTION: ICD-10-CM

## 2018-07-09 DIAGNOSIS — I25.118 CORONARY ARTERY DISEASE OF NATIVE ARTERY OF NATIVE HEART WITH STABLE ANGINA PECTORIS (HCC): ICD-10-CM

## 2018-07-09 PROCEDURE — 99214 OFFICE O/P EST MOD 30 MIN: CPT | Performed by: NURSE PRACTITIONER

## 2018-07-09 RX ORDER — CARVEDILOL 25 MG/1
TABLET ORAL
Qty: 180 TABLET | Refills: 4 | Status: SHIPPED | OUTPATIENT
Start: 2018-07-09 | End: 2019-09-19 | Stop reason: SDUPTHER

## 2018-07-09 RX ORDER — RANOLAZINE 500 MG/1
500 TABLET, EXTENDED RELEASE ORAL EVERY 12 HOURS SCHEDULED
Qty: 60 TABLET | Refills: 5 | Status: SHIPPED | OUTPATIENT
Start: 2018-07-09 | End: 2018-07-16 | Stop reason: SINTOL

## 2018-07-09 NOTE — PROGRESS NOTES
Akhil Huerta MD  Jannette Oonfre  1960 07/09/2018    Patient Active Problem List   Diagnosis   • Unstable angina pectoris (CMS/HCC)   • ST elevation myocardial infarction (STEMI) of anterior wall, 11/29/16 s/p stenting proxismal % occlusion, clinically stable.   • Type 2 diabetes mellitus (CMS/HCC)   • Dyslipidemia   • Chest pain   • Diabetes mellitus (CMS/HCC)   • Coronary artery disease   • Fatigue   • Hyperlipidemia   • Hypertension   • Morbid obesity (CMS/HCC)   • Myocardial infarction   • Dyspnea on exertion (NYHA class 2-3)   • Closed displaced fracture of shaft of fifth metacarpal bone of left hand   • Angina pectoris (CMS/HCC)   • Angina, class III (CMS/[facility])       Dear Akhil Huerta MD:    Subjective     Chief Complaint   Patient presents with   • Follow-up     stress findings   • Shortness of Breath     routine activity   • Dizziness     no time or reason   • Med Management     list provided           History of Present Illness:    Jannette Onofre is a 58 y.o. female with a history of known ASCVD, status post acute anterior wall ST elevation myocardial infarction followed by stenting of the LAD in November of 2016. Her last stress test was 7/2/18 revealing a small sized, mild degree of ischemia in the anterior wall.  She did have a cardiac catheterization October 2017 which revealed a widely patent stent in the proximal segment of the LAD with stenosis of 50-70% in the mid LAD. At that time FFR was normal at 0.95. She has had two episodes of chest pain over the past month. Both episodes occurred while she was sitting at work.  This is described as a tightness in her mid chest.  This was relieved with one sublingual nitroglycerin.  She does have associated shortness of breath but no diaphoresis.  She has also been very fatigued.her blood pressure is also been elevated recently.  Her PCP prescribed amlodipine.  Her blood pressures have improved.          No Known Allergies:      Current  Outpatient Prescriptions:   •  amLODIPine (NORVASC) 5 MG tablet, Take 1 tablet by mouth Daily. (Patient taking differently: Take 2.5 mg by mouth Daily.), Disp: 90 tablet, Rfl: 4  •  aspirin 325 MG tablet, Take 325 mg by mouth Daily., Disp: , Rfl:   •  atorvastatin (LIPITOR) 80 MG tablet, Take 1 tablet by mouth Daily., Disp: 90 tablet, Rfl: 3  •  carvedilol (COREG) 25 MG tablet, Take 1 (one) Tablet, by mouth two times a day., Disp: 180 tablet, Rfl: 4  •  Cholecalciferol (VITAMIN D3) 54158 units tablet, Take 10,000 Units by mouth Daily., Disp: , Rfl:   •  clopidogrel (PLAVIX) 75 MG tablet, Take 1 (one) tablet by mouth every day., Disp: 90 tablet, Rfl: 4  •  cyclobenzaprine (FLEXERIL) 10 MG tablet, Take 1 tablet by mouth 2 (Two) Times a Day As Needed for back pain., Disp: 60 tablet, Rfl: 1  •  diclofenac (VOLTAREN) 25 MG EC tablet, Take 1 tablet by mouth 2 (Two) Times a Day As Needed for pain. Take with food., Disp: 60 tablet, Rfl: 0  •  Empagliflozin 25 MG tablet, Take 1 tablet by mouth Daily., Disp: 90 tablet, Rfl: 4  •  estradiol (ESTRACE) 0.1 MG/GM vaginal cream, Insert 2 g into the vagina Daily., Disp: , Rfl:   •  Estradiol (ESTRADERM TD), Place 6 drops on the skin Daily., Disp: , Rfl:   •  insulin lispro (humaLOG) 100 UNIT/ML injection, Inject 0-9 Units under the skin 4 (Four) Times a Day (Before Meals & at Bedtime)., Disp: 10 mL, Rfl: 12  •  Insulin Lispro Prot & Lispro (50-50) 100 UNIT/ML suspension pen-injector, Inject 30 Units under the skin before meals., Disp: 90 mL, Rfl: 5  •  isosorbide mononitrate (IMDUR) 60 MG 24 hr tablet, Take 1 tablet by mouth Daily., Disp: 90 tablet, Rfl: 1  •  lisinopril (PRINIVIL,ZESTRIL) 20 MG tablet, Take 1 (one) tablet by mouth every day., Disp: 90 tablet, Rfl: 3  •  metFORMIN (GLUCOPHAGE) 1000 MG tablet, Take 1 tablet by mouth 2 (Two) Times a Day., Disp: 180 tablet, Rfl: 3  •  metFORMIN (GLUCOPHAGE) 1000 MG tablet, Take 1 tablet by mouth 2 (Two) Times a Day., Disp: 180 tablet,  "Rfl: 3  •  Multiple Vitamin (MULTI-VITAMIN DAILY PO), Take  by mouth., Disp: , Rfl:   •  nitroglycerin (NITROSTAT) 0.4 MG SL tablet, Place 1 tablet under the tongue Every 5 (Five) Minutes As Needed for Chest Pain (max of 3 doses in 15 minutes. If no relief, go to ER., Disp: 25 tablet, Rfl: 12  •  Albiglutide 30 MG pen-injector, Inject 30 mg under the skin 1 (One) Time Per Week. (Patient taking differently: Inject 30 mg under the skin 1 (One) Time Per Week. Every wednesday), Disp: 12 each, Rfl: 6  •  diclofenac (VOLTAREN) 3 % gel gel, Apply topically to affected area tid as needed., Disp: 100 g, Rfl: 2  •  exenatide er (BYDUREON) 2 MG/0.85ML auto-injector injection, Inject 0.85 mL under the skin 1 (One) Time Per Week., Disp: 10.2 mL, Rfl: 4  •  glucose blood (FREESTYLE LITE) test strip, Use to check blood sugar 3 times daily as needed., Disp: 100 each, Rfl: 5  •  Insulin Lispro Prot & Lispro (HUMALOG MIX 50/50 KWIKPEN) (50-50) 100 UNIT/ML suspension pen-injector, Inject 30 units subcutaneously 3  Times a Day Before Meals, Disp: 90 mL, Rfl: 5  •  Insulin Pen Needle (BD ULTRA-FINE PEN NEEDLES) 29G X 12.7MM misc, USE WITH HUMALOG MIX 3 TIMES DAILY., Disp: 100 each, Rfl: 6  •  Insulin Syringe-Needle U-100 (BD INSULIN SYRINGE ULTRAFINE) 31G X 5/16\" 0.3 ML misc, USE WITH SLIDING SCALE 4 TIMES DAILY., Disp: 100 each, Rfl: 6  •  ranolazine (RANEXA) 500 MG 12 hr tablet, Take 1 tablet by mouth Every 12 (Twelve) Hours., Disp: 60 tablet, Rfl: 5      The following portions of the patient's history were reviewed and updated as appropriate: allergies, current medications, past family history, past medical history, past social history, past surgical history and problem list.    Social History   Substance Use Topics   • Smoking status: Former Smoker     Packs/day: 0.25     Types: Cigarettes     Quit date: 6/27/1989   • Smokeless tobacco: Never Used   • Alcohol use No       Review of Systems   Constitution: Positive for " "malaise/fatigue. Negative for night sweats.   Cardiovascular: Positive for chest pain. Negative for claudication, dyspnea on exertion, irregular heartbeat, leg swelling, near-syncope, orthopnea, palpitations and syncope.   Respiratory: Positive for shortness of breath. Negative for cough, hemoptysis and wheezing.    Gastrointestinal: Negative for abdominal pain, nausea and vomiting.   Neurological: Positive for dizziness. Negative for headaches.       Objective   Vitals:    07/09/18 1410   BP: 109/68   Pulse: 85   Resp: 16   Weight: 115 kg (253 lb)   Height: 167.6 cm (66\")     Body mass index is 40.84 kg/m².        Physical Exam   Constitutional: She is oriented to person, place, and time. She appears well-developed and well-nourished.   HENT:   Head: Normocephalic and atraumatic.   Cardiovascular: Normal rate, regular rhythm and normal heart sounds.  Exam reveals no S3 and no S4.    No murmur heard.  Pulmonary/Chest: Effort normal and breath sounds normal.   Abdominal: Soft. Bowel sounds are normal.   Neurological: She is alert and oriented to person, place, and time.   Skin: Skin is warm and dry.   Psychiatric: She has a normal mood and affect. Her behavior is normal.       Lab Results   Component Value Date     06/21/2018    K 4.0 06/21/2018     06/21/2018    CO2 24.3 06/21/2018    BUN 17 06/21/2018    CREATININE 1.00 06/21/2018    GLUCOSE 227 (H) 06/21/2018    CALCIUM 8.8 06/21/2018    AST 21 06/21/2018    ALT 30 06/21/2018    ALKPHOS 75 06/21/2018    LABIL2 1.7 06/21/2018     Lab Results   Component Value Date    CKTOTAL 35 10/29/2017     Lab Results   Component Value Date    WBC 6.91 04/02/2018    HGB 13.2 04/02/2018    HCT 41.4 04/02/2018     04/02/2018     Lab Results   Component Value Date    INR 0.98 10/28/2017    INR 0.89 11/29/2016    INR 0.82 11/27/2016           Procedures      Assessment/Plan    Diagnosis Plan   1. ST elevation myocardial infarction (STEMI) of anterior wall, " 11/29/16 s/p stenting proxismal % occlusion, clinically stable.     2. Coronary artery disease of native artery of native heart with stable angina pectoris (CMS/HCC)  ranolazine (RANEXA) 500 MG 12 hr tablet   3. Mixed hyperlipidemia     4. Essential hypertension     5. Dyspnea on exertion (NYHA class 2-3)     6. Type 2 diabetes mellitus with complication, unspecified long term insulin use status (CMS/HCC)     7. Dyslipidemia     8. Essential (primary) hypertension  carvedilol (COREG) 25 MG tablet                Recommendations:    1. I have discussed recent stress test findings and symptoms with the patient and Dr. Ogden. Will optimize medical therapy and add Ranexa 500 mg BID. If this is not effective, will need to consider cardiac catheterization. She voices understanding.    2. Continue amlodipine, aspirin, atorvastatin, carvedilol, Plavix, Imdur, and lisinopril    3. Follow up in 2 months and PRN.          Patient's Body mass index is 40.84 kg/m². BMI is above normal parameters. Recommendations include: educational material.         Return in about 2 months (around 9/9/2018) for Recheck.    As always, I appreciate very much the opportunity to participate in the cardiovascular care of your patients.      With Best Regards,    BRYNN Valencia

## 2018-07-16 ENCOUNTER — TELEPHONE (OUTPATIENT)
Dept: CARDIOLOGY | Facility: CLINIC | Age: 58
End: 2018-07-16

## 2018-07-16 DIAGNOSIS — I10 ESSENTIAL HYPERTENSION: ICD-10-CM

## 2018-07-16 DIAGNOSIS — I20.9 ANGINA, CLASS IV (HCC): ICD-10-CM

## 2018-07-16 DIAGNOSIS — I21.09 ST ELEVATION MYOCARDIAL INFARCTION (STEMI) OF ANTERIOR WALL (HCC): ICD-10-CM

## 2018-07-16 RX ORDER — ISOSORBIDE MONONITRATE 60 MG/1
90 TABLET, EXTENDED RELEASE ORAL DAILY
Qty: 135 TABLET | Refills: 1 | Status: SHIPPED | OUTPATIENT
Start: 2018-07-16 | End: 2019-02-21

## 2018-07-16 NOTE — TELEPHONE ENCOUNTER
Patient called stating since being on Ranexa x 1 week  She has had a lot of symptoms that is making it difficult for her to work.  C/O weakness, fuzzy headed, nausea. Cannot function due to these symptoms.    She is wishing to stop this medication.   Please advise.

## 2018-07-16 NOTE — TELEPHONE ENCOUNTER
Called patient and given message to stop the Ranexa and increase the Imdur to 90 mg daily. She agreed and voiced understanding.

## 2018-08-06 ENCOUNTER — OFFICE VISIT (OUTPATIENT)
Dept: BARIATRICS/WEIGHT MGMT | Facility: CLINIC | Age: 58
End: 2018-08-06

## 2018-08-06 VITALS
HEIGHT: 66 IN | BODY MASS INDEX: 40.5 KG/M2 | DIASTOLIC BLOOD PRESSURE: 67 MMHG | RESPIRATION RATE: 18 BRPM | WEIGHT: 252 LBS | TEMPERATURE: 97.8 F | SYSTOLIC BLOOD PRESSURE: 100 MMHG | OXYGEN SATURATION: 99 % | HEART RATE: 82 BPM

## 2018-08-06 DIAGNOSIS — E66.9 DIABETES MELLITUS TYPE 2 IN OBESE (HCC): Primary | ICD-10-CM

## 2018-08-06 DIAGNOSIS — E11.69 DIABETES MELLITUS TYPE 2 IN OBESE (HCC): Primary | ICD-10-CM

## 2018-08-06 DIAGNOSIS — R10.13 DYSPEPSIA: ICD-10-CM

## 2018-08-06 DIAGNOSIS — E66.01 MORBID OBESITY (HCC): ICD-10-CM

## 2018-08-06 DIAGNOSIS — I25.2 HISTORY OF MI (MYOCARDIAL INFARCTION): ICD-10-CM

## 2018-08-06 PROCEDURE — 99214 OFFICE O/P EST MOD 30 MIN: CPT | Performed by: PHYSICIAN ASSISTANT

## 2018-08-06 NOTE — PROGRESS NOTES
"[facility] Bariatric Surgery  [address]  [facility] [address]  [phone]        Patient Name: Jannette Onofre.  YOB: 1960      Date of Visit: 8/6/2018      Reason for Visit:  AGB follow up    HPI:  Jannette Onofre is a 58 y.o. female s/p LAGB 4/2013 by Dr. Russell.  Transferred care to [facility] 7/2016 b/c she says Dr. Russell made her feel like a failure.  Now wishes to discuss potential revision to LSG.    Presurgery weight 340 lbs.  Lowest weight achieved 224 lbs in first 18 months.  Then started to regain d/t stressors at work w/ associated poor food choices and bad habits.  Following her transfer to our practice she was working to get back on track, but then at the end of 2016 she had an MI requiring coronary stenting that really set her back, both physically and emotionally.  In 2017 she had refocused and was following w/ a therapist and her cardiologist, committing herself to getting healthy again.  Unfortunately despite her best efforts she just can't seem to get her weight below 250 lbs.  And unfortunately her insulin requirements continue to increase which is likely affecting her ability to lose weight.      Recent UGI 7/5/18 okay if asymptomatic. Expected band vol possible 5.5cc.  Denies dysphagia/reflux/N/V/AP.     Recent cardiac notes state:  \"Her last stress test was 7/2/18 revealing a small sized, mild degree of ischemia in the anterior wall.  She did have a cardiac catheterization October 2017 which revealed a widely patent stent in the proximal segment of the LAD with stenosis of 50-70% in the mid LAD.\"  Follow up visit scheduled 9/2018.    Past Medical History:   Diagnosis Date   • Anxiety    • Coronary artery disease     s/p LAD stenting, follows w/ Dr. Ogden   • Depression    • Diabetes mellitus (CMS/HCC)     dx 1998, initially improved s/p AGB but now back on insulin    • Fatigue    • Hyperlipidemia    • Hypertension    • Melanoma (CMS/HCC)     1998 "   • Morbid obesity (CMS/HCC)    • Myocardial infarction     2016   • BRICE on CPAP    • Past heart attack    • Syncopal episodes      Past Surgical History:   Procedure Laterality Date   • CARDIAC CATHETERIZATION N/A 2016    Procedure: Left Heart Cath;  Surgeon: Akhil Zuñiga MD;  Location:  [facility] CATH INVASIVE LOCATION;  Service:    • CARDIAC CATHETERIZATION N/A 10/31/2017    Procedure: Left Heart Cath;  Surgeon: Lebron Ogden MD;  Location:  [facility]ATH [facility];  Service:    • CARDIAC CATHETERIZATION  10/31/2017    Procedure: Functional Flow [place];  Surgeon: Denilson Pena MD;  Location:  [facility] CATH INVASIVE LOCATION;  Service:    • CATARACT EXTRACTION N/A     rt eye , left eye    •  SECTION     • COLONOSCOPY     • EYE SURGERY     • FINGER/THUMB CLOSED REDUCTION Left 8/3/2017    Procedure: CLOSED REDUCTION PERCUTANEOUS PINNING LEFT FIFTH METACARPAL SHAFT FRACTURE;  Surgeon: Oscar León MD;  Location: [facility] OR;  Service:    • LAPAROSCOPIC CHOLECYSTECTOMY     • LAPAROSCOPIC GASTRIC BANDING  2013    s/p LAGB by Dr Russell   • OTHER SURGICAL HISTORY      melanoma removal/back   • NV RT/LT HEART CATHETERS N/A 2016    Procedure: Percutaneous Coronary Intervention;  Surgeon: Akhil Zuñiga MD;  Location:  [facility] CATH INVASIVE LOCATION;  Service: Cardiology   • SEPTOPLASTY     • SEPTOPLASTY     • UMBILICAL HERNIA REPAIR         Current Outpatient Prescriptions:   •  Albiglutide 30 MG pen-injector, Inject 30 mg under the skin 1 (One) Time Per Week. (Patient taking differently: Inject 30 mg under the skin 1 (One) Time Per Week. Every wednesday), Disp: 12 each, Rfl: 6  •  amLODIPine (NORVASC) 5 MG tablet, Take 1 tablet by mouth Daily. (Patient taking differently: Take 2.5 mg by mouth Daily.), Disp: 90 tablet, Rfl: 4  •  aspirin 325 MG tablet, Take 325 mg by mouth Daily., Disp: , Rfl:   •  atorvastatin (LIPITOR) 80 MG tablet, Take 1  tablet by mouth Daily., Disp: 90 tablet, Rfl: 3  •  carvedilol (COREG) 25 MG tablet, Take 1 (one) Tablet, by mouth two times a day., Disp: 180 tablet, Rfl: 4  •  Cholecalciferol (VITAMIN D3) 18009 units tablet, Take 10,000 Units by mouth Daily., Disp: , Rfl:   •  clopidogrel (PLAVIX) 75 MG tablet, Take 1 (one) tablet by mouth every day., Disp: 90 tablet, Rfl: 4  •  cyclobenzaprine (FLEXERIL) 10 MG tablet, Take 1 tablet by mouth 2 (Two) Times a Day As Needed for back pain., Disp: 60 tablet, Rfl: 1  •  diclofenac (VOLTAREN) 25 MG EC tablet, Take 1 tablet by mouth 2 (Two) Times a Day As Needed for pain. Take with food., Disp: 60 tablet, Rfl: 0  •  diclofenac (VOLTAREN) 3 % gel gel, Apply topically to affected area tid as needed., Disp: 100 g, Rfl: 2  •  Empagliflozin 25 MG tablet, Take 1 tablet by mouth Daily., Disp: 90 tablet, Rfl: 4  •  estradiol (ESTRACE) 0.1 MG/GM vaginal cream, Insert 2 g into the vagina Daily., Disp: , Rfl:   •  Estradiol (ESTRADERM TD), Place 6 drops on the skin Daily., Disp: , Rfl:   •  glucose blood (FREESTYLE LITE) test strip, Use to check blood sugar 3 times daily as needed., Disp: 100 each, Rfl: 5  •  hepatitis A (HAVRIX) 1440 EL U/ML vaccine, Inject 1 mL into the appropriate muscle as directed by prescriber., Disp: 1 mL, Rfl: 1  •  insulin lispro (humaLOG) 100 UNIT/ML injection, Inject 0-9 Units under the skin 4 (Four) Times a Day (Before Meals & at Bedtime)., Disp: 10 mL, Rfl: 12  •  Insulin Lispro Prot & Lispro (50-50) 100 UNIT/ML suspension pen-injector, Inject 30 Units under the skin before meals., Disp: 90 mL, Rfl: 5  •  Insulin Lispro Prot & Lispro (HUMALOG MIX 50/50 KWIKPEN) (50-50) 100 UNIT/ML suspension pen-injector, Inject 30 units subcutaneously 3  Times a Day Before Meals, Disp: 90 mL, Rfl: 5  •  Insulin Pen Needle (BD ULTRA-FINE PEN NEEDLES) 29G X 12.7MM misc, USE WITH HUMALOG MIX 3 TIMES DAILY., Disp: 100 each, Rfl: 6  •  Insulin Syringe-Needle U-100 (BD INSULIN SYRINGE  "ULTRAFINE) 31G X 5/16\" 0.3 ML misc, USE WITH SLIDING SCALE 4 TIMES DAILY., Disp: 100 each, Rfl: 6  •  isosorbide mononitrate (IMDUR) 60 MG 24 hr tablet, Take 1 1/2 tablets by mouth Daily., Disp: 135 tablet, Rfl: 1  •  lisinopril (PRINIVIL,ZESTRIL) 20 MG tablet, Take 1 (one) tablet by mouth every day., Disp: 90 tablet, Rfl: 3  •  metFORMIN (GLUCOPHAGE) 1000 MG tablet, Take 1 tablet by mouth 2 (Two) Times a Day., Disp: 180 tablet, Rfl: 3  •  metFORMIN (GLUCOPHAGE) 1000 MG tablet, Take 1 tablet by mouth 2 (Two) Times a Day., Disp: 180 tablet, Rfl: 3  •  Multiple Vitamin (MULTI-VITAMIN DAILY PO), Take  by mouth., Disp: , Rfl:   •  nitroglycerin (NITROSTAT) 0.4 MG SL tablet, Place 1 tablet under the tongue Every 5 (Five) Minutes As Needed for Chest Pain (max of 3 doses in 15 minutes. If no relief, go to ER., Disp: 25 tablet, Rfl: 12     No Known Allergies    Social History     Social History   • Marital status:      Spouse name: N/A   • Number of children: N/A   • Years of education: N/A     Occupational History   • Not on file.     Social History Main Topics   • Smoking status: Former Smoker     Packs/day: 0.25     Types: Cigarettes     Quit date: 6/27/1989   • Smokeless tobacco: Never Used   • Alcohol use No   • Drug use: No   • Sexual activity: Defer     Other Topics Concern   • Not on file     Social History Narrative    .  Lives in [facility].   @ [employer].       /67 (BP Location: Left arm, Patient Position: Sitting, Cuff Size: Large Adult)   Pulse 82   Temp 97.8 °F (36.6 °C) (Temporal Artery )   Resp 18   Ht 167.6 cm (66\")   Wt 114 kg (252 lb)   SpO2 99%   BMI 40.67 kg/m²     Physical Exam   Constitutional: She appears well-developed and well-nourished. She is cooperative.   HENT:   Mouth/Throat: Oropharynx is clear and moist and mucous membranes are normal.   Eyes: Conjunctivae are normal. No scleral icterus.   Cardiovascular: Normal rate.    Pulmonary/Chest: " Effort normal.   Abdominal: Soft. There is no tenderness.   RLQ lapband port - unremarkable   Musculoskeletal: Normal range of motion. She exhibits no edema.   Neurological: She is alert.   Skin: Skin is warm and dry. No rash noted.   Psychiatric: She has a normal mood and affect. Judgment normal.       Band Adjustment Info   Band Type: ?? APS ??   Port Location: RLQ   Procedure Position: supine   Needle Type: short    Local Anesthesia: with 1% lidocaine and bicarb       Amount Expected (cc): ?? 5.5 ??  (suspected)   Amount Added (cc):         Amount Removed (cc):     Total Amount (cc): ?? 5.5 ??  (suspected)       Able to lashae water after ADJ?    Other Notes: no Yvonne office notes available to confirm band vol.             Assessment:  58 y.o. female s/p LAGB 4/2013 by Dr. Russell    ICD-10-CM ICD-9-CM   1. Diabetes mellitus type 2 in obese (CMS/[facility]) E11.69 250.00    E66.9 278.00   2. History of MI (myocardial infarction) I25.2 412   3. Dyspepsia R10.13 536.8   4. Morbid obesity (CMS/[facility]) E66.01 278.01       Plan:  No ADJ today.  Will initiate revision process.  Plan EGD to further evaluate AGB prior to band removal.

## 2018-09-10 ENCOUNTER — OFFICE VISIT (OUTPATIENT)
Dept: CARDIOLOGY | Facility: CLINIC | Age: 58
End: 2018-09-10

## 2018-09-10 VITALS
WEIGHT: 253 LBS | RESPIRATION RATE: 16 BRPM | HEART RATE: 77 BPM | BODY MASS INDEX: 40.66 KG/M2 | HEIGHT: 66 IN | SYSTOLIC BLOOD PRESSURE: 105 MMHG | DIASTOLIC BLOOD PRESSURE: 64 MMHG

## 2018-09-10 DIAGNOSIS — R06.09 DYSPNEA ON EXERTION: ICD-10-CM

## 2018-09-10 DIAGNOSIS — I21.09 ST ELEVATION MYOCARDIAL INFARCTION (STEMI) OF ANTERIOR WALL (HCC): Primary | ICD-10-CM

## 2018-09-10 DIAGNOSIS — E78.5 DYSLIPIDEMIA: ICD-10-CM

## 2018-09-10 DIAGNOSIS — E11.8 TYPE 2 DIABETES MELLITUS WITH COMPLICATION, UNSPECIFIED LONG TERM INSULIN USE STATUS: ICD-10-CM

## 2018-09-10 DIAGNOSIS — E78.2 MIXED HYPERLIPIDEMIA: ICD-10-CM

## 2018-09-10 DIAGNOSIS — I25.118 CORONARY ARTERY DISEASE OF NATIVE ARTERY OF NATIVE HEART WITH STABLE ANGINA PECTORIS (HCC): ICD-10-CM

## 2018-09-10 DIAGNOSIS — I10 ESSENTIAL HYPERTENSION: ICD-10-CM

## 2018-09-10 PROCEDURE — 99213 OFFICE O/P EST LOW 20 MIN: CPT | Performed by: NURSE PRACTITIONER

## 2018-09-10 NOTE — PROGRESS NOTES
"Akhil Huerta MD  Jannette Onofre  1960  09/10/2018    Patient Active Problem List   Diagnosis   • Unstable angina pectoris (CMS/HCC)   • ST elevation myocardial infarction (STEMI) of anterior wall, 11/29/16 s/p stenting proxismal % occlusion, clinically stable.   • Type 2 diabetes mellitus (CMS/HCC)   • Dyslipidemia   • Chest pain   • Coronary artery disease   • Fatigue   • Hyperlipidemia   • Hypertension   • Morbid obesity (CMS/HCC)   • Myocardial infarction   • Dyspnea on exertion (NYHA class 2-3)   • Closed displaced fracture of shaft of fifth metacarpal bone of left hand   • Angina pectoris (CMS/HCC)   • Angina, class III (CMS/[facility])       Dear Akhil Huerta MD:    Subjective     Chief Complaint   Patient presents with   • Follow-up     2 mos   • Chest Pain     none   • Dizziness     \"little\"   • Med Management     list provided           History of Present Illness:    Jannette Onofre is a 58 y.o. female with a history of known ASCVD status post acute anterior wall ST elevation myocardial infarction followed by stenting of LAD in November 2016.  Her last stress test was 7/2/18 revealing a small size, mild degree of ischemia in the anterior wall.  She did have a cardiac catheterization in October 2017 which revealed a widely patent stent in the proximal segment of the LAD with stenosis of 50-70% in the mid LAD.  At that time and FFR was normal at 0.95.  She had previously had a couple episodes of chest pain while sitting at work.  This was intermittent chest and relieved with nitroglycerin.  At her last visit in July 2018, Ranexa was added to optimize medical therapy.  When she started the Ranexa she experienced dizziness and lightheadedness.  Therefore, the medication was discontinued and Imdur was increased to 90 mg daily.  Since that time, she has had no episodes of chest pain.  She denies dyspnea and pedal edema.  Blood pressures have been well controlled.  She reports she is doing very " well.          No Known Allergies:      Current Outpatient Prescriptions:   •  Albiglutide 30 MG pen-injector, Inject 30 mg under the skin 1 (One) Time Per Week. (Patient taking differently: Inject 30 mg under the skin 1 (One) Time Per Week. Every wednesday), Disp: 12 each, Rfl: 6  •  amLODIPine (NORVASC) 5 MG tablet, Take 1 tablet by mouth Daily. (Patient taking differently: Take 2.5 mg by mouth Daily.), Disp: 90 tablet, Rfl: 4  •  aspirin 325 MG tablet, Take 325 mg by mouth Daily., Disp: , Rfl:   •  atorvastatin (LIPITOR) 80 MG tablet, Take 1 tablet by mouth Daily., Disp: 90 tablet, Rfl: 3  •  carvedilol (COREG) 25 MG tablet, Take 1 (one) Tablet, by mouth two times a day., Disp: 180 tablet, Rfl: 4  •  Cholecalciferol (VITAMIN D3) 43189 units tablet, Take 10,000 Units by mouth Daily., Disp: , Rfl:   •  clopidogrel (PLAVIX) 75 MG tablet, Take 1 (one) tablet by mouth every day., Disp: 90 tablet, Rfl: 4  •  cyclobenzaprine (FLEXERIL) 10 MG tablet, Take 1 tablet by mouth 2 (Two) Times a Day As Needed for back pain., Disp: 60 tablet, Rfl: 1  •  diclofenac (VOLTAREN) 25 MG EC tablet, Take 1 tablet by mouth 2 (Two) Times a Day As Needed for pain. Take with food., Disp: 60 tablet, Rfl: 0  •  Empagliflozin 25 MG tablet, Take 1 tablet by mouth Daily., Disp: 90 tablet, Rfl: 4  •  estradiol (ESTRACE) 0.1 MG/GM vaginal cream, Insert 2 g into the vagina Daily., Disp: , Rfl:   •  Estradiol (ESTRADERM TD), Place 6 drops on the skin Daily., Disp: , Rfl:   •  insulin lispro (humaLOG) 100 UNIT/ML injection, Inject 0-9 Units under the skin 4 (Four) Times a Day (Before Meals & at Bedtime)., Disp: 10 mL, Rfl: 12  •  Insulin Lispro Prot & Lispro (50-50) 100 UNIT/ML suspension pen-injector, Inject 30 Units under the skin before meals., Disp: 90 mL, Rfl: 5  •  Insulin Lispro Prot & Lispro (HUMALOG MIX 50/50 KWIKPEN) (50-50) 100 UNIT/ML suspension pen-injector, Inject 30 units subcutaneously 3  Times a Day Before Meals, Disp: 90 mL, Rfl:  "5  •  Insulin Pen Needle (BD ULTRA-FINE PEN NEEDLES) 29G X 12.7MM misc, USE WITH HUMALOG MIX 3 TIMES DAILY., Disp: 100 each, Rfl: 6  •  Insulin Syringe-Needle U-100 (BD INSULIN SYRINGE ULTRAFINE) 31G X 5/16\" 0.3 ML misc, USE WITH SLIDING SCALE 4 TIMES DAILY., Disp: 100 each, Rfl: 6  •  isosorbide mononitrate (IMDUR) 60 MG 24 hr tablet, Take 1 1/2 tablets by mouth Daily., Disp: 135 tablet, Rfl: 1  •  lisinopril (PRINIVIL,ZESTRIL) 20 MG tablet, Take 1 (one) tablet by mouth every day., Disp: 90 tablet, Rfl: 3  •  metFORMIN (GLUCOPHAGE) 1000 MG tablet, Take 1 tablet by mouth 2 (Two) Times a Day., Disp: 180 tablet, Rfl: 3  •  Multiple Vitamin (MULTI-VITAMIN DAILY PO), Take  by mouth., Disp: , Rfl:   •  nitroglycerin (NITROSTAT) 0.4 MG SL tablet, Place 1 tablet under the tongue Every 5 (Five) Minutes As Needed for Chest Pain (max of 3 doses in 15 minutes. If no relief, go to ER., Disp: 25 tablet, Rfl: 12  •  diclofenac (VOLTAREN) 3 % gel gel, Apply topically to affected area tid as needed., Disp: 100 g, Rfl: 2  •  Empagliflozin 25 MG tablet, Take 1 tablet by mouth Daily., Disp: 90 tablet, Rfl: 4  •  glucose blood (FREESTYLE LITE) test strip, Use to check blood sugar 3 times daily as needed., Disp: 100 each, Rfl: 5  •  hepatitis A (HAVRIX) 1440 EL U/ML vaccine, Inject 1 mL into the appropriate muscle as directed by prescriber., Disp: 1 mL, Rfl: 1  •  metFORMIN (GLUCOPHAGE) 1000 MG tablet, Take 1 tablet by mouth 2 (Two) Times a Day., Disp: 180 tablet, Rfl: 3      The following portions of the patient's history were reviewed and updated as appropriate: allergies, current medications, past family history, past medical history, past social history, past surgical history and problem list.    Social History   Substance Use Topics   • Smoking status: Former Smoker     Packs/day: 0.25     Types: Cigarettes     Quit date: 6/27/1989   • Smokeless tobacco: Never Used   • Alcohol use No       Review of Systems   Constitution: Negative " "for malaise/fatigue.   Cardiovascular: Negative for chest pain, leg swelling and palpitations.   Respiratory: Negative for shortness of breath and wheezing.    Neurological: Positive for dizziness. Negative for light-headedness.       Objective   Vitals:    09/10/18 1127   BP: 105/64   Pulse: 77   Resp: 16   Weight: 115 kg (253 lb)   Height: 167.6 cm (66\")     Body mass index is 40.84 kg/m².        Physical Exam   Constitutional: She is oriented to person, place, and time. She appears well-developed and well-nourished.   HENT:   Head: Normocephalic and atraumatic.   Cardiovascular: Normal rate, regular rhythm and normal heart sounds.  Exam reveals no S3 and no S4.    No murmur heard.  Pulmonary/Chest: Effort normal and breath sounds normal. She has no wheezes. She has no rales.   Abdominal: Soft. Bowel sounds are normal.   Musculoskeletal: She exhibits no edema.   Neurological: She is alert and oriented to person, place, and time.   Skin: Skin is warm and dry.   Psychiatric: She has a normal mood and affect. Her behavior is normal.       Lab Results   Component Value Date     06/21/2018    K 4.0 06/21/2018     06/21/2018    CO2 24.3 06/21/2018    BUN 17 06/21/2018    CREATININE 1.00 06/21/2018    GLUCOSE 227 (H) 06/21/2018    CALCIUM 8.8 06/21/2018    AST 21 06/21/2018    ALT 30 06/21/2018    ALKPHOS 75 06/21/2018    LABIL2 1.6 12/11/2015     Lab Results   Component Value Date    CKTOTAL 35 10/29/2017     Lab Results   Component Value Date    WBC 6.91 04/02/2018    HGB 13.2 04/02/2018    HCT 41.4 04/02/2018     04/02/2018     Lab Results   Component Value Date    INR 0.98 10/28/2017    INR 0.89 11/29/2016    INR 0.82 11/27/2016     No results found for: MG  Lab Results   Component Value Date    TSH 1.697 04/02/2018    CHLPL 213 (H) 03/14/2016    TRIG 260 (H) 06/21/2018    HDL 36 (L) 06/21/2018    LDL 50 06/21/2018      Lab Results   Component Value Date    BNP 27.0 12/29/2016 "           Procedures      Assessment/Plan    Diagnosis Plan   1. ST elevation myocardial infarction (STEMI) of anterior wall, 11/29/16 s/p stenting proxismal % occlusion, clinically stable.     2. Coronary artery disease of native artery of native heart with stable angina pectoris (CMS/[facility])     3. Mixed hyperlipidemia     4. Essential hypertension     5. Dyspnea on exertion (NYHA class 2-3)     6. Type 2 diabetes mellitus with complication, unspecified long term insulin use status (CMS/[facility])     7. Dyslipidemia                  Recommendations:    1. Since she had had no more chest pains, will continue Imdur 90 mg daily.    2. Continue amlodipine, aspirin, atorvastatin, carvedilol, Plavix, and lisinopril.    3. If she has any further episodes of chest pain, would need to consider cardiac catheterization. She voices understanding.    4. Follow up in 3 months and PRN.          Patient's Body mass index is 40.84 kg/m². BMI is above normal parameters. Recommendations include: educational material.         Return in about 3 months (around 12/10/2018) for Recheck.    As always, I appreciate very much the opportunity to participate in the cardiovascular care of your patients.      With Best Regards,    BRYNN Valencia

## 2018-10-04 ENCOUNTER — TRANSCRIBE ORDERS (OUTPATIENT)
Dept: ADMINISTRATIVE | Facility: HOSPITAL | Age: 58
End: 2018-10-04

## 2018-10-04 ENCOUNTER — LAB (OUTPATIENT)
Dept: LAB | Facility: HOSPITAL | Age: 58
End: 2018-10-04

## 2018-10-04 DIAGNOSIS — Z79.890 NEED FOR PROPHYLACTIC HORMONE REPLACEMENT THERAPY (POSTMENOPAUSAL): Primary | ICD-10-CM

## 2018-10-04 DIAGNOSIS — Z79.890 NEED FOR PROPHYLACTIC HORMONE REPLACEMENT THERAPY (POSTMENOPAUSAL): ICD-10-CM

## 2018-10-04 LAB
25(OH)D3 SERPL-MCNC: 47 NG/ML
ESTRADIOL SERPL HS-MCNC: 116.4 PG/ML
FSH SERPL-ACNC: 49.4 MIU/ML
PROGEST SERPL-MCNC: 1 NG/ML

## 2018-10-04 PROCEDURE — 84403 ASSAY OF TOTAL TESTOSTERONE: CPT

## 2018-10-04 PROCEDURE — 36415 COLL VENOUS BLD VENIPUNCTURE: CPT

## 2018-10-04 PROCEDURE — 84402 ASSAY OF FREE TESTOSTERONE: CPT

## 2018-10-04 PROCEDURE — 82306 VITAMIN D 25 HYDROXY: CPT

## 2018-10-04 PROCEDURE — 84144 ASSAY OF PROGESTERONE: CPT

## 2018-10-04 PROCEDURE — 82670 ASSAY OF TOTAL ESTRADIOL: CPT

## 2018-10-04 PROCEDURE — 83001 ASSAY OF GONADOTROPIN (FSH): CPT

## 2018-10-06 LAB
TESTOST FREE SERPL-MCNC: 2 PG/ML (ref 0–4.2)
TESTOST SERPL-MCNC: 54 NG/DL (ref 3–41)

## 2018-10-19 ENCOUNTER — LAB (OUTPATIENT)
Dept: LAB | Facility: HOSPITAL | Age: 58
End: 2018-10-19
Attending: INTERNAL MEDICINE

## 2018-10-19 ENCOUNTER — TRANSCRIBE ORDERS (OUTPATIENT)
Dept: ADMINISTRATIVE | Facility: HOSPITAL | Age: 58
End: 2018-10-19

## 2018-10-19 DIAGNOSIS — E78.5 HYPERLIPIDEMIA, UNSPECIFIED HYPERLIPIDEMIA TYPE: ICD-10-CM

## 2018-10-19 DIAGNOSIS — R53.81 MALAISE AND FATIGUE: ICD-10-CM

## 2018-10-19 DIAGNOSIS — R53.83 MALAISE AND FATIGUE: ICD-10-CM

## 2018-10-19 DIAGNOSIS — E11.69 DIABETES MELLITUS ASSOCIATED WITH HORMONAL ETIOLOGY (HCC): ICD-10-CM

## 2018-10-19 DIAGNOSIS — I10 ESSENTIAL HYPERTENSION, BENIGN: ICD-10-CM

## 2018-10-19 DIAGNOSIS — E11.69 DIABETES MELLITUS ASSOCIATED WITH HORMONAL ETIOLOGY (HCC): Primary | ICD-10-CM

## 2018-10-19 LAB
ALBUMIN SERPL-MCNC: 4.1 G/DL (ref 3.5–5)
ALBUMIN/GLOB SERPL: 1.7 G/DL (ref 1.5–2.5)
ALP SERPL-CCNC: 59 U/L (ref 35–104)
ALT SERPL W P-5'-P-CCNC: 27 U/L (ref 10–36)
ANION GAP SERPL CALCULATED.3IONS-SCNC: 6.5 MMOL/L (ref 3.6–11.2)
AST SERPL-CCNC: 23 U/L (ref 10–30)
BASOPHILS # BLD AUTO: 0.04 10*3/MM3 (ref 0–0.3)
BASOPHILS NFR BLD AUTO: 0.6 % (ref 0–2)
BILIRUB SERPL-MCNC: 0.5 MG/DL (ref 0.2–1.8)
BUN BLD-MCNC: 22 MG/DL (ref 7–21)
BUN/CREAT SERPL: 20 (ref 7–25)
CALCIUM SPEC-SCNC: 9.2 MG/DL (ref 7.7–10)
CHLORIDE SERPL-SCNC: 111 MMOL/L (ref 99–112)
CHOLEST SERPL-MCNC: 143 MG/DL (ref 0–200)
CO2 SERPL-SCNC: 26.5 MMOL/L (ref 24.3–31.9)
CREAT BLD-MCNC: 1.1 MG/DL (ref 0.43–1.29)
DEPRECATED RDW RBC AUTO: 46.2 FL (ref 37–54)
EOSINOPHIL # BLD AUTO: 0.26 10*3/MM3 (ref 0–0.7)
EOSINOPHIL NFR BLD AUTO: 4 % (ref 0–5)
ERYTHROCYTE [DISTWIDTH] IN BLOOD BY AUTOMATED COUNT: 14.2 % (ref 11.5–14.5)
GFR SERPL CREATININE-BSD FRML MDRD: 51 ML/MIN/1.73
GLOBULIN UR ELPH-MCNC: 2.4 GM/DL
GLUCOSE BLD-MCNC: 185 MG/DL (ref 70–110)
HBA1C MFR BLD: 12.6 % (ref 4.5–5.7)
HCT VFR BLD AUTO: 41.4 % (ref 37–47)
HDLC SERPL-MCNC: 38 MG/DL (ref 60–100)
HGB BLD-MCNC: 13.1 G/DL (ref 12–16)
IMM GRANULOCYTES # BLD: 0.02 10*3/MM3 (ref 0–0.03)
IMM GRANULOCYTES NFR BLD: 0.3 % (ref 0–0.5)
LDLC SERPL CALC-MCNC: 64 MG/DL (ref 0–100)
LDLC/HDLC SERPL: 1.69 {RATIO}
LYMPHOCYTES # BLD AUTO: 2.16 10*3/MM3 (ref 1–3)
LYMPHOCYTES NFR BLD AUTO: 33.6 % (ref 21–51)
MCH RBC QN AUTO: 28.9 PG (ref 27–33)
MCHC RBC AUTO-ENTMCNC: 31.6 G/DL (ref 33–37)
MCV RBC AUTO: 91.2 FL (ref 80–94)
MONOCYTES # BLD AUTO: 0.36 10*3/MM3 (ref 0.1–0.9)
MONOCYTES NFR BLD AUTO: 5.6 % (ref 0–10)
NEUTROPHILS # BLD AUTO: 3.59 10*3/MM3 (ref 1.4–6.5)
NEUTROPHILS NFR BLD AUTO: 55.9 % (ref 30–70)
OSMOLALITY SERPL CALC.SUM OF ELEC: 295 MOSM/KG (ref 273–305)
PLATELET # BLD AUTO: 175 10*3/MM3 (ref 130–400)
PMV BLD AUTO: 10.2 FL (ref 6–10)
POTASSIUM BLD-SCNC: 4.7 MMOL/L (ref 3.5–5.3)
PROT SERPL-MCNC: 6.5 G/DL (ref 6–8)
RBC # BLD AUTO: 4.54 10*6/MM3 (ref 4.2–5.4)
SODIUM BLD-SCNC: 144 MMOL/L (ref 135–153)
TRIGL SERPL-MCNC: 203 MG/DL (ref 0–150)
VLDLC SERPL-MCNC: 40.6 MG/DL
WBC NRBC COR # BLD: 6.43 10*3/MM3 (ref 4.5–12.5)

## 2018-10-19 PROCEDURE — 80053 COMPREHEN METABOLIC PANEL: CPT

## 2018-10-19 PROCEDURE — 80061 LIPID PANEL: CPT

## 2018-10-19 PROCEDURE — 83036 HEMOGLOBIN GLYCOSYLATED A1C: CPT

## 2018-10-19 PROCEDURE — 36415 COLL VENOUS BLD VENIPUNCTURE: CPT

## 2018-10-19 PROCEDURE — 85025 COMPLETE CBC W/AUTO DIFF WBC: CPT

## 2018-12-20 ENCOUNTER — OFFICE VISIT (OUTPATIENT)
Dept: CARDIOLOGY | Facility: CLINIC | Age: 58
End: 2018-12-20

## 2018-12-20 VITALS
HEIGHT: 66 IN | DIASTOLIC BLOOD PRESSURE: 79 MMHG | OXYGEN SATURATION: 97 % | BODY MASS INDEX: 41.78 KG/M2 | SYSTOLIC BLOOD PRESSURE: 126 MMHG | WEIGHT: 260 LBS | HEART RATE: 79 BPM

## 2018-12-20 DIAGNOSIS — E78.2 MIXED HYPERLIPIDEMIA: ICD-10-CM

## 2018-12-20 DIAGNOSIS — I21.09 ST ELEVATION MYOCARDIAL INFARCTION (STEMI) OF ANTERIOR WALL (HCC): Primary | ICD-10-CM

## 2018-12-20 DIAGNOSIS — I10 ESSENTIAL HYPERTENSION: ICD-10-CM

## 2018-12-20 DIAGNOSIS — E11.8 TYPE 2 DIABETES MELLITUS WITH COMPLICATION, UNSPECIFIED WHETHER LONG TERM INSULIN USE: ICD-10-CM

## 2018-12-20 PROCEDURE — 93000 ELECTROCARDIOGRAM COMPLETE: CPT | Performed by: NURSE PRACTITIONER

## 2018-12-20 PROCEDURE — 99213 OFFICE O/P EST LOW 20 MIN: CPT | Performed by: NURSE PRACTITIONER

## 2018-12-20 NOTE — PROGRESS NOTES
Akhil Huerta MD  Jannette Onofre  1960 12/20/2018    Patient Active Problem List   Diagnosis   • Unstable angina pectoris (CMS/HCC)   • ST elevation myocardial infarction (STEMI) of anterior wall, 11/29/16 s/p stenting proxismal % occlusion, clinically stable.   • Type 2 diabetes mellitus (CMS/HCC)   • Dyslipidemia   • Chest pain   • Coronary artery disease   • Fatigue   • Hyperlipidemia   • Hypertension   • Morbid obesity (CMS/HCC)   • Myocardial infarction (CMS/HCC)   • Dyspnea on exertion (NYHA class 2-3)   • Closed displaced fracture of shaft of fifth metacarpal bone of left hand   • Angina pectoris (CMS/HCC)   • Angina, class III (CMS/HCC)       Dear Akhil Huerta MD:    Subjective     Chief Complaint   Patient presents with   • Med Management           History of Present Illness:    Jannette Onofre is a 58 y.o. female with a history of ASCVD status post acute anterior wall ST elevation myocardial infarction followed by stenting of LAD in November 2016.  At stress test in July 2018 revealed a small size, mild degree of ischemia in the anterior wall. Her last cardiac catheterization was in October of 2017. This revealed a widely patent stent in the proximal segment of the LAD with stenosis of 50-70% in the mid LAD with a normal FFR.  She had previously been complaining of chest pains and Imdur was increased.  Since that time, she reports she's been doing very well.  She denies chest pain, shortness of breath, edema, dizziness, palpitations, near syncope, and syncope.  She has been hiking with her  has been able to do this without any issues.  She is tolerating her medications well.  Blood pressure is been well controlled.          No Known Allergies:      Current Outpatient Medications:   •  amLODIPine (NORVASC) 5 MG tablet, Take 1 tablet by mouth Daily. (Patient taking differently: Take 2.5 mg by mouth Daily.), Disp: 90 tablet, Rfl: 4  •  aspirin 325 MG tablet, Take 325 mg by mouth  Daily., Disp: , Rfl:   •  atorvastatin (LIPITOR) 80 MG tablet, Take 1 tablet by mouth Daily., Disp: 90 tablet, Rfl: 3  •  carvedilol (COREG) 25 MG tablet, Take 1 (one) Tablet, by mouth two times a day., Disp: 180 tablet, Rfl: 4  •  Cholecalciferol (VITAMIN D3) 61804 units tablet, Take 10,000 Units by mouth Daily., Disp: , Rfl:   •  clopidogrel (PLAVIX) 75 MG tablet, Take 1 (one) tablet by mouth every day., Disp: 90 tablet, Rfl: 4  •  cyclobenzaprine (FLEXERIL) 10 MG tablet, Take 1 tablet by mouth 2 (Two) Times a Day As Needed for back pain., Disp: 60 tablet, Rfl: 1  •  diclofenac (VOLTAREN) 25 MG EC tablet, Take 1 tablet by mouth 2 (Two) Times a Day As Needed for pain. Take with food., Disp: 60 tablet, Rfl: 0  •  diclofenac (VOLTAREN) 3 % gel gel, Apply topically to affected area tid as needed., Disp: 100 g, Rfl: 2  •  Dulaglutide 0.75 MG/0.5ML solution pen-injector, Inject 0.75 mg subcutaneously once weekly, Disp: 2 mL, Rfl: 5  •  Empagliflozin 25 MG tablet, Take 1 tablet by mouth Daily., Disp: 90 tablet, Rfl: 4  •  Empagliflozin 25 MG tablet, Take 1 tablet by mouth Daily., Disp: 90 tablet, Rfl: 4  •  estradiol (ESTRACE) 0.1 MG/GM vaginal cream, Insert 2 g into the vagina Daily., Disp: , Rfl:   •  Estradiol (ESTRADERM TD), Place 6 drops on the skin Daily., Disp: , Rfl:   •  glipiZIDE (GLUCOTROL XL) 10 MG 24 hr tablet, Take 1 tablet by mouth 2 (Two) Times a Day., Disp: 180 tablet, Rfl: 4  •  glucose blood (FREESTYLE LITE) test strip, Use to check blood sugar 3 times daily as needed., Disp: 100 each, Rfl: 5  •  hepatitis A (HAVRIX) 1440 EL U/ML vaccine, Inject 1 mL into the appropriate muscle as directed by prescriber., Disp: 1 mL, Rfl: 1  •  Insulin Lispro Prot & Lispro (50-50) 100 UNIT/ML suspension pen-injector, Inject 30 Units under the skin before meals., Disp: 90 mL, Rfl: 5  •  Insulin Lispro Prot & Lispro (HUMALOG MIX 50/50 KWIKPEN) (50-50) 100 UNIT/ML suspension pen-injector, Inject 30 units subcutaneously 3   "Times a Day Before Meals, Disp: 90 mL, Rfl: 5  •  Insulin Pen Needle (BD ULTRA-FINE PEN NEEDLES) 29G X 12.7MM misc, USE WITH HUMALOG MIX 3 TIMES DAILY., Disp: 100 each, Rfl: 6  •  Insulin Syringe-Needle U-100 (BD INSULIN SYRINGE ULTRAFINE) 31G X 5/16\" 0.3 ML misc, USE WITH SLIDING SCALE 4 TIMES DAILY., Disp: 100 each, Rfl: 6  •  isosorbide mononitrate (IMDUR) 60 MG 24 hr tablet, Take 1 1/2 tablets by mouth Daily., Disp: 135 tablet, Rfl: 1  •  lisinopril (PRINIVIL,ZESTRIL) 20 MG tablet, Take 1 (one) tablet by mouth every day., Disp: 90 tablet, Rfl: 3  •  metFORMIN (GLUCOPHAGE) 1000 MG tablet, Take 1 tablet by mouth 2 (Two) Times a Day., Disp: 180 tablet, Rfl: 3  •  Multiple Vitamin (MULTI-VITAMIN DAILY PO), Take  by mouth., Disp: , Rfl:   •  nitroglycerin (NITROSTAT) 0.4 MG SL tablet, Place 1 tablet under the tongue Every 5 (Five) Minutes As Needed for Chest Pain (max of 3 doses in 15 minutes. If no relief, go to ER., Disp: 25 tablet, Rfl: 12  •  insulin lispro (humaLOG) 100 UNIT/ML injection, Inject 0-9 Units under the skin 4 (Four) Times a Day (Before Meals & at Bedtime)., Disp: 10 mL, Rfl: 12  •  metFORMIN (GLUCOPHAGE) 1000 MG tablet, Take 1 tablet by mouth 2 (Two) Times a Day., Disp: 180 tablet, Rfl: 3      The following portions of the patient's history were reviewed and updated as appropriate: allergies, current medications, past family history, past medical history, past social history, past surgical history and problem list.    Social History     Tobacco Use   • Smoking status: Former Smoker     Packs/day: 0.25     Types: Cigarettes     Last attempt to quit: 1989     Years since quittin.5   • Smokeless tobacco: Never Used   Substance Use Topics   • Alcohol use: No   • Drug use: No       Review of Systems   Constitution: Negative for weakness and malaise/fatigue.   Cardiovascular: Negative for chest pain, leg swelling, near-syncope and palpitations.   Respiratory: Negative for cough, shortness of " "breath and wheezing.    Neurological: Negative for dizziness and light-headedness.       Objective   Vitals:    12/20/18 1109   BP: 126/79   BP Location: Right arm   Patient Position: Sitting   Cuff Size: Large Adult   Pulse: 79   SpO2: 97%   Weight: 118 kg (260 lb)   Height: 167.6 cm (65.98\")     Body mass index is 41.99 kg/m².        Physical Exam   Constitutional: She is oriented to person, place, and time. She appears well-developed and well-nourished.   HENT:   Head: Normocephalic and atraumatic.   Neck: No JVD present.   Cardiovascular: Normal rate, regular rhythm and normal heart sounds. Exam reveals no S3 and no S4.   No murmur heard.  Pulmonary/Chest: Effort normal and breath sounds normal. She has no wheezes. She has no rales.   Abdominal: Soft. Bowel sounds are normal.   Musculoskeletal: She exhibits no edema.   Neurological: She is alert and oriented to person, place, and time.   Skin: Skin is warm and dry.   Psychiatric: She has a normal mood and affect. Her behavior is normal.       Lab Results   Component Value Date     10/19/2018    K 4.7 10/19/2018     10/19/2018    CO2 26.5 10/19/2018    BUN 22 (H) 10/19/2018    CREATININE 1.10 10/19/2018    GLUCOSE 185 (H) 10/19/2018    CALCIUM 9.2 10/19/2018    AST 23 10/19/2018    ALT 27 10/19/2018    ALKPHOS 59 10/19/2018    LABIL2 1.6 12/11/2015     Lab Results   Component Value Date    CKTOTAL 35 10/29/2017     Lab Results   Component Value Date    WBC 6.43 10/19/2018    HGB 13.1 10/19/2018    HCT 41.4 10/19/2018     10/19/2018     Lab Results   Component Value Date    INR 0.98 10/28/2017    INR 0.89 11/29/2016    INR 0.82 11/27/2016     No results found for: MG  Lab Results   Component Value Date    TSH 1.697 04/02/2018    CHLPL 213 (H) 03/14/2016    TRIG 203 (H) 10/19/2018    HDL 38 (L) 10/19/2018    LDL 64 10/19/2018         ECG 12 Lead  Date/Time: 12/20/2018 11:18 AM  Performed by: Ally Sheehan APRN  Authorized by: Sissy, " BRYNN Canales   Comparison: compared with previous ECG   Similar to previous ECG  BPM: 83  Comments: Right bundle branch block  Old anterior wall MI  Reviewed with Dr. Manriquez              Assessment/Plan    Diagnosis Plan   1. ST elevation myocardial infarction (STEMI) of anterior wall, 11/29/16 s/p stenting proxismal % occlusion, clinically stable.     2. Mixed hyperlipidemia     3. Essential hypertension     4. Type 2 diabetes mellitus with complication, unspecified whether long term insulin use (CMS/[facility])                  Recommendations:    1. Will continue current medications    2. Follow up in 5 months and PRN      Return in about 5 months (around 5/20/2019) for Recheck.    As always, I appreciate very much the opportunity to participate in the cardiovascular care of your patients.      With Best Regards,    BRYNN Valencia

## 2019-01-07 ENCOUNTER — OFFICE VISIT (OUTPATIENT)
Dept: BARIATRICS/WEIGHT MGMT | Facility: CLINIC | Age: 59
End: 2019-01-07

## 2019-01-07 VITALS
HEART RATE: 80 BPM | BODY MASS INDEX: 41.54 KG/M2 | HEIGHT: 66 IN | SYSTOLIC BLOOD PRESSURE: 145 MMHG | WEIGHT: 258.5 LBS | OXYGEN SATURATION: 98 % | DIASTOLIC BLOOD PRESSURE: 96 MMHG

## 2019-01-07 DIAGNOSIS — R11.2 NAUSEA AND VOMITING, INTRACTABILITY OF VOMITING NOT SPECIFIED, UNSPECIFIED VOMITING TYPE: Primary | ICD-10-CM

## 2019-01-07 DIAGNOSIS — Z98.84 STATUS POST BARIATRIC SURGERY: ICD-10-CM

## 2019-01-07 DIAGNOSIS — R10.9 ABDOMINAL PAIN, UNSPECIFIED ABDOMINAL LOCATION: ICD-10-CM

## 2019-01-07 PROCEDURE — 99214 OFFICE O/P EST MOD 30 MIN: CPT | Performed by: PHYSICIAN ASSISTANT

## 2019-01-07 NOTE — PROGRESS NOTES
[facility]ariat[facility]  [address]  [facility] [id number] [phone]        Patient Name:  Jannette Onofre.  :  1960      Date of Visit: 2019      Reason for Visit:   AGB follow up, abdominal pain, nausea, vomiting    HPI: Jannette Onofre is a 58 y.o. female /p LAGB 2013 by Dr. Russell.  Transferred care to [facility] 2016 b/c she says Dr. Russell made her feel like a failure.      Today says she is doing well, although has continued to gain weight. Presents today to discuss lapband plan. She would like to pursue LSG, although does not think she can cover the cost at this time.  She does want her lapband removed regardless as it is no longer providing any benefit, has continued to cause issues and she is concerned about potential emergent complications. Ideally would like to pursue LSG following mid year if able to financially.  Current symptoms include nausea and vomiting post prandial 2-3 times a week, usually within an hour of eating.   She avoids eating many times due to symtoms. She has regular episodic port site pain of RUQ with episodic bruises that appear over port site. Has occasional constipation. No other issues/concerns. Denies dysphagia, reflux, pulmonary issues and fevers  Eating 3 times a day.  Mostly pork/ meat/ beef + vegetables, occ salad. Striving for 80+g prot/day.  Drinking 64+ fluid oz/day.  Taking MVI and Vit D 10,000.  Not on antacid.     On plavix/ ASA s/p stent placement.   Followed by Dr. Ogden, saw him last 18.        PCP manages DM, BS improved since being on trulicity, now  's. Was 300-400 with A1C 12.6 in October, repeat labs due next month.     UGI 18 at [facility] IMPRESSION:  The patient's LAP-BAND is in appropriate position unchanged  from earlier exams. There was minimal distal gastroesophageal reflux, no  other abnormalities were demonstrated.        Past Medical History:   Diagnosis Date   • Anxiety    • Coronary artery  disease     s/p LAD stenting, follows w/ Dr. Ogden   • Depression    • Diabetes mellitus (CMS/[facility])     dx , initially improved s/p AGB but now back on insulin    • Fatigue    • Hyperlipidemia    • Hypertension    • Melanoma (CMS/HCC)        • Morbid obesity (CMS/HCC)    • Myocardial infarction (CMS/HCC)     2016   • BRICE on CPAP    • Past heart attack    • Syncopal episodes      Past Surgical History:   Procedure Laterality Date   • CATARACT EXTRACTION N/A     rt eye , left eye    •  SECTION     • CLOSED REDUCTION PERCUTANEOUS PINNING LEFT FIFTH METACARPAL SHAFT FRACTURE Left 8/3/2017    Performed by Oscar León MD at [facility] OR   • COLONOSCOPY     • EYE SURGERY     • Functional Flow Reserve  10/31/2017    Performed by Denilson Pena MD at [facility] CATH INVASIVE LOCATION   • LAPAROSCOPIC CHOLECYSTECTOMY     • LAPAROSCOPIC GASTRIC BANDING  2013    s/p LAGB by Dr Russell   • Left Heart Cath N/A 10/31/2017    Performed by Lebron Ogden MD at [facility] CATH INVASIVE LOCATION   • Left Heart Cath N/A 2016    Performed by Akhil Zuñiga MD at [facility] CATH INVASIVE LOCATION   • OTHER SURGICAL HISTORY      melanoma removal/back   • Percutaneous Coronary Intervention N/A 2016    Performed by Akhil Zuñiga MD at [facility] CATH INVASIVE LOCATION   • SEPTOPLASTY     • SEPTOPLASTY     • UMBILICAL HERNIA REPAIR       Outpatient Medications Marked as Taking for the 19 encounter (Office Visit) with Mami Kohler PA-C   Medication Sig Dispense Refill   • amLODIPine (NORVASC) 5 MG tablet Take 1 tablet by mouth Daily. (Patient taking differently: Take 2.5 mg by mouth Daily.) 90 tablet 4   • aspirin 325 MG tablet Take 325 mg by mouth Daily.     • atorvastatin (LIPITOR) 80 MG tablet Take 1 tablet by mouth Daily. 90 tablet 3   • carvedilol (COREG) 25 MG tablet Take 1 (one) Tablet, by mouth two times a day. 180 tablet 4   • Cholecalciferol (VITAMIN  D3) 65528 units tablet Take 10,000 Units by mouth Daily.     • clopidogrel (PLAVIX) 75 MG tablet Take 1 (one) tablet by mouth every day. 90 tablet 4   • cyclobenzaprine (FLEXERIL) 10 MG tablet Take 1 tablet by mouth 2 (Two) Times a Day As Needed for back pain. 60 tablet 1   • diclofenac (VOLTAREN) 25 MG EC tablet Take 1 tablet by mouth 2 (Two) Times a Day As Needed for pain. Take with food. 60 tablet 0   • diclofenac (VOLTAREN) 3 % gel gel Apply topically to affected area tid as needed. 100 g 2   • Dulaglutide 0.75 MG/0.5ML solution pen-injector Inject 0.75 mg subcutaneously once weekly 2 mL 5   • Empagliflozin 25 MG tablet Take 1 tablet by mouth Daily. 90 tablet 4   • Empagliflozin 25 MG tablet Take 1 tablet by mouth Daily. 90 tablet 4   • estradiol (ESTRACE) 0.1 MG/GM vaginal cream Insert 2 g into the vagina Daily.     • Estradiol (ESTRADERM TD) Place 6 drops on the skin Daily.     • glipiZIDE (GLUCOTROL XL) 10 MG 24 hr tablet Take 1 tablet by mouth 2 (Two) Times a Day. 180 tablet 4   • insulin lispro (humaLOG) 100 UNIT/ML injection Inject 0-9 Units under the skin 4 (Four) Times a Day (Before Meals & at Bedtime). 10 mL 12   • Insulin Lispro Prot & Lispro (50-50) 100 UNIT/ML suspension pen-injector Inject 30 Units under the skin before meals. 90 mL 5   • Insulin Lispro Prot & Lispro (HUMALOG MIX 50/50 KWIKPEN) (50-50) 100 UNIT/ML suspension pen-injector Inject 30 units subcutaneously 3  Times a Day Before Meals 90 mL 5   • Insulin Pen Needle (BD ULTRA-FINE PEN NEEDLES) 29G X 12.7MM misc USE WITH HUMALOG MIX 3 TIMES DAILY. 100 each 6   • isosorbide mononitrate (IMDUR) 60 MG 24 hr tablet Take 1 1/2 tablets by mouth Daily. 135 tablet 1   • lisinopril (PRINIVIL,ZESTRIL) 20 MG tablet Take 1 (one) tablet by mouth every day. 90 tablet 3   • metFORMIN (GLUCOPHAGE) 1000 MG tablet Take 1 tablet by mouth 2 (Two) Times a Day. 180 tablet 3   • metFORMIN (GLUCOPHAGE) 1000 MG tablet Take 1 tablet by mouth 2 (Two) Times a Day. 180  "tablet 3   • Multiple Vitamin (MULTI-VITAMIN DAILY PO) Take  by mouth.     • nitroglycerin (NITROSTAT) 0.4 MG SL tablet Place 1 tablet under the tongue Every 5 (Five) Minutes As Needed for Chest Pain (max of 3 doses in 15 minutes. If no relief, go to ER. 25 tablet 12       No Known Allergies    Social History     Socioeconomic History   • Marital status:      Spouse name: Not on file   • Number of children: Not on file   • Years of education: Not on file   • Highest education level: Not on file   Social Needs   • Financial resource strain: Not on file   • Food insecurity - worry: Not on file   • Food insecurity - inability: Not on file   • Transportation needs - medical: Not on file   • Transportation needs - non-medical: Not on file   Occupational History   • Not on file   Tobacco Use   • Smoking status: Former Smoker     Packs/day: 0.25     Types: Cigarettes     Last attempt to quit: 1989     Years since quittin.5   • Smokeless tobacco: Never Used   Substance and Sexual Activity   • Alcohol use: No   • Drug use: No   • Sexual activity: Defer   Other Topics Concern   • Not on file   Social History Narrative    .  Lives in [place].   @ [employer].       /96   Pulse 80   Ht 167.6 cm (66\")   Wt 117 kg (258 lb 8 oz)   SpO2 98%   BMI 41.72 kg/m²     Physical Exam   Constitutional: She is oriented to person, place, and time. She appears well-developed and well-nourished.   HENT:   Head: Normocephalic and atraumatic.   Cardiovascular: Normal rate and regular rhythm.   Murmur heard.  Pulmonary/Chest: Effort normal and breath sounds normal. No respiratory distress. She has no wheezes.   Abdominal: Soft. Bowel sounds are normal. She exhibits no distension. There is no tenderness.   Lap scars  Midline scars  RUQ port site unremarkable   Neurological: She is alert and oriented to person, place, and time.   Skin: Skin is warm and dry.   Psychiatric: She has a normal " mood and affect. Her behavior is normal. Judgment and thought content normal.                Band Adjustment Info   Band Type: ?? APS ??   Port Location: RLQ   Procedure Position: supine   Needle Type: short    Local Anesthesia: with 1% lidocaine and bicarb         Amount Expected (cc): ?? 5.5 ??  (suspected)   Amount Added (cc):         Amount Removed (cc):     Total Amount (cc): ?? 5.5 ??  (suspected)         Able to lashae water after ADJ?     Other Notes: no [facility] office notes available to confirm band vol.            Assessment:  AGB follow up, abdominal pain, nausea, vomiting    ICD-10-CM ICD-9-CM   1. Nausea and vomiting, intractability of vomiting not specified, unspecified vomiting type R11.2 787.01   2. Abdominal pain, unspecified abdominal location R10.9 789.00   3. Status post bariatric surgery Z98.84 V45.86         Plan:  Will proceed with EGD for further evaluation. The risks and benefits of the upper endoscopy were discussed with the patient in detail and all questions were answered.  Possibility of perforation, bleeding, aspiration, and anesthesia reaction were reviewed.  Patient agrees to proceed. Considering lapband removal possibly pursuing LSG in the future. Will need cardiac clearance prior to lapband removal with recs on anticoagulation management.  Will need intake visit with revision paperwork completed if interested in LSG. Call or RTC with issues or concerns in the interim.

## 2019-01-27 ENCOUNTER — APPOINTMENT (OUTPATIENT)
Dept: PREADMISSION TESTING | Facility: HOSPITAL | Age: 59
End: 2019-01-27

## 2019-01-27 LAB
HCT VFR BLD AUTO: 42.1 % (ref 34.5–44)
POTASSIUM BLD-SCNC: 4.3 MMOL/L (ref 3.5–5.5)

## 2019-01-27 PROCEDURE — 85014 HEMATOCRIT: CPT | Performed by: ANESTHESIOLOGY

## 2019-01-27 PROCEDURE — 93005 ELECTROCARDIOGRAM TRACING: CPT

## 2019-01-27 PROCEDURE — 36415 COLL VENOUS BLD VENIPUNCTURE: CPT

## 2019-01-27 PROCEDURE — 84132 ASSAY OF SERUM POTASSIUM: CPT | Performed by: ANESTHESIOLOGY

## 2019-01-27 PROCEDURE — 93010 ELECTROCARDIOGRAM REPORT: CPT | Performed by: INTERNAL MEDICINE

## 2019-01-27 NOTE — DISCHARGE INSTRUCTIONS
The following information and instructions were given:    Nothing to eat or drink after midnight except sips of water with routine prescribed medication (except blood thinner, certain blood pressure medications, diabetes, or weight reducing medication) unless otherwise instructed by your physician.  Do not eat, drink, smoke or chew gum after midnight the night before surgery. This also includes no mints.    EXCEPTION: ERAS patients Patient instructed to drink 20 ounces (or until full) of Gatorade or 20 ounces of G2 (if diabetic) and complete 1 hour before arrival time on the day of surgery. (NO RED Gatorade or G2)    Patient verbalized understanding.      DO NOT shave for two days before your procedure.  Do not wear makeup.      DO NOT wear fingernail polish (gel/regular) and/or acrylic/artificial nails on the day of surgery.   If a patient had recent manicure and would rather not remove polish or artificial nails, then the minimum requirement is that the polish/artificial nails must be removed from the middle finger on each hand.      If patient was having surgery on an upper extremity, then the patient was instructed that fingernail polish/artificial fingernails must be removed for surgery.  NO EXCEPTIONS.      If patient was having surgery on a lower extremity, then the patient was instructed that toenail polish on both extremities must be removed for surgery.  NO EXCEPTIONS.    Remove all jewelry (advised to go to jeweler if unable to remove).  Jewelry especially rings can no longer be taped for surgery.    Leave anything you consider valuable at home.      Bring the following with you (if applicable)   -picture ID and insurance cards   -Co-pay/deductible required by insurance   -Medications in the original bottles (not a list) including all over-the-counter meds     Education booklet, brochure, and/or given to patient.    Patient must have a  for transportation home after procedure.  It must be an   adult  that will take responsibility for care for 24 hours after surgery.

## 2019-01-27 NOTE — PAT
After reviewing EKG and previous notes, Karie Noe RN spoke with Dr. William about recent EKG in [facility]. Dr. William requested new EKG and no additional orders were received.

## 2019-01-28 ENCOUNTER — LAB REQUISITION (OUTPATIENT)
Dept: LAB | Facility: HOSPITAL | Age: 59
End: 2019-01-28

## 2019-01-28 DIAGNOSIS — R11.2 NAUSEA WITH VOMITING: ICD-10-CM

## 2019-01-28 PROCEDURE — 88305 TISSUE EXAM BY PATHOLOGIST: CPT | Performed by: SURGERY

## 2019-01-29 ENCOUNTER — TELEPHONE (OUTPATIENT)
Dept: BARIATRICS/WEIGHT MGMT | Facility: CLINIC | Age: 59
End: 2019-01-29

## 2019-01-29 DIAGNOSIS — R11.10 VOMITING, INTRACTABILITY OF VOMITING NOT SPECIFIED, PRESENCE OF NAUSEA NOT SPECIFIED, UNSPECIFIED VOMITING TYPE: ICD-10-CM

## 2019-01-29 DIAGNOSIS — R10.9 ABDOMINAL PAIN, UNSPECIFIED ABDOMINAL LOCATION: ICD-10-CM

## 2019-01-29 DIAGNOSIS — R11.0 NAUSEA: Primary | ICD-10-CM

## 2019-01-29 DIAGNOSIS — R11.11 VOMITING WITHOUT NAUSEA, INTRACTABILITY OF VOMITING NOT SPECIFIED, UNSPECIFIED VOMITING TYPE: ICD-10-CM

## 2019-01-29 LAB
CYTO UR: NORMAL
LAB AP CASE REPORT: NORMAL
LAB AP CLINICAL INFORMATION: NORMAL
PATH REPORT.FINAL DX SPEC: NORMAL
PATH REPORT.GROSS SPEC: NORMAL

## 2019-01-30 NOTE — TELEPHONE ENCOUNTER
Notified pt that Dr Dunbar said it was ok to proceed with her band removal and the order has been placed. I let pt know before she is scheduled she will have to come into the office for an updated H&P and she will also need to fax in her Cardiac clearance as well. I told her she must hold asa/plavix x1 week prior to her band removal. Pt verbalized understanding.

## 2019-02-11 ENCOUNTER — HOSPITAL ENCOUNTER (OUTPATIENT)
Dept: MAMMOGRAPHY | Facility: HOSPITAL | Age: 59
Discharge: HOME | End: 2019-02-11
Admitting: INTERNAL MEDICINE

## 2019-02-11 DIAGNOSIS — Z12.39 SCREENING BREAST EXAMINATION: ICD-10-CM

## 2019-02-11 PROCEDURE — 77067 SCR MAMMO BI INCL CAD: CPT | Performed by: RADIOLOGY

## 2019-02-11 PROCEDURE — 77063 BREAST TOMOSYNTHESIS BI: CPT | Performed by: RADIOLOGY

## 2019-02-11 PROCEDURE — 77067 SCR MAMMO BI INCL CAD: CPT

## 2019-02-11 PROCEDURE — 77063 BREAST TOMOSYNTHESIS BI: CPT

## 2019-02-15 ENCOUNTER — OFFICE VISIT (OUTPATIENT)
Dept: CARDIOLOGY | Facility: CLINIC | Age: 59
End: 2019-02-15

## 2019-02-15 VITALS
DIASTOLIC BLOOD PRESSURE: 74 MMHG | HEART RATE: 81 BPM | OXYGEN SATURATION: 97 % | BODY MASS INDEX: 42.27 KG/M2 | WEIGHT: 263 LBS | HEIGHT: 66 IN | SYSTOLIC BLOOD PRESSURE: 111 MMHG

## 2019-02-15 DIAGNOSIS — E78.2 MIXED HYPERLIPIDEMIA: ICD-10-CM

## 2019-02-15 DIAGNOSIS — E11.8 TYPE 2 DIABETES MELLITUS WITH COMPLICATION, UNSPECIFIED WHETHER LONG TERM INSULIN USE: ICD-10-CM

## 2019-02-15 DIAGNOSIS — I10 ESSENTIAL HYPERTENSION: ICD-10-CM

## 2019-02-15 DIAGNOSIS — I21.09 ST ELEVATION MYOCARDIAL INFARCTION (STEMI) OF ANTERIOR WALL (HCC): Primary | ICD-10-CM

## 2019-02-15 DIAGNOSIS — E66.01 MORBID OBESITY (HCC): ICD-10-CM

## 2019-02-15 PROCEDURE — 99214 OFFICE O/P EST MOD 30 MIN: CPT | Performed by: INTERNAL MEDICINE

## 2019-02-15 NOTE — PROGRESS NOTES
Jannette Onofre  1960  02/15/2019   Ref:No referring provider defined for this encounter.  Pcp: Akhil Huerta MD  [address] [place] [address]    Follow up for:  Chief Complaint   Patient presents with   • Surgical Clearance     Lap band Removal    • Med Management        Patient Active Problem List   Diagnosis   • Unstable angina pectoris (CMS/HCC)   • ST elevation myocardial infarction (STEMI) of anterior wall, 11/29/16 s/p stenting proxismal % occlusion, clinically stable.   • Type 2 diabetes mellitus (CMS/HCC)   • Dyslipidemia   • Chest pain   • Coronary artery disease   • Fatigue   • Hyperlipidemia   • Hypertension   • Morbid obesity (CMS/HCC)   • Myocardial infarction (CMS/HCC)   • Dyspnea on exertion (NYHA class 2-3)   • Closed displaced fracture of shaft of fifth metacarpal bone of left hand   • Angina pectoris (CMS/HCC)   • Angina, class III (CMS/HCC)       HPI     Ms. Onofre is a pleasant 59 yo female who presents to office for preoperative cardiac evaluation prior to lap band reversal procedure.  She notes lap band has been in place since 2013, and it is now causing her to vomit.  She notes she is breathing ok.  She denies having chest pain.  No orthopnea, palpitations or dizziness noted.  She denies significant pedal edema.      She had an anterior MI in 11/24/16 and had an LAD stent.      Review of Systems   Cardiovascular: Negative for chest pain, dyspnea on exertion, irregular heartbeat, leg swelling, orthopnea and palpitations.         Current Outpatient Medications:   •  amLODIPine (NORVASC) 5 MG tablet, Take 1 tablet by mouth Daily. (Patient taking differently: Take 2.5 mg by mouth Daily.), Disp: 90 tablet, Rfl: 4  •  aspirin 325 MG tablet, Take 325 mg by mouth Daily., Disp: , Rfl:   •  atorvastatin (LIPITOR) 80 MG tablet, Take 1 tablet by mouth Daily., Disp: 90 tablet, Rfl: 3  •  carvedilol (COREG) 25 MG tablet, Take 1 (one) Tablet, by mouth two times a day., Disp: 180  tablet, Rfl: 4  •  Cholecalciferol (VITAMIN D3) 29539 units tablet, Take 10,000 Units by mouth Daily., Disp: , Rfl:   •  clopidogrel (PLAVIX) 75 MG tablet, Take 1 (one) tablet by mouth every day., Disp: 90 tablet, Rfl: 4  •  cyclobenzaprine (FLEXERIL) 10 MG tablet, Take 1 tablet by mouth 2 (Two) Times a Day As Needed for back pain., Disp: 60 tablet, Rfl: 1  •  diclofenac (VOLTAREN) 25 MG EC tablet, Take 1 tablet by mouth 2 (Two) Times a Day As Needed for pain. Take with food., Disp: 60 tablet, Rfl: 0  •  diclofenac (VOLTAREN) 3 % gel gel, Apply topically to affected area tid as needed., Disp: 100 g, Rfl: 2  •  Dulaglutide 0.75 MG/0.5ML solution pen-injector, Inject 0.75 mg subcutaneously once weekly, Disp: 2 mL, Rfl: 5  •  Empagliflozin 25 MG tablet, Take 1 tablet by mouth Daily., Disp: 90 tablet, Rfl: 4  •  Empagliflozin 25 MG tablet, Take 1 tablet by mouth Daily., Disp: 90 tablet, Rfl: 4  •  estradiol (ESTRACE) 0.1 MG/GM vaginal cream, Insert 2 g into the vagina Daily., Disp: , Rfl:   •  Estradiol (ESTRADERM TD), Place 6 drops on the skin Daily., Disp: , Rfl:   •  glucose blood (FREESTYLE LITE) test strip, Use to check blood sugar 3 times daily as needed., Disp: 100 each, Rfl: 5  •  hepatitis A (HAVRIX) 1440 EL U/ML vaccine, Inject 1 mL into the appropriate muscle as directed by prescriber., Disp: 1 mL, Rfl: 1  •  insulin lispro (humaLOG) 100 UNIT/ML injection, Inject 0-9 Units under the skin 4 (Four) Times a Day (Before Meals & at Bedtime)., Disp: 10 mL, Rfl: 12  •  Insulin Lispro Prot & Lispro (50-50) 100 UNIT/ML suspension pen-injector, Inject 30 Units under the skin before meals., Disp: 90 mL, Rfl: 5  •  Insulin Lispro Prot & Lispro (HUMALOG MIX 50/50 KWIKPEN) (50-50) 100 UNIT/ML suspension pen-injector, Inject 30 units subcutaneously 3  Times a Day Before Meals, Disp: 90 mL, Rfl: 5  •  Insulin Pen Needle (BD ULTRA-FINE PEN NEEDLES) 29G X 12.7MM misc, USE WITH HUMALOG MIX 3 TIMES DAILY., Disp: 100 each, Rfl:  "6  •  Insulin Syringe-Needle U-100 (BD INSULIN SYRINGE ULTRAFINE) 31G X 5/16\" 0.3 ML misc, USE WITH SLIDING SCALE 4 TIMES DAILY., Disp: 100 each, Rfl: 6  •  isosorbide mononitrate (IMDUR) 60 MG 24 hr tablet, Take 1 1/2 tablets by mouth Daily., Disp: 135 tablet, Rfl: 1  •  lisinopril (PRINIVIL,ZESTRIL) 20 MG tablet, Take 1 (one) tablet by mouth every day., Disp: 90 tablet, Rfl: 3  •  metFORMIN (GLUCOPHAGE) 1000 MG tablet, Take 1 tablet by mouth 2 (Two) Times a Day., Disp: 180 tablet, Rfl: 3  •  metFORMIN (GLUCOPHAGE) 1000 MG tablet, Take 1 tablet by mouth 2 (Two) Times a Day., Disp: 180 tablet, Rfl: 3  •  Multiple Vitamin (MULTI-VITAMIN DAILY PO), Take  by mouth., Disp: , Rfl:   •  nitroglycerin (NITROSTAT) 0.4 MG SL tablet, Place 1 tablet under the tongue Every 5 (Five) Minutes As Needed for Chest Pain (max of 3 doses in 15 minutes. If no relief, go to ER., Disp: 25 tablet, Rfl: 12  •  glipiZIDE (GLUCOTROL XL) 10 MG 24 hr tablet, Take 1 tablet by mouth 2 (Two) Times a Day., Disp: 180 tablet, Rfl: 4    No Known Allergies    /74 (BP Location: Left arm, Patient Position: Sitting, Cuff Size: Large Adult)   Pulse 81   Ht 167.6 cm (66\")   Wt 119 kg (263 lb)   SpO2 97%   BMI 42.45 kg/m²        Physical Exam   Constitutional: She is oriented to person, place, and time. She appears well-developed and well-nourished. No distress.   Morbidly obese   Neck: Neck supple. No JVD present.   Cardiovascular: Normal rate and regular rhythm. Exam reveals no gallop and no friction rub.   No murmur heard.  Pulses:       Carotid pulses are 2+ on the right side, and 2+ on the left side.       Dorsalis pedis pulses are 2+ on the right side, and 2+ on the left side.        Posterior tibial pulses are 2+ on the right side, and 2+ on the left side.   Pulmonary/Chest: Effort normal. She has decreased breath sounds. She has no wheezes. She has no rhonchi. She has no rales.   Abdominal: Soft. Bowel sounds are normal. There is no " tenderness.   Morbidly obese   Musculoskeletal: She exhibits no edema.   Neurological: She is alert and oriented to person, place, and time.   Skin: Skin is warm and dry.   Psychiatric: She has a normal mood and affect. Her behavior is normal. Judgment and thought content normal.   :    Lab Review:    Procedures    Lab Results   Component Value Date     10/19/2018    K 4.3 01/27/2019     10/19/2018    CO2 26.5 10/19/2018    BUN 22 (H) 10/19/2018    CREATININE 1.10 10/19/2018    GLUCOSE 185 (H) 10/19/2018    CALCIUM 9.2 10/19/2018    AST 23 10/19/2018    ALT 27 10/19/2018    ALKPHOS 59 10/19/2018    LABIL2 1.6 12/11/2015     Lab Results   Component Value Date    CKTOTAL 35 10/29/2017     Lab Results   Component Value Date    WBC 6.43 10/19/2018    HGB 13.1 10/19/2018    HCT 42.1 01/27/2019     10/19/2018     Lab Results   Component Value Date    INR 0.98 10/28/2017    INR 0.89 11/29/2016    INR 0.82 11/27/2016     No results found for: MG  Lab Results   Component Value Date    TSH 1.697 04/02/2018    CHLPL 213 (H) 03/14/2016    TRIG 203 (H) 10/19/2018    HDL 38 (L) 10/19/2018    LDL 64 10/19/2018      Lab Results   Component Value Date    BNP 27.0 12/29/2016       Chemistry        Component Value Date/Time     10/19/2018 0739    K 4.3 01/27/2019 1005     10/19/2018 0739    CO2 26.5 10/19/2018 0739    BUN 22 (H) 10/19/2018 0739    CREATININE 1.10 10/19/2018 0739        Component Value Date/Time    CALCIUM 9.2 10/19/2018 0739    ALKPHOS 59 10/19/2018 0739    AST 23 10/19/2018 0739    ALT 27 10/19/2018 0739    BILITOT 0.5 10/19/2018 0739          EKG 1/27/19          EKG 12/20/18:        Treadmill Stress Test 6/14/18:      Lexiscan Stress Test 7/3/18:        Impression:   Diagnosis Plan   1. ST elevation myocardial infarction (STEMI) of anterior wall, 11/29/16 s/p stenting paroxismal % occlusion, clinically stable with no current angina     2. Mixed hyperlipidemia     3. Essential  hypertension     4. Type 2 diabetes mellitus with complication, unspecified whether long term insulin use (CMS/[facility])     5. Morbid obesity (CMS/[facility])         Plan:    1. After interviewing patient and reviewing her previous ekgs and cardiac testing, she may proceed with lap band procedure, with stable but increased cardiac risk, due to known CAD and diabetes.       No orders of the defined types were placed in this encounter.        Return in about 6 weeks (around 3/29/2019).     Scribed for Brooks Cat MD by Mami Garcia CMA 2/15/2019  3:23 PM    I, Brooks Cat MD, personally performed the services described in this documentation as scribed by the above named individual in my presence, and it is both accurate and complete.  2/15/2019  3:23 PM      This document signed by Brooks Cat MD February 15, 2019 3:23 PM

## 2019-02-18 ENCOUNTER — TELEPHONE (OUTPATIENT)
Dept: BARIATRICS/WEIGHT MGMT | Facility: CLINIC | Age: 59
End: 2019-02-18

## 2019-02-18 ENCOUNTER — OFFICE VISIT (OUTPATIENT)
Dept: BARIATRICS/WEIGHT MGMT | Facility: CLINIC | Age: 59
End: 2019-02-18

## 2019-02-18 ENCOUNTER — APPOINTMENT (OUTPATIENT)
Dept: PREADMISSION TESTING | Facility: HOSPITAL | Age: 59
End: 2019-02-18

## 2019-02-18 VITALS
DIASTOLIC BLOOD PRESSURE: 78 MMHG | RESPIRATION RATE: 18 BRPM | BODY MASS INDEX: 41.54 KG/M2 | TEMPERATURE: 97.7 F | OXYGEN SATURATION: 99 % | SYSTOLIC BLOOD PRESSURE: 132 MMHG | WEIGHT: 258.5 LBS | HEIGHT: 66 IN | HEART RATE: 84 BPM

## 2019-02-18 DIAGNOSIS — R10.13 DYSPEPSIA: ICD-10-CM

## 2019-02-18 DIAGNOSIS — R11.0 CHRONIC NAUSEA: Primary | ICD-10-CM

## 2019-02-18 DIAGNOSIS — Z98.84 STATUS POST BARIATRIC SURGERY: ICD-10-CM

## 2019-02-18 LAB
HCT VFR BLD AUTO: 41.9 % (ref 34.5–44)
POTASSIUM BLD-SCNC: 4.3 MMOL/L (ref 3.5–5.5)

## 2019-02-18 PROCEDURE — 36415 COLL VENOUS BLD VENIPUNCTURE: CPT

## 2019-02-18 PROCEDURE — 99214 OFFICE O/P EST MOD 30 MIN: CPT | Performed by: PHYSICIAN ASSISTANT

## 2019-02-18 PROCEDURE — 85014 HEMATOCRIT: CPT | Performed by: ANESTHESIOLOGY

## 2019-02-18 PROCEDURE — 84132 ASSAY OF SERUM POTASSIUM: CPT | Performed by: ANESTHESIOLOGY

## 2019-02-18 NOTE — PROGRESS NOTES
"[facility]  [address]  [facility] [address]  [phone]        Patient Name: Jannette Onofre.  YOB: 1960      Date of Visit: 2/18/2019      Reason for Visit:  AGB follow up - preop AGBR      HPI:  Jannette Onofre is a 58 y.o. female s/p LAGB 4/2013 by Dr. Russell.      Transferred care to [facility] 7/2016 b/c she says Dr. Russell made her feel like a failure.  Unfortunately has had ongoing issues w/ lapband, probable band intolerance.  Wishes to pursue band removal, possibly w/ eventual revision to LSG.    [facility] - ok if asx.  EGD 1/28/19 w/ Dr. Dunbar - small hiatal hernia, no band slip, no erosion.    Still w/ chronic nausea/vomiting.  Denies abd.pain.  No recent fevers/chills.       FSBS have been well controlled since starting on Trulicity.  Recent cardiac eval unremarkable w/ hx anterior MI 11/24/16 s/p LAD stenting, on ASA 325mg + Plavix.  Does have cardiac clearance to proceed w/ band removal.  Says cardiology told her \"OK to stop ASA/Plavix for 1 week prior\".    Past Medical History:   Diagnosis Date   • Anxiety    • Coronary artery disease     s/p LAD stenting, follows w/ Dr. Ogden   • Depression    • Diabetes mellitus (CMS/HCC)     dx 1998, initially improved s/p AGB but now back on insulin    • Fatigue    • Hyperlipidemia    • Hypertension    • Melanoma (CMS/HCC)     1998   • Morbid obesity (CMS/HCC)    • Myocardial infarction (CMS/HCC)     11/2016   • BRICE on CPAP    • Past heart attack    • Syncopal episodes      Past Surgical History:   Procedure Laterality Date   • CARDIAC CATHETERIZATION N/A 11/29/2016    Procedure: Left Heart Cath;  Surgeon: Akhil Zuñiga MD;  Location:  [facility] CATH INVASIVE LOCATION;  Service:    • CARDIAC CATHETERIZATION N/A 10/31/2017    Procedure: Left Heart Cath;  Surgeon: Lebron Ogden MD;  Location:  [facility] CATH [facility] LOCATION;  Service:    • CARDIAC CATHETERIZATION  10/31/2017    Procedure: " Functional Flow [place];  Surgeon: Denilson Pena MD;  Location:  [facility] CATH INVASIVE LOCATION;  Service:    • CATARACT EXTRACTION N/A     rt eye , left eye    •  SECTION     • COLONOSCOPY     • FINGER/THUMB CLOSED REDUCTION Left 8/3/2017    Procedure: CLOSED REDUCTION PERCUTANEOUS PINNING LEFT FIFTH METACARPAL SHAFT FRACTURE;  Surgeon: Oscar León MD;  Location: [facility] OR;  Service:    • LAPAROSCOPIC CHOLECYSTECTOMY     • LAPAROSCOPIC GASTRIC BANDING  2013    s/p LAGB by Dr Russell   • OTHER SURGICAL HISTORY      melanoma removal/back   • MO RT/LT HEART CATHETERS N/A 2016    Procedure: Percutaneous Coronary Intervention;  Surgeon: Akhil Zuñiga MD;  Location: [facility] CATH INVASIVE LOCATION;  Service: Cardiology   • SEPTOPLASTY     • UMBILICAL HERNIA REPAIR         Current Outpatient Medications:   •  amLODIPine (NORVASC) 5 MG tablet, Take 1 tablet by mouth Daily. (Patient taking differently: Take 2.5 mg by mouth Daily.), Disp: 90 tablet, Rfl: 4  •  aspirin 325 MG tablet, Take 325 mg by mouth Daily., Disp: , Rfl:   •  atorvastatin (LIPITOR) 80 MG tablet, Take 1 tablet by mouth Daily., Disp: 90 tablet, Rfl: 3  •  carvedilol (COREG) 25 MG tablet, Take 1 (one) Tablet, by mouth two times a day., Disp: 180 tablet, Rfl: 4  •  Cholecalciferol (VITAMIN D3) 51552 units tablet, Take 10,000 Units by mouth Daily., Disp: , Rfl:   •  clopidogrel (PLAVIX) 75 MG tablet, Take 1 (one) tablet by mouth every day., Disp: 90 tablet, Rfl: 4  •  cyclobenzaprine (FLEXERIL) 10 MG tablet, Take 1 tablet by mouth 2 (Two) Times a Day As Needed for back pain., Disp: 60 tablet, Rfl: 1  •  Dulaglutide 0.75 MG/0.5ML solution pen-injector, Inject 0.75 mg subcutaneously once weekly, Disp: 2 mL, Rfl: 5  •  Empagliflozin 25 MG tablet, Take 1 tablet by mouth Daily., Disp: 90 tablet, Rfl: 4  •  estradiol (ESTRACE) 0.1 MG/GM vaginal cream, Insert 2 g into the vagina Daily., Disp: , Rfl:   •   "Estradiol (ESTRADERM TD), Place 6 drops on the skin Daily., Disp: , Rfl:   •  glipiZIDE (GLUCOTROL XL) 10 MG 24 hr tablet, Take 1 tablet by mouth 2 (Two) Times a Day., Disp: 180 tablet, Rfl: 4  •  glucose blood (FREESTYLE LITE) test strip, Use to check blood sugar 3 times daily as needed., Disp: 100 each, Rfl: 5  •  hepatitis A (HAVRIX) 1440 EL U/ML vaccine, Inject 1 mL into the appropriate muscle as directed by prescriber., Disp: 1 mL, Rfl: 1  •  insulin lispro (humaLOG) 100 UNIT/ML injection, Inject 0-9 Units under the skin 4 (Four) Times a Day (Before Meals & at Bedtime)., Disp: 10 mL, Rfl: 12  •  Insulin Lispro Prot & Lispro (50-50) 100 UNIT/ML suspension pen-injector, Inject 30 Units under the skin before meals., Disp: 90 mL, Rfl: 5  •  Insulin Pen Needle (BD ULTRA-FINE PEN NEEDLES) 29G X 12.7MM misc, USE WITH HUMALOG MIX 3 TIMES DAILY., Disp: 100 each, Rfl: 6  •  Insulin Syringe-Needle U-100 (BD INSULIN SYRINGE ULTRAFINE) 31G X 5/16\" 0.3 ML misc, USE WITH SLIDING SCALE 4 TIMES DAILY., Disp: 100 each, Rfl: 6  •  isosorbide mononitrate (IMDUR) 60 MG 24 hr tablet, Take 1 1/2 tablets by mouth Daily., Disp: 135 tablet, Rfl: 1  •  lisinopril (PRINIVIL,ZESTRIL) 20 MG tablet, Take 1 (one) tablet by mouth every day., Disp: 90 tablet, Rfl: 3  •  metFORMIN (GLUCOPHAGE) 1000 MG tablet, Take 1 tablet by mouth 2 (Two) Times a Day., Disp: 180 tablet, Rfl: 3  •  Multiple Vitamin (MULTI-VITAMIN DAILY PO), Take  by mouth., Disp: , Rfl:   •  nitroglycerin (NITROSTAT) 0.4 MG SL tablet, Place 1 tablet under the tongue Every 5 (Five) Minutes As Needed for Chest Pain (max of 3 doses in 15 minutes. If no relief, go to ER., Disp: 25 tablet, Rfl: 12     No Known Allergies    Social History     Socioeconomic History   • Marital status:      Spouse name: Not on file   • Number of children: Not on file   • Years of education: Not on file   • Highest education level: Not on file   Social Needs   • Financial resource strain: Not on " "file   • Food insecurity - worry: Not on file   • Food insecurity - inability: Not on file   • Transportation needs - medical: Not on file   • Transportation needs - non-medical: Not on file   Occupational History   • Not on file   Tobacco Use   • Smoking status: Former Smoker     Packs/day: 0.25     Types: Cigarettes     Last attempt to quit: 1989     Years since quittin.6   • Smokeless tobacco: Never Used   Substance and Sexual Activity   • Alcohol use: No   • Drug use: No   • Sexual activity: Defer   Other Topics Concern   • Not on file   Social History Narrative    .  Lives in [facility].   @ [employer].       /78 (BP Location: Left arm, Patient Position: Sitting, Cuff Size: Large Adult)   Pulse 84   Temp 97.7 °F (36.5 °C) (Temporal)   Resp 18   Ht 167.6 cm (66\")   Wt 117 kg (258 lb 8 oz)   SpO2 99%   BMI 41.72 kg/m²     Physical Exam   Constitutional: She appears well-developed and well-nourished. She is cooperative.   HENT:   Mouth/Throat: Oropharynx is clear and moist and mucous membranes are normal.   Eyes: Conjunctivae are normal. No scleral icterus.   Cardiovascular: Normal rate.   Pulmonary/Chest: Effort normal.   Abdominal: Soft. There is no tenderness.   Musculoskeletal: Normal range of motion. She exhibits no edema.   Neurological: She is alert.   Skin: Skin is warm and dry. No rash noted.   Psychiatric: She has a normal mood and affect. Judgment normal.       Band Adjustment Info   Band Type: ?? APS ??   Port Location: RLQ   Procedure Position: supine   Needle Type: short    Local Anesthesia: with 1% lidocaine and bicarb       Amount Expected (cc): ?? 5.5 ??  (suspected)   Amount Added (cc):         Amount Removed (cc):     Total Amount (cc): ?? 5.5 ??  (suspected)       Able to lashae water after ADJ?    Other Notes: no Yvonne office notes available to confirm band vol.             Assessment:  58 y.o. female s/p LAGB 2013 by Dr. Russell    ICD-10-CM " ICD-9-CM   1. Chronic nausea R11.0 787.02   2. Dyspepsia R10.13 536.8   3. Status post bariatric surgery Z98.84 V45.86       Plan:  Laparoscopic Lapband Removal w/ Dr. Dunbar for band intolerance.  Potential risk of bleeding, infection, bowel injury, pulm complications, venothromboembolic events, anesthesia reaction discussed with patient.  All questions answered - willing to proceed.  Cardiac clearance obtained - in Epic.  Will hold ASA/Plavix/NSAIDS x 1 week prior.

## 2019-02-20 ENCOUNTER — TRANSCRIBE ORDERS (OUTPATIENT)
Dept: ADMINISTRATIVE | Facility: HOSPITAL | Age: 59
End: 2019-02-20

## 2019-02-20 ENCOUNTER — LAB (OUTPATIENT)
Dept: LAB | Facility: HOSPITAL | Age: 59
End: 2019-02-20

## 2019-02-20 DIAGNOSIS — I10 BENIGN HYPERTENSION: ICD-10-CM

## 2019-02-20 DIAGNOSIS — R53.81 MALAISE AND FATIGUE: ICD-10-CM

## 2019-02-20 DIAGNOSIS — E11.69 DIABETES MELLITUS ASSOCIATED WITH HORMONAL ETIOLOGY (HCC): ICD-10-CM

## 2019-02-20 DIAGNOSIS — E78.5 HYPERLIPIDEMIA, UNSPECIFIED HYPERLIPIDEMIA TYPE: ICD-10-CM

## 2019-02-20 DIAGNOSIS — R53.83 MALAISE AND FATIGUE: ICD-10-CM

## 2019-02-20 DIAGNOSIS — E11.69 DIABETES MELLITUS ASSOCIATED WITH HORMONAL ETIOLOGY (HCC): Primary | ICD-10-CM

## 2019-02-20 DIAGNOSIS — E53.8 VITAMIN B 12 DEFICIENCY: ICD-10-CM

## 2019-02-20 LAB
ALBUMIN SERPL-MCNC: 4.3 G/DL (ref 3.5–5)
ALBUMIN/GLOB SERPL: 1.8 G/DL (ref 1.5–2.5)
ALP SERPL-CCNC: 52 U/L (ref 35–104)
ALT SERPL W P-5'-P-CCNC: 24 U/L (ref 10–36)
ANION GAP SERPL CALCULATED.3IONS-SCNC: 8.1 MMOL/L (ref 3.6–11.2)
AST SERPL-CCNC: 23 U/L (ref 10–30)
BASOPHILS # BLD AUTO: 0.03 10*3/MM3 (ref 0–0.3)
BASOPHILS NFR BLD AUTO: 0.4 % (ref 0–2)
BILIRUB SERPL-MCNC: 0.5 MG/DL (ref 0.2–1.8)
BUN BLD-MCNC: 16 MG/DL (ref 7–21)
BUN/CREAT SERPL: 17.4 (ref 7–25)
CALCIUM SPEC-SCNC: 8.8 MG/DL (ref 7.7–10)
CHLORIDE SERPL-SCNC: 109 MMOL/L (ref 99–112)
CHOLEST SERPL-MCNC: 115 MG/DL (ref 0–200)
CO2 SERPL-SCNC: 23.9 MMOL/L (ref 24.3–31.9)
CREAT BLD-MCNC: 0.92 MG/DL (ref 0.43–1.29)
DEPRECATED RDW RBC AUTO: 45.1 FL (ref 37–54)
EOSINOPHIL # BLD AUTO: 0.13 10*3/MM3 (ref 0–0.7)
EOSINOPHIL NFR BLD AUTO: 1.9 % (ref 0–5)
ERYTHROCYTE [DISTWIDTH] IN BLOOD BY AUTOMATED COUNT: 14.3 % (ref 11.5–14.5)
FOLATE SERPL-MCNC: 19.58 NG/ML (ref 5.4–20)
GFR SERPL CREATININE-BSD FRML MDRD: 62 ML/MIN/1.73
GLOBULIN UR ELPH-MCNC: 2.4 GM/DL
GLUCOSE BLD-MCNC: 211 MG/DL (ref 70–110)
HBA1C MFR BLD: 9 % (ref 4.5–5.7)
HCT VFR BLD AUTO: 41.2 % (ref 37–47)
HDLC SERPL-MCNC: 31 MG/DL (ref 60–100)
HGB BLD-MCNC: 13 G/DL (ref 12–16)
IMM GRANULOCYTES # BLD AUTO: 0.02 10*3/MM3 (ref 0–0.03)
IMM GRANULOCYTES NFR BLD AUTO: 0.3 % (ref 0–0.5)
LDLC SERPL CALC-MCNC: 25 MG/DL (ref 0–100)
LDLC/HDLC SERPL: 0.79 {RATIO}
LYMPHOCYTES # BLD AUTO: 2.05 10*3/MM3 (ref 1–3)
LYMPHOCYTES NFR BLD AUTO: 29.3 % (ref 21–51)
MCH RBC QN AUTO: 27.9 PG (ref 27–33)
MCHC RBC AUTO-ENTMCNC: 31.6 G/DL (ref 33–37)
MCV RBC AUTO: 88.4 FL (ref 80–94)
MONOCYTES # BLD AUTO: 0.37 10*3/MM3 (ref 0.1–0.9)
MONOCYTES NFR BLD AUTO: 5.3 % (ref 0–10)
NEUTROPHILS # BLD AUTO: 4.39 10*3/MM3 (ref 1.4–6.5)
NEUTROPHILS NFR BLD AUTO: 62.8 % (ref 30–70)
OSMOLALITY SERPL CALC.SUM OF ELEC: 288.7 MOSM/KG (ref 273–305)
PLATELET # BLD AUTO: 185 10*3/MM3 (ref 130–400)
PMV BLD AUTO: 10.1 FL (ref 6–10)
POTASSIUM BLD-SCNC: 4.6 MMOL/L (ref 3.5–5.3)
PROT SERPL-MCNC: 6.7 G/DL (ref 6–8)
RBC # BLD AUTO: 4.66 10*6/MM3 (ref 4.2–5.4)
SODIUM BLD-SCNC: 141 MMOL/L (ref 135–153)
TRIGL SERPL-MCNC: 297 MG/DL (ref 0–150)
VIT B12 BLD-MCNC: 546 PG/ML (ref 211–911)
VLDLC SERPL-MCNC: 59.4 MG/DL
WBC NRBC COR # BLD: 6.99 10*3/MM3 (ref 4.5–12.5)

## 2019-02-20 PROCEDURE — 80053 COMPREHEN METABOLIC PANEL: CPT

## 2019-02-20 PROCEDURE — 36415 COLL VENOUS BLD VENIPUNCTURE: CPT

## 2019-02-20 PROCEDURE — 80061 LIPID PANEL: CPT

## 2019-02-20 PROCEDURE — 82746 ASSAY OF FOLIC ACID SERUM: CPT

## 2019-02-20 PROCEDURE — 83036 HEMOGLOBIN GLYCOSYLATED A1C: CPT

## 2019-02-20 PROCEDURE — 82607 VITAMIN B-12: CPT

## 2019-02-20 PROCEDURE — 85025 COMPLETE CBC W/AUTO DIFF WBC: CPT

## 2019-02-21 DIAGNOSIS — I21.09 ST ELEVATION MYOCARDIAL INFARCTION (STEMI) OF ANTERIOR WALL (HCC): ICD-10-CM

## 2019-02-21 DIAGNOSIS — I20.9 ANGINA, CLASS IV (HCC): ICD-10-CM

## 2019-02-21 DIAGNOSIS — I10 ESSENTIAL HYPERTENSION: ICD-10-CM

## 2019-02-21 RX ORDER — ISOSORBIDE MONONITRATE 60 MG/1
90 TABLET, EXTENDED RELEASE ORAL DAILY
Qty: 135 TABLET | Refills: 0 | Status: SHIPPED | OUTPATIENT
Start: 2019-02-21 | End: 2019-05-22 | Stop reason: SDUPTHER

## 2019-02-22 ENCOUNTER — APPOINTMENT (OUTPATIENT)
Dept: PREADMISSION TESTING | Facility: HOSPITAL | Age: 59
End: 2019-02-22

## 2019-02-25 ENCOUNTER — LAB REQUISITION (OUTPATIENT)
Dept: LAB | Facility: HOSPITAL | Age: 59
End: 2019-02-25

## 2019-02-25 ENCOUNTER — OUTSIDE FACILITY SERVICE (OUTPATIENT)
Dept: BARIATRICS/WEIGHT MGMT | Facility: CLINIC | Age: 59
End: 2019-02-25

## 2019-02-25 DIAGNOSIS — R10.9 ABDOMINAL PAIN: ICD-10-CM

## 2019-02-25 LAB
LAB AP CASE REPORT: NORMAL
LAB AP CLINICAL INFORMATION: NORMAL
PATH REPORT.FINAL DX SPEC: NORMAL
PATH REPORT.GROSS SPEC: NORMAL

## 2019-02-25 PROCEDURE — 88300 SURGICAL PATH GROSS: CPT | Performed by: SURGERY

## 2019-02-25 PROCEDURE — 43774 LAP RMVL GASTR ADJ ALL PARTS: CPT | Performed by: SURGERY

## 2019-03-07 ENCOUNTER — OFFICE VISIT (OUTPATIENT)
Dept: BARIATRICS/WEIGHT MGMT | Facility: CLINIC | Age: 59
End: 2019-03-07

## 2019-03-07 VITALS
BODY MASS INDEX: 41.22 KG/M2 | WEIGHT: 256.5 LBS | OXYGEN SATURATION: 99 % | HEART RATE: 84 BPM | RESPIRATION RATE: 18 BRPM | TEMPERATURE: 96.5 F | DIASTOLIC BLOOD PRESSURE: 64 MMHG | SYSTOLIC BLOOD PRESSURE: 106 MMHG | HEIGHT: 66 IN

## 2019-03-07 DIAGNOSIS — R11.0 NAUSEA: ICD-10-CM

## 2019-03-07 DIAGNOSIS — R11.10 VOMITING, INTRACTABILITY OF VOMITING NOT SPECIFIED, PRESENCE OF NAUSEA NOT SPECIFIED, UNSPECIFIED VOMITING TYPE: ICD-10-CM

## 2019-03-07 DIAGNOSIS — K95.09 COMPLICATION OF GASTRIC BAND PROCEDURE: ICD-10-CM

## 2019-03-07 DIAGNOSIS — R11.0 CHRONIC NAUSEA: Primary | ICD-10-CM

## 2019-03-07 DIAGNOSIS — R10.9 ABDOMINAL PAIN, UNSPECIFIED ABDOMINAL LOCATION: ICD-10-CM

## 2019-03-07 DIAGNOSIS — R11.11 VOMITING WITHOUT NAUSEA, INTRACTABILITY OF VOMITING NOT SPECIFIED, UNSPECIFIED VOMITING TYPE: ICD-10-CM

## 2019-03-07 DIAGNOSIS — Z98.84 S/P BARIATRIC SURGERY: ICD-10-CM

## 2019-03-07 PROCEDURE — 99024 POSTOP FOLLOW-UP VISIT: CPT | Performed by: SURGERY

## 2019-03-07 NOTE — PROGRESS NOTES
[facility] BARIATRIC SURG [facility] BARIATRIC SURGERY  [address]  [facility] [address] [phone]    Jannette Onofre.  1960    Day Of Visit: 3/7/2019    Reason for Visit:  band removal follow Up    HPI:    58 y.o. year old female s/p LAGB 2013 by Dr. Russell  followed by AGBR removal by Dr. Dunbar  on 19 forband intolerance and chronic nausea and vomiting. Doing well. Tolerating PO w/out issue. Denies fever, vomiting and abdominal pain. oiding well. No other issues/concerns.      Chronic nausea and dysphagia are resolved.  Somewhat constipated since band removal. Has tried some over the counter stool softeners, with relief.       Past Medical History:   Diagnosis Date   • Anxiety    • Coronary artery disease     s/p LAD stenting, follows w/ Dr. Ogden   • Depression    • Diabetes mellitus (CMS/HCC)     dx , initially improved s/p AGB but now back on insulin    • Fatigue    • Hyperlipidemia    • Hypertension    • Melanoma (CMS/HCC)        • Morbid obesity (CMS/HCC)    • Myocardial infarction (CMS/HCC)     2016   • BRICE on CPAP    • Past heart attack    • Syncopal episodes      Past Surgical History:   Procedure Laterality Date   • CARDIAC CATHETERIZATION N/A 2016    Procedure: Left Heart Cath;  Surgeon: Akhil Zuñiga MD;  Location: [facility] INVASIVE LOCATION;  Service:    • CARDIAC CATHETERIZATION N/A 10/31/2017    Procedure: Left Heart Cath;  Surgeon: Lebron Ogden MD;  Location: [facility] CATH INVASIVE LOCATION;  Service:    • CARDIAC CATHETERIZATION  10/31/2017    Procedure: Functional Flow [place];  Surgeon: Denilson Pena MD;  Location:  [facility] CATH INVASIVE LOCATION;  Service:    • CATARACT EXTRACTION N/A     rt eye , left eye    •  SECTION     • COLONOSCOPY     • FINGER/THUMB CLOSED REDUCTION Left 8/3/2017    Procedure: CLOSED REDUCTION PERCUTANEOUS PINNING LEFT FIFTH METACARPAL SHAFT FRACTURE;  Surgeon: Oscar  Niko León MD;  Location: [facility] OR;  Service:    • LAPAROSCOPIC CHOLECYSTECTOMY  2002   • LAPAROSCOPIC GASTRIC BANDING  04/2013    s/p LAGB by Dr Russell   • OTHER SURGICAL HISTORY  1998    melanoma removal/back   • ND RT/LT HEART CATHETERS N/A 11/29/2016    Procedure: Percutaneous Coronary Intervention;  Surgeon: Akhil Zuñiga MD;  Location: [facility] INVASIVE LOCATION;  Service: Cardiology   • SEPTOPLASTY  2006   • UMBILICAL HERNIA REPAIR  2002       Current Outpatient Medications:   •  amLODIPine (NORVASC) 5 MG tablet, Take 1 tablet by mouth Daily. (Patient taking differently: Take 2.5 mg by mouth Daily.), Disp: 90 tablet, Rfl: 4  •  aspirin 325 MG tablet, Take 325 mg by mouth Daily., Disp: , Rfl:   •  atorvastatin (LIPITOR) 80 MG tablet, Take 1 tablet by mouth Daily., Disp: 90 tablet, Rfl: 3  •  carvedilol (COREG) 25 MG tablet, Take 1 (one) Tablet, by mouth two times a day., Disp: 180 tablet, Rfl: 4  •  Cholecalciferol (VITAMIN D3) 77167 units tablet, Take 10,000 Units by mouth Daily., Disp: , Rfl:   •  clopidogrel (PLAVIX) 75 MG tablet, Take 1 (one) tablet by mouth every day., Disp: 90 tablet, Rfl: 4  •  cyclobenzaprine (FLEXERIL) 10 MG tablet, Take 1 tablet by mouth 2 (Two) Times a Day As Needed for back pain., Disp: 60 tablet, Rfl: 1  •  Dulaglutide 0.75 MG/0.5ML solution pen-injector, Inject 0.75 mg subcutaneously once weekly, Disp: 2 mL, Rfl: 5  •  Empagliflozin 25 MG tablet, Take 1 tablet by mouth Daily., Disp: 90 tablet, Rfl: 4  •  estradiol (ESTRACE) 0.1 MG/GM vaginal cream, Insert 2 g into the vagina Daily., Disp: , Rfl:   •  Estradiol (ESTRADERM TD), Place 6 drops on the skin Daily., Disp: , Rfl:   •  glipiZIDE (GLUCOTROL XL) 10 MG 24 hr tablet, Take 1 tablet by mouth 2 (Two) Times a Day., Disp: 180 tablet, Rfl: 4  •  glucose blood (FREESTYLE LITE) test strip, Use to check blood sugar 3 times daily as needed., Disp: 100 each, Rfl: 5  •  hepatitis A (HAVRIX) 1440 EL U/ML vaccine, Inject 1 mL  "into the appropriate muscle as directed by prescriber., Disp: 1 mL, Rfl: 1  •  HYDROcodone-acetaminophen (NORCO) 7.5-325 MG per tablet, Take 1 tablet by mouth Every 4 (Four) Hours As Needed for pain, Disp: 15 tablet, Rfl: 0  •  insulin lispro (humaLOG) 100 UNIT/ML injection, Inject 0-9 Units under the skin 4 (Four) Times a Day (Before Meals & at Bedtime)., Disp: 10 mL, Rfl: 12  •  Insulin Lispro Prot & Lispro (50-50) 100 UNIT/ML suspension pen-injector, Inject 30 Units under the skin before meals., Disp: 90 mL, Rfl: 5  •  Insulin Pen Needle (BD ULTRA-FINE PEN NEEDLES) 29G X 12.7MM misc, USE WITH HUMALOG MIX 3 TIMES DAILY., Disp: 100 each, Rfl: 6  •  Insulin Syringe-Needle U-100 (BD INSULIN SYRINGE ULTRAFINE) 31G X 5/16\" 0.3 ML misc, USE WITH SLIDING SCALE 4 TIMES DAILY., Disp: 100 each, Rfl: 6  •  isosorbide mononitrate (IMDUR) 60 MG 24 hr tablet, Take 1 1/2 tablets by mouth Daily., Disp: 135 tablet, Rfl: 0  •  lisinopril (PRINIVIL,ZESTRIL) 20 MG tablet, Take 1 (one) tablet by mouth every day., Disp: 90 tablet, Rfl: 3  •  metFORMIN (GLUCOPHAGE) 1000 MG tablet, Take 1 tablet by mouth 2 (Two) Times a Day., Disp: 180 tablet, Rfl: 3  •  metFORMIN (GLUCOPHAGE) 1000 MG tablet, Take 1 tablet by mouth 2 (Two) Times a Day With Meals., Disp: 60 tablet, Rfl: 11  •  Multiple Vitamin (MULTI-VITAMIN DAILY PO), Take  by mouth., Disp: , Rfl:   •  nitroglycerin (NITROSTAT) 0.4 MG SL tablet, Place 1 tablet under the tongue Every 5 (Five) Minutes As Needed for Chest Pain (max of 3 doses in 15 minutes. If no relief, go to ER., Disp: 25 tablet, Rfl: 12  No Known Allergies  Social History     Socioeconomic History   • Marital status:      Spouse name: Not on file   • Number of children: Not on file   • Years of education: Not on file   • Highest education level: Not on file   Social Needs   • Financial resource strain: Not on file   • Food insecurity - worry: Not on file   • Food insecurity - inability: Not on file   • " Transportation needs - medical: Not on file   • Transportation needs - non-medical: Not on file   Occupational History   • Not on file   Tobacco Use   • Smoking status: Former Smoker     Packs/day: 0.25     Types: Cigarettes     Last attempt to quit: 1989     Years since quittin.7   • Smokeless tobacco: Never Used   Substance and Sexual Activity   • Alcohol use: No   • Drug use: No   • Sexual activity: Defer   Other Topics Concern   • Not on file   Social History Narrative    .  Lives in [facility] [place].   @ [employer].       There were no vitals taken for this visit.    Physical Exam   Constitutional: She is oriented to person, place, and time. She appears well-developed and well-nourished. No distress.   HENT:   Head: Normocephalic and atraumatic.   Mouth/Throat: No oropharyngeal exudate.   Eyes: Conjunctivae and EOM are normal. Pupils are equal, round, and reactive to light.   Pulmonary/Chest: Effort normal. No respiratory distress.   Abdominal: Soft. She exhibits no distension.   Incisions are well-healing without induration, erythema, fluctuance, or drainage.       Neurological: She is alert and oriented to person, place, and time. No cranial nerve deficit.   Skin: Skin is warm and dry. She is not diaphoretic. No pallor.   Psychiatric: She has a normal mood and affect. Her behavior is normal. Thought content normal.         Assessment:  2 weeks s/p AGBR    Plan:   Call w/issues and concerns  Will forward chart to .  May follow up after 90 days for sleeve intake.    RTC sooner with problems.

## 2019-04-09 ENCOUNTER — HOSPITAL ENCOUNTER (OUTPATIENT)
Dept: BONE DENSITY | Facility: HOSPITAL | Age: 59
Discharge: HOME | End: 2019-04-09
Admitting: INTERNAL MEDICINE

## 2019-04-09 DIAGNOSIS — Z78.0 POSTMENOPAUSAL STATUS: ICD-10-CM

## 2019-04-09 PROCEDURE — 77080 DXA BONE DENSITY AXIAL: CPT | Performed by: RADIOLOGY

## 2019-04-09 PROCEDURE — 77080 DXA BONE DENSITY AXIAL: CPT

## 2019-04-16 ENCOUNTER — OFFICE VISIT (OUTPATIENT)
Dept: CARDIOLOGY | Facility: CLINIC | Age: 59
End: 2019-04-16

## 2019-04-16 VITALS
HEART RATE: 83 BPM | HEIGHT: 66 IN | BODY MASS INDEX: 42.94 KG/M2 | OXYGEN SATURATION: 94 % | WEIGHT: 267.2 LBS | SYSTOLIC BLOOD PRESSURE: 135 MMHG | DIASTOLIC BLOOD PRESSURE: 74 MMHG

## 2019-04-16 DIAGNOSIS — I21.09 ST ELEVATION MYOCARDIAL INFARCTION (STEMI) OF ANTERIOR WALL (HCC): Primary | ICD-10-CM

## 2019-04-16 DIAGNOSIS — E78.2 MIXED HYPERLIPIDEMIA: ICD-10-CM

## 2019-04-16 DIAGNOSIS — I10 ESSENTIAL HYPERTENSION: ICD-10-CM

## 2019-04-16 DIAGNOSIS — E11.8 TYPE 2 DIABETES MELLITUS WITH COMPLICATION, UNSPECIFIED WHETHER LONG TERM INSULIN USE: ICD-10-CM

## 2019-04-16 PROCEDURE — 99213 OFFICE O/P EST LOW 20 MIN: CPT | Performed by: NURSE PRACTITIONER

## 2019-04-16 NOTE — PROGRESS NOTES
Akhil Huerta MD  Jannette Onofre  1960 04/16/2019    Patient Active Problem List   Diagnosis   • Unstable angina pectoris (CMS/HCC)   • ST elevation myocardial infarction (STEMI) of anterior wall, 11/29/16 s/p stenting proxismal % occlusion, clinically stable.   • Type 2 diabetes mellitus (CMS/HCC)   • Dyslipidemia   • Chest pain   • Coronary artery disease   • Fatigue   • Hyperlipidemia   • Hypertension   • Morbid obesity (CMS/HCC)   • Myocardial infarction (CMS/HCC)   • Dyspnea on exertion (NYHA class 2-3)   • Closed displaced fracture of shaft of fifth metacarpal bone of left hand   • Angina pectoris (CMS/HCC)   • Angina, class III (CMS/HCC)       Dear Akhil Huerta MD:    Subjective     Chief Complaint   Patient presents with   • Follow-up   • Med Management     med list.    • ST elevation           History of Present Illness:    Jannette Onofre is a 58 y.o. female with a past medical history significant for ASCVD status post acute anterior wall ST elevation myocardial infarction followed by stenting of the LAD in November 2016, hypertension, hyperlipidemia, and diabetes mellitus type 2.  She recently underwent lap band removal.  She is here for routine follow-up following the procedure.  She reports she did very well with the procedure.  She tolerated this with no complications.  She denies any chest pain or shortness of breath.  She is working on diet and weight loss.  She plans to have gastric sleeve surgery in the fall.        No Known Allergies:      Current Outpatient Medications:   •  amLODIPine (NORVASC) 5 MG tablet, Take 1 tablet by mouth Daily. (Patient taking differently: Take 2.5 mg by mouth Daily.), Disp: 90 tablet, Rfl: 4  •  aspirin 325 MG tablet, Take 325 mg by mouth Daily., Disp: , Rfl:   •  atorvastatin (LIPITOR) 80 MG tablet, Take 1 tablet by mouth Daily., Disp: 90 tablet, Rfl: 3  •  carvedilol (COREG) 25 MG tablet, Take 1 (one) Tablet, by mouth two times a day., Disp: 180  "tablet, Rfl: 4  •  Cholecalciferol (VITAMIN D3) 61628 units tablet, Take 10,000 Units by mouth Daily., Disp: , Rfl:   •  clopidogrel (PLAVIX) 75 MG tablet, Take 1 (one) tablet by mouth every day., Disp: 90 tablet, Rfl: 4  •  Dulaglutide 0.75 MG/0.5ML solution pen-injector, Inject .75 mg subcutaneously once weekly., Disp: 2 mL, Rfl: 2  •  Empagliflozin 25 MG tablet, Take 1 tablet by mouth Daily., Disp: 90 tablet, Rfl: 4  •  glipiZIDE (GLUCOTROL XL) 10 MG 24 hr tablet, Take 1 tablet by mouth 2 (Two) Times a Day., Disp: 180 tablet, Rfl: 4  •  glucose blood (FREESTYLE LITE) test strip, Use to check blood sugar 3 times daily as needed., Disp: 100 each, Rfl: 5  •  insulin lispro (humaLOG) 100 UNIT/ML injection, Inject 0-9 Units under the skin 4 (Four) Times a Day (Before Meals & at Bedtime)., Disp: 10 mL, Rfl: 12  •  Insulin Lispro Prot & Lispro (50-50) 100 UNIT/ML suspension pen-injector, Inject 30 Units under the skin before meals., Disp: 90 mL, Rfl: 5  •  Insulin Pen Needle (BD ULTRA-FINE PEN NEEDLES) 29G X 12.7MM misc, USE WITH HUMALOG MIX 3 TIMES DAILY., Disp: 100 each, Rfl: 6  •  Insulin Syringe-Needle U-100 (BD INSULIN SYRINGE ULTRAFINE) 31G X 5/16\" 0.3 ML misc, USE WITH SLIDING SCALE 4 TIMES DAILY., Disp: 100 each, Rfl: 6  •  isosorbide mononitrate (IMDUR) 60 MG 24 hr tablet, Take 1 1/2 tablets by mouth Daily., Disp: 135 tablet, Rfl: 0  •  lisinopril (PRINIVIL,ZESTRIL) 20 MG tablet, Take 1 (one) tablet by mouth every day., Disp: 90 tablet, Rfl: 3  •  metFORMIN (GLUCOPHAGE) 1000 MG tablet, Take 1 tablet by mouth 2 (Two) Times a Day (with morning & evening meals)., Disp: 60 tablet, Rfl: 11  •  Multiple Vitamin (MULTI-VITAMIN DAILY PO), Take  by mouth., Disp: , Rfl:   •  nitroglycerin (NITROSTAT) 0.4 MG SL tablet, Place 1 tablet under the tongue Every 5 (Five) Minutes As Needed for Chest Pain (max of 3 doses in 15 minutes. If no relief, go to ER., Disp: 25 tablet, Rfl: 12      The following portions of the patient's " "history were reviewed and updated as appropriate: allergies, current medications, past family history, past medical history, past social history, past surgical history and problem list.    Social History     Tobacco Use   • Smoking status: Former Smoker     Packs/day: 0.25     Types: Cigarettes     Last attempt to quit: 1989     Years since quittin.8   • Smokeless tobacco: Never Used   Substance Use Topics   • Alcohol use: No   • Drug use: No       Review of Systems   Constitution: Negative for weakness and malaise/fatigue.   Cardiovascular: Negative for chest pain, leg swelling, palpitations and syncope.   Respiratory: Negative for cough, shortness of breath and wheezing.    Neurological: Negative for dizziness and light-headedness.       Objective   Vitals:    19 1459   BP: 135/74   BP Location: Left arm   Patient Position: Sitting   Cuff Size: Adult   Pulse: 83   SpO2: 94%   Weight: 121 kg (267 lb 3.2 oz)   Height: 167.6 cm (66\")     Body mass index is 43.13 kg/m².        Physical Exam   Constitutional: She is oriented to person, place, and time. She appears well-developed and well-nourished.   HENT:   Head: Normocephalic and atraumatic.   Neck: No JVD present.   Cardiovascular: Normal rate, regular rhythm and normal heart sounds. Exam reveals no S3 and no S4.   No murmur heard.  Pulmonary/Chest: Effort normal and breath sounds normal. She has no wheezes. She has no rales.   Abdominal: Soft. Bowel sounds are normal.   Musculoskeletal: She exhibits no edema.   Neurological: She is alert and oriented to person, place, and time.   Skin: Skin is warm and dry.   Psychiatric: She has a normal mood and affect. Her behavior is normal.       Lab Results   Component Value Date     2019    K 4.6 2019     2019    CO2 23.9 (L) 2019    BUN 16 2019    CREATININE 0.92 2019    GLUCOSE 211 (H) 2019    CALCIUM 8.8 2019    AST 23 2019    ALT 24 " 02/20/2019    ALKPHOS 52 02/20/2019    LABIL2 1.6 12/11/2015     Lab Results   Component Value Date    WBC 6.99 02/20/2019    HGB 13.0 02/20/2019    HCT 41.2 02/20/2019     02/20/2019       Lab Results   Component Value Date    TSH 1.697 04/02/2018    CHLPL 213 (H) 03/14/2016    TRIG 297 (H) 02/20/2019    HDL 31 (L) 02/20/2019    LDL 25 02/20/2019            Procedures      Assessment/Plan    Diagnosis Plan   1. ST elevation myocardial infarction (STEMI) of anterior wall, 11/29/16 s/p stenting proxismal % occlusion, clinically stable.     2. Mixed hyperlipidemia     3. Essential hypertension     4. Type 2 diabetes mellitus with complication, unspecified whether long term insulin use (CMS/[facility])                  Recommendations:    1. Continue with amlodipine, aspirin, atorvastatin, carvedilol, Plavix, Imdur, and lisinopril.    2. Follow up in 5 months and PRN.      Return in about 5 months (around 9/16/2019) for Recheck with Dr. Ogden.    As always, I appreciate very much the opportunity to participate in the cardiovascular care of your patients.      With Best Regards,    BRYNN Valencia

## 2019-05-17 ENCOUNTER — LAB (OUTPATIENT)
Dept: LAB | Facility: HOSPITAL | Age: 59
End: 2019-05-17

## 2019-05-17 ENCOUNTER — TRANSCRIBE ORDERS (OUTPATIENT)
Dept: ADMINISTRATIVE | Facility: HOSPITAL | Age: 59
End: 2019-05-17

## 2019-05-17 DIAGNOSIS — I10 BENIGN HYPERTENSION: ICD-10-CM

## 2019-05-17 DIAGNOSIS — E11.42 TYPE 2 DIABETES MELLITUS WITH DIABETIC POLYNEUROPATHY, UNSPECIFIED WHETHER LONG TERM INSULIN USE (HCC): Primary | ICD-10-CM

## 2019-05-17 DIAGNOSIS — E78.2 MIXED HYPERLIPIDEMIA: ICD-10-CM

## 2019-05-17 DIAGNOSIS — E55.9 VITAMIN D DEFICIENCY: ICD-10-CM

## 2019-05-17 DIAGNOSIS — E11.42 TYPE 2 DIABETES MELLITUS WITH DIABETIC POLYNEUROPATHY, UNSPECIFIED WHETHER LONG TERM INSULIN USE (HCC): ICD-10-CM

## 2019-05-17 DIAGNOSIS — I25.10 DISEASE OF CARDIOVASCULAR SYSTEM: ICD-10-CM

## 2019-05-17 LAB
ALBUMIN SERPL-MCNC: 4.3 G/DL (ref 3.5–5.2)
ALBUMIN/GLOB SERPL: 1.8 G/DL
ALP SERPL-CCNC: 52 U/L (ref 39–117)
ALT SERPL W P-5'-P-CCNC: 18 U/L (ref 1–33)
ANION GAP SERPL CALCULATED.3IONS-SCNC: 14.4 MMOL/L
AST SERPL-CCNC: 18 U/L (ref 1–32)
BILIRUB SERPL-MCNC: 0.7 MG/DL (ref 0.2–1.2)
BUN BLD-MCNC: 16 MG/DL (ref 6–20)
BUN/CREAT SERPL: 17 (ref 7–25)
CALCIUM SPEC-SCNC: 9.2 MG/DL (ref 8.6–10.5)
CHLORIDE SERPL-SCNC: 105 MMOL/L (ref 98–107)
CHOLEST SERPL-MCNC: 109 MG/DL (ref 0–200)
CO2 SERPL-SCNC: 22.6 MMOL/L (ref 22–29)
CREAT BLD-MCNC: 0.94 MG/DL (ref 0.57–1)
GFR SERPL CREATININE-BSD FRML MDRD: 61 ML/MIN/1.73
GLOBULIN UR ELPH-MCNC: 2.4 GM/DL
GLUCOSE BLD-MCNC: 210 MG/DL (ref 65–99)
HBA1C MFR BLD: 8.09 % (ref 4.8–5.6)
HDLC SERPL-MCNC: 29 MG/DL (ref 40–60)
LDLC SERPL CALC-MCNC: 20 MG/DL (ref 0–100)
LDLC/HDLC SERPL: 0.7 {RATIO}
POTASSIUM BLD-SCNC: 4.7 MMOL/L (ref 3.5–5.2)
PROT SERPL-MCNC: 6.7 G/DL (ref 6–8.5)
SODIUM BLD-SCNC: 142 MMOL/L (ref 136–145)
TRIGL SERPL-MCNC: 299 MG/DL (ref 0–150)
VLDLC SERPL-MCNC: 59.8 MG/DL (ref 5–40)

## 2019-05-17 PROCEDURE — 80053 COMPREHEN METABOLIC PANEL: CPT

## 2019-05-17 PROCEDURE — 80061 LIPID PANEL: CPT

## 2019-05-17 PROCEDURE — 36415 COLL VENOUS BLD VENIPUNCTURE: CPT

## 2019-05-17 PROCEDURE — 83036 HEMOGLOBIN GLYCOSYLATED A1C: CPT

## 2019-05-23 ENCOUNTER — DOCUMENTATION (OUTPATIENT)
Dept: BARIATRICS/WEIGHT MGMT | Facility: CLINIC | Age: 59
End: 2019-05-23

## 2019-05-23 ENCOUNTER — OFFICE VISIT (OUTPATIENT)
Dept: BARIATRICS/WEIGHT MGMT | Facility: CLINIC | Age: 59
End: 2019-05-23

## 2019-05-23 VITALS
OXYGEN SATURATION: 99 % | RESPIRATION RATE: 18 BRPM | WEIGHT: 259 LBS | DIASTOLIC BLOOD PRESSURE: 82 MMHG | TEMPERATURE: 96.1 F | BODY MASS INDEX: 41.62 KG/M2 | HEIGHT: 66 IN | SYSTOLIC BLOOD PRESSURE: 128 MMHG | HEART RATE: 83 BPM

## 2019-05-23 DIAGNOSIS — R10.13 DYSPEPSIA: ICD-10-CM

## 2019-05-23 DIAGNOSIS — E66.01 MORBID OBESITY (HCC): ICD-10-CM

## 2019-05-23 DIAGNOSIS — I10 ESSENTIAL HYPERTENSION: Primary | ICD-10-CM

## 2019-05-23 DIAGNOSIS — E11.69 DIABETES MELLITUS TYPE 2 IN OBESE (HCC): ICD-10-CM

## 2019-05-23 DIAGNOSIS — E66.9 DIABETES MELLITUS TYPE 2 IN OBESE (HCC): ICD-10-CM

## 2019-05-23 DIAGNOSIS — R53.83 FATIGUE, UNSPECIFIED TYPE: ICD-10-CM

## 2019-05-23 DIAGNOSIS — I25.118 CORONARY ARTERY DISEASE OF NATIVE ARTERY OF NATIVE HEART WITH STABLE ANGINA PECTORIS (HCC): ICD-10-CM

## 2019-05-23 PROCEDURE — 99215 OFFICE O/P EST HI 40 MIN: CPT | Performed by: PHYSICIAN ASSISTANT

## 2019-05-23 NOTE — PROGRESS NOTES
"Weight Loss Surgery  Presurgical Nutrition Assessment     Jannette Onofre  2019  [id number]  [id number]  1960  female    Surgery desired: Desires Sleeve Gastrectomy - LAGB in  - Removed in , by Dr Dunbar.     Ht 167.6 cm (66\"); Wt 117 kg (259 #); BMI 41.8  Past Medical History:   Diagnosis Date   • Anxiety    • Coronary artery disease     s/p LAD stenting, follows w/ Dr. Ogden   • Depression    • Diabetes mellitus (CMS/HCC)     dx , initially improved s/p AGB but now back on insulin    • Dyspepsia    • Dyspnea on exertion    • Fatigue    • Hyperlipidemia    • Hypertension    • Melanoma (CMS/HCC)        • Morbid obesity (CMS/HCC)    • Myocardial infarction (CMS/HCC) 2016    s/p LAD stenting, on ASA 325mg + Plavix   • BRICE on CPAP     noncompliant w/ device, \"lucia me\"     Past Surgical History:   Procedure Laterality Date   • CARDIAC CATHETERIZATION N/A 2016    Procedure: Left Heart Cath;  Surgeon: Akhil Zuñiga MD;  Location:  [facility] CATH INVASIVE LOCATION;  Service:    • CARDIAC CATHETERIZATION N/A 10/31/2017    Procedure: Left Heart Cath;  Surgeon: Lebron Ogden MD;  Location:  [facility] CATH INVASIVE LOCATION;  Service:    • CARDIAC CATHETERIZATION  10/31/2017    Procedure: Functional Flow [place];  Surgeon: Denilson Pena MD;  Location:  [facility] CATH INVASIVE LOCATION;  Service:    • CATARACT EXTRACTION Right    • CATARACT EXTRACTION Left    •  SECTION     • COLONOSCOPY     • FINGER/THUMB CLOSED REDUCTION Left 8/3/2017    Procedure: CLOSED REDUCTION PERCUTANEOUS PINNING LEFT FIFTH METACARPAL SHAFT FRACTURE;  Surgeon: Oscar León MD;  Location: [facility] OR;  Service:    • GASTRIC BANDING REMOVAL  2019    w/ Dr. Dunbar   • LAPAROSCOPIC CHOLECYSTECTOMY      w/ UHR   • LAPAROSCOPIC GASTRIC BANDING  2013    s/p LAGB by Dr Russell   • OTHER SURGICAL HISTORY      melanoma removal/back   • MI RT/LT HEART CATHETERS N/A " 11/29/2016    Procedure: Percutaneous Coronary Intervention;  Surgeon: Akhil Zuñiga MD;  Location: [facility] INVASIVE LOCATION;  Service: Cardiology   • SEPTOPLASTY  2006   • UMBILICAL HERNIA REPAIR  2002    no mesh     No Known Allergies    Current Outpatient Medications:   •  amLODIPine (NORVASC) 5 MG tablet, Take 1 tablet by mouth Daily. (Patient taking differently: Take 2.5 mg by mouth Daily.), Disp: 90 tablet, Rfl: 4  •  aspirin 325 MG tablet, Take 325 mg by mouth Daily., Disp: , Rfl:   •  atorvastatin (LIPITOR) 80 MG tablet, Take 1 tablet by mouth Daily., Disp: 90 tablet, Rfl: 3  •  carvedilol (COREG) 25 MG tablet, Take 1 (one) Tablet, by mouth two times a day., Disp: 180 tablet, Rfl: 4  •  Cholecalciferol (VITAMIN D3) 81950 units tablet, Take 10,000 Units by mouth Daily., Disp: , Rfl:   •  clopidogrel (PLAVIX) 75 MG tablet, Take 1 (one) tablet by mouth every day., Disp: 90 tablet, Rfl: 4  •  Cyanocobalamin (VITAMIN B12 PO), Take  by mouth., Disp: , Rfl:   •  cyclobenzaprine (FLEXERIL) 10 MG tablet, Take 1 tablet by mouth 2 (Two) Times a Day As Needed., Disp: 60 tablet, Rfl: 0  •  Dulaglutide 0.75 MG/0.5ML solution pen-injector, Inject .75 mg subcutaneously once weekly., Disp: 2 mL, Rfl: 2  •  Dulaglutide 1.5 MG/0.5ML solution pen-injector, Inject 1.5 mg under the skin in the abdomen, thigh, or upper arm rotating injection sites 1 (One) Time Per Week., Disp: 2 mL, Rfl: 5  •  Empagliflozin 25 MG tablet, Take 1 tablet by mouth Daily., Disp: 90 tablet, Rfl: 4  •  glipiZIDE (GLUCOTROL XL) 10 MG 24 hr tablet, Take 1 tablet by mouth 2 (Two) Times a Day., Disp: 180 tablet, Rfl: 4  •  glucose blood (FREESTYLE LITE) test strip, Use to check blood sugar 3 times daily as needed., Disp: 100 each, Rfl: 5  •  glucose blood test strip, USE AS DIRECTED TO CHECK BLOOD SUGAR AS NEEDED., Disp: 100 each, Rfl: 3  •  icosapent ethyl (VASCEPA) 1 g capsule capsule, take 2 capsule by mouth 2 times every day with food swallowing  "whole. Do not chew, open, dissolve and/or crush., Disp: 360 capsule, Rfl: 3  •  insulin lispro (humaLOG) 100 UNIT/ML injection, Inject 0-9 Units under the skin 4 (Four) Times a Day (Before Meals & at Bedtime)., Disp: 10 mL, Rfl: 12  •  Insulin Lispro Prot & Lispro (50-50) 100 UNIT/ML suspension pen-injector, Inject 30 Units under the skin before meals., Disp: 90 mL, Rfl: 5  •  Insulin Pen Needle (BD ULTRA-FINE PEN NEEDLES) 29G X 12.7MM misc, USE WITH HUMALOG MIX 3 TIMES DAILY., Disp: 100 each, Rfl: 6  •  Insulin Syringe-Needle U-100 (BD INSULIN SYRINGE ULTRAFINE) 31G X 5/16\" 0.3 ML misc, USE WITH SLIDING SCALE 4 TIMES DAILY., Disp: 100 each, Rfl: 6  •  isosorbide mononitrate (IMDUR) 60 MG 24 hr tablet, Take 1½ tablets by mouth Every Morning., Disp: 180 tablet, Rfl: 3  •  lisinopril (PRINIVIL,ZESTRIL) 20 MG tablet, Take 1 (one) tablet by mouth every day., Disp: 90 tablet, Rfl: 3  •  metFORMIN (GLUCOPHAGE) 1000 MG tablet, Take 1 tablet by mouth 2 (Two) Times a Day (with morning & evening meals)., Disp: 60 tablet, Rfl: 11  •  Multiple Vitamin (MULTI-VITAMIN DAILY PO), Take  by mouth., Disp: , Rfl:   •  nitroglycerin (NITROSTAT) 0.4 MG SL tablet, Place 1 tablet under the tongue Every 5 (Five) Minutes As Needed for Chest Pain (max of 3 doses in 15 minutes. If no relief, go to ER., Disp: 25 tablet, Rfl: 12      Nutrition Assessment    Estimated energy needs:  1760 kcal    Estimated calories for weight loss:  1200 kcal    IBW (Pounds):  155 #        Excess body weight (Pounds):  105 #       Nutrition Recall  24 Hour recall: (B) (L) (D) -  Reviewed and discussed with patient.  Drinking Slimfast shake (10 g protein ea) ea day for lunch. Advised to eat food rather than supplements until /p surgery.  Brkfast = 1 c Special K cereal /c 1/2 c 2 % milk.  Dinner = 3 oz chicken breast OR 6 oz steak /c green beans or baked beans - 1/2 c.  Advised that sweetened baked beans were not a recommendation & that soup beans or blk beans etc " s/b chosen. Inadeq protein. Pt to focus on ingesting more prot in 3 meals & 2-3 snks qd.  Drinks only water.         Exercise  daily - walks 60 min qd      Education    Provided information packet re:  Sleeve Gastrectomy  1. Reviewed guidelines for higher protein, limited carbohydrate diet to promote weight loss.  Encouraged patient to incorporate these principles of healthy eating from now until approximately 2 weeks prior to bariatric surgery date, when an even lower carbohydrate “liver-shrinking” regimen will be followed. (Information sheet re pre-op diet given).  Explained that after recovery from surgery this diet will again be followed to ensure further loss and for weight maintenance.    2. Encouraged patient to choose an acceptable protein supplement powder or shake for post-surgery liquid diet.  Provided product guidelines and examples.    3. Explained importance of goal setting to help in changing eating behaviors that are not conducive to weight loss.  Targeted several on a worksheet which also included spaces for patient to work on issues specific to them.  4. Provided follow-up options for support, including contact information for dietitians here, if desired.  Web-based support information and apps for smart phones and computers given.  Noted that monthly support group is offered at this clinic, and that support is associated with successful weight loss.    Recommend that team proceed with surgery and follow per protocol.      Nutrition Goals   Dietary Guidelines per information packet as described above  Protein goal:  grams per day   Carbohydrate goal:  100-140 grams per day  Eliminate soda, sweet tea, etc.     Exercise Goals  Continue current exercise routine   Add 15-30 minutes of activity per day as tolerated      Prabha James, LUISA  05/23/2019  4:35 PM

## 2019-05-23 NOTE — PROGRESS NOTES
"[facility] BARIATRIC SURGERY  [address]  [facility] [address] [phone]      Patient  Name:  Jannette Onofre  :  1960      Date of Visit: 2019      Chief Complaint:  weight gain; unable to maintain weight loss    History of Present Illness:  Jannette Onofre is a 58 y.o. female who presents today for evaluation, education and consultation regarding weight loss surgery. The patient is interested in sleeve gastrectomy with Dr. Dunbar.     s/p LAGB 2013 by Dr. Russell.  Transferred care to [facility] 2016 b/c she says Dr. Russell made her feel like a failure.  Unfortunately had ongoing issues w/ lapband, suspected band intolerance.  Ultimately s/p AGBR 19 w/ Dr. Dunbar.  Chronic nausea/dysphagia resolved w/ band removal.  Weight stable.      Presurgery weight 322 lbs.  Lowest weight:  224 lbs, but was unable to maintain.  Current weight: 259 lbs.     As above, patient has been overweight for many years, with numerous failed dietary/weight loss attempts.  She has obesity related comorbidities and as such has decided to pursue additional weight loss surgery.  All past medical, surgical, social and family history have been obtained and discussed today, as pertinent to bariatric surgery.     Past Medical History:   Diagnosis Date   • Anxiety    • Coronary artery disease     s/p LAD stenting, follows w/ Dr. Ogden   • Depression    • Diabetes mellitus (CMS/HCC)     dx , initially improved s/p AGB but now back on insulin    • Dyspepsia    • Dyspnea on exertion    • Fatigue    • Hyperlipidemia    • Hypertension    • Melanoma (CMS/HCC)        • Morbid obesity (CMS/HCC)    • Myocardial infarction (CMS/HCC) 2016    s/p LAD stenting, on ASA 325mg + Plavix   • BRICE on CPAP     noncompliant w/ device, \"lucia me\"     Past Surgical History:   Procedure Laterality Date   • CARDIAC CATHETERIZATION N/A 2016    Procedure: Left Heart Cath;  Surgeon: Akhil Zuñiga, " MD;  Location: [facility] CATH INVASIVE LOCATION;  Service:    • CARDIAC CATHETERIZATION N/A 10/31/2017    Procedure: Left Heart Cath;  Surgeon: Lebron Ogden MD;  Location: [facility] CATH INVASIVE LOCATION;  Service:    • CARDIAC CATHETERIZATION  10/31/2017    Procedure: Functional Flow [place];  Surgeon: Denilson Pena MD;  Location: [facility] CATH INVASIVE LOCATION;  Service:    • CATARACT EXTRACTION Right    • CATARACT EXTRACTION Left    •  SECTION     • COLONOSCOPY     • FINGER/THUMB CLOSED REDUCTION Left 8/3/2017    Procedure: CLOSED REDUCTION PERCUTANEOUS PINNING LEFT FIFTH METACARPAL SHAFT FRACTURE;  Surgeon: Oscar León MD;  Location: [facility] OR;  Service:    • GASTRIC BANDING REMOVAL  2019    w/ Dr. Dunbar   • LAPAROSCOPIC CHOLECYSTECTOMY      w/ UHR   • LAPAROSCOPIC GASTRIC BANDING  2013    s/p LAGB by Dr Russell   • OTHER SURGICAL HISTORY      melanoma removal/back   • CO RT/LT HEART CATHETERS N/A 2016    Procedure: Percutaneous Coronary Intervention;  Surgeon: Akhil Zuñiga MD;  Location: [facility] CATH INVASIVE LOCATION;  Service: Cardiology   • SEPTOPLASTY     • UMBILICAL HERNIA REPAIR      no mesh       No Known Allergies    Current Outpatient Medications:   •  amLODIPine (NORVASC) 5 MG tablet, Take 1 tablet by mouth Daily. (Patient taking differently: Take 2.5 mg by mouth Daily.), Disp: 90 tablet, Rfl: 4  •  aspirin 325 MG tablet, Take 325 mg by mouth Daily., Disp: , Rfl:   •  atorvastatin (LIPITOR) 80 MG tablet, Take 1 tablet by mouth Daily., Disp: 90 tablet, Rfl: 3  •  carvedilol (COREG) 25 MG tablet, Take 1 (one) Tablet, by mouth two times a day., Disp: 180 tablet, Rfl: 4  •  Cholecalciferol (VITAMIN D3) 95902 units tablet, Take 10,000 Units by mouth Daily., Disp: , Rfl:   •  clopidogrel (PLAVIX) 75 MG tablet, Take 1 (one) tablet by mouth every day., Disp: 90 tablet, Rfl: 4  •  Cyanocobalamin (VITAMIN B12 PO), Take  by mouth., Disp: ,  "Rfl:   •  cyclobenzaprine (FLEXERIL) 10 MG tablet, Take 1 tablet by mouth 2 (Two) Times a Day As Needed., Disp: 60 tablet, Rfl: 0  •  Dulaglutide 0.75 MG/0.5ML solution pen-injector, Inject .75 mg subcutaneously once weekly., Disp: 2 mL, Rfl: 2  •  Dulaglutide 1.5 MG/0.5ML solution pen-injector, Inject 1.5 mg under the skin in the abdomen, thigh, or upper arm rotating injection sites 1 (One) Time Per Week., Disp: 2 mL, Rfl: 5  •  Empagliflozin 25 MG tablet, Take 1 tablet by mouth Daily., Disp: 90 tablet, Rfl: 4  •  glipiZIDE (GLUCOTROL XL) 10 MG 24 hr tablet, Take 1 tablet by mouth 2 (Two) Times a Day., Disp: 180 tablet, Rfl: 4  •  glucose blood (FREESTYLE LITE) test strip, Use to check blood sugar 3 times daily as needed., Disp: 100 each, Rfl: 5  •  glucose blood test strip, USE AS DIRECTED TO CHECK BLOOD SUGAR AS NEEDED., Disp: 100 each, Rfl: 3  •  insulin lispro (humaLOG) 100 UNIT/ML injection, Inject 0-9 Units under the skin 4 (Four) Times a Day (Before Meals & at Bedtime)., Disp: 10 mL, Rfl: 12  •  Insulin Lispro Prot & Lispro (50-50) 100 UNIT/ML suspension pen-injector, Inject 30 Units under the skin before meals., Disp: 90 mL, Rfl: 5  •  Insulin Pen Needle (BD ULTRA-FINE PEN NEEDLES) 29G X 12.7MM misc, USE WITH HUMALOG MIX 3 TIMES DAILY., Disp: 100 each, Rfl: 6  •  Insulin Syringe-Needle U-100 (BD INSULIN SYRINGE ULTRAFINE) 31G X 5/16\" 0.3 ML misc, USE WITH SLIDING SCALE 4 TIMES DAILY., Disp: 100 each, Rfl: 6  •  isosorbide mononitrate (IMDUR) 60 MG 24 hr tablet, Take 1½ tablets by mouth Every Morning., Disp: 180 tablet, Rfl: 3  •  lisinopril (PRINIVIL,ZESTRIL) 20 MG tablet, Take 1 (one) tablet by mouth every day., Disp: 90 tablet, Rfl: 3  •  metFORMIN (GLUCOPHAGE) 1000 MG tablet, Take 1 tablet by mouth 2 (Two) Times a Day (with morning & evening meals)., Disp: 60 tablet, Rfl: 11  •  Multiple Vitamin (MULTI-VITAMIN DAILY PO), Take  by mouth., Disp: , Rfl:   •  nitroglycerin (NITROSTAT) 0.4 MG SL tablet, Place " 1 tablet under the tongue Every 5 (Five) Minutes As Needed for Chest Pain (max of 3 doses in 15 minutes. If no relief, go to ER., Disp: 25 tablet, Rfl: 12  •  icosapent ethyl (VASCEPA) 1 g capsule capsule, take 2 capsule by mouth 2 times every day with food swallowing whole. Do not chew, open, dissolve and/or crush., Disp: 360 capsule, Rfl: 3    Social History     Socioeconomic History   • Marital status:      Spouse name: Not on file   • Number of children: Not on file   • Years of education: Not on file   • Highest education level: Not on file   Tobacco Use   • Smoking status: Former Smoker     Packs/day: 0.25     Years: 1.00     Pack years: 0.25     Types: Cigarettes     Last attempt to quit: 1989     Years since quittin.9   • Smokeless tobacco: Never Used   Substance and Sexual Activity   • Alcohol use: No   • Drug use: No   • Sexual activity: Defer   Social History Narrative    .  Lives in [place].   @ [employer].     Family History   Problem Relation Age of Onset   • Diabetes Mother    • Heart disease Mother    • Hyperlipidemia Mother    • Hypertension Mother    • Skin cancer Mother    • COPD Father    • Heart disease Father    • Hyperlipidemia Father    • Heart attack Father    • Alzheimer's disease Father    • Diabetes Maternal Aunt    • Diabetes Maternal Grandmother    • Heart disease Maternal Grandfather    • Heart attack Maternal Grandfather    • Hypertension Maternal Grandfather    • Hypertension Sister    • Melanoma Brother    • Breast cancer Neg Hx        Review of Systems:  Constitutional:  reports fatigue, weight gain and denies fevers, chills.  HEENT:  denies headache, ear pain or loss of hearing, blurred or double vision, nasal discharge or sore throat.  Cardiovascular:  reports HTN, HLD, CAD, hx MI and denies chest pain, palpitations, hx DVT.  Respiratory:  reports dyspnea on exertion, sleep apnea and denies cough , wheezing, asthma, hx  PE.  Gastrointestinal:  denies dysphagia, heartburn, nausea, vomiting, abdominal pain, IBS.  Genitourinary:  denies history of  frequent UTI, incontinence, dysuria, polyuria, renal insufficiency.    Musculoskeletal:  denies fibromyalgia, arthritis and autoimmune disease.  Neurological:  denies numbness /tingling, dizziness, confusion, seizure, stroke.  Psychiatric:  reports hx anxiety, depression, denies bipolar disorder.  Endocrine:  reports diabetes and denies thyroid disease, gout.  Hematologic:  denies anemia, bleeding disorder, hx blood transfusion.  Skin:  denies rashes, hx MRSA.    Physical Exam:  Vital Signs:  Weight: 117 kg (259 lb)   Body mass index is 41.8 kg/m².  Temp: 96.1 °F (35.6 °C)   Heart Rate: 83   BP: 128/82     Physical Exam   Constitutional: She is oriented to person, place, and time. She appears well-developed and well-nourished.   HENT:   Head: Normocephalic and atraumatic.   hair thinning on top   Eyes: Conjunctivae are normal. No scleral icterus.   Neck: Neck supple. No thyromegaly present.   Cardiovascular: Normal rate and regular rhythm.   No murmur heard.  Pulmonary/Chest: Effort normal and breath sounds normal. No respiratory distress. She has no wheezes. She has no rales.   Abdominal: Soft. Bowel sounds are normal. She exhibits no distension and no mass. There is no tenderness. No hernia.   scars:  lap thai, AGB/AGBR, umbilical, lower midline   Musculoskeletal: Normal range of motion. She exhibits no edema.   Neurological: She is alert and oriented to person, place, and time. Gait normal.   Skin: Skin is warm and dry. No rash noted.   Psychiatric: She has a normal mood and affect. Judgment normal.   Vitals reviewed.      Patient Active Problem List   Diagnosis   • ST elevation myocardial infarction (STEMI) of anterior wall, 11/29/16 s/p stenting proxismal % occlusion, clinically stable.   • Type 2 diabetes mellitus (CMS/HCC)   • Chest pain   • Coronary artery disease   •  Fatigue   • Hyperlipidemia   • Hypertension   • Morbid obesity (CMS/HCC)   • Dyspnea on exertion (NYHA class 2-3)   • Angina, class III (CMS/HCC)   • Dyspepsia   • BRICE on CPAP       Assessment:    Jannette Onofre is a 58 y.o. female with medically complicated obesity pursuing sleeve gastrectomy.    Weight loss surgery is deemed medically necessary given the following obesity related comorbidities including sleep apnea, diabetes, hypertension, dyslipidemia and cardiovascular disease with current Weight: 117 kg (259 lb) and Body mass index is 41.8 kg/m².     Surgical risk is higher given her multiple medical problems including hx prior WLS.  Further input pending Dr. Dunbar's evaluation.     Plan:  The consultation plan and program requirements were reviewed with the patient.  The patient has been advised that a letter of medical support must be obtained from her primary care physician or referring provider. A psychological evaluation will be arranged.  A nutritional evaluation will be performed.  The patient was advised to start a high protein and low carbohydrate diet.  Necessary lifestyle modifications were discussed.  Instructions on how to access INGRID was given to the patient.  INGRID is an internet based educational video that explains the surgical procedure chosen and answers basic questions regarding that procedure.     Preoperative testing will include: CBC, CMP, Lipids, TSH, HgA1C, H.Pylori UBT, CXR and EKG     Additional preop clearances required prior to surgery: Cardiac.      The patient has been educated on expected postoperative lifestyle changes, including commitment to high protein diet, vitamin regimen, and exercise program.  They are aware that support groups are encouraged for optimal weight loss results. Patient understands that bariatric surgery is not cosmetic surgery but rather a tool to help make a lifelong commitment to lifestyle changes including diet, exercise, behavior modifications, and  healthy habits. The surgical procedure was discussed with the patient and all questions were answered. The importance of avoiding ASA/ NSAIDS/ steroids/ tobacco/ hormones/ immunomodulators perioperatively was discussed.       JOSHUA Aguillon

## 2019-05-29 ENCOUNTER — LAB (OUTPATIENT)
Dept: LAB | Facility: HOSPITAL | Age: 59
End: 2019-05-29

## 2019-05-29 DIAGNOSIS — R10.13 DYSPEPSIA: ICD-10-CM

## 2019-05-29 DIAGNOSIS — R53.83 FATIGUE, UNSPECIFIED TYPE: ICD-10-CM

## 2019-05-29 LAB — TSH SERPL DL<=0.05 MIU/L-ACNC: 2.81 MIU/ML (ref 0.27–4.2)

## 2019-05-29 PROCEDURE — 36415 COLL VENOUS BLD VENIPUNCTURE: CPT

## 2019-05-29 PROCEDURE — 84443 ASSAY THYROID STIM HORMONE: CPT

## 2019-05-29 PROCEDURE — 83013 H PYLORI (C-13) BREATH: CPT

## 2019-05-30 LAB — UREA BREATH TEST QL: NEGATIVE

## 2019-08-21 ENCOUNTER — TRANSCRIBE ORDERS (OUTPATIENT)
Dept: ADMINISTRATIVE | Facility: HOSPITAL | Age: 59
End: 2019-08-21

## 2019-08-21 ENCOUNTER — LAB (OUTPATIENT)
Dept: LAB | Facility: HOSPITAL | Age: 59
End: 2019-08-21

## 2019-08-21 DIAGNOSIS — E53.8 VITAMIN B12 DEFICIENCY: ICD-10-CM

## 2019-08-21 DIAGNOSIS — E11.9 DIABETES MELLITUS WITHOUT COMPLICATION (HCC): ICD-10-CM

## 2019-08-21 DIAGNOSIS — E78.2 MIXED HYPERLIPIDEMIA: ICD-10-CM

## 2019-08-21 DIAGNOSIS — E66.01 MORBID OBESITY (HCC): ICD-10-CM

## 2019-08-21 DIAGNOSIS — E55.9 VITAMIN D DEFICIENCY: ICD-10-CM

## 2019-08-21 DIAGNOSIS — E11.9 DIABETES MELLITUS WITHOUT COMPLICATION (HCC): Primary | ICD-10-CM

## 2019-08-21 LAB
25(OH)D3 SERPL-MCNC: 59.7 NG/ML (ref 30–100)
ALBUMIN SERPL-MCNC: 4.4 G/DL (ref 3.5–5.2)
ALBUMIN UR-MCNC: <1.2 MG/DL
ALBUMIN/GLOB SERPL: 2 G/DL
ALP SERPL-CCNC: 59 U/L (ref 39–117)
ALT SERPL W P-5'-P-CCNC: 20 U/L (ref 1–33)
ANION GAP SERPL CALCULATED.3IONS-SCNC: 12.2 MMOL/L (ref 5–15)
AST SERPL-CCNC: 19 U/L (ref 1–32)
BASOPHILS # BLD AUTO: 0.06 10*3/MM3 (ref 0–0.2)
BASOPHILS NFR BLD AUTO: 0.9 % (ref 0–1.5)
BILIRUB SERPL-MCNC: 0.4 MG/DL (ref 0.2–1.2)
BUN BLD-MCNC: 21 MG/DL (ref 8–23)
BUN/CREAT SERPL: 21.6 (ref 7–25)
CALCIUM SPEC-SCNC: 9.2 MG/DL (ref 8.6–10.5)
CHLORIDE SERPL-SCNC: 110 MMOL/L (ref 98–107)
CHOLEST SERPL-MCNC: 120 MG/DL (ref 0–200)
CO2 SERPL-SCNC: 21.8 MMOL/L (ref 22–29)
CREAT BLD-MCNC: 0.97 MG/DL (ref 0.57–1)
CREAT UR-MCNC: 77.8 MG/DL
DEPRECATED RDW RBC AUTO: 49.3 FL (ref 37–54)
EOSINOPHIL # BLD AUTO: 0.31 10*3/MM3 (ref 0–0.4)
EOSINOPHIL NFR BLD AUTO: 4.4 % (ref 0.3–6.2)
ERYTHROCYTE [DISTWIDTH] IN BLOOD BY AUTOMATED COUNT: 14.6 % (ref 12.3–15.4)
FOLATE SERPL-MCNC: >20 NG/ML (ref 4.78–24.2)
GFR SERPL CREATININE-BSD FRML MDRD: 59 ML/MIN/1.73
GLOBULIN UR ELPH-MCNC: 2.2 GM/DL
GLUCOSE BLD-MCNC: 170 MG/DL (ref 65–99)
HBA1C MFR BLD: 8.82 % (ref 4.8–5.6)
HCT VFR BLD AUTO: 43.8 % (ref 34–46.6)
HDLC SERPL-MCNC: 35 MG/DL (ref 40–60)
HGB BLD-MCNC: 13.2 G/DL (ref 12–15.9)
IMM GRANULOCYTES # BLD AUTO: 0.02 10*3/MM3 (ref 0–0.05)
IMM GRANULOCYTES NFR BLD AUTO: 0.3 % (ref 0–0.5)
LDLC SERPL CALC-MCNC: 42 MG/DL (ref 0–100)
LDLC/HDLC SERPL: 1.19 {RATIO}
LYMPHOCYTES # BLD AUTO: 2.07 10*3/MM3 (ref 0.7–3.1)
LYMPHOCYTES NFR BLD AUTO: 29.6 % (ref 19.6–45.3)
MCH RBC QN AUTO: 28 PG (ref 26.6–33)
MCHC RBC AUTO-ENTMCNC: 30.1 G/DL (ref 31.5–35.7)
MCV RBC AUTO: 92.8 FL (ref 79–97)
MICROALBUMIN/CREAT UR: NORMAL MG/G{CREAT}
MONOCYTES # BLD AUTO: 0.46 10*3/MM3 (ref 0.1–0.9)
MONOCYTES NFR BLD AUTO: 6.6 % (ref 5–12)
NEUTROPHILS # BLD AUTO: 4.08 10*3/MM3 (ref 1.7–7)
NEUTROPHILS NFR BLD AUTO: 58.2 % (ref 42.7–76)
NRBC BLD AUTO-RTO: 0 /100 WBC (ref 0–0.2)
PLATELET # BLD AUTO: 214 10*3/MM3 (ref 140–450)
PMV BLD AUTO: 10.4 FL (ref 6–12)
POTASSIUM BLD-SCNC: 4.5 MMOL/L (ref 3.5–5.2)
PROT SERPL-MCNC: 6.6 G/DL (ref 6–8.5)
RBC # BLD AUTO: 4.72 10*6/MM3 (ref 3.77–5.28)
SODIUM BLD-SCNC: 144 MMOL/L (ref 136–145)
T3 SERPL-MCNC: 97.1 NG/DL (ref 80–200)
T4 SERPL-MCNC: 6.9 MCG/DL (ref 4.5–11.7)
TRIGL SERPL-MCNC: 216 MG/DL (ref 0–150)
TSH SERPL DL<=0.05 MIU/L-ACNC: 2.7 MIU/ML (ref 0.27–4.2)
VIT B12 BLD-MCNC: 502 PG/ML (ref 211–946)
VLDLC SERPL-MCNC: 43.2 MG/DL (ref 5–40)
WBC NRBC COR # BLD: 7 10*3/MM3 (ref 3.4–10.8)

## 2019-08-21 PROCEDURE — 82306 VITAMIN D 25 HYDROXY: CPT

## 2019-08-21 PROCEDURE — 36415 COLL VENOUS BLD VENIPUNCTURE: CPT

## 2019-08-21 PROCEDURE — 85025 COMPLETE CBC W/AUTO DIFF WBC: CPT

## 2019-08-21 PROCEDURE — 84436 ASSAY OF TOTAL THYROXINE: CPT

## 2019-08-21 PROCEDURE — 84443 ASSAY THYROID STIM HORMONE: CPT

## 2019-08-21 PROCEDURE — 82607 VITAMIN B-12: CPT

## 2019-08-21 PROCEDURE — 82043 UR ALBUMIN QUANTITATIVE: CPT

## 2019-08-21 PROCEDURE — 82746 ASSAY OF FOLIC ACID SERUM: CPT

## 2019-08-21 PROCEDURE — 82570 ASSAY OF URINE CREATININE: CPT

## 2019-08-21 PROCEDURE — 83036 HEMOGLOBIN GLYCOSYLATED A1C: CPT

## 2019-08-21 PROCEDURE — 80061 LIPID PANEL: CPT

## 2019-08-21 PROCEDURE — 80053 COMPREHEN METABOLIC PANEL: CPT

## 2019-08-21 PROCEDURE — 84480 ASSAY TRIIODOTHYRONINE (T3): CPT

## 2019-09-17 ENCOUNTER — OFFICE VISIT (OUTPATIENT)
Dept: CARDIOLOGY | Facility: CLINIC | Age: 59
End: 2019-09-17

## 2019-09-17 VITALS
OXYGEN SATURATION: 96 % | HEART RATE: 87 BPM | BODY MASS INDEX: 43.1 KG/M2 | DIASTOLIC BLOOD PRESSURE: 80 MMHG | WEIGHT: 268.2 LBS | SYSTOLIC BLOOD PRESSURE: 134 MMHG | HEIGHT: 66 IN

## 2019-09-17 DIAGNOSIS — Z01.810 PREOPERATIVE CARDIOVASCULAR EXAMINATION: ICD-10-CM

## 2019-09-17 DIAGNOSIS — I21.09 ST ELEVATION MYOCARDIAL INFARCTION (STEMI) OF ANTERIOR WALL (HCC): Primary | ICD-10-CM

## 2019-09-17 DIAGNOSIS — I10 ESSENTIAL HYPERTENSION: ICD-10-CM

## 2019-09-17 DIAGNOSIS — R10.13 DYSPEPSIA: ICD-10-CM

## 2019-09-17 PROCEDURE — 93000 ELECTROCARDIOGRAM COMPLETE: CPT | Performed by: INTERNAL MEDICINE

## 2019-09-17 PROCEDURE — 99214 OFFICE O/P EST MOD 30 MIN: CPT | Performed by: INTERNAL MEDICINE

## 2019-09-17 NOTE — PROGRESS NOTES
Akhil Huerta MD  Jannette Onofre  1960 09/17/2019    Patient Active Problem List   Diagnosis   • Acute ST elevation myocardial infarction (STEMI) of anterior wall, 11/29/16 s/p stenting proxismal % occlusion, clinically stable.   • Type 2 diabetes mellitus (CMS/HCC)   • Chest pain   • Coronary artery disease   • Fatigue   • Hyperlipidemia   • Hypertension   • Morbid obesity (CMS/HCC)   • Dyspnea on exertion (NYHA class 2-3)   • Angina, class III (CMS/HCC)   • Dyspepsia   • BRICE on CPAP   • Preoperative cardiovascular examination       Dear Akhil Huerta MD:    Subjective     Jannette Onofre is a 59 y.o. female with the problems as listed above, presents    Chief complaint: Follow-up of coronary artery disease and preoperative cardiac evaluation and cardiac clearance prior to undergoing gastric sleeve surgery procedure.    History of Present Illness:  is a very pleasant 59-year-old  female with history of known ASCVD, status post previous anterior wall myocardial infarction, status post stenting of the LAD in November 2016.  She has had stable clinical course since then from cardiac standpoint.  She is here today seeking a preoperative cardiac evaluation and cardiac clearance to undergo gastric sleeve procedure.  On further questioning she denies any complaints of chest pain or discomfort or shortness of breath in a long time.  She has been doing well overall from cardiac standpoint in the last 2 years or so.    No Known Allergies:      Current Outpatient Medications:   •  amLODIPine (NORVASC) 5 MG tablet, Take 1 tablet by mouth Daily. (Patient taking differently: Take 2.5 mg by mouth Daily.), Disp: 90 tablet, Rfl: 4  •  aspirin 325 MG tablet, Take 325 mg by mouth Daily., Disp: , Rfl:   •  atorvastatin (LIPITOR) 80 MG tablet, Take 1 tablet by mouth Daily., Disp: 90 tablet, Rfl: 3  •  carvedilol (COREG) 25 MG tablet, Take 1 (one) Tablet, by mouth two times a day., Disp: 180 tablet,  "Rfl: 4  •  Cholecalciferol (VITAMIN D3) 26438 units tablet, Take 10,000 Units by mouth Daily., Disp: , Rfl:   •  clopidogrel (PLAVIX) 75 MG tablet, Take 1 (one) tablet by mouth every day., Disp: 90 tablet, Rfl: 4  •  Dulaglutide 1.5 MG/0.5ML solution pen-injector, Inject 1.5 mg under the skin in the abdomen, thigh, or upper arm rotating injection sites 1 (One) Time Per Week., Disp: 2 mL, Rfl: 5  •  Empagliflozin 25 MG tablet, Take 1 tablet by mouth Daily., Disp: 90 tablet, Rfl: 4  •  glipiZIDE (GLUCOTROL XL) 10 MG 24 hr tablet, Take 1 tablet by mouth 2 (Two) Times a Day., Disp: 180 tablet, Rfl: 4  •  glucose blood (FREESTYLE LITE) test strip, Use to check blood sugar 3 times daily as needed., Disp: 100 each, Rfl: 5  •  glucose blood test strip, USE AS DIRECTED TO CHECK BLOOD SUGAR AS NEEDED., Disp: 100 each, Rfl: 3  •  icosapent ethyl (VASCEPA) 1 g capsule capsule, take 2 capsule by mouth 2 times every day with food swallowing whole. Do not chew, open, dissolve and/or crush., Disp: 360 capsule, Rfl: 3  •  insulin lispro (humaLOG) 100 UNIT/ML injection, Inject 0-9 Units under the skin 4 (Four) Times a Day (Before Meals & at Bedtime)., Disp: 10 mL, Rfl: 12  •  Insulin Lispro Prot & Lispro (50-50) 100 UNIT/ML suspension pen-injector, Inject 30 Units under the skin before meals., Disp: 90 mL, Rfl: 5  •  Insulin Pen Needle (BD ULTRA-FINE PEN NEEDLES) 29G X 12.7MM misc, USE WITH HUMALOG MIX 3 TIMES DAILY., Disp: 100 each, Rfl: 6  •  Insulin Syringe-Needle U-100 (BD INSULIN SYRINGE ULTRAFINE) 31G X 5/16\" 0.3 ML misc, USE WITH SLIDING SCALE 4 TIMES DAILY., Disp: 100 each, Rfl: 6  •  isosorbide mononitrate (IMDUR) 60 MG 24 hr tablet, Take 1½ tablets by mouth Every Morning., Disp: 180 tablet, Rfl: 3  •  lisinopril (PRINIVIL,ZESTRIL) 20 MG tablet, Take 1 (one) tablet by mouth every day., Disp: 90 tablet, Rfl: 3  •  metFORMIN (GLUCOPHAGE) 1000 MG tablet, Take 1 tablet by mouth 2 (Two) Times a Day (with morning & evening " "meals)., Disp: 60 tablet, Rfl: 11  •  Multiple Vitamin (MULTI-VITAMIN DAILY PO), Take  by mouth., Disp: , Rfl:   •  nitroglycerin (NITROSTAT) 0.4 MG SL tablet, Place 1 tablet under the tongue Every 5 (Five) Minutes As Needed for Chest Pain (max of 3 doses in 15 minutes. If no relief, go to ER., Disp: 25 tablet, Rfl: 12  •  Cyanocobalamin (VITAMIN B12 PO), Take  by mouth., Disp: , Rfl:   •  cyclobenzaprine (FLEXERIL) 10 MG tablet, Take 1 tablet by mouth 2 (Two) Times a Day As Needed., Disp: 60 tablet, Rfl: 0  •  Dulaglutide 0.75 MG/0.5ML solution pen-injector, Inject .75 mg subcutaneously once weekly., Disp: 2 mL, Rfl: 2      The following portions of the patient's history were reviewed and updated as appropriate: allergies, current medications, past family history, past medical history, past social history, past surgical history and problem list.    Social History     Tobacco Use   • Smoking status: Former Smoker     Packs/day: 0.25     Years: 1.00     Pack years: 0.25     Types: Cigarettes     Last attempt to quit: 1989     Years since quittin.2   • Smokeless tobacco: Never Used   Substance Use Topics   • Alcohol use: No   • Drug use: No       Review of Systems   Constitution: Negative for chills and fever.   HENT: Negative for nosebleeds and sore throat.    Respiratory: Negative for cough, hemoptysis and wheezing.    Gastrointestinal: Negative for abdominal pain, hematemesis, hematochezia, melena, nausea and vomiting.   Genitourinary: Negative for dysuria and hematuria.   Neurological: Negative for headaches.       Objective   Vitals:    19 1520   BP: 134/80   BP Location: Left arm   Patient Position: Sitting   Cuff Size: Adult   Pulse: 87   SpO2: 96%   Weight: 122 kg (268 lb 3.2 oz)   Height: 167.6 cm (66\")     Body mass index is 43.29 kg/m².    Physical Exam   Constitutional: She is oriented to person, place, and time. She appears well-developed and well-nourished.   HENT:   Mouth/Throat: " Oropharynx is clear and moist.   Eyes: EOM are normal. Pupils are equal, round, and reactive to light.   Neck: Neck supple. No JVD present. No tracheal deviation present. No thyromegaly present.   Cardiovascular: Normal rate, regular rhythm, S1 normal and S2 normal. Exam reveals no gallop and no friction rub.   No murmur heard.  Pulmonary/Chest: Effort normal and breath sounds normal.   Abdominal: Soft. Bowel sounds are normal. She exhibits no mass. There is no tenderness.   Musculoskeletal: Normal range of motion. She exhibits no edema.   Lymphadenopathy:     She has no cervical adenopathy.   Neurological: She is alert and oriented to person, place, and time.   Skin: Skin is warm and dry. No rash noted.   Psychiatric: She has a normal mood and affect.       Lab Results   Component Value Date     08/21/2019    K 4.5 08/21/2019     (H) 08/21/2019    CO2 21.8 (L) 08/21/2019    BUN 21 08/21/2019    CREATININE 0.97 08/21/2019    GLUCOSE 170 (H) 08/21/2019    CALCIUM 9.2 08/21/2019    AST 19 08/21/2019    ALT 20 08/21/2019    ALKPHOS 59 08/21/2019    LABIL2 1.6 12/11/2015     Lab Results   Component Value Date    CKTOTAL 35 10/29/2017     Lab Results   Component Value Date    WBC 7.00 08/21/2019    HGB 13.2 08/21/2019    HCT 43.8 08/21/2019     08/21/2019     Lab Results   Component Value Date    INR 0.98 10/28/2017    INR 0.89 11/29/2016    INR 0.82 11/27/2016     No results found for: MG  Lab Results   Component Value Date    TSH 2.700 08/21/2019    CHLPL 213 (H) 03/14/2016    TRIG 216 (H) 08/21/2019    HDL 35 (L) 08/21/2019    LDL 42 08/21/2019      Lab Results   Component Value Date    BNP 27.0 12/29/2016       Jannette Onofre   Regadenoson Stress Test With Myocardial Perfusion SPECT (Multi Study)   Order# [id number]   Reading physician: eLbron Ogden MD Ordering physician: Ally Sheehan APRN Study date: 7/2/18   Patient Information     Patient Name  Jannette Onofre MRN  [id number] Sex  Female   (Age)  1960 (59 y.o.)   Interpretation Summary     · Myocardial perfusion imaging indicates a small-sized, mild degree of ischemia in the anterior wall.  · Normal LV cavity size. Normal LV wall motion noted.  · Left ventricular ejection fraction is hyperdynamic (Calculated EF > 70%).  · Impressions are consistent with a low risk study.        During this visit the following were done:  Labs Reviewed [x]    Labs Ordered []    Radiology Reports Reviewed [x]    Radiology Ordered []    PCP Records Reviewed []    Referring Provider Records Reviewed []    ER Records Reviewed [x]    Hospital Records Reviewed [x]    History Obtained From Family []    Radiology Images Reviewed [x]    Other Reviewed [x]    Records Requested []        ECG 12 Lead  Date/Time: 2019 3:22 PM  Performed by: Lebron Ogden MD  Authorized by: Lebron Ogden MD   Comparison: compared with previous ECG from 2019  Similar to previous ECG  Rhythm: sinus rhythm  BPM: 82  Other findings: non-specific ST-T wave changes  Comments: Old anterior wall microinfarction.                 Assessment/Plan    Diagnosis Plan   1. Acute ST elevation myocardial infarction (STEMI) of anterior wall, 16 s/p stenting proxismal % occlusion, clinically stable.     2. Preoperative cardiovascular examination     3. Essential hypertension     4. Dyspepsia          Recommendations:  1. Since her cardiac status appears to be stable at this time, we will go ahead and clear her for the planned procedure.  2. With regards to the antiplatelet therapy perioperatively, I told her to continue the aspirin with the low-dose aspirin of 81 mg daily before and after surgery and may hold the Plavix 1 week before and restart this after the surgery whenever it is deemed safe from the surgery standpoint.  3. Follow-up in 3 months.    Return in about 3 months (around 2019).    As always, Dwight   I appreciate very much the opportunity to participate in the  cardiovascular care of your patients. Please do not hesitate to call me with any questions with regards to Jannette Onofre evaluation and management.         With Best Regards,        Lebron Ogden MD, FACC    Please note that portions of this note were completed with a voice recognition program.

## 2019-10-01 DIAGNOSIS — R06.00 DYSPNEA, UNSPECIFIED TYPE: ICD-10-CM

## 2019-10-01 DIAGNOSIS — R53.83 FATIGUE, UNSPECIFIED TYPE: Primary | ICD-10-CM

## 2019-10-03 ENCOUNTER — HOSPITAL ENCOUNTER (OUTPATIENT)
Dept: GENERAL RADIOLOGY | Facility: HOSPITAL | Age: 59
Discharge: HOME | End: 2019-10-03
Admitting: PHYSICIAN ASSISTANT

## 2019-10-03 ENCOUNTER — LAB (OUTPATIENT)
Dept: LAB | Facility: HOSPITAL | Age: 59
End: 2019-10-03

## 2019-10-03 DIAGNOSIS — R53.83 FATIGUE, UNSPECIFIED TYPE: ICD-10-CM

## 2019-10-03 DIAGNOSIS — R06.00 DYSPNEA, UNSPECIFIED TYPE: ICD-10-CM

## 2019-10-03 LAB
ALBUMIN SERPL-MCNC: 4.2 G/DL (ref 3.5–5.2)
ALBUMIN/GLOB SERPL: 1.5 G/DL
ALP SERPL-CCNC: 60 U/L (ref 39–117)
ALT SERPL W P-5'-P-CCNC: 16 U/L (ref 1–33)
ANION GAP SERPL CALCULATED.3IONS-SCNC: 10.8 MMOL/L (ref 5–15)
AST SERPL-CCNC: 14 U/L (ref 1–32)
BILIRUB SERPL-MCNC: 0.5 MG/DL (ref 0.2–1.2)
BUN BLD-MCNC: 26 MG/DL (ref 8–23)
BUN/CREAT SERPL: 26.3 (ref 7–25)
CALCIUM SPEC-SCNC: 9.2 MG/DL (ref 8.6–10.5)
CHLORIDE SERPL-SCNC: 102 MMOL/L (ref 98–107)
CO2 SERPL-SCNC: 25.2 MMOL/L (ref 22–29)
CREAT BLD-MCNC: 0.99 MG/DL (ref 0.57–1)
DEPRECATED RDW RBC AUTO: 44.8 FL (ref 37–54)
ERYTHROCYTE [DISTWIDTH] IN BLOOD BY AUTOMATED COUNT: 13.9 % (ref 12.3–15.4)
GFR SERPL CREATININE-BSD FRML MDRD: 57 ML/MIN/1.73
GLOBULIN UR ELPH-MCNC: 2.8 GM/DL
GLUCOSE BLD-MCNC: 248 MG/DL (ref 65–99)
HCT VFR BLD AUTO: 40.3 % (ref 34–46.6)
HGB BLD-MCNC: 12.9 G/DL (ref 12–15.9)
MCH RBC QN AUTO: 28.5 PG (ref 26.6–33)
MCHC RBC AUTO-ENTMCNC: 32 G/DL (ref 31.5–35.7)
MCV RBC AUTO: 89.2 FL (ref 79–97)
PLATELET # BLD AUTO: 192 10*3/MM3 (ref 140–450)
PMV BLD AUTO: 10.2 FL (ref 6–12)
POTASSIUM BLD-SCNC: 4.5 MMOL/L (ref 3.5–5.2)
PROT SERPL-MCNC: 7 G/DL (ref 6–8.5)
RBC # BLD AUTO: 4.52 10*6/MM3 (ref 3.77–5.28)
SODIUM BLD-SCNC: 138 MMOL/L (ref 136–145)
WBC NRBC COR # BLD: 6.74 10*3/MM3 (ref 3.4–10.8)

## 2019-10-03 PROCEDURE — 36415 COLL VENOUS BLD VENIPUNCTURE: CPT

## 2019-10-03 PROCEDURE — 85027 COMPLETE CBC AUTOMATED: CPT

## 2019-10-03 PROCEDURE — 71046 X-RAY EXAM CHEST 2 VIEWS: CPT | Performed by: RADIOLOGY

## 2019-10-03 PROCEDURE — 71046 X-RAY EXAM CHEST 2 VIEWS: CPT

## 2019-10-03 PROCEDURE — 80053 COMPREHEN METABOLIC PANEL: CPT

## 2019-10-04 RX ORDER — BLOOD SUGAR DIAGNOSTIC
STRIP MISCELLANEOUS
Qty: 100 EACH | Refills: 6 | Status: CANCELLED | OUTPATIENT
Start: 2019-10-04

## 2019-10-08 ENCOUNTER — CONSULT (OUTPATIENT)
Dept: BARIATRICS/WEIGHT MGMT | Facility: CLINIC | Age: 59
End: 2019-10-08

## 2019-10-08 VITALS
HEART RATE: 83 BPM | BODY MASS INDEX: 42.91 KG/M2 | OXYGEN SATURATION: 99 % | WEIGHT: 267 LBS | SYSTOLIC BLOOD PRESSURE: 122 MMHG | DIASTOLIC BLOOD PRESSURE: 82 MMHG | RESPIRATION RATE: 18 BRPM | TEMPERATURE: 97.3 F | HEIGHT: 66 IN

## 2019-10-08 DIAGNOSIS — E66.01 MORBID OBESITY WITH BODY MASS INDEX (BMI) OF 40.0 TO 44.9 IN ADULT (HCC): Primary | ICD-10-CM

## 2019-10-08 PROCEDURE — 99214 OFFICE O/P EST MOD 30 MIN: CPT | Performed by: SURGERY

## 2019-10-08 RX ORDER — SODIUM CHLORIDE 0.9 % (FLUSH) 0.9 %
3-10 SYRINGE (ML) INJECTION AS NEEDED
Status: CANCELLED | OUTPATIENT
Start: 2019-10-08

## 2019-10-08 RX ORDER — SCOLOPAMINE TRANSDERMAL SYSTEM 1 MG/1
1 PATCH, EXTENDED RELEASE TRANSDERMAL ONCE
Status: CANCELLED | OUTPATIENT
Start: 2019-10-08 | End: 2019-10-08

## 2019-10-08 RX ORDER — SODIUM CHLORIDE 0.9 % (FLUSH) 0.9 %
3 SYRINGE (ML) INJECTION EVERY 12 HOURS SCHEDULED
Status: CANCELLED | OUTPATIENT
Start: 2019-10-08

## 2019-10-08 RX ORDER — GABAPENTIN 100 MG/1
600 CAPSULE ORAL ONCE
Status: CANCELLED | OUTPATIENT
Start: 2019-10-08 | End: 2019-10-08

## 2019-10-08 RX ORDER — PANTOPRAZOLE SODIUM 40 MG/10ML
40 INJECTION, POWDER, LYOPHILIZED, FOR SOLUTION INTRAVENOUS ONCE
Status: CANCELLED | OUTPATIENT
Start: 2019-10-08 | End: 2019-10-08

## 2019-10-08 RX ORDER — SODIUM CHLORIDE 9 MG/ML
150 INJECTION, SOLUTION INTRAVENOUS CONTINUOUS
Status: CANCELLED | OUTPATIENT
Start: 2019-10-08

## 2019-10-08 RX ORDER — CHLORHEXIDINE GLUCONATE 0.12 MG/ML
30 RINSE ORAL
Status: CANCELLED | OUTPATIENT
Start: 2019-10-08 | End: 2019-10-08

## 2019-10-08 RX ORDER — ACETAMINOPHEN 500 MG
1000 TABLET ORAL ONCE
Status: CANCELLED | OUTPATIENT
Start: 2019-10-08 | End: 2019-10-08

## 2019-10-09 PROBLEM — E66.01 MORBID OBESITY WITH BODY MASS INDEX (BMI) OF 40.0 TO 44.9 IN ADULT: Status: ACTIVE | Noted: 2019-10-09

## 2019-10-20 ENCOUNTER — APPOINTMENT (OUTPATIENT)
Dept: PREADMISSION TESTING | Facility: HOSPITAL | Age: 59
End: 2019-10-20

## 2019-10-20 LAB
DEPRECATED RDW RBC AUTO: 47.8 FL (ref 37–54)
ERYTHROCYTE [DISTWIDTH] IN BLOOD BY AUTOMATED COUNT: 14.3 % (ref 12.3–15.4)
HBA1C MFR BLD: 8.2 % (ref 4.8–5.6)
HCT VFR BLD AUTO: 42.3 % (ref 34–46.6)
HGB BLD-MCNC: 13 G/DL (ref 12–15.9)
MCH RBC QN AUTO: 28.1 PG (ref 26.6–33)
MCHC RBC AUTO-ENTMCNC: 30.7 G/DL (ref 31.5–35.7)
MCV RBC AUTO: 91.6 FL (ref 79–97)
PLATELET # BLD AUTO: 198 10*3/MM3 (ref 140–450)
PMV BLD AUTO: 10.1 FL (ref 6–12)
RBC # BLD AUTO: 4.62 10*6/MM3 (ref 3.77–5.28)
WBC NRBC COR # BLD: 6.51 10*3/MM3 (ref 3.4–10.8)

## 2019-10-20 PROCEDURE — 36415 COLL VENOUS BLD VENIPUNCTURE: CPT

## 2019-10-20 PROCEDURE — 85027 COMPLETE CBC AUTOMATED: CPT | Performed by: ANESTHESIOLOGY

## 2019-10-20 PROCEDURE — 83036 HEMOGLOBIN GLYCOSYLATED A1C: CPT | Performed by: ANESTHESIOLOGY

## 2019-10-20 RX ORDER — ASPIRIN 81 MG/1
81 TABLET ORAL DAILY
COMMUNITY
End: 2019-11-09 | Stop reason: HOSPADM

## 2019-10-20 NOTE — PAT
EKG and cardiac clearance from Dr. Ogden on chart.    Per Dr. Ogden hold Plavix one week before surgery    Patient instructed to drink 20 ounces (or until full) of Gatorade and it needs to be completed 1 hour before given arrival time for procedure (NO RED Gatorade)    Patient verbalized understanding.    Patient to apply Chlorhexadine wipes  to surgical area (as instructed) the night before procedure and the AM of procedure. Wipes provided.    Per Anesthesia Request, patient instructed not to take their ACE/ARB medications on the AM of surgery.

## 2019-10-20 NOTE — PROGRESS NOTES
"[facility] BARIATRIC SURGERY  [address]  [facility] [address]  [phone]      Patient  Name:  Jannette Onofre  :  1960      Date of Visit: 10/8/19    Chief Complaint:  weight gain; unable to maintain weight loss.  Evaluate for possible weight loss surgery.    History of Present Illness:  Jannette Onofre is a 59 y.o. female who presents today for evaluation, education and consultation regarding weight loss surgery.  Since last seen 2019 she has lost 1 pound.The patient returns for final visit prior to LSG.  Original intake evaluation Carli Bernal PA-C dated 2019 reviewed.    From her evaluation that day:     \"s/p LAGB 2013 by Dr. Russell.  Transferred care to [facility] 2016 b/c she says Dr. Russell made her feel like a failure.  Unfortunately had ongoing issues w/ lapband, suspected band intolerance.  Ultimately s/p AGBR 19 w/ Dr. Dunbar.  Chronic nausea/dysphagia resolved w/ band removal.  Weight stable.       Presurgery weight 322 lbs.  Lowest weight:  224 lbs, but was unable to maintain.  Current weight: 259 lbs. \"    The patient has had issues with morbid obesity for years and only temporary success with surgical and non-surgical methods of weight loss.  The patient is seeking LSG to help with the morbid obesity related conditions of coronary artery disease and myocardial infarction s/p stenting, anxiety, depression, type II non-insulin-dependent diabetes mellitus, dyspnea on exertion, fatigue, hyperlipidemia, hypertension, history of melanoma, obstructive sleep apnea noncompliant with CPAP, alopecia, chronic kidney disease, right bundle branch block.    59-year-old morbidly obese white female  at .  She lives in [facility].  Since her band removal she denies any GE reflux symptoms.  She says she cannot tolerate her CPAP.  She says she lost 80 pounds with her band and her diabetes improved and was only on " "metformin.  Her  was with her for today's evaluation.      Past Medical History:   Diagnosis Date   • Anxiety    • Coronary artery disease     s/p LAD stenting, follows w/ Dr. Ogden   • Depression    • Diabetes mellitus (CMS/HCC)     dx , initially improved s/p AGB but now back on insulin    • Dyspepsia    • Dyspnea on exertion    • Fatigue    • Hyperlipidemia    • Hypertension    • Melanoma (CMS/HCC)        • Morbid obesity (CMS/HCC)    • Myocardial infarction (CMS/HCC) 2016    s/p LAD stenting, on ASA 325mg + Plavix   • BRICE on CPAP     noncompliant w/ device, \"lucia me\"   • Umbilical hernia    • Wears glasses      Past Surgical History:   Procedure Laterality Date   • CARDIAC CATHETERIZATION N/A 2016    Procedure: Left Heart Cath;  Surgeon: Akhil Zuñiga MD;  Location: [facility] CATH INVASIVE LOCATION;  Service:    • CARDIAC CATHETERIZATION N/A 10/31/2017    Procedure: Left Heart Cath;  Surgeon: Lebron Ogden MD;  Location: [facility] CATH INVASIVE LOCATION;  Service:    • CARDIAC CATHETERIZATION  10/31/2017    Procedure: Functional Flow [place];  Surgeon: Denilson Pena MD;  Location: [facility] CATH INVASIVE LOCATION;  Service:    • CATARACT EXTRACTION Right    • CATARACT EXTRACTION Left    •  SECTION     • COLONOSCOPY      routine   • ENDOSCOPY     • FINGER/THUMB CLOSED REDUCTION Left 8/3/2017    Procedure: CLOSED REDUCTION PERCUTANEOUS PINNING LEFT FIFTH METACARPAL SHAFT FRACTURE;  Surgeon: Oscar León MD;  Location: [facility];  Service:    • GASTRIC BANDING REMOVAL  2019    w/ Dr. Dunbar   • LAPAROSCOPIC CHOLECYSTECTOMY      w/ [facility]   • LAPAROSCOPIC GASTRIC BANDING  2013    s/p LAGB by Dr Russell   • OTHER SURGICAL HISTORY      melanoma removal/back   • NC RT/LT HEART CATHETERS N/A 2016    Procedure: Percutaneous Coronary Intervention;  Surgeon: Akhil Zuñiga MD;  Location: [facility] CATH INVASIVE LOCATION;  Service: " Cardiology   • SEPTOPLASTY  2006   • UMBILICAL HERNIA REPAIR  2002    no mesh       No Known Allergies    Current Outpatient Medications:   •  amLODIPine (NORVASC) 5 MG tablet, Take 1 tablet by mouth Daily., Disp: 90 tablet, Rfl: 3  •  atorvastatin (LIPITOR) 80 MG tablet, Take 1 tablet by mouth Daily., Disp: 90 tablet, Rfl: 3  •  carvedilol (COREG) 25 MG tablet, Take 1 tablet by mouth 2 (Two) Times a Day with food., Disp: 180 tablet, Rfl: 3  •  Cholecalciferol (VITAMIN D3) 39256 units tablet, Take 10,000 Units by mouth Daily., Disp: , Rfl:   •  clopidogrel (PLAVIX) 75 MG tablet, Take 1 (one) tablet by mouth every day. (Patient taking differently: Take 75 mg by mouth Daily. Hold one week before surgery per Dr. Ogden), Disp: 90 tablet, Rfl: 4  •  Cyanocobalamin (VITAMIN B12 PO), Take  by mouth., Disp: , Rfl:   •  Dulaglutide 1.5 MG/0.5ML solution pen-injector, Inject 1.5 mg under the skin in the abdomen, thigh, or upper arm rotating injection sites 1 (One) Time Per Week., Disp: 2 mL, Rfl: 5  •  Empagliflozin 25 MG tablet, Take 1 tablet by mouth Every Morning., Disp: 90 tablet, Rfl: 3  •  glipizide (GLUCOTROL XL) 10 MG 24 hr tablet, Take 1 tablet by mouth 2 (Two) Times a Day., Disp: 180 tablet, Rfl: 4  •  glucose blood (FREESTYLE LITE) test strip, Use to check blood sugar 3 times daily as needed., Disp: 100 each, Rfl: 5  •  glucose blood test strip, USE AS DIRECTED TO CHECK BLOOD SUGAR AS NEEDED., Disp: 100 each, Rfl: 3  •  icosapent ethyl (VASCEPA) 1 g capsule capsule, take 2 capsule by mouth 2 times every day with food swallowing whole. Do not chew, open, dissolve and/or crush., Disp: 360 capsule, Rfl: 3  •  insulin lispro (humaLOG) 100 UNIT/ML injection, Inject 0-9 Units under the skin 4 (Four) Times a Day (Before Meals & at Bedtime)., Disp: 10 mL, Rfl: 12  •  insulin lispro (humaLOG) 100 UNIT/ML injection, Inject 12 to 20 Units under the skin into the appropriate area as directed 4 (Four) Times a Day., Disp: 40 mL,  "Rfl: 6  •  Insulin Lispro Prot & Lispro (50-50) 100 UNIT/ML suspension pen-injector, Inject 30 Units under the skin before meals., Disp: 90 mL, Rfl: 5  •  Insulin Pen Needle (BD ULTRA-FINE PEN NEEDLES) 29G X 12.7MM misc, USE WITH HUMALOG MIX 3 TIMES DAILY., Disp: 100 each, Rfl: 6  •  Insulin Syringe-Needle U-100 (BD INSULIN SYRINGE ULTRAFINE) 31G X 5/16\" 0.3 ML misc, USE WITH SLIDING SCALE 4 TIMES DAILY., Disp: 100 each, Rfl: 6  •  isosorbide mononitrate (IMDUR) 60 MG 24 hr tablet, Take 1½ tablets by mouth Every Morning., Disp: 180 tablet, Rfl: 3  •  lisinopril (PRINIVIL,ZESTRIL) 20 MG tablet, Take 1 (one) tablet by mouth every day., Disp: 90 tablet, Rfl: 3  •  metFORMIN (GLUCOPHAGE) 1000 MG tablet, Take 1 tablet by mouth 2 (Two) Times a Day (with morning & evening meals)., Disp: 60 tablet, Rfl: 11  •  Multiple Vitamin (MULTI-VITAMIN DAILY PO), Take  by mouth., Disp: , Rfl:   •  apixaban (ELIQUIS) 2.5 MG tablet tablet, Take 1 tablet by mouth Every 12 (Twelve) Hours for 28 doses. (Patient taking differently: Take 2.5 mg by mouth Every 12 (Twelve) Hours. Will start after surgery), Disp: 28 tablet, Rfl: 0  •  aspirin 81 MG EC tablet, Take 81 mg by mouth Daily., Disp: , Rfl:   •  nitroglycerin (NITROSTAT) 0.4 MG SL tablet, Place 1 tablet under the tongue Every 5 (Five) Minutes As Needed for Chest Pain (max of 3 doses in 15 minutes. If no relief, go to ER., Disp: 25 tablet, Rfl: 12    Social History     Socioeconomic History   • Marital status:      Spouse name: Not on file   • Number of children: Not on file   • Years of education: Not on file   • Highest education level: Not on file   Tobacco Use   • Smoking status: Former Smoker     Packs/day: 0.25     Years: 1.00     Pack years: 0.25     Types: Cigarettes     Last attempt to quit: 1989     Years since quittin.3   • Smokeless tobacco: Never Used   Substance and Sexual Activity   • Alcohol use: No   • Drug use: No   • Sexual activity: Defer   Social " History Narrative    .  Lives in [facility].   @ [employer].     Family History   Problem Relation Age of Onset   • Diabetes Mother    • Heart disease Mother    • Hyperlipidemia Mother    • Hypertension Mother    • Skin cancer Mother    • COPD Father    • Heart disease Father    • Hyperlipidemia Father    • Heart attack Father    • Alzheimer's disease Father    • Diabetes Maternal Aunt    • Diabetes Maternal Grandmother    • Heart disease Maternal Grandfather    • Heart attack Maternal Grandfather    • Hypertension Maternal Grandfather    • Hypertension Sister    • Melanoma Brother    • Breast cancer Neg Hx        Review of Systems   Constitutional: Positive for fatigue. Negative for chills, diaphoresis, fever and unexpected weight loss.   HENT: Negative for congestion and facial swelling.    Eyes: Negative for blurred vision, double vision and discharge.   Respiratory: Negative for chest tightness, shortness of breath and stridor.    Cardiovascular: Negative for chest pain, palpitations and leg swelling.   Gastrointestinal: Negative for blood in stool.   Endocrine: Negative for polydipsia.   Genitourinary: Negative for hematuria.   Musculoskeletal: Positive for arthralgias.   Skin: Negative for color change.   Allergic/Immunologic: Negative for immunocompromised state.   Neurological: Negative for confusion.   Psychiatric/Behavioral: Negative for self-injury.       I have reviewed the ROS and confirm that it's accurate today.    Physical Exam:  Vital Signs:  Weight: 121 kg (267 lb)   Body mass index is 43.09 kg/m².  Temp: 97.3 °F (36.3 °C)   Heart Rate: 83   BP: 122/82     Physical Exam   Constitutional: She is oriented to person, place, and time. She appears well-developed and well-nourished.   Central obesity   HENT:   Head: Normocephalic and atraumatic.   Nose: Nose normal.   Mild alopecia   Eyes: Conjunctivae and EOM are normal. Pupils are equal, round, and reactive to light.   Neck:  Normal range of motion. Neck supple. Carotid bruit is not present. No tracheal deviation present. No thyromegaly present.   Cardiovascular: Normal rate, regular rhythm and normal heart sounds.   Pulmonary/Chest: Effort normal and breath sounds normal. No respiratory distress.   Abdominal: Soft. She exhibits no distension. There is no hepatosplenomegaly. There is no tenderness.   Old port site right upper quadrant.  Supraumbilical midline incision, lower midline incision, scattered laparoscopy scars, no hernias appreciated   Musculoskeletal: Normal range of motion. She exhibits no edema or deformity.   Neurological: She is alert and oriented to person, place, and time. No cranial nerve deficit. Coordination normal.   Skin: Skin is warm and dry. No rash noted.   Psychiatric: She has a normal mood and affect. Her behavior is normal. Judgment and thought content normal.   Vitals reviewed.      Patient Active Problem List   Diagnosis   • Acute ST elevation myocardial infarction (STEMI) of anterior wall, 11/29/16 s/p stenting proxismal % occlusion, clinically stable.   • Type 2 diabetes mellitus (CMS/HCC)   • Chest pain   • Coronary artery disease   • Fatigue   • Hyperlipidemia   • Hypertension   • Morbid obesity (CMS/HCC)   • Dyspnea on exertion (NYHA class 2-3)   • Angina, class III (CMS/HCC)   • Dyspepsia   • BRICE on CPAP   • Preoperative cardiovascular examination   • Morbid obesity with body mass index (BMI) of 40.0 to 44.9 in adult (CMS/HCC)       Assessment:    Jannette Onofre is a 59 y.o. year old female with medically complicated obesity.    Weight loss surgery is deemed medically necessary given the following obesity related comorbidities including coronary artery disease and myocardial infarction s/p stenting, anxiety, depression, type II non-insulin-dependent diabetes mellitus, dyspnea on exertion, fatigue, hyperlipidemia, hypertension, history of melanoma, obstructive sleep apnea noncompliant with CPAP,  alopecia, chronic kidney disease, right bundle branch block with current Weight: 121 kg (267 lb) and Body mass index is 43.09 kg/m²..    Encounter Diagnosis   Name Primary?   • Morbid obesity with body mass index (BMI) of 40.0 to 44.9 in adult (CMS/[facility]) Yes      Patient is aware that surgery is a tool, and that weight loss is not guaranteed but only seen in the context of appropriate use, follow up and exercise.    The patient was present for an approximately a 2.5 hour discussion of the purpose of weight loss surgery, how WLS is a tool to assist in achieving weight loss goals, the most common complications and how best to avoid them, and the strategies for short and long term weight loss.  Ample opportunity to discuss questions was available both in group and during the time of individual examination.    I reviewed her Adriel report showing testosterone x1 and hydrocodone x1 with band removal.  Chest x-ray dated 10/3/2019 no active process.  EKG dated 9/17/2019 showing sinus rhythm with an indeterminate axis and right bundle branch block possible anterior infarction.  CBC and CMP dated 10/3/2019 normal except for glucose of 248 BUN of 26 GFR 57 of note creatinine 0.99 normal CBC.  Cardiology clearance dated 9/17/2019 by Dr. Ogden saying to continue aspirin 81 mg before and after surgery and hold the Plavix 1 week before and restart whenever safe after surgery.  LAP-BAND removal operative report dated 2/25/2019, EGD dated 1/28/2019 pathology of the antrum 1/28/2019 showing minimal chronic gastritis and negative for H. pylori.  Upper GI dated 7/5/2018 showing LAP-BAND in the appropriate position and minimal distal GE reflux.  LAP-BAND operative report dated 4/19/2013 Dr. Russell.  Psychological evaluation dated 5/23/2019 David Scales, PhD appropriate candidate.  Dietitian evaluation dated 5/23/2019 Prabha perry showing a diet and adequate in protein.  Negative H. pylori breath test dated 5/29/2018.  Labs dated  "5/17/2019 showing a glucose of 210 otherwise unremarkable CMP hemoglobin A1c of 8.09, elevated triglycerides 299 low HDL 29 high VLDL 59.8.  Normal CBC except for a low MCHC and a high MPV.  Please see scanned records that I have reviewed and signed off on today.  All of this in addition to the patient's unique history and exam has been taken into consideration in determining their appropriate candidacy for weight loss surgery.    Complications  of laparoscopic/possible robotic gastric sleeve were discussed. The patient is well aware of the potential complications of surgery that include but not limited to bleeding, infections, deep venous thrombosis, pulmonary embolism, pulmonary complications such as pneumonia, cardiac events, hernias, small bowel obstruction, damage to the spleen or other organs, bowel injury, disfiguring scars, failure to lose weight, need for additional surgery, conversion to an open procedure, and death. Patient is also aware of complications which apply in this particular procedure that can include but are not limited to a \"leak\" at the staple line which in some instances may require conversion to gastric bypass.    The patient is aware if a hiatal hernia is encountered, it likely will be repaired.  R/B/A Rx to hiatal hernia repair were discussed as outlined in our long consent form.  Briefly risks in addition to those for LSG include recurrent hernia, BOWEN, dysphagia, esophageal injury, pneumothorax, injury to the vagus nerves, injury to the thoracic duct, aorta or vena cava.    I discussed avoiding all tobacco products and second hand smoke at least 2 weeks pre-operatively and 6 weeks post-operatively to minimize the risk of sleeve leak.  This included discussing the importance of avoiding even secondhand smoke as the risk of leak is increased.  Examples discussed:  I made it very clear that the patient understands they should avoid even riding in a car where someone has previously smoked in " the last 2 weeks, living in a house where someone smokes (even if it's in a separate room/patio/attached garage, etc.) we discussed that they should not have a conversation with a group of people who are smoking even if it's outside.  They can be around wood burning fires and barbecue.  I told them I do not know if marijuana has a same effects but my overall recommendation is to avoid it for 2 weeks prior in 6 weeks after surgery.  They also are aware that nicotine may also increase the risk of leak and I strongly encouraged him to avoid that as well for 2 weeks prior in 6 weeks after surgery.    Discussed the risks, benefits and alternative therapies at great length as outlined in our extensive consent forms, consent videos, and educational teaching process under the direction of the center's .    A copy of the patient's signed informed consent is on file.    R/B/A Rx discussed to postop anticoagulation incl but not limited to bleeding, drug reaction, venothromboembolic events, etc. and agreeable to taking post op  Eliquis 2.5 mg po Q 12 hrs #28        Plan: After evaluation today I think the patient is a reasonable candidate for higher risk laparoscopic sleeve gastrectomy and possible concomitant hiatal hernia repair.  I told her I cannot offer her sleeve gastrectomy unless she can hold her aspirin and Plavix 1 week prior in 2 weeks after surgery at a minimum.  I explained that my usual practice with cardiac stent patients has been to hold the ASA and/or plavix 1 wk prior and instead of the 6 wks afterwards (safest to hopefully avoid a leak), resume at 2 wks post op with PPI coverage.  So far, this has not resulted in any leaks to date, but the sample size is relatively small.  The idea is to balance the risk of stent occlusion with the risk of leak and there's no perfect solution.    I offered her to seek a second opinion with a surgeon who will allow her to continue her baby aspirin throughout  the perioperative period as recommended by Dr. Ogden.  I also offered her to discuss this with Dr. Ogden.  She says regardless she wishes to proceed with sleeve gastrectomy.  Moreover, she is aware that LSG after prev AGB/AGBR carries increased risk over primary LSG alone, bertha wrt bleeding, infection, leak, prolonged OR times with incr risk pulm complications and VTE, etc and still wishes to proceed.  Her intolerance of her CPAP will increase her risk as well.  Other issues include  anxiety, depression, poorly controlled type II non-insulin-dependent diabetes mellitus with a hemoglobin A1c >8, dyspnea on exertion, fatigue, hyperlipidemia, hypertension, history of melanoma, alopecia, chronic kidney disease, right bundle branch block, poor dietitian evaluation.        Ranjeet Dunbar MD

## 2019-11-03 ENCOUNTER — APPOINTMENT (OUTPATIENT)
Dept: PREADMISSION TESTING | Facility: HOSPITAL | Age: 59
End: 2019-11-03

## 2019-11-03 LAB
DEPRECATED RDW RBC AUTO: 48.1 FL (ref 37–54)
ERYTHROCYTE [DISTWIDTH] IN BLOOD BY AUTOMATED COUNT: 14.4 % (ref 12.3–15.4)
HCT VFR BLD AUTO: 42.5 % (ref 34–46.6)
HGB BLD-MCNC: 13.1 G/DL (ref 12–15.9)
MCH RBC QN AUTO: 28.3 PG (ref 26.6–33)
MCHC RBC AUTO-ENTMCNC: 30.8 G/DL (ref 31.5–35.7)
MCV RBC AUTO: 91.8 FL (ref 79–97)
PLATELET # BLD AUTO: 189 10*3/MM3 (ref 140–450)
PMV BLD AUTO: 9.7 FL (ref 6–12)
RBC # BLD AUTO: 4.63 10*6/MM3 (ref 3.77–5.28)
WBC NRBC COR # BLD: 6.03 10*3/MM3 (ref 3.4–10.8)

## 2019-11-03 PROCEDURE — 36415 COLL VENOUS BLD VENIPUNCTURE: CPT

## 2019-11-03 PROCEDURE — 85027 COMPLETE CBC AUTOMATED: CPT | Performed by: ANESTHESIOLOGY

## 2019-11-03 RX ORDER — B-COMPLEX WITH VITAMIN C
250 TABLET ORAL DAILY
COMMUNITY

## 2019-11-04 ENCOUNTER — OFFICE VISIT (OUTPATIENT)
Dept: CARDIOLOGY | Facility: CLINIC | Age: 59
End: 2019-11-04

## 2019-11-04 VITALS
BODY MASS INDEX: 41.91 KG/M2 | WEIGHT: 260.8 LBS | SYSTOLIC BLOOD PRESSURE: 107 MMHG | DIASTOLIC BLOOD PRESSURE: 72 MMHG | OXYGEN SATURATION: 97 % | HEART RATE: 78 BPM | HEIGHT: 66 IN

## 2019-11-04 DIAGNOSIS — Z01.810 PREOPERATIVE CARDIOVASCULAR EXAMINATION: Primary | ICD-10-CM

## 2019-11-04 DIAGNOSIS — I25.10 ASCVD (ARTERIOSCLEROTIC CARDIOVASCULAR DISEASE): ICD-10-CM

## 2019-11-04 PROCEDURE — 99213 OFFICE O/P EST LOW 20 MIN: CPT | Performed by: INTERNAL MEDICINE

## 2019-11-04 NOTE — PROGRESS NOTES
Akhil Huerta MD  Jannette Onofre  1960 11/04/2019    Patient Active Problem List   Diagnosis   • Acute ST elevation myocardial infarction (STEMI) of anterior wall, 11/29/16 s/p stenting proxismal % occlusion, clinically stable.   • Type 2 diabetes mellitus (CMS/HCC)   • Chest pain   • ASCVD (arteriosclerotic cardiovascular disease)   • Fatigue   • Hyperlipidemia   • Hypertension   • Morbid obesity (CMS/[facility])   • Dyspnea on exertion (NYHA class 2-3)   • Angina, class III (CMS/[facility])   • Dyspepsia   • BRICE on CPAP   • Preoperative cardiovascular examination   • Morbid obesity with body mass index (BMI) of 40.0 to 44.9 in adult (CMS/[facility])       Dear Akhil Huerta MD:    Subjective     Jannette Onofre is a 59 y.o. female with the problems as listed above, presents    Chief Complaint: Follow-up of coronary artery disease and preoperative cardiac evaluation and cardiac clearance prior to undergoing gastric sleeve procedure.       History of Present Illness:  is a very pleasant 59-year-old  female with history of known ASCVD, status post previous anterior wall STEMI in 2016 followed by acute coronary intervention at that time.  She has done well since with stable clinical course.  Her last cardiac catheterization 2017 revealed patent stent with nonobstructive disease.  She has been doing well with no anginal symptoms in the last several months.  She is here today seeking preoperative cardiac evaluation and cardiac clearance prior to undergoing gastric sleeve procedure which is scheduled for this Friday.  She as mentioned denies any complaints of chest pains or any shortness of breath over the last several months.  She in fact is been fairly active and reportedly walks about 2 miles a day without any symptoms.    No Known Allergies:      Current Outpatient Medications:   •  amLODIPine (NORVASC) 5 MG tablet, Take 1 tablet by mouth Daily., Disp: 90 tablet, Rfl: 3  •  apixaban (ELIQUIS) 2.5 MG  tablet tablet, Take 2.5 mg by mouth., Disp: , Rfl:   •  aspirin 81 MG EC tablet, Take 81 mg by mouth Daily. Last dose 10-31-19, Disp: , Rfl:   •  atorvastatin (LIPITOR) 80 MG tablet, Take 1 tablet by mouth Daily., Disp: 90 tablet, Rfl: 3  •  carvedilol (COREG) 25 MG tablet, Take 1 tablet by mouth 2 (Two) Times a Day with food., Disp: 180 tablet, Rfl: 3  •  Cholecalciferol (VITAMIN D3) 49781 units tablet, Take 10,000 Units by mouth Daily., Disp: , Rfl:   •  clopidogrel (PLAVIX) 75 MG tablet, Take 1 (one) tablet by mouth every day. (Patient taking differently: Take 75 mg by mouth Daily. Hold one week before surgery per Dr. Ogden. Last dose 10-31-19), Disp: 90 tablet, Rfl: 4  •  Dulaglutide 1.5 MG/0.5ML solution pen-injector, Inject 1.5 mg under the skin in the abdomen, thigh, or upper arm rotating injection sites 1 (One) Time Per Week., Disp: 2 mL, Rfl: 5  •  Empagliflozin 25 MG tablet, Take 1 tablet by mouth Every Morning., Disp: 90 tablet, Rfl: 3  •  glipizide (GLUCOTROL XL) 10 MG 24 hr tablet, Take 1 tablet by mouth 2 (Two) Times a Day., Disp: 180 tablet, Rfl: 4  •  icosapent ethyl (VASCEPA) 1 g capsule capsule, take 2 capsule by mouth 2 times every day with food swallowing whole. Do not chew, open, dissolve and/or crush., Disp: 360 capsule, Rfl: 3  •  insulin lispro (humaLOG) 100 UNIT/ML injection, Inject 0-9 Units under the skin 4 (Four) Times a Day (Before Meals & at Bedtime). (Patient taking differently: Inject 0-9 Units under the skin into the appropriate area as directed Take As Directed. PER SLIDING SCALE), Disp: 10 mL, Rfl: 12  •  Insulin Lispro Prot & Lispro (50-50) 100 UNIT/ML suspension pen-injector, Inject 30 Units under the skin before meals. (Patient taking differently: Inject 30 Units under the skin into the appropriate area as directed 3 (Three) Times a Day.), Disp: 90 mL, Rfl: 5  •  isosorbide mononitrate (IMDUR) 60 MG 24 hr tablet, Take 1½ tablets by mouth Every Morning., Disp: 180 tablet, Rfl:  "3  •  lisinopril (PRINIVIL,ZESTRIL) 20 MG tablet, Take 1 (one) tablet by mouth every day. (Patient taking differently: Take 20 mg by mouth Daily.), Disp: 90 tablet, Rfl: 3  •  metFORMIN (GLUCOPHAGE) 1000 MG tablet, Take 1 tablet by mouth 2 (Two) Times a Day (with morning & evening meals)., Disp: 60 tablet, Rfl: 11  •  Multiple Vitamin (MULTI-VITAMIN DAILY PO), Take  by mouth., Disp: , Rfl:   •  nitroglycerin (NITROSTAT) 0.4 MG SL tablet, Place 1 tablet under the tongue Every 5 (Five) Minutes As Needed for Chest Pain (max of 3 doses in 15 minutes. If no relief, go to ER., Disp: 25 tablet, Rfl: 12  •  thiamine1 (VITAMIN B1) 250 MG tablet, Take 250 mg by mouth Daily., Disp: , Rfl:       The following portions of the patient's history were reviewed and updated as appropriate: allergies, current medications, past family history, past medical history, past social history, past surgical history and problem list.    Social History     Tobacco Use   • Smoking status: Former Smoker     Packs/day: 0.25     Years: 1.00     Pack years: 0.25     Types: Cigarettes     Last attempt to quit: 1989     Years since quittin.3   • Smokeless tobacco: Never Used   Substance Use Topics   • Alcohol use: No   • Drug use: No       Review of Systems   Constitution: Negative for chills and fever.   HENT: Negative for nosebleeds and sore throat.    Cardiovascular: Negative.    Respiratory: Negative for cough, hemoptysis and wheezing.    Gastrointestinal: Negative for abdominal pain, hematemesis, hematochezia, melena, nausea and vomiting.   Genitourinary: Negative for dysuria and hematuria.   Neurological: Negative for headaches.       Objective   Vitals:    19 1053   BP: 107/72   BP Location: Right arm   Patient Position: Sitting   Cuff Size: Adult   Pulse: 78   SpO2: 97%   Weight: 118 kg (260 lb 12.8 oz)   Height: 167.6 cm (66\")     Body mass index is 42.09 kg/m².      Physical Exam   Constitutional: She is oriented to person, " place, and time. She appears well-developed and well-nourished.   HENT:   Mouth/Throat: Oropharynx is clear and moist.   Eyes: EOM are normal. Pupils are equal, round, and reactive to light.   Neck: Neck supple. No JVD present. No tracheal deviation present. No thyromegaly present.   Cardiovascular: Normal rate, regular rhythm, S1 normal and S2 normal. Exam reveals no gallop and no friction rub.   No murmur heard.  Pulmonary/Chest: Effort normal and breath sounds normal.   Abdominal: Soft. Bowel sounds are normal. She exhibits no mass. There is no tenderness.   Musculoskeletal: Normal range of motion. She exhibits no edema.   Lymphadenopathy:     She has no cervical adenopathy.   Neurological: She is alert and oriented to person, place, and time.   Skin: Skin is warm and dry. No rash noted.   Psychiatric: She has a normal mood and affect.       Lab Results   Component Value Date     10/03/2019    K 4.5 10/03/2019     10/03/2019    CO2 25.2 10/03/2019    BUN 26 (H) 10/03/2019    CREATININE 0.99 10/03/2019    GLUCOSE 248 (H) 10/03/2019    CALCIUM 9.2 10/03/2019    AST 14 10/03/2019    ALT 16 10/03/2019    ALKPHOS 60 10/03/2019    LABIL2 1.6 12/11/2015     Lab Results   Component Value Date    CKTOTAL 35 10/29/2017     Lab Results   Component Value Date    WBC 6.03 11/03/2019    HGB 13.1 11/03/2019    HCT 42.5 11/03/2019     11/03/2019     Lab Results   Component Value Date    INR 0.98 10/28/2017    INR 0.89 11/29/2016    INR 0.82 11/27/2016     No results found for: MG  Lab Results   Component Value Date    TSH 2.700 08/21/2019    CHLPL 213 (H) 03/14/2016    TRIG 216 (H) 08/21/2019    HDL 35 (L) 08/21/2019    LDL 42 08/21/2019      Lab Results   Component Value Date    BNP 27.0 12/29/2016       Procedures    Assessment/Plan    Diagnosis Plan   1. Preoperative cardiovascular examination     2. ASCVD (arteriosclerotic cardiovascular disease), status post previous angina wall STEMI in 11/16, status  post acute intervention with stable clinical course since then.          Recommendations:  1. Since her cardiac status has been stable over the last couple of years with good functional capacity as described by the patient, we will go ahead and clear her for the laparoscopic gastric sleeve procedure.   2. I would like for her to get back on the aspirin as soon as possible to avoid any stent thrombosis.   3. I think Plavix would be a better option than Eliquis postoperatively to prevent stent thrombosis and the Plavix would not have any adverse effect on the postoperative healing on the stomach like aspirin.      Return in about 3 months (around 2/4/2020).    As always, Akhil Huerta MD  I appreciate very much the opportunity to participate in the cardiovascular care of your patients. Please do not hesitate to call me with any questions with regards to Jannette Onofre evaluation and management.           With Best Regards,        Lebron Ogden MD, [facility]    Please note that portions of this note were completed with a voice recognition program.

## 2019-11-07 PROCEDURE — S0260 H&P FOR SURGERY: HCPCS | Performed by: PHYSICIAN ASSISTANT

## 2019-11-08 ENCOUNTER — HOSPITAL ENCOUNTER (INPATIENT)
Facility: HOSPITAL | Age: 59
LOS: 1 days | Discharge: HOME | End: 2019-11-09
Attending: SURGERY | Admitting: SURGERY

## 2019-11-08 ENCOUNTER — ANESTHESIA EVENT (OUTPATIENT)
Dept: PERIOP | Facility: HOSPITAL | Age: 59
End: 2019-11-08

## 2019-11-08 ENCOUNTER — ANESTHESIA (OUTPATIENT)
Dept: PERIOP | Facility: HOSPITAL | Age: 59
End: 2019-11-08

## 2019-11-08 DIAGNOSIS — E66.01 MORBID OBESITY WITH BODY MASS INDEX (BMI) OF 40.0 TO 44.9 IN ADULT (HCC): ICD-10-CM

## 2019-11-08 LAB
ABO GROUP BLD: NORMAL
BLD GP AB SCN SERPL QL: NEGATIVE
GLUCOSE BLDC GLUCOMTR-MCNC: 122 MG/DL (ref 70–130)
GLUCOSE BLDC GLUCOMTR-MCNC: 136 MG/DL (ref 70–130)
GLUCOSE BLDC GLUCOMTR-MCNC: 209 MG/DL (ref 70–130)
RH BLD: NEGATIVE
T&S EXPIRATION DATE: NORMAL

## 2019-11-08 PROCEDURE — 88307 TISSUE EXAM BY PATHOLOGIST: CPT | Performed by: SURGERY

## 2019-11-08 PROCEDURE — 25010000002 BUPRENORPHINE PER 0.1 MG: Performed by: ANESTHESIOLOGY

## 2019-11-08 PROCEDURE — 25010000002 ENOXAPARIN PER 10 MG: Performed by: SURGERY

## 2019-11-08 PROCEDURE — 94799 UNLISTED PULMONARY SVC/PX: CPT

## 2019-11-08 PROCEDURE — 25010000002 DEXAMETHASONE SODIUM PHOSPHATE 10 MG/ML SOLUTION: Performed by: ANESTHESIOLOGY

## 2019-11-08 PROCEDURE — 86900 BLOOD TYPING SEROLOGIC ABO: CPT | Performed by: SURGERY

## 2019-11-08 PROCEDURE — 0WQF4ZZ REPAIR ABDOMINAL WALL, PERCUTANEOUS ENDOSCOPIC APPROACH: ICD-10-PCS | Performed by: SURGERY

## 2019-11-08 PROCEDURE — 82962 GLUCOSE BLOOD TEST: CPT

## 2019-11-08 PROCEDURE — 0DB64Z3 EXCISION OF STOMACH, PERCUTANEOUS ENDOSCOPIC APPROACH, VERTICAL: ICD-10-PCS | Performed by: SURGERY

## 2019-11-08 PROCEDURE — 86901 BLOOD TYPING SEROLOGIC RH(D): CPT | Performed by: SURGERY

## 2019-11-08 PROCEDURE — 25010000002 ONDANSETRON PER 1 MG: Performed by: SURGERY

## 2019-11-08 PROCEDURE — 63710000001 INSULIN LISPRO (HUMAN) PER 5 UNITS: Performed by: SURGERY

## 2019-11-08 PROCEDURE — 25010000002 NEOSTIGMINE 10 MG/10ML SOLUTION: Performed by: ANESTHESIOLOGY

## 2019-11-08 PROCEDURE — 25010000003 CEFAZOLIN IN DEXTROSE 2-4 GM/100ML-% SOLUTION: Performed by: SURGERY

## 2019-11-08 PROCEDURE — 25010000002 PROPOFOL 10 MG/ML EMULSION: Performed by: NURSE ANESTHETIST, CERTIFIED REGISTERED

## 2019-11-08 PROCEDURE — 25010000002 SUCCINYLCHOLINE PER 20 MG: Performed by: NURSE ANESTHETIST, CERTIFIED REGISTERED

## 2019-11-08 PROCEDURE — 43775 LAP SLEEVE GASTRECTOMY: CPT | Performed by: SURGERY

## 2019-11-08 PROCEDURE — 86850 RBC ANTIBODY SCREEN: CPT | Performed by: SURGERY

## 2019-11-08 PROCEDURE — 25010000002 FENTANYL CITRATE (PF) 100 MCG/2ML SOLUTION: Performed by: NURSE ANESTHETIST, CERTIFIED REGISTERED

## 2019-11-08 PROCEDURE — 25010000002 DEXAMETHASONE PER 1 MG: Performed by: NURSE ANESTHETIST, CERTIFIED REGISTERED

## 2019-11-08 DEVICE — REINFORCED INTELLIGENT RELOAD, FOR USE WITH SIGNIA STAPLING SYSTEM
Type: IMPLANTABLE DEVICE | Status: FUNCTIONAL
Brand: TRI-STAPLE 2.0

## 2019-11-08 DEVICE — BLACK REINFORCED INTELLIGENT RELOAD, FOR USE WITH SIGNIA STAPLING SYSTEM
Type: IMPLANTABLE DEVICE | Status: FUNCTIONAL
Brand: TRI-STAPLE 2.0

## 2019-11-08 RX ORDER — ALBUTEROL SULFATE 2.5 MG/3ML
2.5 SOLUTION RESPIRATORY (INHALATION) EVERY 4 HOURS PRN
Status: DISCONTINUED | OUTPATIENT
Start: 2019-11-08 | End: 2019-11-09

## 2019-11-08 RX ORDER — NEOSTIGMINE METHYLSULFATE 1 MG/ML
INJECTION, SOLUTION INTRAVENOUS AS NEEDED
Status: DISCONTINUED | OUTPATIENT
Start: 2019-11-08 | End: 2019-11-08 | Stop reason: SURG

## 2019-11-08 RX ORDER — ALPRAZOLAM 0.25 MG/1
0.25 TABLET ORAL ONCE AS NEEDED
Status: DISCONTINUED | OUTPATIENT
Start: 2019-11-08 | End: 2019-11-09 | Stop reason: HOSPADM

## 2019-11-08 RX ORDER — HYDROMORPHONE HYDROCHLORIDE 1 MG/ML
0.5 INJECTION, SOLUTION INTRAMUSCULAR; INTRAVENOUS; SUBCUTANEOUS
Status: DISCONTINUED | OUTPATIENT
Start: 2019-11-08 | End: 2019-11-08 | Stop reason: HOSPADM

## 2019-11-08 RX ORDER — PROMETHAZINE HYDROCHLORIDE 25 MG/ML
12.5 INJECTION, SOLUTION INTRAMUSCULAR; INTRAVENOUS EVERY 6 HOURS PRN
Status: DISCONTINUED | OUTPATIENT
Start: 2019-11-08 | End: 2019-11-09 | Stop reason: HOSPADM

## 2019-11-08 RX ORDER — NALOXONE HCL 0.4 MG/ML
0.1 VIAL (ML) INJECTION
Status: DISCONTINUED | OUTPATIENT
Start: 2019-11-08 | End: 2019-11-09 | Stop reason: HOSPADM

## 2019-11-08 RX ORDER — PROMETHAZINE HYDROCHLORIDE 25 MG/1
25 SUPPOSITORY RECTAL ONCE AS NEEDED
Status: DISCONTINUED | OUTPATIENT
Start: 2019-11-08 | End: 2019-11-08 | Stop reason: HOSPADM

## 2019-11-08 RX ORDER — SUCCINYLCHOLINE CHLORIDE 20 MG/ML
INJECTION INTRAMUSCULAR; INTRAVENOUS AS NEEDED
Status: DISCONTINUED | OUTPATIENT
Start: 2019-11-08 | End: 2019-11-08 | Stop reason: SURG

## 2019-11-08 RX ORDER — PANTOPRAZOLE SODIUM 40 MG/10ML
40 INJECTION, POWDER, LYOPHILIZED, FOR SOLUTION INTRAVENOUS ONCE
Status: COMPLETED | OUTPATIENT
Start: 2019-11-08 | End: 2019-11-08

## 2019-11-08 RX ORDER — PROMETHAZINE HYDROCHLORIDE 25 MG/ML
12.5 INJECTION, SOLUTION INTRAMUSCULAR; INTRAVENOUS ONCE AS NEEDED
Status: DISCONTINUED | OUTPATIENT
Start: 2019-11-08 | End: 2019-11-08 | Stop reason: HOSPADM

## 2019-11-08 RX ORDER — SODIUM CHLORIDE 9 MG/ML
150 INJECTION, SOLUTION INTRAVENOUS CONTINUOUS
Status: DISCONTINUED | OUTPATIENT
Start: 2019-11-08 | End: 2019-11-09

## 2019-11-08 RX ORDER — SODIUM CHLORIDE AND POTASSIUM CHLORIDE 150; 450 MG/100ML; MG/100ML
125 INJECTION, SOLUTION INTRAVENOUS CONTINUOUS
Status: DISCONTINUED | OUTPATIENT
Start: 2019-11-09 | End: 2019-11-09 | Stop reason: HOSPADM

## 2019-11-08 RX ORDER — ACETAMINOPHEN 500 MG
1000 TABLET ORAL EVERY 12 HOURS
Status: DISCONTINUED | OUTPATIENT
Start: 2019-11-08 | End: 2019-11-09 | Stop reason: HOSPADM

## 2019-11-08 RX ORDER — CHLORHEXIDINE GLUCONATE 0.12 MG/ML
30 RINSE ORAL
Status: COMPLETED | OUTPATIENT
Start: 2019-11-08 | End: 2019-11-08

## 2019-11-08 RX ORDER — CYANOCOBALAMIN 1000 UG/ML
1000 INJECTION, SOLUTION INTRAMUSCULAR; SUBCUTANEOUS ONCE
Status: COMPLETED | OUTPATIENT
Start: 2019-11-09 | End: 2019-11-09

## 2019-11-08 RX ORDER — FAMOTIDINE 10 MG/ML
20 INJECTION, SOLUTION INTRAVENOUS ONCE
Status: CANCELLED | OUTPATIENT
Start: 2019-11-08 | End: 2019-11-08

## 2019-11-08 RX ORDER — FAMOTIDINE 20 MG/1
20 TABLET, FILM COATED ORAL ONCE
Status: CANCELLED | OUTPATIENT
Start: 2019-11-08 | End: 2019-11-08

## 2019-11-08 RX ORDER — GLYCOPYRROLATE 0.2 MG/ML
INJECTION INTRAMUSCULAR; INTRAVENOUS AS NEEDED
Status: DISCONTINUED | OUTPATIENT
Start: 2019-11-08 | End: 2019-11-08 | Stop reason: SURG

## 2019-11-08 RX ORDER — SODIUM CHLORIDE 9 MG/ML
150 INJECTION, SOLUTION INTRAVENOUS CONTINUOUS
Status: DISCONTINUED | OUTPATIENT
Start: 2019-11-08 | End: 2019-11-08 | Stop reason: HOSPADM

## 2019-11-08 RX ORDER — PROMETHAZINE HYDROCHLORIDE 25 MG/1
25 TABLET ORAL ONCE AS NEEDED
Status: DISCONTINUED | OUTPATIENT
Start: 2019-11-08 | End: 2019-11-08 | Stop reason: HOSPADM

## 2019-11-08 RX ORDER — FENTANYL CITRATE 50 UG/ML
50 INJECTION, SOLUTION INTRAMUSCULAR; INTRAVENOUS
Status: DISCONTINUED | OUTPATIENT
Start: 2019-11-08 | End: 2019-11-08 | Stop reason: HOSPADM

## 2019-11-08 RX ORDER — ONDANSETRON 2 MG/ML
4 INJECTION INTRAMUSCULAR; INTRAVENOUS EVERY 6 HOURS
Status: DISPENSED | OUTPATIENT
Start: 2019-11-08 | End: 2019-11-09

## 2019-11-08 RX ORDER — GABAPENTIN 300 MG/1
600 CAPSULE ORAL ONCE
Status: COMPLETED | OUTPATIENT
Start: 2019-11-08 | End: 2019-11-08

## 2019-11-08 RX ORDER — PROCHLORPERAZINE MALEATE 10 MG
10 TABLET ORAL EVERY 6 HOURS PRN
Status: DISCONTINUED | OUTPATIENT
Start: 2019-11-08 | End: 2019-11-09 | Stop reason: HOSPADM

## 2019-11-08 RX ORDER — METOCLOPRAMIDE HYDROCHLORIDE 5 MG/ML
10 INJECTION INTRAMUSCULAR; INTRAVENOUS EVERY 6 HOURS PRN
Status: DISCONTINUED | OUTPATIENT
Start: 2019-11-08 | End: 2019-11-09 | Stop reason: HOSPADM

## 2019-11-08 RX ORDER — DIPHENHYDRAMINE HYDROCHLORIDE 50 MG/ML
25 INJECTION INTRAMUSCULAR; INTRAVENOUS EVERY 4 HOURS PRN
Status: DISCONTINUED | OUTPATIENT
Start: 2019-11-08 | End: 2019-11-09 | Stop reason: HOSPADM

## 2019-11-08 RX ORDER — ROCURONIUM BROMIDE 10 MG/ML
INJECTION, SOLUTION INTRAVENOUS AS NEEDED
Status: DISCONTINUED | OUTPATIENT
Start: 2019-11-08 | End: 2019-11-08 | Stop reason: SURG

## 2019-11-08 RX ORDER — SODIUM CHLORIDE 0.9 % (FLUSH) 0.9 %
3 SYRINGE (ML) INJECTION EVERY 12 HOURS SCHEDULED
Status: DISCONTINUED | OUTPATIENT
Start: 2019-11-08 | End: 2019-11-08 | Stop reason: HOSPADM

## 2019-11-08 RX ORDER — CARVEDILOL 12.5 MG/1
25 TABLET ORAL 2 TIMES DAILY
Status: DISCONTINUED | OUTPATIENT
Start: 2019-11-08 | End: 2019-11-09 | Stop reason: HOSPADM

## 2019-11-08 RX ORDER — BUPIVACAINE HYDROCHLORIDE 2.5 MG/ML
INJECTION, SOLUTION EPIDURAL; INFILTRATION; INTRACAUDAL
Status: COMPLETED | OUTPATIENT
Start: 2019-11-08 | End: 2019-11-08

## 2019-11-08 RX ORDER — BUPRENORPHINE HYDROCHLORIDE 0.32 MG/ML
INJECTION INTRAMUSCULAR; INTRAVENOUS
Status: COMPLETED | OUTPATIENT
Start: 2019-11-08 | End: 2019-11-08

## 2019-11-08 RX ORDER — LIDOCAINE HYDROCHLORIDE 10 MG/ML
0.5 INJECTION, SOLUTION EPIDURAL; INFILTRATION; INTRACAUDAL; PERINEURAL ONCE AS NEEDED
Status: COMPLETED | OUTPATIENT
Start: 2019-11-08 | End: 2019-11-08

## 2019-11-08 RX ORDER — LISINOPRIL 20 MG/1
20 TABLET ORAL DAILY
Status: DISCONTINUED | OUTPATIENT
Start: 2019-11-09 | End: 2019-11-09 | Stop reason: HOSPADM

## 2019-11-08 RX ORDER — LORAZEPAM 2 MG/ML
0.5 INJECTION INTRAMUSCULAR EVERY 12 HOURS PRN
Status: DISCONTINUED | OUTPATIENT
Start: 2019-11-08 | End: 2019-11-09 | Stop reason: HOSPADM

## 2019-11-08 RX ORDER — MAGNESIUM HYDROXIDE 1200 MG/15ML
LIQUID ORAL AS NEEDED
Status: DISCONTINUED | OUTPATIENT
Start: 2019-11-08 | End: 2019-11-08 | Stop reason: HOSPADM

## 2019-11-08 RX ORDER — HYDRALAZINE HYDROCHLORIDE 20 MG/ML
10 INJECTION INTRAMUSCULAR; INTRAVENOUS
Status: DISCONTINUED | OUTPATIENT
Start: 2019-11-08 | End: 2019-11-09 | Stop reason: HOSPADM

## 2019-11-08 RX ORDER — PANTOPRAZOLE SODIUM 40 MG/10ML
40 INJECTION, POWDER, LYOPHILIZED, FOR SOLUTION INTRAVENOUS
Status: DISCONTINUED | OUTPATIENT
Start: 2019-11-09 | End: 2019-11-09 | Stop reason: HOSPADM

## 2019-11-08 RX ORDER — SCOLOPAMINE TRANSDERMAL SYSTEM 1 MG/1
1 PATCH, EXTENDED RELEASE TRANSDERMAL ONCE
Status: DISCONTINUED | OUTPATIENT
Start: 2019-11-08 | End: 2019-11-08

## 2019-11-08 RX ORDER — DEXAMETHASONE SODIUM PHOSPHATE 10 MG/ML
INJECTION, SOLUTION INTRAMUSCULAR; INTRAVENOUS
Status: COMPLETED | OUTPATIENT
Start: 2019-11-08 | End: 2019-11-08

## 2019-11-08 RX ORDER — SODIUM CHLORIDE 9 MG/ML
INJECTION, SOLUTION INTRAVENOUS AS NEEDED
Status: DISCONTINUED | OUTPATIENT
Start: 2019-11-08 | End: 2019-11-08 | Stop reason: HOSPADM

## 2019-11-08 RX ORDER — SODIUM CHLORIDE 0.9 % (FLUSH) 0.9 %
3-10 SYRINGE (ML) INJECTION AS NEEDED
Status: DISCONTINUED | OUTPATIENT
Start: 2019-11-08 | End: 2019-11-08 | Stop reason: HOSPADM

## 2019-11-08 RX ORDER — DEXTROSE MONOHYDRATE 25 G/50ML
25 INJECTION, SOLUTION INTRAVENOUS
Status: DISCONTINUED | OUTPATIENT
Start: 2019-11-08 | End: 2019-11-08 | Stop reason: HOSPADM

## 2019-11-08 RX ORDER — PROMETHAZINE HYDROCHLORIDE 12.5 MG/1
12.5 TABLET ORAL EVERY 6 HOURS PRN
Status: DISCONTINUED | OUTPATIENT
Start: 2019-11-08 | End: 2019-11-09 | Stop reason: HOSPADM

## 2019-11-08 RX ORDER — HYDROMORPHONE HYDROCHLORIDE 2 MG/1
2 TABLET ORAL EVERY 4 HOURS PRN
Status: DISCONTINUED | OUTPATIENT
Start: 2019-11-08 | End: 2019-11-09 | Stop reason: HOSPADM

## 2019-11-08 RX ORDER — ONDANSETRON 4 MG/1
4 TABLET, FILM COATED ORAL EVERY 6 HOURS PRN
Status: DISCONTINUED | OUTPATIENT
Start: 2019-11-09 | End: 2019-11-09 | Stop reason: HOSPADM

## 2019-11-08 RX ORDER — SODIUM CHLORIDE, SODIUM LACTATE, POTASSIUM CHLORIDE, CALCIUM CHLORIDE 600; 310; 30; 20 MG/100ML; MG/100ML; MG/100ML; MG/100ML
9 INJECTION, SOLUTION INTRAVENOUS CONTINUOUS
Status: DISCONTINUED | OUTPATIENT
Start: 2019-11-08 | End: 2019-11-08 | Stop reason: HOSPADM

## 2019-11-08 RX ORDER — CEFAZOLIN SODIUM 2 G/100ML
2 INJECTION, SOLUTION INTRAVENOUS EVERY 8 HOURS
Status: COMPLETED | OUTPATIENT
Start: 2019-11-09 | End: 2019-11-09

## 2019-11-08 RX ORDER — HYDROCODONE BITARTRATE AND ACETAMINOPHEN 7.5; 325 MG/1; MG/1
1 TABLET ORAL EVERY 4 HOURS PRN
Status: DISCONTINUED | OUTPATIENT
Start: 2019-11-08 | End: 2019-11-09 | Stop reason: HOSPADM

## 2019-11-08 RX ORDER — PROPOFOL 10 MG/ML
VIAL (ML) INTRAVENOUS AS NEEDED
Status: DISCONTINUED | OUTPATIENT
Start: 2019-11-08 | End: 2019-11-08 | Stop reason: SURG

## 2019-11-08 RX ORDER — NALOXONE HCL 0.4 MG/ML
0.4 VIAL (ML) INJECTION
Status: DISCONTINUED | OUTPATIENT
Start: 2019-11-08 | End: 2019-11-09 | Stop reason: HOSPADM

## 2019-11-08 RX ORDER — NITROGLYCERIN 0.4 MG/1
0.4 TABLET SUBLINGUAL
Status: DISCONTINUED | OUTPATIENT
Start: 2019-11-08 | End: 2019-11-09 | Stop reason: HOSPADM

## 2019-11-08 RX ORDER — LORAZEPAM 1 MG/1
1 TABLET ORAL EVERY 12 HOURS PRN
Status: DISCONTINUED | OUTPATIENT
Start: 2019-11-08 | End: 2019-11-09 | Stop reason: HOSPADM

## 2019-11-08 RX ORDER — SIMETHICONE 80 MG
80 TABLET,CHEWABLE ORAL 4 TIMES DAILY PRN
Status: DISCONTINUED | OUTPATIENT
Start: 2019-11-08 | End: 2019-11-09 | Stop reason: HOSPADM

## 2019-11-08 RX ORDER — NICOTINE POLACRILEX 4 MG
15 LOZENGE BUCCAL
Status: DISCONTINUED | OUTPATIENT
Start: 2019-11-08 | End: 2019-11-08 | Stop reason: HOSPADM

## 2019-11-08 RX ORDER — DEXAMETHASONE SODIUM PHOSPHATE 4 MG/ML
INJECTION, SOLUTION INTRA-ARTICULAR; INTRALESIONAL; INTRAMUSCULAR; INTRAVENOUS; SOFT TISSUE AS NEEDED
Status: DISCONTINUED | OUTPATIENT
Start: 2019-11-08 | End: 2019-11-08 | Stop reason: SURG

## 2019-11-08 RX ORDER — ONDANSETRON 2 MG/ML
4 INJECTION INTRAMUSCULAR; INTRAVENOUS ONCE AS NEEDED
Status: DISCONTINUED | OUTPATIENT
Start: 2019-11-08 | End: 2019-11-08 | Stop reason: HOSPADM

## 2019-11-08 RX ORDER — LIDOCAINE HYDROCHLORIDE 10 MG/ML
INJECTION, SOLUTION EPIDURAL; INFILTRATION; INTRACAUDAL; PERINEURAL AS NEEDED
Status: DISCONTINUED | OUTPATIENT
Start: 2019-11-08 | End: 2019-11-08 | Stop reason: SURG

## 2019-11-08 RX ORDER — GABAPENTIN 100 MG/1
100 CAPSULE ORAL 3 TIMES DAILY
Status: DISCONTINUED | OUTPATIENT
Start: 2019-11-08 | End: 2019-11-09 | Stop reason: HOSPADM

## 2019-11-08 RX ORDER — AMLODIPINE BESYLATE 5 MG/1
5 TABLET ORAL DAILY
Status: DISCONTINUED | OUTPATIENT
Start: 2019-11-09 | End: 2019-11-09 | Stop reason: HOSPADM

## 2019-11-08 RX ORDER — MORPHINE SULFATE 4 MG/ML
4 INJECTION, SOLUTION INTRAMUSCULAR; INTRAVENOUS
Status: DISCONTINUED | OUTPATIENT
Start: 2019-11-08 | End: 2019-11-09 | Stop reason: HOSPADM

## 2019-11-08 RX ORDER — ACETAMINOPHEN 500 MG
1000 TABLET ORAL ONCE
Status: COMPLETED | OUTPATIENT
Start: 2019-11-08 | End: 2019-11-08

## 2019-11-08 RX ORDER — ATORVASTATIN CALCIUM 40 MG/1
80 TABLET, FILM COATED ORAL NIGHTLY
Status: DISCONTINUED | OUTPATIENT
Start: 2019-11-08 | End: 2019-11-09 | Stop reason: HOSPADM

## 2019-11-08 RX ORDER — CEFAZOLIN SODIUM 2 G/100ML
2 INJECTION, SOLUTION INTRAVENOUS ONCE
Status: COMPLETED | OUTPATIENT
Start: 2019-11-08 | End: 2019-11-08

## 2019-11-08 RX ORDER — FENTANYL CITRATE 50 UG/ML
INJECTION, SOLUTION INTRAMUSCULAR; INTRAVENOUS AS NEEDED
Status: DISCONTINUED | OUTPATIENT
Start: 2019-11-08 | End: 2019-11-08 | Stop reason: SURG

## 2019-11-08 RX ADMIN — GLYCOPYRROLATE 0.4 MG: 0.2 INJECTION, SOLUTION INTRAMUSCULAR; INTRAVENOUS at 18:40

## 2019-11-08 RX ADMIN — SODIUM CHLORIDE, POTASSIUM CHLORIDE, SODIUM LACTATE AND CALCIUM CHLORIDE: 600; 310; 30; 20 INJECTION, SOLUTION INTRAVENOUS at 17:03

## 2019-11-08 RX ADMIN — INSULIN LISPRO 5 UNITS: 100 INJECTION, SOLUTION INTRAVENOUS; SUBCUTANEOUS at 21:12

## 2019-11-08 RX ADMIN — CHLORHEXIDINE GLUCONATE 0.12% ORAL RINSE 15 ML: 1.2 LIQUID ORAL at 13:56

## 2019-11-08 RX ADMIN — ROCURONIUM BROMIDE 10 MG: 10 SOLUTION INTRAVENOUS at 17:14

## 2019-11-08 RX ADMIN — PANTOPRAZOLE SODIUM 40 MG: 40 INJECTION, POWDER, FOR SOLUTION INTRAVENOUS at 13:15

## 2019-11-08 RX ADMIN — LIDOCAINE HYDROCHLORIDE 50 MG: 10 INJECTION, SOLUTION EPIDURAL; INFILTRATION; INTRACAUDAL; PERINEURAL at 17:14

## 2019-11-08 RX ADMIN — BUPIVACAINE HYDROCHLORIDE 60 ML: 2.5 INJECTION, SOLUTION EPIDURAL; INFILTRATION; INTRACAUDAL; PERINEURAL at 17:19

## 2019-11-08 RX ADMIN — SODIUM CHLORIDE 150 ML/HR: 9 INJECTION, SOLUTION INTRAVENOUS at 13:53

## 2019-11-08 RX ADMIN — ATORVASTATIN CALCIUM 80 MG: 40 TABLET, FILM COATED ORAL at 21:12

## 2019-11-08 RX ADMIN — ROCURONIUM BROMIDE 40 MG: 10 SOLUTION INTRAVENOUS at 17:29

## 2019-11-08 RX ADMIN — ACETAMINOPHEN 1000 MG: 500 TABLET ORAL at 13:23

## 2019-11-08 RX ADMIN — CEFAZOLIN SODIUM 2 G: 2 INJECTION, SOLUTION INTRAVENOUS at 17:03

## 2019-11-08 RX ADMIN — SCOPALAMINE 1 PATCH: 1 PATCH, EXTENDED RELEASE TRANSDERMAL at 13:24

## 2019-11-08 RX ADMIN — CEFAZOLIN SODIUM 2 G: 2 INJECTION, SOLUTION INTRAVENOUS at 23:32

## 2019-11-08 RX ADMIN — SODIUM CHLORIDE 150 ML/HR: 9 INJECTION, SOLUTION INTRAVENOUS at 20:39

## 2019-11-08 RX ADMIN — GABAPENTIN 600 MG: 300 CAPSULE ORAL at 13:55

## 2019-11-08 RX ADMIN — BUPRENORPHINE HYDROCHLORIDE 0.3 MG: 0.32 INJECTION INTRAMUSCULAR; INTRAVENOUS at 17:19

## 2019-11-08 RX ADMIN — PROPOFOL 200 MG: 10 INJECTION, EMULSION INTRAVENOUS at 17:14

## 2019-11-08 RX ADMIN — DEXAMETHASONE SODIUM PHOSPHATE 6 MG: 10 INJECTION INTRAMUSCULAR; INTRAVENOUS at 17:19

## 2019-11-08 RX ADMIN — SODIUM CHLORIDE 1000 ML: 9 INJECTION, SOLUTION INTRAVENOUS at 13:10

## 2019-11-08 RX ADMIN — LIDOCAINE HYDROCHLORIDE 0.5 ML: 10 INJECTION, SOLUTION EPIDURAL; INFILTRATION; INTRACAUDAL; PERINEURAL at 13:10

## 2019-11-08 RX ADMIN — ONDANSETRON 4 MG: 2 INJECTION INTRAMUSCULAR; INTRAVENOUS at 23:32

## 2019-11-08 RX ADMIN — FENTANYL CITRATE 100 MCG: 50 INJECTION, SOLUTION INTRAMUSCULAR; INTRAVENOUS at 17:14

## 2019-11-08 RX ADMIN — DEXAMETHASONE SODIUM PHOSPHATE 8 MG: 4 INJECTION, SOLUTION INTRAMUSCULAR; INTRAVENOUS at 17:20

## 2019-11-08 RX ADMIN — SUCCINYLCHOLINE CHLORIDE 180 MG: 20 INJECTION, SOLUTION INTRAMUSCULAR; INTRAVENOUS at 17:14

## 2019-11-08 RX ADMIN — CHLORHEXIDINE GLUCONATE 0.12% ORAL RINSE 30 ML: 1.2 LIQUID ORAL at 13:23

## 2019-11-08 RX ADMIN — NEOSTIGMINE METHYLSULFATE 5 MG: 1 INJECTION, SOLUTION INTRAVENOUS at 18:40

## 2019-11-08 RX ADMIN — ACETAMINOPHEN 1000 MG: 500 TABLET, FILM COATED ORAL at 21:12

## 2019-11-08 NOTE — ANESTHESIA PREPROCEDURE EVALUATION
Anesthesia Evaluation     Patient summary reviewed and Nursing notes reviewed                Airway   Mallampati: II  TM distance: >3 FB  Neck ROM: full  Possible difficult intubation  Dental - normal exam     Pulmonary - normal exam   (+) sleep apnea,   Cardiovascular - normal exam    (+) hypertension, CAD, cardiac stents (off asa/plavix 8 days) hyperlipidemia,     ROS comment: RBBB  Echo 8/17: normal EF, no valve disease  Stress test 7/18: low risk study for ischemia      Neuro/Psych- negative ROS  GI/Hepatic/Renal/Endo    (+) morbid obesity,  diabetes mellitus,     Musculoskeletal (-) negative ROS    Abdominal  - normal exam    Bowel sounds: normal.   Substance History - negative use     OB/GYN negative ob/gyn ROS         Other                        Anesthesia Plan    ASA 3     general   (TAP blocks)  intravenous induction     Anesthetic plan, all risks, benefits, and alternatives have been provided, discussed and informed consent has been obtained with: patient.    Plan discussed with CRNA.

## 2019-11-08 NOTE — BRIEF OP NOTE
GASTRIC SLEEVE LAPAROSCOPIC, ESOPHAGOGASTRODUODENOSCOPY  Progress Note    Jannette Onofre  11/8/2019    Pre-op Diagnosis:   Morbid obesity with body mass index (BMI) of 40.0 to 44.9 in adult (CMS/[facility]) [E66.01, Z68.41]       Post-Op Diagnosis Codes:     * Morbid obesity with body mass index (BMI) of 40.0 to 44.9 in adult (CMS/[facility]) [E66.01, Z68.41]    Procedure/CPT® Codes:  SD LAP, BAN RESTRICT PROC, LONGITUDINAL GASTRECTOMY [39522]  SD ESOPHAGOGASTRODUODENOSCOPY TRANSORAL DIAGNOSTIC [22410]    Procedure(s):  GASTRIC SLEEVE LAPAROSCOPIC  ESOPHAGOGASTRODUODENOSCOPY    Surgeon(s):  Ranjeet Dunbar MD    Anesthesia: General with Block    Staff:   Circulator: Arabella Covington RN; Esthela Isabel RN  Scrub Person: Scar Jackson  Vendor Representative: Eddie Joshi  Nursing Assistant: Marielena Luz    Estimated Blood Loss: minimal    Urine Voided: * No values recorded between 11/8/2019  5:03 PM and 11/8/2019  6:43 PM *    Specimens:                Specimens     ID Source Type Tests Collected By Collected At Frozen?      A Stomach Tissue · TISSUE PATHOLOGY EXAM   Ranjeet Dunbar MD 11/8/19 1736 No     Description: SUB-TOTAL GASTRECTOMY                Drains:      Findings:     Complications: none      Ranjeet Dunbar MD     Date: 11/8/2019  Time: 6:43 PM

## 2019-11-08 NOTE — ANESTHESIA PROCEDURE NOTES
Airway  Urgency: elective    Date/Time: 11/8/2019 5:19 PM  Airway not difficult    General Information and Staff    Patient location during procedure: OR  CRNA: Noam Walker CRNA    Indications and Patient Condition  Indications for airway management: airway protection    Preoxygenated: yes  MILS not maintained throughout  Mask difficulty assessment: 1 - vent by mask    Final Airway Details  Final airway type: endotracheal airway      Successful airway: ETT  Cuffed: yes   Successful intubation technique: direct laryngoscopy  Facilitating devices/methods: intubating stylet  Endotracheal tube insertion site: oral  Blade: Ani  Blade size: 3  ETT size (mm): 7.5  Cormack-Lehane Classification: grade IIa - partial view of glottis  Placement verified by: chest auscultation and capnometry   Measured from: lips  ETT/EBT  to lips (cm): 20  Number of attempts at approach: 1  Assessment: lips, teeth, and gum same as pre-op and atraumatic intubation    Additional Comments  Negative epigastric sounds, Breath sound equal bilaterally with symmetric chest rise and fall

## 2019-11-08 NOTE — H&P
"[facility] GROUP BARIATRIC SURGERY  [address]  [facility] [id number] [phone]        Patient  Name:  Jannette Onofre  :  1960           Chief Complaint:  weight gain; unable to maintain weight loss.  Evaluate for possible weight loss surgery.     History of Present Illness:  Jannette Onofre is a 59 y.o. female who presents today for evaluation, education and consultation regarding weight loss surgery.  Since last seen 2019 she has lost 1 pound.The patient returns for final visit prior to LSG.  Original intake evaluation Carli Bernal PA-C dated 2019 reviewed.    From her evaluation that day:      \"s/p LAGB 2013 by Dr. Russell.  Transferred care to [facility] 2016 b/c she says Dr. Russell made her feel like a failure.  Unfortunately had ongoing issues w/ lapband, suspected band intolerance.  Ultimately s/p AGBR 19 w/ Dr. Dunbar.  Chronic nausea/dysphagia resolved w/ band removal.  Weight stable.       Presurgery weight 322 lbs.  Lowest weight:  224 lbs, but was unable to maintain.  Current weight: 259 lbs. \"     The patient has had issues with morbid obesity for years and only temporary success with surgical and non-surgical methods of weight loss.  The patient is seeking LSG to help with the morbid obesity related conditions of coronary artery disease and myocardial infarction s/p stenting, anxiety, depression, type II non-insulin-dependent diabetes mellitus, dyspnea on exertion, fatigue, hyperlipidemia, hypertension, history of melanoma, obstructive sleep apnea noncompliant with CPAP, alopecia, chronic kidney disease, right bundle branch block.     59-year-old morbidly obese white female  at [employer].  She lives in [facility].  Since her band removal she denies any GE reflux symptoms.  She says she cannot tolerate her CPAP.  She says she lost 80 pounds with her band and her diabetes improved and was only on metformin.  Her  " "was with her for today's evaluation.        Medical History        Past Medical History:   Diagnosis Date   • Anxiety     • Coronary artery disease       s/p LAD stenting, follows w/ Dr. Ogden   • Depression     • Diabetes mellitus (CMS/HCC)       dx , initially improved s/p AGB but now back on insulin    • Dyspepsia     • Dyspnea on exertion     • Fatigue     • Hyperlipidemia     • Hypertension     • Melanoma (CMS/HCC)          • Morbid obesity (CMS/HCC)     • Myocardial infarction (CMS/HCC) 2016     s/p LAD stenting, on ASA 325mg + Plavix   • BRICE on CPAP       noncompliant w/ device, \"lucia me\"   • Umbilical hernia     • Wears glasses           Surgical History         Past Surgical History:   Procedure Laterality Date   • CARDIAC CATHETERIZATION N/A 2016     Procedure: Left Heart Cath;  Surgeon: Akhil Zuñiga MD;  Location:  [facility] CATH INVASIVE LOCATION;  Service:    • CARDIAC CATHETERIZATION N/A 10/31/2017     Procedure: Left Heart Cath;  Surgeon: Lebron Ogden MD;  Location:  [facility];  Service:    • CARDIAC CATHETERIZATION   10/31/2017     Procedure: Functional Flow Courtenay;  Surgeon: Denilson Pena MD;  Location:  [facility] INV[facility] LOCATION;  Service:    • CATARACT EXTRACTION Right    • CATARACT EXTRACTION Left    •  SECTION      • COLONOSCOPY        routine   • ENDOSCOPY       • FINGER/THUMB CLOSED REDUCTION Left 8/3/2017     Procedure: CLOSED REDUCTION PERCUTANEOUS PINNING LEFT FIFTH METACARPAL SHAFT FRACTURE;  Surgeon: Oscar León MD;  Location: [facility];  Service:    • GASTRIC BANDING REMOVAL   2019     w/ Dr. Dunbar   • LAPAROSCOPIC CHOLECYSTECTOMY        w/ [facility]   • LAPAROSCOPIC GASTRIC BANDING   2013     s/p LAGB by Dr Russell   • OTHER SURGICAL HISTORY        melanoma removal/back   • NH RT/LT HEART CATHETERS N/A 2016     Procedure: Percutaneous Coronary Intervention;  Surgeon: Akhil CHUNG" MD Zara;  Location: [facility] INVASIVE LOCATION;  Service: Cardiology   • SEPTOPLASTY   2006   • UMBILICAL HERNIA REPAIR   2002     no mesh            No Known Allergies     Current Outpatient Medications:   •  amLODIPine (NORVASC) 5 MG tablet, Take 1 tablet by mouth Daily., Disp: 90 tablet, Rfl: 3  •  atorvastatin (LIPITOR) 80 MG tablet, Take 1 tablet by mouth Daily., Disp: 90 tablet, Rfl: 3  •  carvedilol (COREG) 25 MG tablet, Take 1 tablet by mouth 2 (Two) Times a Day with food., Disp: 180 tablet, Rfl: 3  •  Cholecalciferol (VITAMIN D3) 96046 units tablet, Take 10,000 Units by mouth Daily., Disp: , Rfl:   •  clopidogrel (PLAVIX) 75 MG tablet, Take 1 (one) tablet by mouth every day. (Patient taking differently: Take 75 mg by mouth Daily. Hold one week before surgery per Dr. Ogden), Disp: 90 tablet, Rfl: 4  •  Cyanocobalamin (VITAMIN B12 PO), Take  by mouth., Disp: , Rfl:   •  Dulaglutide 1.5 MG/0.5ML solution pen-injector, Inject 1.5 mg under the skin in the abdomen, thigh, or upper arm rotating injection sites 1 (One) Time Per Week., Disp: 2 mL, Rfl: 5  •  Empagliflozin 25 MG tablet, Take 1 tablet by mouth Every Morning., Disp: 90 tablet, Rfl: 3  •  glipizide (GLUCOTROL XL) 10 MG 24 hr tablet, Take 1 tablet by mouth 2 (Two) Times a Day., Disp: 180 tablet, Rfl: 4  •  glucose blood (FREESTYLE LITE) test strip, Use to check blood sugar 3 times daily as needed., Disp: 100 each, Rfl: 5  •  glucose blood test strip, USE AS DIRECTED TO CHECK BLOOD SUGAR AS NEEDED., Disp: 100 each, Rfl: 3  •  icosapent ethyl (VASCEPA) 1 g capsule capsule, take 2 capsule by mouth 2 times every day with food swallowing whole. Do not chew, open, dissolve and/or crush., Disp: 360 capsule, Rfl: 3  •  insulin lispro (humaLOG) 100 UNIT/ML injection, Inject 0-9 Units under the skin 4 (Four) Times a Day (Before Meals & at Bedtime)., Disp: 10 mL, Rfl: 12  •  insulin lispro (humaLOG) 100 UNIT/ML injection, Inject 12 to 20 Units under the skin  "into the appropriate area as directed 4 (Four) Times a Day., Disp: 40 mL, Rfl: 6  •  Insulin Lispro Prot & Lispro (50-50) 100 UNIT/ML suspension pen-injector, Inject 30 Units under the skin before meals., Disp: 90 mL, Rfl: 5  •  Insulin Pen Needle (BD ULTRA-FINE PEN NEEDLES) 29G X 12.7MM misc, USE WITH HUMALOG MIX 3 TIMES DAILY., Disp: 100 each, Rfl: 6  •  Insulin Syringe-Needle U-100 (BD INSULIN SYRINGE ULTRAFINE) 31G X 5/16\" 0.3 ML misc, USE WITH SLIDING SCALE 4 TIMES DAILY., Disp: 100 each, Rfl: 6  •  isosorbide mononitrate (IMDUR) 60 MG 24 hr tablet, Take 1½ tablets by mouth Every Morning., Disp: 180 tablet, Rfl: 3  •  lisinopril (PRINIVIL,ZESTRIL) 20 MG tablet, Take 1 (one) tablet by mouth every day., Disp: 90 tablet, Rfl: 3  •  metFORMIN (GLUCOPHAGE) 1000 MG tablet, Take 1 tablet by mouth 2 (Two) Times a Day (with morning & evening meals)., Disp: 60 tablet, Rfl: 11  •  Multiple Vitamin (MULTI-VITAMIN DAILY PO), Take  by mouth., Disp: , Rfl:   •  apixaban (ELIQUIS) 2.5 MG tablet tablet, Take 1 tablet by mouth Every 12 (Twelve) Hours for 28 doses. (Patient taking differently: Take 2.5 mg by mouth Every 12 (Twelve) Hours. Will start after surgery), Disp: 28 tablet, Rfl: 0  •  aspirin 81 MG EC tablet, Take 81 mg by mouth Daily., Disp: , Rfl:   •  nitroglycerin (NITROSTAT) 0.4 MG SL tablet, Place 1 tablet under the tongue Every 5 (Five) Minutes As Needed for Chest Pain (max of 3 doses in 15 minutes. If no relief, go to ER., Disp: 25 tablet, Rfl: 12     Social History   Social History            Socioeconomic History   • Marital status:        Spouse name: Not on file   • Number of children: Not on file   • Years of education: Not on file   • Highest education level: Not on file   Tobacco Use   • Smoking status: Former Smoker       Packs/day: 0.25       Years: 1.00       Pack years: 0.25       Types: Cigarettes       Last attempt to quit: 1989       Years since quittin.3   • Smokeless tobacco: Never " Used   Substance and Sexual Activity   • Alcohol use: No   • Drug use: No   • Sexual activity: Defer   Social History Narrative     .  Lives in [facility].   @ [employer].               Family History   Problem Relation Age of Onset   • Diabetes Mother     • Heart disease Mother     • Hyperlipidemia Mother     • Hypertension Mother     • Skin cancer Mother     • COPD Father     • Heart disease Father     • Hyperlipidemia Father     • Heart attack Father     • Alzheimer's disease Father     • Diabetes Maternal Aunt     • Diabetes Maternal Grandmother     • Heart disease Maternal Grandfather     • Heart attack Maternal Grandfather     • Hypertension Maternal Grandfather     • Hypertension Sister     • Melanoma Brother     • Breast cancer Neg Hx           Review of Systems   Constitutional: Positive for fatigue. Negative for chills, diaphoresis, fever and unexpected weight loss.   HENT: Negative for congestion and facial swelling.    Eyes: Negative for blurred vision, double vision and discharge.   Respiratory: Negative for chest tightness, shortness of breath and stridor.    Cardiovascular: Negative for chest pain, palpitations and leg swelling.   Gastrointestinal: Negative for blood in stool.   Endocrine: Negative for polydipsia.   Genitourinary: Negative for hematuria.   Musculoskeletal: Positive for arthralgias.   Skin: Negative for color change.   Allergic/Immunologic: Negative for immunocompromised state.   Neurological: Negative for confusion.   Psychiatric/Behavioral: Negative for self-injury.         I have reviewed the ROS and confirm that it's accurate today.     Physical Exam:  Vital Signs:  Weight: 121 kg (267 lb)   Body mass index is 43.09 kg/m².  Temp: 97.3 °F (36.3 °C)   Heart Rate: 83   BP: 122/82      Physical Exam   Constitutional: She is oriented to person, place, and time. She appears well-developed and well-nourished.   Central obesity   HENT:   Head: Normocephalic and  atraumatic.   Nose: Nose normal.   Mild alopecia   Eyes: Conjunctivae and EOM are normal. Pupils are equal, round, and reactive to light.   Neck: Normal range of motion. Neck supple. Carotid bruit is not present. No tracheal deviation present. No thyromegaly present.   Cardiovascular: Normal rate, regular rhythm and normal heart sounds.   Pulmonary/Chest: Effort normal and breath sounds normal. No respiratory distress.   Abdominal: Soft. She exhibits no distension. There is no hepatosplenomegaly. There is no tenderness.   Old port site right upper quadrant.  Supraumbilical midline incision, lower midline incision, scattered laparoscopy scars, no hernias appreciated   Musculoskeletal: Normal range of motion. She exhibits no edema or deformity.   Neurological: She is alert and oriented to person, place, and time. No cranial nerve deficit. Coordination normal.   Skin: Skin is warm and dry. No rash noted.   Psychiatric: She has a normal mood and affect. Her behavior is normal. Judgment and thought content normal.   Vitals reviewed.            Patient Active Problem List   Diagnosis   • Acute ST elevation myocardial infarction (STEMI) of anterior wall, 11/29/16 s/p stenting proxismal % occlusion, clinically stable.   • Type 2 diabetes mellitus (CMS/HCC)   • Chest pain   • Coronary artery disease   • Fatigue   • Hyperlipidemia   • Hypertension   • Morbid obesity (CMS/HCC)   • Dyspnea on exertion (NYHA class 2-3)   • Angina, class III (CMS/HCC)   • Dyspepsia   • BRICE on CPAP   • Preoperative cardiovascular examination   • Morbid obesity with body mass index (BMI) of 40.0 to 44.9 in adult (CMS/HCC)         Assessment:     Jannette Onofre is a 59 y.o. year old female with medically complicated obesity.     Weight loss surgery is deemed medically necessary given the following obesity related comorbidities including coronary artery disease and myocardial infarction s/p stenting, anxiety, depression, type II  non-insulin-dependent diabetes mellitus, dyspnea on exertion, fatigue, hyperlipidemia, hypertension, history of melanoma, obstructive sleep apnea noncompliant with CPAP, alopecia, chronic kidney disease, right bundle branch block with current Weight: 121 kg (267 lb) and Body mass index is 43.09 kg/m²..          Encounter Diagnosis   Name Primary?   • Morbid obesity with body mass index (BMI) of 40.0 to 44.9 in adult (CMS/[facility]) Yes      Patient is aware that surgery is a tool, and that weight loss is not guaranteed but only seen in the context of appropriate use, follow up and exercise.     The patient was present for an approximately a 2.5 hour discussion of the purpose of weight loss surgery, how WLS is a tool to assist in achieving weight loss goals, the most common complications and how best to avoid them, and the strategies for short and long term weight loss.  Ample opportunity to discuss questions was available both in group and during the time of individual examination.     I reviewed her Adriel report showing testosterone x1 and hydrocodone x1 with band removal.  Chest x-ray dated 10/3/2019 no active process.  EKG dated 9/17/2019 showing sinus rhythm with an indeterminate axis and right bundle branch block possible anterior infarction.  CBC and CMP dated 10/3/2019 normal except for glucose of 248 BUN of 26 GFR 57 of note creatinine 0.99 normal CBC.  Cardiology clearance dated 9/17/2019 by Dr. Ogden saying to continue aspirin 81 mg before and after surgery and hold the Plavix 1 week before and restart whenever safe after surgery.  LAP-BAND removal operative report dated 2/25/2019, EGD dated 1/28/2019 pathology of the antrum 1/28/2019 showing minimal chronic gastritis and negative for H. pylori.  Upper GI dated 7/5/2018 showing LAP-BAND in the appropriate position and minimal distal GE reflux.  LAP-BAND operative report dated 4/19/2013 Dr. Russell.  Psychological evaluation dated 5/23/2019 David Scales, PhD  "appropriate candidate.  Dietitian evaluation dated 5/23/2019 Prabha perry showing a diet and adequate in protein.  Negative H. pylori breath test dated 5/29/2018.  Labs dated 5/17/2019 showing a glucose of 210 otherwise unremarkable CMP hemoglobin A1c of 8.09, elevated triglycerides 299 low HDL 29 high VLDL 59.8.  Normal CBC except for a low MCHC and a high MPV.  Please see scanned records that I have reviewed and signed off on today.  All of this in addition to the patient's unique history and exam has been taken into consideration in determining their appropriate candidacy for weight loss surgery.     Complications  of laparoscopic/possible robotic gastric sleeve were discussed. The patient is well aware of the potential complications of surgery that include but not limited to bleeding, infections, deep venous thrombosis, pulmonary embolism, pulmonary complications such as pneumonia, cardiac events, hernias, small bowel obstruction, damage to the spleen or other organs, bowel injury, disfiguring scars, failure to lose weight, need for additional surgery, conversion to an open procedure, and death. Patient is also aware of complications which apply in this particular procedure that can include but are not limited to a \"leak\" at the staple line which in some instances may require conversion to gastric bypass.     The patient is aware if a hiatal hernia is encountered, it likely will be repaired.  R/B/A Rx to hiatal hernia repair were discussed as outlined in our long consent form.  Briefly risks in addition to those for LSG include recurrent hernia, BOWEN, dysphagia, esophageal injury, pneumothorax, injury to the vagus nerves, injury to the thoracic duct, aorta or vena cava.     I discussed avoiding all tobacco products and second hand smoke at least 2 weeks pre-operatively and 6 weeks post-operatively to minimize the risk of sleeve leak.  This included discussing the importance of avoiding even secondhand smoke as " the risk of leak is increased.  Examples discussed:  I made it very clear that the patient understands they should avoid even riding in a car where someone has previously smoked in the last 2 weeks, living in a house where someone smokes (even if it's in a separate room/patio/attached garage, etc.) we discussed that they should not have a conversation with a group of people who are smoking even if it's outside.  They can be around wood burning fires and barbecue.  I told them I do not know if marijuana has a same effects but my overall recommendation is to avoid it for 2 weeks prior in 6 weeks after surgery.  They also are aware that nicotine may also increase the risk of leak and I strongly encouraged him to avoid that as well for 2 weeks prior in 6 weeks after surgery.     Discussed the risks, benefits and alternative therapies at great length as outlined in our extensive consent forms, consent videos, and educational teaching process under the direction of the center's .     A copy of the patient's signed informed consent is on file.     R/B/A Rx discussed to postop anticoagulation incl but not limited to bleeding, drug reaction, venothromboembolic events, etc. and agreeable to taking post op  Eliquis 2.5 mg po Q 12 hrs #28           Plan: After evaluation today I think the patient is a reasonable candidate for higher risk laparoscopic sleeve gastrectomy and possible concomitant hiatal hernia repair.  I told her I cannot offer her sleeve gastrectomy unless she can hold her aspirin and Plavix 1 week prior in 2 weeks after surgery at a minimum.  I explained that my usual practice with cardiac stent patients has been to hold the ASA and/or plavix 1 wk prior and instead of the 6 wks afterwards (safest to hopefully avoid a leak), resume at 2 wks post op with PPI coverage.  So far, this has not resulted in any leaks to date, but the sample size is relatively small.  The idea is to balance the risk of  stent occlusion with the risk of leak and there's no perfect solution.    I offered her to seek a second opinion with a surgeon who will allow her to continue her baby aspirin throughout the perioperative period as recommended by Dr. Ogden.  I also offered her to discuss this with Dr. Ogden.  She says regardless she wishes to proceed with sleeve gastrectomy.  Moreover, she is aware that LSG after prev AGB/AGBR carries increased risk over primary LSG alone, bertha wrt bleeding, infection, leak, prolonged OR times with incr risk pulm complications and VTE, etc and still wishes to proceed.  Her intolerance of her CPAP will increase her risk as well.  Other issues include  anxiety, depression, poorly controlled type II non-insulin-dependent diabetes mellitus with a hemoglobin A1c >8, dyspnea on exertion, fatigue, hyperlipidemia, hypertension, history of melanoma, alopecia, chronic kidney disease, right bundle branch block, poor dietitian evaluation.

## 2019-11-08 NOTE — ANESTHESIA PROCEDURE NOTES
Peripheral Block      Patient reassessed immediately prior to procedure    Patient location during procedure: OR  Reason for block: at surgeon's request and post-op pain management  Performed by  Anesthesiologist: García Bowden MD  CRNA: Noam Walker CRNA  Preanesthetic Checklist  Completed: patient identified, site marked, surgical consent, pre-op evaluation, timeout performed, IV checked, risks and benefits discussed and monitors and equipment checked  Prep:  Pt Position: supine  Sterile barriers:cap, gloves, sterile barriers and mask  Prep: ChloraPrep  Patient monitoring: blood pressure monitoring, continuous pulse oximetry and EKG  Procedure  Sedation:yes  Performed under: general  Guidance:ultrasound guided  Images:still images obtained, printed/placed on chart    Laterality:Bilateral  Block Type:TAP  Injection Technique:single-shot  Needle Type:short-bevel and echogenic  Needle Gauge:20 G  Resistance on Injection: none    Medications Used: buprenorphine (BUPRENEX) injection, 0.3 mg  dexamethasone sodium phosphate injection, 6 mg  bupivacaine PF (MARCAINE) 0.25 % injection, 60 mL  Med admintered at 11/8/2019 5:19 PM      Medications  Comment:Block Injection:  LA dose divided between Right and Left block/ QUAD TAPS        Post Assessment  Injection Assessment: negative aspiration for heme, incremental injection and no paresthesia on injection  Patient Tolerance:comfortable throughout block  Complications:no  Additional Notes      Under Ultrasound guidance, a BBraun 4inch 360 degree needle was advanced with Normal Saline hydro dissection of tissue.  The Internal Oblique and Transversus Abdominus muscles where visualized.  At or before the aponeurosis of Internal Oblique, local anesthetic spread was visualized in the Transversus Abdominus Plane. Injection was made incrementally with aspiration every 5 mls.  There was no  intravascular injection,  injection pressure was normal, there was no neural injection,  and the procedure was completed without difficulty.  Thank You.

## 2019-11-09 ENCOUNTER — APPOINTMENT (OUTPATIENT)
Dept: GENERAL RADIOLOGY | Facility: HOSPITAL | Age: 59
End: 2019-11-09

## 2019-11-09 VITALS
WEIGHT: 267 LBS | TEMPERATURE: 97.8 F | HEART RATE: 90 BPM | OXYGEN SATURATION: 91 % | RESPIRATION RATE: 18 BRPM | SYSTOLIC BLOOD PRESSURE: 118 MMHG | DIASTOLIC BLOOD PRESSURE: 64 MMHG | BODY MASS INDEX: 42.91 KG/M2 | HEIGHT: 66 IN

## 2019-11-09 PROBLEM — R09.02 HYPOXIA: Status: ACTIVE | Noted: 2019-11-09

## 2019-11-09 LAB
ALBUMIN SERPL-MCNC: 3.3 G/DL (ref 3.5–5.2)
ALBUMIN/GLOB SERPL: 1.1 G/DL
ALP SERPL-CCNC: 44 U/L (ref 39–117)
ALT SERPL W P-5'-P-CCNC: 82 U/L (ref 1–33)
ANION GAP SERPL CALCULATED.3IONS-SCNC: 18 MMOL/L (ref 5–15)
AST SERPL-CCNC: 77 U/L (ref 1–32)
BASOPHILS # BLD AUTO: 0.01 10*3/MM3 (ref 0–0.2)
BASOPHILS NFR BLD AUTO: 0.1 % (ref 0–1.5)
BILIRUB SERPL-MCNC: 0.4 MG/DL (ref 0.2–1.2)
BILIRUB UR QL STRIP: NEGATIVE
BUN BLD-MCNC: 20 MG/DL (ref 8–23)
BUN/CREAT SERPL: 24.7 (ref 7–25)
CALCIUM SPEC-SCNC: 8.6 MG/DL (ref 8.6–10.5)
CHLORIDE SERPL-SCNC: 110 MMOL/L (ref 98–107)
CLARITY UR: CLEAR
CO2 SERPL-SCNC: 12 MMOL/L (ref 22–29)
COLOR UR: YELLOW
CREAT BLD-MCNC: 0.81 MG/DL (ref 0.57–1)
DEPRECATED RDW RBC AUTO: 49.5 FL (ref 37–54)
EOSINOPHIL # BLD AUTO: 0 10*3/MM3 (ref 0–0.4)
EOSINOPHIL NFR BLD AUTO: 0 % (ref 0.3–6.2)
ERYTHROCYTE [DISTWIDTH] IN BLOOD BY AUTOMATED COUNT: 13.9 % (ref 12.3–15.4)
GFR SERPL CREATININE-BSD FRML MDRD: 72 ML/MIN/1.73
GLOBULIN UR ELPH-MCNC: 2.9 GM/DL
GLUCOSE BLD-MCNC: 169 MG/DL (ref 65–99)
GLUCOSE BLDC GLUCOMTR-MCNC: 146 MG/DL (ref 70–130)
GLUCOSE BLDC GLUCOMTR-MCNC: 153 MG/DL (ref 70–130)
GLUCOSE BLDC GLUCOMTR-MCNC: 193 MG/DL (ref 70–130)
GLUCOSE UR STRIP-MCNC: ABNORMAL MG/DL
HCT VFR BLD AUTO: 41.8 % (ref 34–46.6)
HGB BLD-MCNC: 12.4 G/DL (ref 12–15.9)
HGB UR QL STRIP.AUTO: NEGATIVE
IMM GRANULOCYTES # BLD AUTO: 0.04 10*3/MM3 (ref 0–0.05)
IMM GRANULOCYTES NFR BLD AUTO: 0.4 % (ref 0–0.5)
IRON 24H UR-MRATE: 25 MCG/DL (ref 37–145)
KETONES UR QL STRIP: ABNORMAL
LEUKOCYTE ESTERASE UR QL STRIP.AUTO: NEGATIVE
LYMPHOCYTES # BLD AUTO: 0.63 10*3/MM3 (ref 0.7–3.1)
LYMPHOCYTES NFR BLD AUTO: 5.5 % (ref 19.6–45.3)
MCH RBC QN AUTO: 28.4 PG (ref 26.6–33)
MCHC RBC AUTO-ENTMCNC: 29.7 G/DL (ref 31.5–35.7)
MCV RBC AUTO: 95.9 FL (ref 79–97)
MONOCYTES # BLD AUTO: 0.5 10*3/MM3 (ref 0.1–0.9)
MONOCYTES NFR BLD AUTO: 4.4 % (ref 5–12)
NEUTROPHILS # BLD AUTO: 10.2 10*3/MM3 (ref 1.7–7)
NEUTROPHILS NFR BLD AUTO: 89.6 % (ref 42.7–76)
NITRITE UR QL STRIP: NEGATIVE
NRBC BLD AUTO-RTO: 0 /100 WBC (ref 0–0.2)
PH UR STRIP.AUTO: 5.5 [PH] (ref 5–8)
PLATELET # BLD AUTO: 166 10*3/MM3 (ref 140–450)
PMV BLD AUTO: 9.9 FL (ref 6–12)
POTASSIUM BLD-SCNC: 4.8 MMOL/L (ref 3.5–5.2)
PROT SERPL-MCNC: 6.2 G/DL (ref 6–8.5)
PROT UR QL STRIP: NEGATIVE
RBC # BLD AUTO: 4.36 10*6/MM3 (ref 3.77–5.28)
SODIUM BLD-SCNC: 140 MMOL/L (ref 136–145)
SP GR UR STRIP: 1.03 (ref 1–1.03)
UROBILINOGEN UR QL STRIP: ABNORMAL
WBC NRBC COR # BLD: 11.38 10*3/MM3 (ref 3.4–10.8)

## 2019-11-09 PROCEDURE — 25010000002 CYANOCOBALAMIN PER 1000 MCG: Performed by: SURGERY

## 2019-11-09 PROCEDURE — 94660 CPAP INITIATION&MGMT: CPT

## 2019-11-09 PROCEDURE — 81003 URINALYSIS AUTO W/O SCOPE: CPT | Performed by: SURGERY

## 2019-11-09 PROCEDURE — 94799 UNLISTED PULMONARY SVC/PX: CPT

## 2019-11-09 PROCEDURE — 74241: CPT

## 2019-11-09 PROCEDURE — 99252 IP/OBS CONSLTJ NEW/EST SF 35: CPT | Performed by: NURSE PRACTITIONER

## 2019-11-09 PROCEDURE — 0 DIATRIZOATE MEGLUMINE & SODIUM PER 1 ML: Performed by: SURGERY

## 2019-11-09 PROCEDURE — 25010000002 ENOXAPARIN PER 10 MG: Performed by: SURGERY

## 2019-11-09 PROCEDURE — 94640 AIRWAY INHALATION TREATMENT: CPT

## 2019-11-09 PROCEDURE — 85025 COMPLETE CBC W/AUTO DIFF WBC: CPT | Performed by: SURGERY

## 2019-11-09 PROCEDURE — 25010000003 CEFAZOLIN IN DEXTROSE 2-4 GM/100ML-% SOLUTION: Performed by: SURGERY

## 2019-11-09 PROCEDURE — 25010000002 ONDANSETRON PER 1 MG: Performed by: SURGERY

## 2019-11-09 PROCEDURE — 25010000002 THIAMINE PER 100 MG: Performed by: SURGERY

## 2019-11-09 PROCEDURE — 25010000002 NA FERRIC GLUC CPLX PER 12.5 MG: Performed by: SURGERY

## 2019-11-09 PROCEDURE — 80053 COMPREHEN METABOLIC PANEL: CPT | Performed by: SURGERY

## 2019-11-09 PROCEDURE — 83540 ASSAY OF IRON: CPT | Performed by: SURGERY

## 2019-11-09 PROCEDURE — 99024 POSTOP FOLLOW-UP VISIT: CPT | Performed by: SURGERY

## 2019-11-09 PROCEDURE — 82962 GLUCOSE BLOOD TEST: CPT

## 2019-11-09 PROCEDURE — 71045 X-RAY EXAM CHEST 1 VIEW: CPT

## 2019-11-09 RX ORDER — ALBUTEROL SULFATE 2.5 MG/3ML
2.5 SOLUTION RESPIRATORY (INHALATION) EVERY 6 HOURS PRN
Status: DISCONTINUED | OUTPATIENT
Start: 2019-11-09 | End: 2019-11-09 | Stop reason: HOSPADM

## 2019-11-09 RX ORDER — OMEPRAZOLE 40 MG/1
40 CAPSULE, DELAYED RELEASE ORAL DAILY
Qty: 60 CAPSULE | Refills: 1 | Status: SHIPPED | OUTPATIENT
Start: 2019-11-09 | End: 2020-02-10

## 2019-11-09 RX ORDER — IPRATROPIUM BROMIDE AND ALBUTEROL SULFATE 2.5; .5 MG/3ML; MG/3ML
3 SOLUTION RESPIRATORY (INHALATION)
Status: DISCONTINUED | OUTPATIENT
Start: 2019-11-09 | End: 2019-11-09 | Stop reason: HOSPADM

## 2019-11-09 RX ORDER — HYDROCODONE BITARTRATE AND ACETAMINOPHEN 7.5; 325 MG/1; MG/1
1 TABLET ORAL EVERY 4 HOURS PRN
Qty: 12 TABLET | Refills: 0 | Status: SHIPPED | OUTPATIENT
Start: 2019-11-09 | End: 2019-11-19

## 2019-11-09 RX ORDER — ALBUTEROL SULFATE 2.5 MG/3ML
2.5 SOLUTION RESPIRATORY (INHALATION)
Status: DISCONTINUED | OUTPATIENT
Start: 2019-11-09 | End: 2019-11-09

## 2019-11-09 RX ADMIN — ISOSORBIDE MONONITRATE 90 MG: 60 TABLET, EXTENDED RELEASE ORAL at 08:40

## 2019-11-09 RX ADMIN — SODIUM CHLORIDE 125 MG: 9 INJECTION, SOLUTION INTRAVENOUS at 15:44

## 2019-11-09 RX ADMIN — CARVEDILOL 25 MG: 12.5 TABLET, FILM COATED ORAL at 08:41

## 2019-11-09 RX ADMIN — ONDANSETRON 4 MG: 2 INJECTION INTRAMUSCULAR; INTRAVENOUS at 05:20

## 2019-11-09 RX ADMIN — FOLIC ACID 250 ML/HR: 5 INJECTION, SOLUTION INTRAMUSCULAR; INTRAVENOUS; SUBCUTANEOUS at 05:20

## 2019-11-09 RX ADMIN — POTASSIUM CHLORIDE AND SODIUM CHLORIDE 125 ML/HR: 450; 150 INJECTION, SOLUTION INTRAVENOUS at 11:26

## 2019-11-09 RX ADMIN — INSULIN LISPRO 3 UNITS: 100 INJECTION, SOLUTION INTRAVENOUS; SUBCUTANEOUS at 12:27

## 2019-11-09 RX ADMIN — ENOXAPARIN SODIUM 40 MG: 40 INJECTION SUBCUTANEOUS at 08:40

## 2019-11-09 RX ADMIN — GABAPENTIN 100 MG: 100 CAPSULE ORAL at 17:07

## 2019-11-09 RX ADMIN — SODIUM CHLORIDE 150 ML/HR: 9 INJECTION, SOLUTION INTRAVENOUS at 03:22

## 2019-11-09 RX ADMIN — CEFAZOLIN SODIUM 2 G: 2 INJECTION, SOLUTION INTRAVENOUS at 09:30

## 2019-11-09 RX ADMIN — CYANOCOBALAMIN 1000 MCG: 1000 INJECTION, SOLUTION INTRAMUSCULAR; SUBCUTANEOUS at 08:40

## 2019-11-09 RX ADMIN — ONDANSETRON 4 MG: 2 INJECTION INTRAMUSCULAR; INTRAVENOUS at 11:26

## 2019-11-09 RX ADMIN — PANTOPRAZOLE SODIUM 40 MG: 40 INJECTION, POWDER, FOR SOLUTION INTRAVENOUS at 05:20

## 2019-11-09 RX ADMIN — INSULIN LISPRO 3 UNITS: 100 INJECTION, SOLUTION INTRAVENOUS; SUBCUTANEOUS at 17:07

## 2019-11-09 RX ADMIN — Medication 30 ML: at 10:27

## 2019-11-09 RX ADMIN — GABAPENTIN 100 MG: 100 CAPSULE ORAL at 08:40

## 2019-11-09 RX ADMIN — IPRATROPIUM BROMIDE AND ALBUTEROL SULFATE 3 ML: 2.5; .5 SOLUTION RESPIRATORY (INHALATION) at 16:16

## 2019-11-09 RX ADMIN — ACETAMINOPHEN 1000 MG: 500 TABLET, FILM COATED ORAL at 08:41

## 2019-11-09 NOTE — OP NOTE
Preoperative Diagnosis:  Morbid Obesity with Multiple Co-Morbidities s/p LapBand 4/13, LapBand removal 2/19    Postoperative Diagnosis:   Same    Procedure:                                                      Laparoscopic Sleeve Gastrectomy (85% subtotal vertical gastrectomy)                                                                         EGD                                                                                                                                          Surgeon:                                                       ANALISA Dunbar MD    Anesthesia:                                                   GETA    EBL:                                                              Minimal    Fluids:                                                           Crystalloid    Specimens:                                                   Subtotal gastrectomy    Drains:                                                           None    Counts:                                                          Correct    Complications:                                               None    Findings: Multiple recurrent incisional hernias at and above the umbilicus left undisturbed    Indications:   This is a 59 year-old morbidly obese white female s/p LapBand by Dr. Russell 4/13, I removed the band for intolerance 2/19 with resolution of her symptoms of dysphgia and chronic nausea.  She now presents for elective higher risk laparoscopic sleeve gastrectomy.  She is aware that LSG after prev AGB/AGBR carries increased risk over primary LSG alone, bertha wrt bleeding, infection, leak, prolonged OR times with incr risk pulm complications and VTE, etc and still wishes to proceed.  She's undergone our extensive preoperative education teaching and consent process everything's in order.      Operative technique:     The patient was brought to the operating room, and placed supine upon the operating room table.  SCD hose were  placed, she underwent uneventful general endotracheal anesthesia per the anesthesiology staff, she received IV Ancef and subcutaneous Lovenox, the anesthesiology staff performed a tap block, and her abdomen was prepped and draped with ChloraPrep in a sterile fashion, an Ioban was used as well, a Jacobo catheter was not placed.    The peritoneal cavity was entered in the upper abdomen in the left midclavicular line using an 11 mm trocar and an Optiview technique and the abdomen was insufflated to a pressure of 15 mmHg with CO2 gas.  Exploratory laparoscopy revealed no evidence of injury from the entrance technique, adhesions in the periumbilical region, normal-appearing liver, status post cholecystectomy.  Under direct visualization a 5 mm trochars placed in the left subcostal position and assessment was made for placement of the periumbilical trochars.  I lysed some adhesions in the area with the LigaSure device and it was clear that there were multiple recurrent incarcerated incisional hernias at and above the umbilicus.  During dissection I inadvertently reduced a couple of the hernias each about a centimeter or 2 in size.  I assessed where I could place my trochars and still allow my preferred clamp technique especially in a patient with a revision after previous banding.  I ended up placing a 12 mm fascial splitting trocar about 10 cm above the umbilicus and to the right of midline and a 15 mm trocar across from this to the left of midline.  Nonetheless these were still in the vicinity of the most proximal incisional hernias.    There were some adhesions of the left lobe of the liver to the diaphragm elevating the left lobe of the liver.  Adhesions of the undersurface of the left lobe of the liver to the lesser omentum and area of previous banding were lysed exposing the hiatus there was no visible hiatal hernia.  A Kishore retractor was not required for the procedure.    Beginning approximately two thirds of  the way around the greater curvature the stomach, the gastrocolic vessels were divided proceeding proximally taking down all the short gastric vessels and exposing the left yefri.  There were posterior hernias or lipomas and this was photo documented.  The previous remaining band implications were divided taking care to avoid a gastrotomy and the very thick previous band capsule was debrided circumferentially opening everything nicely.  No evidence of hiatal hernia and the hiatus was left undisturbed.      Gastrocolic vessels were then divided medially to a few cm proximal to the pylorus.  Some of the filmy attachments of the posterior stomach to the pancreas and retroperitoneum were divided.  The orogastric tube was positioned into the distal antrum.  The stomach was marked with a Kitner saturated with a marking pen 1 cm lateral to the angle of His, 3 cm away from the angularis, and 6 cm from the pylorus.  The 25 cm bariatric standard clamp was then positioned just inside these markings.  The 85% subtotal vertical sleeve gastrectomy was then performed using a [employer] articulating electronic linear stapler with dual absorbable strips.  The first firing was a 60 mm black load, the next 3 firings were 60 mm purple loads. The OG and then the Standard clamp were removed.  The sleeve was performed such that it was uniform in size, no hourglassing or narrowing, especially at the angularis, and the final firing was done a centimeter away from the angle of His to hopefully avoid incorporating esophageal fibers.  The subtotal gastrectomy specimen was placed into a large retrieval bag and withdrawn and placed on a separate [place] stand, it was an average size specimen.  The sleeve was submerged under saline.  Upper endoscopy was performed, and the endoscope was advanced into the duodenal bulb.  No air bubbles or leak seen, no bleeding at the staple line, no narrowing at the angularis, no pyloric spasm or deformity, no  gastritis, no hiatal hernia or Rocha's esophagus, no residual changes of previous banding, and the endoscope was withdrawn.  The subtotal gastrectomy specimen was inspected and sent to pathology for permanent section.  Irrigation fluid was suctioned free.  The sleeve was resting nicely and hemostatic.  A tiny capsular tear on the inferior superior spleen from retraction of the splenic fat pad was controlled with 5 cc of FloSeal and some aerosolized Tisseel.  The sleeve staple line was treated with the remainder of the 4 cc of aerosolized Tisseel fibrin glue.  Photodocumentation of the sleeve was obtained.  The Kishore retractor was removed. Fascia at the 15 mm trocar site incision was closed with a horizontal mattress 0 Vicryl suture placed with a suture passer under direct visualization and tying the knot extracorporeally.  Remaining trocars were removed under direct visualization, no bleeding noted from their sites.  Subcutaneous tissue in the 15 mm incision was closed with an interrupted 2-0 Vicryl plus suture, and skin in each incision was closed using 3-0 Monocryl plus in an interrupted subcuticular stitch followed by skin-a-fix.  The patient tolerated the procedure well without complication, was taken to the recovery room in stable condition.

## 2019-11-09 NOTE — PLAN OF CARE
Problem: Patient Care Overview  Goal: Plan of Care Review  Outcome: Ongoing (interventions implemented as appropriate)   11/09/19 0423   Coping/Psychosocial   Plan of Care Reviewed With patient   Plan of Care Review   Progress no change   OTHER   Outcome Summary Pt. arrived to unit on 4L O2 and we have been unable to wean her down and had to move to the nonrebreather. Pt. is curently wearing a CPAP and O2 sats. are WNL. No c/o SOB and no increased effort. No c/o pain or nausea. Will continue to monitor       Problem: Bariatric Surgery (Adult,Pediatric)  Goal: Signs and Symptoms of Listed Potential Problems Will be Absent, Minimized or Managed (Bariatric Surgery)  Outcome: Ongoing (interventions implemented as appropriate)   11/09/19 0423   Goal/Outcome Evaluation   Problems Assessed (Bariatric Surgery) all   Problems Present (Bariatric Surgery) bowel motility decreased;respiratory compromise       Problem: Fall Risk (Adult)  Goal: Identify Related Risk Factors and Signs and Symptoms  Outcome: Ongoing (interventions implemented as appropriate)   11/09/19 0423   Fall Risk (Adult)   Related Risk Factors (Fall Risk) age-related changes;gait/mobility problems;slippery/uneven surfaces;environment unfamiliar   Signs and Symptoms (Fall Risk) presence of risk factors

## 2019-11-09 NOTE — CONSULTS
"     Psychiatric Medicine Services  CONSULT NOTE      Patient Name: Jannette Onofre  : 1960  MRN: [id number]    Primary Care Physician: Akhil Huerta MD  Provider requesting consultation: Ranjeet Dunbar MD    Subjective   Subjective     Reason for Consultation:  Abnormal CXR    HPI:  Jannette Onofre is a 59 y.o. female with pmh significant for HTN, HLP, CAD s/p MI, T2 DM admitted for scheduled gastric sleeve on 19. Patient has been doing well post operatively with controlled pain, ambulating and following intake instructions.     Hospitalist consulted for continued hypoxia on 4LNC- patient not on home oxygen and abnormal CXR. Patient seen and 93% on room air. Patient denies any soa, dyspnea, cough or wheezing. States she has been ambulating without difficulty and denies lightheadedness/ dizziness. No fever/ chills      Review of Systems   Constitutional: Negative.    HENT: Negative.    Eyes: Negative.    Respiratory: Negative.  Negative for cough, shortness of breath and wheezing.    Cardiovascular: Negative.    Gastrointestinal: Negative.    Endocrine: Negative.    Genitourinary: Negative.    Musculoskeletal: Negative.    Skin: Negative.    Neurological: Negative.    Hematological: Negative.    Psychiatric/Behavioral: Negative.          Otherwise complete ROS reviewed and is negative except as mentioned in the HPI.    Past Medical History:   Diagnosis Date   • Anxiety    • Coronary artery disease     s/p LAD stenting, follows w/ Dr. Ogden   • Depression    • Diabetes mellitus (CMS/HCC)     dx , initially improved s/p AGB but now back on insulin    • Dyspepsia    • Dyspnea on exertion    • Fatigue    • Hyperlipidemia    • Hypertension    • Melanoma (CMS/HCC)        • Morbid obesity (CMS/HCC)    • Myocardial infarction (CMS/HCC) 2016    s/p LAD stenting, on ASA 325mg + Plavix   • BRICE on CPAP     noncompliant w/ device, \"lucia me\"   • Umbilical hernia    • Wears " glasses        Past Surgical History:   Procedure Laterality Date   • CARDIAC CATHETERIZATION N/A 2016    Procedure: Left Heart Cath;  Surgeon: Akhil Zuñiga MD;  Location:  [facility];  Service:    • CARDIAC CATHETERIZATION N/A 10/31/2017    Procedure: Left Heart Cath;  Surgeon: Lebron Ogden MD;  Location:  [facility];  Service:    • CARDIAC CATHETERIZATION  10/31/2017    Procedure: Functional Flow [place];  Surgeon: Denilson Pena MD;  Location:  [facility];  Service:    • CARDIAC CATHETERIZATION      02, 3/5/07, 3/23/11   • CATARACT EXTRACTION Right    • CATARACT EXTRACTION Left      RIGHT 05   •  SECTION     • COLONOSCOPY      routine/ COLONGUARD    • ENDOSCOPY      3/09, ,    • FINGER/THUMB CLOSED REDUCTION Left 8/3/2017    Procedure: CLOSED REDUCTION PERCUTANEOUS PINNING LEFT FIFTH METACARPAL SHAFT FRACTURE;  Surgeon: Oscar León MD;  Location: [facility];  Service:    • GASTRIC BANDING REMOVAL  2019    w/ Dr. Dunbar   • LAPAROSCOPIC CHOLECYSTECTOMY      w/ U[facility]   • LAPAROSCOPIC GASTRIC BANDING  2013    s/p LAGB by Dr Russell   • OTHER SURGICAL HISTORY      melanoma removal/back   • WI RT/LT HEART CATHETERS N/A 2016    Procedure: Percutaneous Coronary Intervention;  Surgeon: Akhil Zuñiga MD;  Location:  [facility];  Service: Cardiology   • SEPTOPLASTY     • UMBILICAL HERNIA REPAIR      no mesh       Family History: family history includes Alzheimer's disease in her father; COPD in her father; Diabetes in her maternal aunt, maternal grandmother, and mother; Heart attack in her father and maternal grandfather; Heart disease in her father, maternal grandfather, and mother; Hyperlipidemia in her father and mother; Hypertension in her maternal grandfather, mother, and sister; Melanoma in her brother; Skin cancer in her mother. Otherwise  pertinent FHx was reviewed and unremarkable.     Social History:  reports that she quit smoking about 30 years ago. Her smoking use included cigarettes. She has a 0.25 pack-year smoking history. She has never used smokeless tobacco. She reports that she does not drink alcohol or use drugs.    Medications:  Medications Prior to Admission   Medication Sig Dispense Refill Last Dose   • amLODIPine (NORVASC) 5 MG tablet Take 1 tablet by mouth Daily. 90 tablet 3 11/8/2019 at 0730   • apixaban (ELIQUIS) 2.5 MG tablet tablet Take 2.5 mg by mouth.   N/A   • aspirin 81 MG EC tablet Take 81 mg by mouth Daily. Last dose 10-31-19   10/31/2019   • atorvastatin (LIPITOR) 80 MG tablet Take 1 tablet by mouth Daily. 90 tablet 3 11/7/2019 at 2000   • carvedilol (COREG) 25 MG tablet Take 1 tablet by mouth 2 (Two) Times a Day with food. 180 tablet 3 11/8/2019 at 0730   • Cholecalciferol (VITAMIN D3) 53929 units tablet Take 10,000 Units by mouth Daily.   11/7/2019 at 0800   • clopidogrel (PLAVIX) 75 MG tablet Take 1 (one) tablet by mouth every day. (Patient taking differently: Take 75 mg by mouth Daily. Hold one week before surgery per Dr. Ogden. Last dose 10-31-19) 90 tablet 4 10/31/2019   • Dulaglutide 1.5 MG/0.5ML solution pen-injector Inject 1.5 mg under the skin in the abdomen, thigh, or upper arm rotating injection sites 1 (One) Time Per Week. 2 mL 5 11/5/2019   • Empagliflozin 25 MG tablet Take 1 tablet by mouth Every Morning. 90 tablet 3 11/7/2019 at 0800   • glipizide (GLUCOTROL XL) 10 MG 24 hr tablet Take 1 tablet by mouth 2 (Two) Times a Day. 180 tablet 4 11/7/2019 at 2000   • insulin lispro (humaLOG) 100 UNIT/ML injection Inject 0-9 Units under the skin 4 (Four) Times a Day (Before Meals & at Bedtime). (Patient taking differently: Inject 0-9 Units under the skin into the appropriate area as directed Take As Directed. PER SLIDING SCALE) 10 mL 12 Past Month at Unknown time   • Insulin Lispro Prot & Lispro (50-50) 100 UNIT/ML  suspension pen-injector Inject 30 Units under the skin before meals. (Patient taking differently: Inject 30 Units under the skin into the appropriate area as directed 3 (Three) Times a Day.) 90 mL 5 11/5/2019   • isosorbide mononitrate (IMDUR) 60 MG 24 hr tablet Take 1½ tablets by mouth Every Morning. 180 tablet 3 11/8/2019 at 0730   • lisinopril (PRINIVIL,ZESTRIL) 20 MG tablet Take 1 (one) tablet by mouth every day. (Patient taking differently: Take 20 mg by mouth Daily.) 90 tablet 3 11/7/2019 at 0800   • metFORMIN (GLUCOPHAGE) 1000 MG tablet Take 1 tablet by mouth 2 (Two) Times a Day (with morning & evening meals). 60 tablet 11 11/7/2019 at 2000   • Multiple Vitamin (MULTI-VITAMIN DAILY PO) Take  by mouth.   11/7/2019 at 0800   • nitroglycerin (NITROSTAT) 0.4 MG SL tablet Place 1 tablet under the tongue Every 5 (Five) Minutes As Needed for Chest Pain (max of 3 doses in 15 minutes. If no relief, go to ER. 25 tablet 12 N/A   • thiamine1 (VITAMIN B1) 250 MG tablet Take 250 mg by mouth Daily.   11/7/2019 at 0800   • icosapent ethyl (VASCEPA) 1 g capsule capsule take 2 capsule by mouth 2 times every day with food swallowing whole. Do not chew, open, dissolve and/or crush. 360 capsule 3 11/7/2019 at 0800       Scheduled Meds:  acetaminophen 1,000 mg Oral Q12H   amLODIPine 5 mg Oral Daily   atorvastatin 80 mg Oral Nightly   carvedilol 25 mg Oral BID   enoxaparin 40 mg Subcutaneous Daily   gabapentin 100 mg Oral TID   insulin lispro 0-14 Units Subcutaneous 4x Daily AC & at Bedtime   ipratropium-albuterol 3 mL Nebulization 4x Daily - RT   isosorbide mononitrate 90 mg Oral Daily   lisinopril 20 mg Oral Daily   ondansetron 4 mg Intravenous Q6H   pantoprazole 40 mg Intravenous Q AM     Continuous Infusions:  sodium chloride 0.45 % with KCl 20 mEq 125 mL/hr Last Rate: 125 mL/hr (11/09/19 1126)     PRN Meds:.albuterol  •  ALPRAZolam  •  diphenhydrAMINE  •  ferric gluconate  •  hydrALAZINE  •  HYDROcodone-acetaminophen  •   HYDROmorphone **AND** naloxone  •  HYDROmorphone  •  LORazepam  •  LORazepam  •  metoclopramide  •  Morphine **AND** naloxone  •  nitroglycerin  •  ondansetron **FOLLOWED BY** ondansetron  •  phenol  •  prochlorperazine  •  promethazine **OR** promethazine  •  promethazine  •  simethicone    No Known Allergies    Objective   Objective     Vital Signs:   Temp:  [97.1 °F (36.2 °C)-98.2 °F (36.8 °C)] 97.7 °F (36.5 °C)  Heart Rate:  [50-96] 95  Resp:  [12-20] 18  BP: ()/(42-90) 109/62     Physical Exam  Constitutional: Awake, alert, sitting up in bed, NAD  Eyes: PERRLA, sclerae anicteric, no conjunctival injection  HENT: NCAT, mucous membranes moist  Neck: Supple, no thyromegaly, no lymphadenopathy, trachea midline  Respiratory: few fine crackles bilaterally, nonlabored respirations on RA  Cardiovascular: RRR, no murmurs, rubs, or gallops, palpable pedal pulses bilaterally  Gastrointestinal: Positive bowel sounds, soft, nontender, nondistended  Musculoskeletal: No bilateral ankle edema, no clubbing or cyanosis to extremities  Psychiatric: Appropriate affect, cooperative  Neurologic: Oriented x 3, strength symmetric in all extremities, Cranial Nerves grossly intact to confrontation, speech clear  Skin: No rashes    Results Reviewed:  I have personally reviewed current lab, radiology, and data and agree.    Results from last 7 days   Lab Units 11/09/19  0836   WBC 10*3/mm3 11.38*   HEMOGLOBIN g/dL 12.4   HEMATOCRIT % 41.8   PLATELETS 10*3/mm3 166     Results from last 7 days   Lab Units 11/09/19  0836   SODIUM mmol/L 140   POTASSIUM mmol/L 4.8   CHLORIDE mmol/L 110*   CO2 mmol/L 12.0*   BUN mg/dL 20   CREATININE mg/dL 0.81   GLUCOSE mg/dL 169*   CALCIUM mg/dL 8.6   ALT (SGPT) U/L 82*   AST (SGOT) U/L 77*     Estimated Creatinine Clearance: 97.9 mL/min (by C-G formula based on SCr of 0.81 mg/dL).  Brief Urine Lab Results  (Last result in the past 365 days)      Color   Clarity   Blood   Leuk Est   Nitrite   Protein    CREAT   Urine HCG        08/21/19 0852             77.8           No results found for: BNP    Microbiology Results Abnormal     None          Imaging Results (Last 24 Hours)     Procedure Component Value Units Date/Time    FL Upper GI Single Contrast With KUB [[id number]] Collected:  11/09/19 1040     Updated:  11/09/19 1041    Narrative:       EXAMINATION: FL UPPER GI SINGLE CONTRAST W KUB-     INDICATION: sleeve water soluble; E66.01-Morbid (severe) obesity due to  excess calories; Z68.41-Body mass index (BMI) 40.0-44.9, adult status  post gastric sleeve     TECHNIQUE: 31 seconds of fluoroscopy and 7 images used for water soluble  upper GI.     COMPARISON: NONE     FINDINGS:  imaging is obtained with unremarkable surgical clips in  the right upper quadrant from prior cholecystectomy. Distal esophagus is  unremarkable. There is no evidence of extravasation of contrast to  suggest evidence of a leak. No gastric outlet obstruction. Expected  postsurgical changes seen from gastric spleen.          Impression:       Expected postsurgical changes seen from gastric spleen with  no extravasation of contrast to suggest evidence of a leak.           XR Chest 1 View [[id number]] Collected:  11/09/19 0818     Updated:  11/09/19 0819    Narrative:          EXAMINATION: XR CHEST 1 VW-      INDICATION: Low Oxygen Saturation; E66.01-Morbid (severe) obesity due to  excess calories; Z68.41-Body mass index (BMI) 40.0-44.9, adult      COMPARISON: NONE     FINDINGS: Portable chest reveals patchy airspace disease seen within the  left mid and lower lung field and right midlung. Heart is enlarged. The  upper lung fields are clear. No definite pleural effusion or  pneumothorax.           Impression:       Airspace disease identified within the left mid and lower  lung field and right midlung. Continued follow up recommended as  indicated.              Results for orders placed during the hospital encounter of 08/25/17   Adult  "Transthoracic Echo Complete    Narrative · Normal left ventricular cavity size and wall thickness noted. All left   ventricular wall segments contract normally.  · Estimated EF appears to be in the range of 61 - 65%  · The aortic valve is structurally normal. No aortic valve regurgitation   is present. No aortic valve stenosis is present.  · The mitral valve is normal in structure. No mitral valve regurgitation   is present. No significant mitral valve stenosis is present  · The tricuspid valve is normal. No tricuspid valve stenosis is present.   No tricuspid valve regurgitation is present. Estimated right ventricular   systolic pressure from tricuspid regurgitation is normal (<35 mmHg).  · There is a small (<1cm) pericardial effusion adjacent to the left   ventricle. There is no evidence of cardiac tamponade.          Assessment/Plan   Assessment / Plan     Active Hospital Problems    Diagnosis POA   • **Morbid obesity with body mass index (BMI) of 40.0 to 44.9 in adult (CMS/[facility]) [E66.01, Z68.41] Unknown     Added automatically from request for surgery [id number]     • Hypoxia [R09.02] Unknown   • BRICE on CPAP [G47.33, Z99.89] Not Applicable     noncompliant w/ device, \"lucia me\"     • Hypertension [I10] Yes   • Type 2 diabetes mellitus (CMS/[facility]) [E11.9] Yes         I recommend the following:  -- CXR reviewed and most likely post operative atelectasis in setting of morbid obesity and decrease mobilization. Patient is asymptomatic and currently 93% on Room air. Possibly monitor malfunction and changing out o2 sat   -- continue scheduled and prn nebs. Suspect part is due to underlying BRICE and desaturations while resting. Appears to have adequate O2 sats during exam. Patient is non compliant with CPAP at home and may benefit from nocturnal oxygen if continues to drop while sleeping.  -- mild leukocytosis- monitor. Likely reactive from surgery  -- discussed with patient to use IS every hour while awake and continue to " walk  -- I do not recommend antibiotics at this time. Can recheck cbc in am if still here      Thank you for allowing [facility] to provide consultative care for your patient, we will continue to follow while clinically appropriate.    Electronically signed by BRYNN Kelly, 11/09/19, 1:12 PM.

## 2019-11-09 NOTE — PROGRESS NOTES
"Cc:  POD#1 LSG  \"I feel good\"    Her  is in the room.  She looks and feels well and wants to go home.  She was very surprised at how low her oxygen saturations became.  She did use a CPAP last night and says she will use hers at home.  She is ambulating well and voiding well.  No pulmonary complaints off her oxygen.  Spirometer 1500.  She is tolerating her protein shakes and has no nausea.  No fever pulse 95 respirations 18 blood pressure 109/62   - 1050 she is in no apparent distress.  Lungs clear to auscultation.  Heart tachycardic.  Abdomen soft, nontender, nondistended, bowel sounds hypoactive.  Wounds look okay.  Extremity exam no edema.  CMP normal except for glucose of 169 chloride of 110 CO2 of 12 albumin 3.30 SGPT 82 SGOT 77 iron is 25 white blood count 11.4 with 90 segs 6 lymphs 4 monocytes H&H 12.4 and 41.8 hemoglobin A1c 8.20 upper GI images reviewed and report unremarkable portable chest x-ray airspace disease in the left mid and lower lung fields and right midlung.    Impression: Doing okay postoperative day #1 sleeve gastrectomy.  Hypoxia and abnormal chest x-ray, appreciate hospitalists input, felt to be atelectasis and related to surgery and untreated obstructive sleep apnea.  Asymptomatic.  Iron deficiency without evidence of bleeding on Lovenox.  Hypoalbuminemia concerning as it suggests chronic protein malnutrition and may put her at increased risk for delayed leak and/or wound complications.  Once again I discussed her multiple recurrent incisional hernias and likely repair by a general surgeon down the road.  Also we discussed resuming her aspirin in 2 weeks, Eliquis in the meantime.  She says she quit all her diabetic medication on her preoperative diet as her sugars were running in the low 100s.  She says she will check her sugars and manage treatment with her primary as an outpatient.    Plan: Discharge home.  Discharge instructions were discussed.  She says she has her Eliquis " at home.  See orders

## 2019-11-18 ENCOUNTER — OFFICE VISIT (OUTPATIENT)
Dept: BARIATRICS/WEIGHT MGMT | Facility: CLINIC | Age: 59
End: 2019-11-18

## 2019-11-18 VITALS
DIASTOLIC BLOOD PRESSURE: 82 MMHG | OXYGEN SATURATION: 99 % | SYSTOLIC BLOOD PRESSURE: 128 MMHG | RESPIRATION RATE: 18 BRPM | TEMPERATURE: 97.1 F | HEIGHT: 66 IN | WEIGHT: 250.5 LBS | HEART RATE: 84 BPM | BODY MASS INDEX: 40.26 KG/M2

## 2019-11-18 DIAGNOSIS — E66.01 OBESITY, CLASS III, BMI 40-49.9 (MORBID OBESITY) (HCC): Primary | ICD-10-CM

## 2019-11-18 DIAGNOSIS — Z98.84 STATUS POST BARIATRIC SURGERY: ICD-10-CM

## 2019-11-18 PROCEDURE — 99024 POSTOP FOLLOW-UP VISIT: CPT | Performed by: PHYSICIAN ASSISTANT

## 2019-11-18 NOTE — PROGRESS NOTES
[facility]ariatric Surgery  [address]  [place], located within [facility] [address]  [phone]      Patient Name:  Jannette Onofre.  :  1960      Reason for Visit:  POD #10    HPI:  Jannette Onofre is a 59 y.o. female s/p LSG by  on 19 (previous LAGB  with Dr. Russell, AGBR 2019 with Dr. Dunbar) higher risk with previous AGB + cardiac stents previously on ASA + plavix    Findings: Multiple recurrent incisional hernias at and above the umbilicus left undisturbed    D/c POD#1- hypoxia and abnormal CXR with hospitalists eval, r/t atelectasis and untreated BRICE. Hypoalbuminemia noted on labs. Multiple recurrent incisional hernias noted intraop, likely repair by a general surgeon down the road. Rx eliquis x 2 weeks then restart ASA.     Doing well.  Feeling really good, recovering well. Has not had any nausea, not requiring antiemetics. Taking 1 pain pill a day max for some incisional soreness, mostly with rolling over at night, minimal. No other issues/ concerns.  Denies dysphagia, reflux, nausea, vomiting, pulmonary issues and fevers.  Tolerating diet progression - on stage 2.  Getting 100g prot/day, shakes.  Drinking 64+ fluid oz/day, water.  Taking MVI, B12, B1, Calcium, Vit D, iron and Vit C.  On Omeprazole  and Eliquis .  Holding ASA , NSAIDs , Tramadol, Hormones, Diuretics , Steroids and Immunologics.  Plans to start ASA at 2 weeks postop. Questioning whether when to start plavix again.   Ambulating around house, has treadmill out walking 5-7 min at a time.     Presurgery weight: 267 pounds.  Today's weight is 114 kg (250 lb 8 oz) pounds, today's  Body mass index is 40.43 kg/m²., and her weight loss since surgery is 17 pounds.       Final Diagnosis    STOMACH, SUBTOTAL GASTRECTOMY:  No histopathologic abnormalities.     DGD/dlb         Past Medical History:   Diagnosis Date   • Anxiety    • Coronary artery disease     s/p LAD stenting, follows w/ Dr. Ogden   • Depression   "  • Diabetes mellitus (CMS/[facility])     dx , initially improved s/p AGB but now back on insulin    • Dyspepsia    • Dyspnea on exertion    • Fatigue    • Hyperlipidemia    • Hypertension    • Melanoma (CMS/[facility])        • Morbid obesity (CMS/[facility])    • Myocardial infarction (CMS/[facility]) 2016    s/p LAD stenting, on ASA 325mg + Plavix   • BRICE on CPAP     noncompliant w/ device, \"lucia me\"   • Umbilical hernia    • Wears glasses      Past Surgical History:   Procedure Laterality Date   • CARDIAC CATHETERIZATION N/A 2016    Procedure: Left Heart Cath;  Surgeon: Akhil Zuñiga MD;  [facility]:  [facility];  Service:    • CARDIAC CATHETERIZATION N/A 10/31/2017    Procedure: Left Heart Cath;  Surgeon: Lebron Ogden MD;  [facility]:  [facility];  Service:    • CARDIAC CATHETERIZATION  10/31/2017    Procedure: Functional Flow [place];  Surgeon: Denilson Pena MD;  [facility]:  [facility];  Service:    • CARDIAC CATHETERIZATION      02, 3/5/07, 3/23/11   • CATARACT EXTRACTION Right    • CATARACT EXTRACTION Left      RIGHT 05   •  SECTION     • COLONOSCOPY      routine/ COLONGUARD    • ENDOSCOPY      3/09, ,    • ENDOSCOPY N/A 2019    Procedure: ESOPHAGOGASTRODUODENOSCOPY;  Surgeon: Ranjeet Dunbar MD;  Location:  [facility];  Service: Bariatric   • FINGER/THUMB CLOSED REDUCTION Left 8/3/2017    Procedure: CLOSED REDUCTION PERCUTANEOUS PINNING LEFT FIFTH METACARPAL SHAFT FRACTURE;  Surgeon: Oscar León MD;  Location:  [facility];  Service:    • GASTRIC BANDING REMOVAL  2019    w/ Dr. Dunbar   • GASTRIC SLEEVE LAPAROSCOPIC N/A 2019    Procedure: GASTRIC SLEEVE LAPAROSCOPIC;  Surgeon: Ranjeet Dunbar MD;  Location:  [facility];  Service: Bariatric   • LAPAROSCOPIC CHOLECYSTECTOMY      w/ UHR   • LAPAROSCOPIC GASTRIC BANDING  2013    s/p LAGB by Dr Russell   • OTHER SURGICAL " HISTORY      melanoma removal/back   • UT RT/LT HEART CATHETERS N/A 2016    Procedure: Percutaneous Coronary Intervention;  Surgeon: Akhil Zuñiga MD;  Location: [facility] INVASIVE LOCATION;  Service: Cardiology   • SEPTOPLASTY     • UMBILICAL HERNIA REPAIR      no mesh     Outpatient Medications Marked as Taking for the 19 encounter (Office Visit) with Mami Kohler PA-C   Medication Sig Dispense Refill   • amLODIPine (NORVASC) 5 MG tablet Take 1 tablet by mouth Daily. 90 tablet 3   • apixaban (ELIQUIS) 2.5 MG tablet tablet Take 2.5 mg by mouth.     • atorvastatin (LIPITOR) 80 MG tablet Take 1 tablet by mouth Daily. 90 tablet 3   • carvedilol (COREG) 25 MG tablet Take 1 tablet by mouth 2 (Two) Times a Day with food. 180 tablet 3   • Cholecalciferol (VITAMIN D3) 36758 units tablet Take 10,000 Units by mouth Daily.     • HYDROcodone-acetaminophen (NORCO) 7.5-325 MG per tablet Take 1 tablet by mouth Every 4 (Four) Hours As Needed for Moderate Pain  for up to 10 days. 12 tablet 0   • isosorbide mononitrate (IMDUR) 60 MG 24 hr tablet Take 1½ tablets by mouth Every Morning. 180 tablet 3   • lisinopril (PRINIVIL,ZESTRIL) 20 MG tablet Take 1 (one) tablet by mouth every day. (Patient taking differently: Take 20 mg by mouth Daily.) 90 tablet 3   • Multiple Vitamin (MULTI-VITAMIN DAILY PO) Take  by mouth.     • omeprazole (PrilOSEC) 40 MG capsule Take 1 capsule by mouth Daily. 60 capsule 1   • thiamine1 (VITAMIN B1) 250 MG tablet Take 250 mg by mouth Daily.       No Known Allergies    Social History     Socioeconomic History   • Marital status:      Spouse name: Not on file   • Number of children: Not on file   • Years of education: Not on file   • Highest education level: Not on file   Tobacco Use   • Smoking status: Former Smoker     Packs/day: 0.25     Years: 1.00     Pack years: 0.25     Types: Cigarettes     Last attempt to quit: 1989     Years since quittin.4   •  "Smokeless tobacco: Never Used   Substance and Sexual Activity   • Alcohol use: No   • Drug use: No   • Sexual activity: Defer   Social History Narrative    .  Lives in [facility].   @ [employer].       /82 (BP Location: Left arm, Patient Position: Sitting, Cuff Size: Large Adult)   Pulse 84   Temp 97.1 °F (36.2 °C) (Temporal)   Resp 18   Ht 167.6 cm (66\")   Wt 114 kg (250 lb 8 oz)   LMP 03/01/2013 (Approximate) Comment: LAST MAMMOGRAM 2-2019  SpO2 99%   BMI 40.43 kg/m²   Physical Exam   Constitutional: She is oriented to person, place, and time. She appears well-developed and well-nourished.   HENT:   Head: Normocephalic and atraumatic.   Cardiovascular: Normal rate, regular rhythm and normal heart sounds.   Pulmonary/Chest: Effort normal and breath sounds normal. No respiratory distress. She has no wheezes.   Abdominal: Soft. Bowel sounds are normal. She exhibits no distension. There is no tenderness.   Incisions healing well   Neurological: She is alert and oriented to person, place, and time.   Skin: Skin is warm and dry.   Psychiatric: She has a normal mood and affect. Her behavior is normal. Judgment and thought content normal.         Assessment:   POD #10 s/p LSG by  on 11/8/19 (previous LAGB 2013 with Dr. Russell, AGBR 2/2019 with Dr. Dunbar)    ICD-10-CM ICD-9-CM   1. Obesity, Class III, BMI 40-49.9 (morbid obesity) (CMS/[facility]) E66.01 278.01   2. Status post bariatric surgery Z98.84 V45.86         Plan:  Doing well. Continue eliquis until POD#14 then start ASA. Will clarify with Dr. Dunbar as to when to start plavix.  Continue to advance diet per manual.  Continue protein intake to 100g/day, especially with noted hypoalbuminemia.  Increase exercise/activity as tolerated.  Reviewed lifting restrictions, nothing >25 lbs x 2 more weeks.  Continue vitamins.  Continue PPI.  Continue to avoid ASA/NSAIDs/tramadol/tobacco x 6 weeks postop, steroids x 8 weeks postop.  " Call w/ problems/concerns.    The patient was instructed to follow up in 3 weeks, sooner if needed.

## 2019-11-19 ENCOUNTER — TELEPHONE (OUTPATIENT)
Dept: BARIATRICS/WEIGHT MGMT | Facility: CLINIC | Age: 59
End: 2019-11-19

## 2019-11-19 NOTE — TELEPHONE ENCOUNTER
Ranjeet Dunbar MD Hitt, Mami MARTINEZ PA-C             Yes, restart both ASA and Plavix at 2 weeks, continue daily PPI for full 2 months post op.  Thanks!

## 2019-11-19 NOTE — TELEPHONE ENCOUNTER
Pt notified that Dr Dunbar would like for her to restart both her ASA and Plavix at 2 weeks postop and I reiterated that she use her daily PPI x 2 months post op. Pt verbalized her understanding of restarting asa and Plavix at 2 weeks postop and take PPI x2 months postop

## 2019-12-09 ENCOUNTER — OFFICE VISIT (OUTPATIENT)
Dept: BARIATRICS/WEIGHT MGMT | Facility: CLINIC | Age: 59
End: 2019-12-09

## 2019-12-09 VITALS
HEIGHT: 66 IN | BODY MASS INDEX: 38.25 KG/M2 | HEART RATE: 88 BPM | SYSTOLIC BLOOD PRESSURE: 116 MMHG | DIASTOLIC BLOOD PRESSURE: 64 MMHG | OXYGEN SATURATION: 99 % | RESPIRATION RATE: 18 BRPM | TEMPERATURE: 96.5 F | WEIGHT: 238 LBS

## 2019-12-09 DIAGNOSIS — R10.13 DYSPEPSIA: Primary | ICD-10-CM

## 2019-12-09 DIAGNOSIS — E66.01 MORBID OBESITY (HCC): ICD-10-CM

## 2019-12-09 DIAGNOSIS — R79.0 ABNORMAL BLOOD LEVEL OF IRON: ICD-10-CM

## 2019-12-09 DIAGNOSIS — R53.83 FATIGUE, UNSPECIFIED TYPE: ICD-10-CM

## 2019-12-09 DIAGNOSIS — E55.9 VITAMIN D DEFICIENCY: ICD-10-CM

## 2019-12-09 PROCEDURE — 99024 POSTOP FOLLOW-UP VISIT: CPT | Performed by: PHYSICIAN ASSISTANT

## 2019-12-09 RX ORDER — CLOPIDOGREL BISULFATE 75 MG/1
75 TABLET ORAL DAILY
COMMUNITY
End: 2020-02-10

## 2019-12-09 RX ORDER — ASPIRIN 81 MG/1
81 TABLET ORAL DAILY
COMMUNITY
End: 2020-08-24

## 2019-12-09 NOTE — PROGRESS NOTES
"[facility]ariatric Surgery  [address]  [facility] [address]  [phone]      Patient Name:  Jannette Onofre.  :  1960      Date of Visit: 2019      Reason for Visit:  1 month postop    HPI:  Jannette Onofre is a 59 y.o. female s/p LSG 19 GDW (hx AGB)    Feeling really good.  Tolerating diet progression.  Getting 100+g prot/day.  No issues/concerns.  Denies dysphagia, reflux, nausea, vomiting and abdominal pain.  Tolerating diet progression w/out issue.  Taking MVI, B12, B1, Calcium, Vit D, iron and Vit C.  On Omeprazole .   Has resumed ASA + Plavix as advised.  Still avoiding tobacco.  Exercising daily - walking on the treadmill x 30 min.     Presurgery weight:  267 pounds. Today's weight is 108 kg (238 lb) pounds, today's Body mass index is 38.41 kg/m²., and her weight loss since surgery is 29 pounds.       Past Medical History:   Diagnosis Date   • Anxiety    • Coronary artery disease     s/p LAD stenting, follows w/ Dr. Ogden   • Depression    • Diabetes mellitus (CMS/HCC)     dx , initially improved s/p AGB but now back on insulin    • Dyspepsia    • Dyspnea on exertion    • Fatigue    • Hyperlipidemia    • Hypertension    • Melanoma (CMS/HCC)        • Morbid obesity (CMS/HCC)    • Myocardial infarction (CMS/HCC) 2016    s/p LAD stenting, on ASA 325mg + Plavix   • BRICE on CPAP     noncompliant w/ device, \"lucia me\"   • Umbilical hernia    • Wears glasses      Past Surgical History:   Procedure Laterality Date   • CARDIAC CATHETERIZATION N/A 2016    Procedure: Left Heart Cath;  Surgeon: Akhil Zuñiga MD;  [facility]:  [facility];  Service:    • CARDIAC CATHETERIZATION N/A 10/31/2017    Procedure: Left Heart Cath;  Surgeon: Lebron Ogden MD;  [facility]:  [facility];  Service:    • CARDIAC CATHETERIZATION  10/31/2017    Procedure: Functional Flow [place];  Surgeon: Denilson Pena MD;  Location: "  [facility];  Service:    • CARDIAC CATHETERIZATION      02, 3/5/07, 3/23/11   • CATARACT EXTRACTION Right    • CATARACT EXTRACTION Left 2005     RIGHT 05   •  SECTION     • COLONOSCOPY      routine/ COLONGUARD    • ENDOSCOPY      3/09, ,    • ENDOSCOPY N/A 2019    Procedure: ESOPHAGOGASTRODUODENOSCOPY;  Surgeon: Ranjeet Dunbar MD;  Location:  [facility];  Service: Bariatric   • FINGER/THUMB CLOSED REDUCTION Left 8/3/2017    Procedure: CLOSED REDUCTION PERCUTANEOUS PINNING LEFT FIFTH METACARPAL SHAFT FRACTURE;  Surgeon: Oscar León MD;  Location:  [facility];  Service:    • GASTRIC BANDING REMOVAL  2019    w/ Dr. Dunbar   • GASTRIC SLEEVE LAPAROSCOPIC N/A 2019    Procedure: GASTRIC SLEEVE LAPAROSCOPIC;  Surgeon: Ranjeet Dunbar MD;  Location:  [facility];  Service: Bariatric   • LAPAROSCOPIC CHOLECYSTECTOMY      w/ UHR   • LAPAROSCOPIC GASTRIC BANDING  2013    s/p LAGB by Dr Russell   • OTHER SURGICAL HISTORY      melanoma removal/back   • [facility] RT/LT HEART CATHETERS N/A 2016    Procedure: Percutaneous Coronary Intervention;  Surgeon: Akhil Zuñiga MD;  [facility]:  [facility];  Service: Cardiology   • SEPTOPLASTY     • UMBILICAL HERNIA REPAIR      no mesh     Outpatient Medications Marked as Taking for the 19 encounter (Office Visit) with Carli Bernal PA   Medication Sig Dispense Refill   • amLODIPine (NORVASC) 5 MG tablet Take 1 tablet by mouth Daily. 90 tablet 3   • aspirin 81 MG EC tablet Take 81 mg by mouth Daily.     • atorvastatin (LIPITOR) 80 MG tablet Take 1 tablet by mouth Daily. 90 tablet 3   • carvedilol (COREG) 25 MG tablet Take 1 tablet by mouth 2 (Two) Times a Day with food. 180 tablet 3   • Cholecalciferol (VITAMIN D3) 22633 units tablet Take 10,000 Units by mouth Daily.     • clopidogrel (PLAVIX) 75 MG tablet Take 75 mg by mouth Daily.     • isosorbide  "mononitrate (IMDUR) 60 MG 24 hr tablet Take 1½ tablets by mouth Every Morning. 180 tablet 3   • lisinopril (PRINIVIL,ZESTRIL) 20 MG tablet Take 1 (one) tablet by mouth every day. (Patient taking differently: Take 20 mg by mouth Daily.) 90 tablet 3   • Multiple Vitamin (MULTI-VITAMIN DAILY PO) Take  by mouth.     • nitroglycerin (NITROSTAT) 0.4 MG SL tablet Place 1 tablet under the tongue Every 5 (Five) Minutes As Needed for Chest Pain (max of 3 doses in 15 minutes. If no relief, go to ER. 25 tablet 12   • omeprazole (PrilOSEC) 40 MG capsule Take 1 capsule by mouth Daily. 60 capsule 1   • thiamine1 (VITAMIN B1) 250 MG tablet Take 250 mg by mouth Daily.       No Known Allergies    Social History     Socioeconomic History   • Marital status:      Spouse name: Not on file   • Number of children: Not on file   • Years of education: Not on file   • Highest education level: Not on file   Tobacco Use   • Smoking status: Former Smoker     Packs/day: 0.25     Years: 1.00     Pack years: 0.25     Types: Cigarettes     Last attempt to quit: 1989     Years since quittin.4   • Smokeless tobacco: Never Used   Substance and Sexual Activity   • Alcohol use: No   • Drug use: No   • Sexual activity: Defer   Social History Narrative    .  Lives in [facility] [place].   @ [employer].       /64 (BP Location: Right arm, Patient Position: Sitting, Cuff Size: Large Adult)   Pulse 88   Temp 96.5 °F (35.8 °C) (Temporal)   Resp 18   Ht 167.6 cm (66\")   Wt 108 kg (238 lb)   LMP 2013 (Approximate) Comment: LAST MAMMOGRAM   SpO2 99%   BMI 38.41 kg/m²     Physical Exam   Constitutional: She appears well-developed and well-nourished. She is cooperative.   obese   HENT:   Mouth/Throat: Oropharynx is clear and moist and mucous membranes are normal.   Eyes: Conjunctivae are normal. No scleral icterus.   Cardiovascular: Normal rate.   Pulmonary/Chest: Effort normal.   Abdominal: Soft. Bowel " sounds are normal. There is no tenderness.   Incisions healing well   Musculoskeletal: Normal range of motion. She exhibits no edema.   Neurological: She is alert.   Skin: Skin is warm and dry. No rash noted.   Psychiatric: She has a normal mood and affect. Judgment normal.         Assessment:  1 month s/p LSG 11/8/19 GDW (hx AGB)    ICD-10-CM ICD-9-CM   1. Dyspepsia R10.13 536.8   2. Fatigue, unspecified type R53.83 780.79   3. Abnormal blood level of iron R79.0 790.6   4. Vitamin D deficiency E55.9 268.9   5. Morbid obesity (CMS/HCC) E66.01 278.01         Plan:  Doing well.  Continue to advance diet per manual.  Continue protein 100g/day.  Encouraged good food choices - high protein, low carb.  Continue routine exercise.  Routine bariatric labs ordered.  Continue vitamins w/ adjustments pending lab results.  Continue PPI.  Continue to avoid NSAIDs/tobacco x 6 weeks postop, steroids x 8 weeks postop.   Call w/ problems/concerns.    The patient was instructed to follow up in 2 months, sooner if needed.

## 2019-12-10 ENCOUNTER — LAB (OUTPATIENT)
Dept: LAB | Facility: HOSPITAL | Age: 59
End: 2019-12-10

## 2019-12-10 DIAGNOSIS — R53.83 FATIGUE, UNSPECIFIED TYPE: ICD-10-CM

## 2019-12-10 DIAGNOSIS — R10.13 DYSPEPSIA: ICD-10-CM

## 2019-12-10 DIAGNOSIS — E55.9 VITAMIN D DEFICIENCY: ICD-10-CM

## 2019-12-10 DIAGNOSIS — R79.0 ABNORMAL BLOOD LEVEL OF IRON: ICD-10-CM

## 2019-12-10 LAB
25(OH)D3 SERPL-MCNC: 73.7 NG/ML (ref 30–100)
ALBUMIN SERPL-MCNC: 4.1 G/DL (ref 3.5–5.2)
ALBUMIN/GLOB SERPL: 1.3 G/DL
ALP SERPL-CCNC: 74 U/L (ref 39–117)
ALT SERPL W P-5'-P-CCNC: 97 U/L (ref 1–33)
ANION GAP SERPL CALCULATED.3IONS-SCNC: 15 MMOL/L (ref 5–15)
AST SERPL-CCNC: 60 U/L (ref 1–32)
BASOPHILS # BLD AUTO: 0.04 10*3/MM3 (ref 0–0.2)
BASOPHILS NFR BLD AUTO: 0.6 % (ref 0–1.5)
BILIRUB SERPL-MCNC: 0.5 MG/DL (ref 0.2–1.2)
BUN BLD-MCNC: 43 MG/DL (ref 8–23)
BUN/CREAT SERPL: 34.4 (ref 7–25)
CALCIUM SPEC-SCNC: 9.7 MG/DL (ref 8.6–10.5)
CHLORIDE SERPL-SCNC: 111 MMOL/L (ref 98–107)
CO2 SERPL-SCNC: 18 MMOL/L (ref 22–29)
CREAT BLD-MCNC: 1.25 MG/DL (ref 0.57–1)
DEPRECATED RDW RBC AUTO: 45.4 FL (ref 37–54)
EOSINOPHIL # BLD AUTO: 0.18 10*3/MM3 (ref 0–0.4)
EOSINOPHIL NFR BLD AUTO: 2.7 % (ref 0.3–6.2)
ERYTHROCYTE [DISTWIDTH] IN BLOOD BY AUTOMATED COUNT: 14 % (ref 12.3–15.4)
FERRITIN SERPL-MCNC: 275 NG/ML (ref 13–150)
FOLATE SERPL-MCNC: >20 NG/ML (ref 4.78–24.2)
GFR SERPL CREATININE-BSD FRML MDRD: 44 ML/MIN/1.73
GLOBULIN UR ELPH-MCNC: 3.2 GM/DL
GLUCOSE BLD-MCNC: 259 MG/DL (ref 65–99)
HCT VFR BLD AUTO: 38.4 % (ref 34–46.6)
HGB BLD-MCNC: 12.5 G/DL (ref 12–15.9)
IMM GRANULOCYTES # BLD AUTO: 0.01 10*3/MM3 (ref 0–0.05)
IMM GRANULOCYTES NFR BLD AUTO: 0.1 % (ref 0–0.5)
IRON 24H UR-MRATE: 93 MCG/DL (ref 37–145)
LYMPHOCYTES # BLD AUTO: 1.95 10*3/MM3 (ref 0.7–3.1)
LYMPHOCYTES NFR BLD AUTO: 28.7 % (ref 19.6–45.3)
MCH RBC QN AUTO: 28.8 PG (ref 26.6–33)
MCHC RBC AUTO-ENTMCNC: 32.6 G/DL (ref 31.5–35.7)
MCV RBC AUTO: 88.5 FL (ref 79–97)
MONOCYTES # BLD AUTO: 0.38 10*3/MM3 (ref 0.1–0.9)
MONOCYTES NFR BLD AUTO: 5.6 % (ref 5–12)
NEUTROPHILS # BLD AUTO: 4.23 10*3/MM3 (ref 1.7–7)
NEUTROPHILS NFR BLD AUTO: 62.3 % (ref 42.7–76)
NRBC BLD AUTO-RTO: 0.1 /100 WBC (ref 0–0.2)
PLATELET # BLD AUTO: 166 10*3/MM3 (ref 140–450)
PMV BLD AUTO: 11.1 FL (ref 6–12)
POTASSIUM BLD-SCNC: 4.7 MMOL/L (ref 3.5–5.2)
PREALB SERPL-MCNC: 30.4 MG/DL (ref 20–40)
PROT SERPL-MCNC: 7.3 G/DL (ref 6–8.5)
RBC # BLD AUTO: 4.34 10*6/MM3 (ref 3.77–5.28)
SODIUM BLD-SCNC: 144 MMOL/L (ref 136–145)
WBC NRBC COR # BLD: 6.79 10*3/MM3 (ref 3.4–10.8)

## 2019-12-10 PROCEDURE — 82728 ASSAY OF FERRITIN: CPT

## 2019-12-10 PROCEDURE — 84425 ASSAY OF VITAMIN B-1: CPT

## 2019-12-10 PROCEDURE — 83921 ORGANIC ACID SINGLE QUANT: CPT

## 2019-12-10 PROCEDURE — 80053 COMPREHEN METABOLIC PANEL: CPT

## 2019-12-10 PROCEDURE — 84134 ASSAY OF PREALBUMIN: CPT

## 2019-12-10 PROCEDURE — 82306 VITAMIN D 25 HYDROXY: CPT

## 2019-12-10 PROCEDURE — 85025 COMPLETE CBC W/AUTO DIFF WBC: CPT

## 2019-12-10 PROCEDURE — 83540 ASSAY OF IRON: CPT

## 2019-12-10 PROCEDURE — 36415 COLL VENOUS BLD VENIPUNCTURE: CPT

## 2019-12-10 PROCEDURE — 82746 ASSAY OF FOLIC ACID SERUM: CPT

## 2019-12-13 ENCOUNTER — INFUSION (OUTPATIENT)
Dept: ONCOLOGY | Facility: HOSPITAL | Age: 59
End: 2019-12-13

## 2019-12-13 VITALS
DIASTOLIC BLOOD PRESSURE: 75 MMHG | RESPIRATION RATE: 18 BRPM | HEART RATE: 84 BPM | TEMPERATURE: 96.7 F | SYSTOLIC BLOOD PRESSURE: 108 MMHG

## 2019-12-13 DIAGNOSIS — N28.9 RENAL INSUFFICIENCY: Primary | ICD-10-CM

## 2019-12-13 LAB
Lab: NORMAL
METHYLMALONATE SERPL-SCNC: 135 NMOL/L (ref 0–378)

## 2019-12-13 PROCEDURE — 96360 HYDRATION IV INFUSION INIT: CPT

## 2019-12-13 RX ORDER — SODIUM CHLORIDE 9 MG/ML
500 INJECTION, SOLUTION INTRAVENOUS ONCE
Status: CANCELLED | OUTPATIENT
Start: 2019-12-13

## 2019-12-13 RX ORDER — SODIUM CHLORIDE 9 MG/ML
500 INJECTION, SOLUTION INTRAVENOUS ONCE
Status: COMPLETED | OUTPATIENT
Start: 2019-12-13 | End: 2019-12-13

## 2019-12-13 RX ADMIN — SODIUM CHLORIDE 500 ML/HR: 9 INJECTION, SOLUTION INTRAVENOUS at 14:48

## 2019-12-17 ENCOUNTER — TRANSCRIBE ORDERS (OUTPATIENT)
Dept: ADMINISTRATIVE | Facility: HOSPITAL | Age: 59
End: 2019-12-17

## 2019-12-17 ENCOUNTER — LAB (OUTPATIENT)
Dept: LAB | Facility: HOSPITAL | Age: 59
End: 2019-12-17

## 2019-12-17 DIAGNOSIS — E11.9 DIABETES MELLITUS WITHOUT COMPLICATION (HCC): Primary | ICD-10-CM

## 2019-12-17 DIAGNOSIS — E78.2 MIXED HYPERLIPIDEMIA: ICD-10-CM

## 2019-12-17 DIAGNOSIS — E86.0 DEHYDRATION: ICD-10-CM

## 2019-12-17 DIAGNOSIS — R89.9 ABNORMAL PLEURAL FLUID: ICD-10-CM

## 2019-12-17 DIAGNOSIS — E11.9 DIABETES MELLITUS WITHOUT COMPLICATION (HCC): ICD-10-CM

## 2019-12-17 LAB
ALBUMIN SERPL-MCNC: 4.1 G/DL (ref 3.5–5.2)
ALBUMIN/GLOB SERPL: 1.4 G/DL
ALP SERPL-CCNC: 65 U/L (ref 39–117)
ALT SERPL W P-5'-P-CCNC: 76 U/L (ref 1–33)
ANION GAP SERPL CALCULATED.3IONS-SCNC: 15 MMOL/L (ref 5–15)
AST SERPL-CCNC: 40 U/L (ref 1–32)
BILIRUB SERPL-MCNC: 0.4 MG/DL (ref 0.2–1.2)
BUN BLD-MCNC: 24 MG/DL (ref 8–23)
BUN/CREAT SERPL: 24.5 (ref 7–25)
CALCIUM SPEC-SCNC: 9.5 MG/DL (ref 8.6–10.5)
CHLORIDE SERPL-SCNC: 107 MMOL/L (ref 98–107)
CO2 SERPL-SCNC: 21 MMOL/L (ref 22–29)
CREAT BLD-MCNC: 0.98 MG/DL (ref 0.57–1)
GFR SERPL CREATININE-BSD FRML MDRD: 58 ML/MIN/1.73
GLOBULIN UR ELPH-MCNC: 2.9 GM/DL
GLUCOSE BLD-MCNC: 156 MG/DL (ref 65–99)
POTASSIUM BLD-SCNC: 4.1 MMOL/L (ref 3.5–5.2)
PROT SERPL-MCNC: 7 G/DL (ref 6–8.5)
SODIUM BLD-SCNC: 143 MMOL/L (ref 136–145)
VIT B1 BLD-SCNC: 300 NMOL/L (ref 66.5–200)

## 2019-12-17 PROCEDURE — 80053 COMPREHEN METABOLIC PANEL: CPT

## 2019-12-17 PROCEDURE — 36415 COLL VENOUS BLD VENIPUNCTURE: CPT

## 2020-02-10 ENCOUNTER — OFFICE VISIT (OUTPATIENT)
Dept: BARIATRICS/WEIGHT MGMT | Facility: CLINIC | Age: 60
End: 2020-02-10

## 2020-02-10 VITALS
BODY MASS INDEX: 36.72 KG/M2 | RESPIRATION RATE: 18 BRPM | HEIGHT: 66 IN | HEART RATE: 84 BPM | TEMPERATURE: 96.4 F | OXYGEN SATURATION: 99 % | DIASTOLIC BLOOD PRESSURE: 60 MMHG | WEIGHT: 228.5 LBS | SYSTOLIC BLOOD PRESSURE: 116 MMHG

## 2020-02-10 DIAGNOSIS — R53.83 FATIGUE, UNSPECIFIED TYPE: Primary | ICD-10-CM

## 2020-02-10 DIAGNOSIS — N28.9 RENAL INSUFFICIENCY: ICD-10-CM

## 2020-02-10 DIAGNOSIS — R10.13 DYSPEPSIA: ICD-10-CM

## 2020-02-10 DIAGNOSIS — I10 ESSENTIAL HYPERTENSION: ICD-10-CM

## 2020-02-10 DIAGNOSIS — E11.69 DIABETES MELLITUS TYPE 2 IN OBESE (HCC): ICD-10-CM

## 2020-02-10 DIAGNOSIS — R79.0 ABNORMAL BLOOD LEVEL OF IRON: ICD-10-CM

## 2020-02-10 DIAGNOSIS — E66.9 DIABETES MELLITUS TYPE 2 IN OBESE (HCC): ICD-10-CM

## 2020-02-10 PROCEDURE — 99214 OFFICE O/P EST MOD 30 MIN: CPT | Performed by: PHYSICIAN ASSISTANT

## 2020-02-10 NOTE — PROGRESS NOTES
"[facility]  [address]  [address]  [place], located within [facility] [id number] [phone]      Patient Name:  Jannette Onofre.  :  1960      Date of Visit: 02/10/2020        Reason for Visit:  3 months postop    HPI:  Jannette Onofre is a 59 y.o. female s/p LSG 19 GDW (hx AGB)    Feeling good!  Getting 100g prot/day, mostly w/ food choices.  Averaging 900-1000 gladis/day.  No issues/concerns.  Still holding diabetic meds - FSBS running 120's.  No longer needing PPI.  Drinking 5 (16.9oz) bottles water/day.  Voiding \"a lot\" - \"light yellow.\"  Still exercising daily.      Labs 12/10/19 - Cr 1.25, BUN 43, CO2 18, ferritin 275.  Repeat CMP 19 s/p IVF - Cr 0.98, BUN 24, CO2 21      Presurgery weight:  267 pounds. Today's weight is 104 kg (228 lb 8 oz) pounds, today's Body mass index is 36.88 kg/m²., and her weight loss since surgery is 39 pounds.       Past Medical History:   Diagnosis Date   • Anxiety    • Coronary artery disease     s/p LAD stenting, follows w/ Dr. Ogden   • Depression    • Diabetes mellitus (CMS/HCC)     dx , initially improved s/p AGB but now back on insulin    • Dyspepsia    • Dyspnea on exertion    • Fatigue    • Hyperlipidemia    • Hypertension    • Melanoma (CMS/HCC)        • Morbid obesity (CMS/HCC)    • Myocardial infarction (CMS/HCC) 2016    s/p LAD stenting, on ASA 325mg + Plavix   • BRICE on CPAP     noncompliant w/ device, \"lucia me\"   • Umbilical hernia     multiple recurrent incisional hernias at the umbilicus, left undisturbed during LSG, can consider general surgery repair in the future, if needed.   • Wears glasses      Past Surgical History:   Procedure Laterality Date   • CARDIAC CATHETERIZATION N/A 2016    Procedure: Left Heart Cath;  Surgeon: Akhil Zuñiga MD;  Location: [facility] CATH INVASIVE LOCATION;  Service:    • CARDIAC CATHETERIZATION N/A 10/31/2017    Procedure: Left Heart Cath;  Surgeon: Lebron Ogden MD;  " Location:  [facility] CATH INVASIVE LOCATION;  Service:    • CARDIAC CATHETERIZATION  10/31/2017    Procedure: Functional Flow [place];  Surgeon: Denilson Pena MD;  Location:  [facility] CATH INVASIVE LOCATION;  Service:    • CARDIAC CATHETERIZATION      02, 3/5/07, 3/23/11   • CATARACT EXTRACTION Right    • CATARACT EXTRACTION Left      RIGHT 05   •  SECTION     • COLONOSCOPY      routine/ COLONGUARD    • ENDOSCOPY      3/09, ,    • ENDOSCOPY N/A 2019    Procedure: ESOPHAGOGASTRODUODENOSCOPY;  Surgeon: Ranjeet Dunbar MD;  Location:  [facility];  Service: Bariatric   • FINGER/THUMB CLOSED REDUCTION Left 8/3/2017    Procedure: CLOSED REDUCTION PERCUTANEOUS PINNING LEFT FIFTH METACARPAL SHAFT FRACTURE;  Surgeon: Oscar León MD;  Location:  [facility];  Service:    • GASTRIC BANDING REMOVAL  2019    w/ Dr. Dunbar   • GASTRIC SLEEVE LAPAROSCOPIC N/A 2019    Procedure: GASTRIC SLEEVE LAPAROSCOPIC;  Surgeon: Ranjeet Dunbar MD;  Location:  [facility];  Service: Bariatric   • LAPAROSCOPIC CHOLECYSTECTOMY      w/ UHR   • LAPAROSCOPIC GASTRIC BANDING  2013    s/p LAGB by Dr Russell   • OTHER SURGICAL HISTORY      melanoma removal/back   • MS RT/LT HEART CATHETERS N/A 2016    Procedure: Percutaneous Coronary Intervention;  Surgeon: Akhil Zuñiga MD;  Location:  [facility] CATH INVASIVE LOCATION;  Service: Cardiology   • SEPTOPLASTY     • UMBILICAL HERNIA REPAIR      no mesh     Outpatient Medications Marked as Taking for the 2/10/20 encounter (Office Visit) with Carli Bernal PA   Medication Sig Dispense Refill   • amLODIPine (NORVASC) 5 MG tablet Take 1 tablet by mouth Daily. 90 tablet 3   • aspirin 81 MG EC tablet Take 81 mg by mouth Daily.     • atorvastatin (LIPITOR) 80 MG tablet Take 1 tablet by mouth Daily. 90 tablet 3   • carvedilol (COREG) 25 MG tablet Take 1 tablet by mouth 2 (Two) Times a Day with food. 180 tablet 3    • Cholecalciferol (VITAMIN D3) 66407 units tablet Take 10,000 Units by mouth Daily.     • clopidogrel (PLAVIX) 75 MG tablet Take 1 tablet by mouth Daily. 90 tablet 4   • glucose blood test strip use one strip to check glucose as needed. 100 each 3   • icosapent ethyl (VASCEPA) 1 g capsule capsule take 2 capsule by mouth 2 times every day with food swallowing whole. Do not chew, open, dissolve and/or crush. 360 capsule 3   • isosorbide mononitrate (IMDUR) 60 MG 24 hr tablet Take 1½ tablets by mouth Every Morning. 180 tablet 3   • Lancets Thin misc Use one strip to check glucose as needed. 300 each 3   • lisinopril (PRINIVIL,ZESTRIL) 20 MG tablet Take 1 (one) tablet by mouth every day. (Patient taking differently: Take 20 mg by mouth Daily.) 90 tablet 3   • Multiple Vitamin (MULTI-VITAMIN DAILY PO) Take  by mouth.     • nitroglycerin (NITROSTAT) 0.4 MG SL tablet Place 1 tablet under the tongue Every 5 (Five) Minutes As Needed for Chest Pain (max of 3 doses in 15 minutes. If no relief, go to ER. 25 tablet 12   • thiamine1 (VITAMIN B1) 250 MG tablet Take 250 mg by mouth Daily.     • [DISCONTINUED] omeprazole (PrilOSEC) 40 MG capsule Take 1 capsule by mouth Daily. 60 capsule 1     No Known Allergies    Social History     Socioeconomic History   • Marital status:      Spouse name: Not on file   • Number of children: Not on file   • Years of education: Not on file   • Highest education level: Not on file   Tobacco Use   • Smoking status: Former Smoker     Packs/day: 0.25     Years: 1.00     Pack years: 0.25     Types: Cigarettes     Last attempt to quit: 1989     Years since quittin.6   • Smokeless tobacco: Never Used   Substance and Sexual Activity   • Alcohol use: No   • Drug use: No   • Sexual activity: Defer   Social History Narrative    .  Lives in [facility].   @ [employer].       /60 (BP Location: Right arm, Patient Position: Sitting, Cuff Size: Large Adult)    "Pulse 84   Temp 96.4 °F (35.8 °C) (Temporal)   Resp 18   Ht 167.6 cm (66\")   Wt 104 kg (228 lb 8 oz)   LMP 03/01/2013 (Approximate) Comment: LAST MAMMOGRAM 2-2019  SpO2 99%   BMI 36.88 kg/m²     Physical Exam   Constitutional: She appears well-developed and well-nourished. She is cooperative.   HENT:   Mouth/Throat: Oropharynx is clear and moist and mucous membranes are normal.   Eyes: Conjunctivae are normal. No scleral icterus.   Cardiovascular: Normal rate.   Pulmonary/Chest: Effort normal.   Abdominal: Soft. There is no tenderness.   Musculoskeletal: Normal range of motion. She exhibits no edema.   Neurological: She is alert.   Skin: Skin is warm and dry. No rash noted.   Psychiatric: She has a normal mood and affect. Judgment normal.         Assessment:  3 months s/p LSG 11/8/19 GDW (hx AGB)    ICD-10-CM ICD-9-CM   1. Fatigue, unspecified type R53.83 780.79   2. Dyspepsia R10.13 536.8   3. Abnormal blood level of iron R79.0 790.6   4. Diabetes mellitus type 2 in obese (CMS/HCC) E11.69 250.00    E66.9 278.00   5. Essential hypertension I10 401.9   6. Renal insufficiency N28.9 593.9       Plan:  Doing well.  Continue w/ good food choices and healthy habits.  Continue protein 100g/day.  Increase calories to 1200/day.  Routine bariatric labs ordered.  Continue vitamins w/ adjustments pending lab results.  Call w/ problems/concerns.    The patient was instructed to follow up in 3 months, sooner if needed.   "

## 2020-02-18 ENCOUNTER — LAB (OUTPATIENT)
Dept: LAB | Facility: HOSPITAL | Age: 60
End: 2020-02-18

## 2020-02-18 ENCOUNTER — TRANSCRIBE ORDERS (OUTPATIENT)
Dept: ADMINISTRATIVE | Facility: HOSPITAL | Age: 60
End: 2020-02-18

## 2020-02-18 DIAGNOSIS — E66.9 DIABETES MELLITUS TYPE 2 IN OBESE (HCC): ICD-10-CM

## 2020-02-18 DIAGNOSIS — E11.42 DIABETIC SENSORIMOTOR NEUROPATHY (HCC): Primary | ICD-10-CM

## 2020-02-18 DIAGNOSIS — E55.9 VITAMIN D DEFICIENCY: ICD-10-CM

## 2020-02-18 DIAGNOSIS — E66.01 MORBID OBESITY (HCC): ICD-10-CM

## 2020-02-18 DIAGNOSIS — E53.8 VITAMIN B12 DEFICIENCY: ICD-10-CM

## 2020-02-18 DIAGNOSIS — E11.9 DIABETES MELLITUS WITHOUT COMPLICATION (HCC): ICD-10-CM

## 2020-02-18 DIAGNOSIS — R79.0 ABNORMAL BLOOD LEVEL OF IRON: ICD-10-CM

## 2020-02-18 DIAGNOSIS — N28.9 RENAL INSUFFICIENCY: ICD-10-CM

## 2020-02-18 DIAGNOSIS — I10 ESSENTIAL HYPERTENSION: ICD-10-CM

## 2020-02-18 DIAGNOSIS — E11.69 DIABETES MELLITUS TYPE 2 IN OBESE (HCC): ICD-10-CM

## 2020-02-18 DIAGNOSIS — E11.42 DIABETIC SENSORIMOTOR NEUROPATHY (HCC): ICD-10-CM

## 2020-02-18 DIAGNOSIS — R53.83 FATIGUE, UNSPECIFIED TYPE: ICD-10-CM

## 2020-02-18 DIAGNOSIS — R10.13 DYSPEPSIA: ICD-10-CM

## 2020-02-18 LAB
25(OH)D3 SERPL-MCNC: 82.8 NG/ML (ref 30–100)
ALBUMIN SERPL-MCNC: 3.8 G/DL (ref 3.5–5.2)
ALBUMIN UR-MCNC: <1.2 MG/DL
ALBUMIN/GLOB SERPL: 1.6 G/DL
ALP SERPL-CCNC: 71 U/L (ref 39–117)
ALT SERPL W P-5'-P-CCNC: 26 U/L (ref 1–33)
ANION GAP SERPL CALCULATED.3IONS-SCNC: 12.4 MMOL/L (ref 5–15)
AST SERPL-CCNC: 17 U/L (ref 1–32)
BASOPHILS # BLD AUTO: 0.03 10*3/MM3 (ref 0–0.2)
BASOPHILS NFR BLD AUTO: 0.5 % (ref 0–1.5)
BILIRUB SERPL-MCNC: 0.4 MG/DL (ref 0.2–1.2)
BUN BLD-MCNC: 24 MG/DL (ref 8–23)
BUN/CREAT SERPL: 25.5 (ref 7–25)
CALCIUM SPEC-SCNC: 9.2 MG/DL (ref 8.6–10.5)
CHLORIDE SERPL-SCNC: 107 MMOL/L (ref 98–107)
CHOLEST SERPL-MCNC: 110 MG/DL (ref 0–200)
CO2 SERPL-SCNC: 25.6 MMOL/L (ref 22–29)
CREAT BLD-MCNC: 0.94 MG/DL (ref 0.57–1)
DEPRECATED RDW RBC AUTO: 48.7 FL (ref 37–54)
EOSINOPHIL # BLD AUTO: 0.14 10*3/MM3 (ref 0–0.4)
EOSINOPHIL NFR BLD AUTO: 2.2 % (ref 0.3–6.2)
ERYTHROCYTE [DISTWIDTH] IN BLOOD BY AUTOMATED COUNT: 14.5 % (ref 12.3–15.4)
FERRITIN SERPL-MCNC: 126 NG/ML (ref 13–150)
FOLATE SERPL-MCNC: >20 NG/ML (ref 4.78–24.2)
GFR SERPL CREATININE-BSD FRML MDRD: 61 ML/MIN/1.73
GLOBULIN UR ELPH-MCNC: 2.4 GM/DL
GLUCOSE BLD-MCNC: 97 MG/DL (ref 65–99)
HBA1C MFR BLD: 8.6 % (ref 4.8–5.6)
HCT VFR BLD AUTO: 39.6 % (ref 34–46.6)
HDLC SERPL-MCNC: 40 MG/DL (ref 40–60)
HGB BLD-MCNC: 13.1 G/DL (ref 12–15.9)
IMM GRANULOCYTES # BLD AUTO: 0.01 10*3/MM3 (ref 0–0.05)
IMM GRANULOCYTES NFR BLD AUTO: 0.2 % (ref 0–0.5)
IRON 24H UR-MRATE: 56 MCG/DL (ref 37–145)
LDLC SERPL CALC-MCNC: 40 MG/DL (ref 0–100)
LDLC/HDLC SERPL: 1.01 {RATIO}
LYMPHOCYTES # BLD AUTO: 2.11 10*3/MM3 (ref 0.7–3.1)
LYMPHOCYTES NFR BLD AUTO: 33.3 % (ref 19.6–45.3)
MCH RBC QN AUTO: 30.1 PG (ref 26.6–33)
MCHC RBC AUTO-ENTMCNC: 33.1 G/DL (ref 31.5–35.7)
MCV RBC AUTO: 91 FL (ref 79–97)
MONOCYTES # BLD AUTO: 0.42 10*3/MM3 (ref 0.1–0.9)
MONOCYTES NFR BLD AUTO: 6.6 % (ref 5–12)
NEUTROPHILS # BLD AUTO: 3.62 10*3/MM3 (ref 1.7–7)
NEUTROPHILS NFR BLD AUTO: 57.2 % (ref 42.7–76)
NRBC BLD AUTO-RTO: 0 /100 WBC (ref 0–0.2)
PLATELET # BLD AUTO: 177 10*3/MM3 (ref 140–450)
PMV BLD AUTO: 10.9 FL (ref 6–12)
POTASSIUM BLD-SCNC: 4 MMOL/L (ref 3.5–5.2)
PREALB SERPL-MCNC: 21.3 MG/DL (ref 20–40)
PROT SERPL-MCNC: 6.2 G/DL (ref 6–8.5)
RBC # BLD AUTO: 4.35 10*6/MM3 (ref 3.77–5.28)
SODIUM BLD-SCNC: 145 MMOL/L (ref 136–145)
T4 FREE SERPL-MCNC: 1.16 NG/DL (ref 0.93–1.7)
TRIGL SERPL-MCNC: 149 MG/DL (ref 0–150)
TSH SERPL DL<=0.05 MIU/L-ACNC: 2.19 UIU/ML (ref 0.27–4.2)
VIT B12 BLD-MCNC: >2000 PG/ML (ref 211–946)
VLDLC SERPL-MCNC: 29.8 MG/DL (ref 5–40)
WBC NRBC COR # BLD: 6.33 10*3/MM3 (ref 3.4–10.8)

## 2020-02-18 PROCEDURE — 84443 ASSAY THYROID STIM HORMONE: CPT

## 2020-02-18 PROCEDURE — 82607 VITAMIN B-12: CPT

## 2020-02-18 PROCEDURE — 85025 COMPLETE CBC W/AUTO DIFF WBC: CPT

## 2020-02-18 PROCEDURE — 83036 HEMOGLOBIN GLYCOSYLATED A1C: CPT

## 2020-02-18 PROCEDURE — 84134 ASSAY OF PREALBUMIN: CPT

## 2020-02-18 PROCEDURE — 83921 ORGANIC ACID SINGLE QUANT: CPT

## 2020-02-18 PROCEDURE — 84425 ASSAY OF VITAMIN B-1: CPT

## 2020-02-18 PROCEDURE — 84439 ASSAY OF FREE THYROXINE: CPT

## 2020-02-18 PROCEDURE — 80053 COMPREHEN METABOLIC PANEL: CPT

## 2020-02-18 PROCEDURE — 80061 LIPID PANEL: CPT

## 2020-02-18 PROCEDURE — 83540 ASSAY OF IRON: CPT

## 2020-02-18 PROCEDURE — 36415 COLL VENOUS BLD VENIPUNCTURE: CPT

## 2020-02-18 PROCEDURE — 82746 ASSAY OF FOLIC ACID SERUM: CPT

## 2020-02-18 PROCEDURE — 82306 VITAMIN D 25 HYDROXY: CPT

## 2020-02-18 PROCEDURE — 82043 UR ALBUMIN QUANTITATIVE: CPT

## 2020-02-18 PROCEDURE — 82728 ASSAY OF FERRITIN: CPT

## 2020-02-20 ENCOUNTER — OFFICE VISIT (OUTPATIENT)
Dept: CARDIOLOGY | Facility: CLINIC | Age: 60
End: 2020-02-20

## 2020-02-20 VITALS
SYSTOLIC BLOOD PRESSURE: 113 MMHG | HEART RATE: 75 BPM | OXYGEN SATURATION: 100 % | WEIGHT: 234.6 LBS | DIASTOLIC BLOOD PRESSURE: 72 MMHG | BODY MASS INDEX: 37.7 KG/M2 | HEIGHT: 66 IN

## 2020-02-20 DIAGNOSIS — I25.10 ASCVD (ARTERIOSCLEROTIC CARDIOVASCULAR DISEASE): Primary | ICD-10-CM

## 2020-02-20 DIAGNOSIS — E78.2 MIXED HYPERLIPIDEMIA: ICD-10-CM

## 2020-02-20 DIAGNOSIS — I10 ESSENTIAL HYPERTENSION: ICD-10-CM

## 2020-02-20 PROCEDURE — 99213 OFFICE O/P EST LOW 20 MIN: CPT | Performed by: PHYSICIAN ASSISTANT

## 2020-02-20 NOTE — PROGRESS NOTES
Akhil Huerta MD  Jannette Onofre  1960 02/20/2020    Patient Active Problem List   Diagnosis   • Acute ST elevation myocardial infarction (STEMI) of anterior wall, 11/29/16 s/p stenting proxismal % occlusion, clinically stable.   • Type 2 diabetes mellitus (CMS/HCC)   • Chest pain   • ASCVD (arteriosclerotic cardiovascular disease)   • Fatigue   • Hyperlipidemia   • Essential hypertension   • Morbid obesity (CMS/HCC)   • Dyspnea on exertion (NYHA class 2-3)   • Angina, class III (CMS/HCC)   • Dyspepsia   • BRICE on CPAP   • Hypoxia   • Renal insufficiency       Dear Akhil Huerta MD:    Subjective     History of Present Illness:    Chief Complaint   Patient presents with   • Coronary Artery Disease   • Med Management       Jannette Onofre is a pleasant 59 y.o. female with a past medical history significant for known ASCVD, status post previous anterior wall STEMI in 2016 followed by acute coronary intervention at that time.  She has done well since with stable clinical course.  Her last cardiac catheterization 2017 revealed patent stent with nonobstructive disease. She comes in for routine cardiology follow up.     Since Ms. Onofre last seen she did proceed with laparoscopic gastric sleeve she reports she has lost 40 pounds since that procedure.  She denies any significant sequelae from the procedure although she reports that she did take longer than expected to wake up from anesthesia although she did not need any assistance with breathing.  She denies any symptoms today such as chest pains, shortness of breath, palpitations, dizziness, or syncope.  She also reports higher energy levels and being able to do more since losing the weight.      No Known Allergies:      Current Outpatient Medications:   •  amLODIPine (NORVASC) 5 MG tablet, Take 1 tablet by mouth Daily., Disp: 90 tablet, Rfl: 3  •  aspirin 81 MG EC tablet, Take 81 mg by mouth Daily., Disp: , Rfl:   •  atorvastatin (LIPITOR) 80 MG tablet,  Take 1 tablet by mouth Daily., Disp: 90 tablet, Rfl: 3  •  carvedilol (COREG) 25 MG tablet, Take 1 tablet by mouth 2 (Two) Times a Day with food., Disp: 180 tablet, Rfl: 3  •  Cholecalciferol (VITAMIN D3) 23188 units tablet, Take 10,000 Units by mouth Daily., Disp: , Rfl:   •  clopidogrel (PLAVIX) 75 MG tablet, Take 1 tablet by mouth Daily., Disp: 90 tablet, Rfl: 4  •  glucose blood test strip, use one strip to check glucose as needed., Disp: 100 each, Rfl: 3  •  icosapent ethyl (VASCEPA) 1 g capsule capsule, take 2 capsule by mouth 2 times every day with food swallowing whole. Do not chew, open, dissolve and/or crush., Disp: 360 capsule, Rfl: 3  •  isosorbide mononitrate (IMDUR) 60 MG 24 hr tablet, Take 1½ tablets by mouth Every Morning., Disp: 180 tablet, Rfl: 3  •  Lancets Thin misc, Use one strip to check glucose as needed., Disp: 300 each, Rfl: 3  •  lisinopril (PRINIVIL,ZESTRIL) 20 MG tablet, Take 1 (one) tablet by mouth every day. (Patient taking differently: Take 20 mg by mouth Daily.), Disp: 90 tablet, Rfl: 3  •  Multiple Vitamin (MULTI-VITAMIN DAILY PO), Take  by mouth., Disp: , Rfl:   •  nitroglycerin (NITROSTAT) 0.4 MG SL tablet, Place 1 tablet under the tongue Every 5 (Five) Minutes As Needed for Chest Pain (max of 3 doses in 15 minutes. If no relief, go to ER., Disp: 25 tablet, Rfl: 12  •  thiamine1 (VITAMIN B1) 250 MG tablet, Take 250 mg by mouth Daily., Disp: , Rfl:     The following portions of the patient's history were reviewed and updated as appropriate: allergies, current medications, past family history, past medical history, past social history, past surgical history and problem list.    Social History     Tobacco Use   • Smoking status: Former Smoker     Packs/day: 0.25     Years: 1.00     Pack years: 0.25     Types: Cigarettes     Last attempt to quit: 1989     Years since quittin.6   • Smokeless tobacco: Never Used   Substance Use Topics   • Alcohol use: No   • Drug use: No  "      Review of Systems   Constitution: Negative for malaise/fatigue.   Cardiovascular: Negative for chest pain, dyspnea on exertion and irregular heartbeat.   Respiratory: Negative for cough and shortness of breath.    Hematologic/Lymphatic: Negative for bleeding problem. Bruises/bleeds easily.   Gastrointestinal: Negative for nausea and vomiting.   Neurological: Negative for weakness.       Objective   Vitals:    02/20/20 1513   BP: 113/72   BP Location: Right arm   Patient Position: Sitting   Cuff Size: Adult   Pulse: 75   SpO2: 100%   Weight: 106 kg (234 lb 9.6 oz)   Height: 167.6 cm (66\")     Body mass index is 37.87 kg/m².    Physical Exam   Constitutional: She is oriented to person, place, and time. She appears well-developed and well-nourished. No distress.   HENT:   Head: Normocephalic and atraumatic.   Cardiovascular: Normal rate, regular rhythm and normal heart sounds.   Pulmonary/Chest: Effort normal and breath sounds normal. No respiratory distress.   Musculoskeletal: She exhibits no edema.   Bruises on her right bicep and left hip   Neurological: She is alert and oriented to person, place, and time.   Skin: She is not diaphoretic.       Lab Results   Component Value Date     02/18/2020    K 4.0 02/18/2020     02/18/2020    CO2 25.6 02/18/2020    BUN 24 (H) 02/18/2020    CREATININE 0.94 02/18/2020    GLUCOSE 97 02/18/2020    CALCIUM 9.2 02/18/2020    AST 17 02/18/2020    ALT 26 02/18/2020    ALKPHOS 71 02/18/2020    LABIL2 1.6 12/11/2015     Lab Results   Component Value Date    CKTOTAL 35 10/29/2017     Lab Results   Component Value Date    WBC 6.33 02/18/2020    HGB 13.1 02/18/2020    HCT 39.6 02/18/2020     02/18/2020     Lab Results   Component Value Date    INR 0.98 10/28/2017    INR 0.89 11/29/2016    INR 0.82 11/27/2016     No results found for: MG  Lab Results   Component Value Date    TSH 2.190 02/18/2020    CHLPL 213 (H) 03/14/2016    TRIG 149 02/18/2020    HDL 40 02/18/2020 "    LDL 40 02/18/2020      Lab Results   Component Value Date    BNP 27.0 12/29/2016       During this visit the following were done:  Labs Reviewed [x]    Labs Ordered []    Radiology Reports Reviewed [x]    Radiology Ordered []    PCP Records Reviewed []    Referring Provider Records Reviewed []    ER Records Reviewed []    Hospital Records Reviewed []    History Obtained From Family []    Radiology Images Reviewed []    Other Reviewed []    Records Requested []       Procedures    Assessment/Plan    Diagnosis Plan   1. ASCVD (arteriosclerotic cardiovascular disease)     2. Mixed hyperlipidemia     3. Essential hypertension              Recommendations:  1. With patient having easy bruising I did inform him that we can stop 1 of her antiplatelet agents since she is 4 years out from receiving her last stent.  However she would like to still stay on both since she has been doing well. I do feel this is reasonable at this time however I did tell her that if she continues to have significant bruising or other bleeding issues to call our office and we can stop the aspirin and continue Plavix.  2. I also informed her that if she continues losing weight her blood pressure will continue to drop and we may have to discontinue some of her antihypertensives so advised her to monitor blood pressure closely especially if she develops weakness, fatigue, or dizziness and let her office know and we will adjust antihypertensives accordingly.  3. I did recommend stopping amlodipine first if antihypertensives need to be decreased.    Return in about 6 months (around 8/20/2020).    As always, I appreciate very much the opportunity to participate in the cardiovascular care of your patients.      With Best Regards,    Sathya Redman PA-C

## 2020-02-21 LAB — VIT B1 BLD-SCNC: 282.8 NMOL/L (ref 66.5–200)

## 2020-02-24 LAB
Lab: NORMAL
METHYLMALONATE SERPL-SCNC: 63 NMOL/L (ref 0–378)

## 2020-03-02 ENCOUNTER — HOSPITAL ENCOUNTER (OUTPATIENT)
Dept: MAMMOGRAPHY | Facility: HOSPITAL | Age: 60
Discharge: HOME | End: 2020-03-02
Admitting: INTERNAL MEDICINE

## 2020-03-02 DIAGNOSIS — Z12.31 VISIT FOR SCREENING MAMMOGRAM: ICD-10-CM

## 2020-03-02 PROCEDURE — 77067 SCR MAMMO BI INCL CAD: CPT | Performed by: RADIOLOGY

## 2020-03-02 PROCEDURE — 77067 SCR MAMMO BI INCL CAD: CPT

## 2020-03-02 PROCEDURE — 77063 BREAST TOMOSYNTHESIS BI: CPT | Performed by: RADIOLOGY

## 2020-03-02 PROCEDURE — 77063 BREAST TOMOSYNTHESIS BI: CPT

## 2020-04-06 ENCOUNTER — TELEPHONE (OUTPATIENT)
Dept: CARDIOLOGY | Facility: CLINIC | Age: 60
End: 2020-04-06

## 2020-04-06 NOTE — TELEPHONE ENCOUNTER
Called pt to this morning 88/45 HR 70's, Saturday 88/54. She stated that she is getting real lighted. Sathya advised her to stop the Amlodipine 1 month ago.

## 2020-04-06 NOTE — TELEPHONE ENCOUNTER
She can decrease lisinopril to 10 mg daily and continue to monitor. Let us know if BP continues to run low. Thanks.

## 2020-04-06 NOTE — TELEPHONE ENCOUNTER
bp is running low and Sathya said she could cut her meds in half but she wanted to call [facility]

## 2020-05-11 ENCOUNTER — TELEMEDICINE (OUTPATIENT)
Dept: BARIATRICS/WEIGHT MGMT | Facility: CLINIC | Age: 60
End: 2020-05-11

## 2020-05-11 DIAGNOSIS — R79.0 ABNORMAL BLOOD LEVEL OF IRON: ICD-10-CM

## 2020-05-11 DIAGNOSIS — Z98.84 STATUS POST BARIATRIC SURGERY: ICD-10-CM

## 2020-05-11 DIAGNOSIS — E55.9 VITAMIN D DEFICIENCY: ICD-10-CM

## 2020-05-11 DIAGNOSIS — Z13.21 MALNUTRITION SCREEN: ICD-10-CM

## 2020-05-11 DIAGNOSIS — E66.9 DIABETES MELLITUS TYPE 2 IN OBESE (HCC): ICD-10-CM

## 2020-05-11 DIAGNOSIS — R53.83 FATIGUE, UNSPECIFIED TYPE: Primary | ICD-10-CM

## 2020-05-11 DIAGNOSIS — I10 ESSENTIAL HYPERTENSION: ICD-10-CM

## 2020-05-11 DIAGNOSIS — I25.118 CORONARY ARTERY DISEASE OF NATIVE ARTERY OF NATIVE HEART WITH STABLE ANGINA PECTORIS (HCC): ICD-10-CM

## 2020-05-11 DIAGNOSIS — E11.69 DIABETES MELLITUS TYPE 2 IN OBESE (HCC): ICD-10-CM

## 2020-05-11 PROCEDURE — 99213 OFFICE O/P EST LOW 20 MIN: CPT | Performed by: SURGERY

## 2020-05-11 NOTE — PROGRESS NOTES
"[facility]  [address]  [address]  [facility] [id number] [phone]        Patient Name:  Jannette Onofre.  :  1960      Date of Visit: 2020      Reason for Visit:   6 months postop     HPI: Jannette Onofre is a 59 y.o. female s/p LSG 19 GDW (hx AGB)    Doing well.  No GI issues/concerns. Denies dysphagia, reflux, nausea, vomiting and abdominal pain.  Getting 100 g prot/day.  Drinking 64 fluid oz/day.  Last labs revealed no vitamin deficiencies. Taking MVI, B12, B1, Calcium, Vit D, iron and Vit C.  Exercise: walks 1-2 miles per day, also strength exercises at home.     Presurgery weight: 267 pounds.  Today's weight is 230  pounds, today's  There is no height or weight on file to calculate BMI., and weight loss since surgery is 37 pounds.  Struggling with weight loss stall.      Estimates 1200 gladis/day.  Eats breakfast lunch and dinner.  Sometimes snack in between.  Denies calorie drinks or sweets.    Past Medical History:   Diagnosis Date   • Anxiety    • Coronary artery disease     s/p LAD stenting, follows w/ Dr. Ogden   • Depression    • Diabetes mellitus (CMS/HCC)     dx , initially improved s/p AGB but now back on insulin    • Dyspepsia    • Dyspnea on exertion    • Fatigue    • Hyperlipidemia    • Hypertension    • Melanoma (CMS/HCC)        • Morbid obesity (CMS/HCC)    • Myocardial infarction (CMS/HCC) 2016    s/p LAD stenting, on ASA 325mg + Plavix   • BRICE on CPAP     noncompliant w/ device, \"lucia me\"   • Umbilical hernia     multiple recurrent incisional hernias at the umbilicus, left undisturbed during LSG, can consider general surgery repair in the future, if needed.   • Wears glasses      Past Surgical History:   Procedure Laterality Date   • CARDIAC CATHETERIZATION N/A 2016    Procedure: Left Heart Cath;  Surgeon: Akhil Zuñiga MD;  Location: [facility] CATH INVASIVE LOCATION;  Service:    • CARDIAC CATHETERIZATION N/A " 10/31/2017    Procedure: Left Heart Cath;  Surgeon: Lebron Ogden MD;  Location:  [facility] CATH INVASIVE LOCATION;  Service:    • CARDIAC CATHETERIZATION  10/31/2017    Procedure: Functional Flow [place];  Surgeon: Denilson Pena MD;  Location:  [facility] CATH INVASIVE LOCATION;  Service:    • CARDIAC CATHETERIZATION      02, 3/5/07, 3/23/11   • CATARACT EXTRACTION Right    • CATARACT EXTRACTION Left      RIGHT 05   •  SECTION     • COLONOSCOPY      routine/ COLONGUARD    • ENDOSCOPY      3/09, ,    • ENDOSCOPY N/A 2019    Procedure: ESOPHAGOGASTRODUODENOSCOPY;  Surgeon: Ranjeet Dunbar MD;  Location:  [facility];  Service: Bariatric   • FINGER/THUMB CLOSED REDUCTION Left 8/3/2017    Procedure: CLOSED REDUCTION PERCUTANEOUS PINNING LEFT FIFTH METACARPAL SHAFT FRACTURE;  Surgeon: Oscar León MD;  Location:  [facility];  Service:    • GASTRIC BANDING REMOVAL  2019    w/ Dr. Dunbar   • GASTRIC SLEEVE LAPAROSCOPIC N/A 2019    Procedure: GASTRIC SLEEVE LAPAROSCOPIC;  Surgeon: Ranjeet Dunbar MD;  Location:  [facility];  Service: Bariatric   • LAPAROSCOPIC CHOLECYSTECTOMY      w/ UHR   • LAPAROSCOPIC GASTRIC BANDING  2013    s/p LAGB by Dr Russell   • OTHER SURGICAL HISTORY      melanoma removal/back   • GA RT/LT HEART CATHETERS N/A 2016    Procedure: Percutaneous Coronary Intervention;  Surgeon: Akhil Zuñiga MD;  Location:  [facility] CATH INVASIVE LOCATION;  Service: Cardiology   • SEPTOPLASTY     • UMBILICAL HERNIA REPAIR      no mesh     No outpatient medications have been marked as taking for the 20 encounter (Appointment) with Swapna Dinh MD.       No Known Allergies    Social History     Socioeconomic History   • Marital status:      Spouse name: Not on file   • Number of children: Not on file   • Years of education: Not on file   • Highest education level: Not on file   Tobacco Use   • Smoking  status: Former Smoker     Packs/day: 0.25     Years: 1.00     Pack years: 0.25     Types: Cigarettes     Last attempt to quit: 1989     Years since quittin.8   • Smokeless tobacco: Never Used   Substance and Sexual Activity   • Alcohol use: No   • Drug use: No   • Sexual activity: Defer   Social History Narrative    .  L[facility] in [facility].   @ [employer].       [facility] [employer] 2013 (Approximate) Comment: LAST MAMMOGRAM     Physical Exam   Constitutional: She is oriented to person, place, and time. She appears well-developed and well-nourished. No distress.   HENT:   Head: Normocephalic and atraumatic.   Mouth/Throat: No oropharyngeal exudate.   Eyes: Pupils are equal, round, and reactive to light. Conjunctivae and EOM are normal. No scleral icterus.   Pulmonary/Chest: Effort normal. No respiratory distress.   Neurological: She is alert and oriented to person, place, and time.   Skin: She is not diaphoretic. No pallor.   Psychiatric: She has a normal mood and affect.         Assessment:  6 months s/p LSG 19 GDW (hx AGB)    ICD-10-CM ICD-9-CM   1. Fatigue, unspecified type R53.83 780.79   2. Abnormal blood level of iron R79.0 790.6   3. Status post bariatric surgery Z98.84 V45.86   4. Vitamin D deficiency E55.9 268.9   5. Malnutrition screen Z13.21 V77.2   6. Diabetes mellitus type 2 in obese (CMS/[facility]) E11.69 250.00    E66.9 278.00   7. Essential hypertension I10 401.9   8. Coronary artery disease of native artery of native heart with stable angina pectoris (CMS/[facility]) I25.118 414.01     413.9         Plan:  Doing well. Continue w/ good food choices and healthy habits.  Continue protein >70g/day.  Continue routine exercise.  Routine bariatric labs ordered.  Continue vitamins w/ adjustments pending lab results.  Call w/ problems/concerns.     For weight loss stall:  We discussed increasing the intensity of her cardio and strength exercises.  Also may need to increase to  [phone] gladis/day if she exercises harder.    The patient was instructed to follow up in 3 months, sooner if needed.    This visit was conducted as a video visit, in an effort to limit spread of the novel coronavirus during the 2020 pandemic.      Swapna Dinh MD

## 2020-05-22 ENCOUNTER — LAB (OUTPATIENT)
Dept: LAB | Facility: HOSPITAL | Age: 60
End: 2020-05-22

## 2020-05-22 DIAGNOSIS — E55.9 VITAMIN D DEFICIENCY: ICD-10-CM

## 2020-05-22 DIAGNOSIS — R53.83 FATIGUE, UNSPECIFIED TYPE: ICD-10-CM

## 2020-05-22 DIAGNOSIS — Z98.84 STATUS POST BARIATRIC SURGERY: ICD-10-CM

## 2020-05-22 DIAGNOSIS — Z13.21 MALNUTRITION SCREEN: ICD-10-CM

## 2020-05-22 DIAGNOSIS — R79.0 ABNORMAL BLOOD LEVEL OF IRON: ICD-10-CM

## 2020-05-22 LAB
25(OH)D3 SERPL-MCNC: 79.7 NG/ML (ref 30–100)
ALBUMIN SERPL-MCNC: 4 G/DL (ref 3.5–5.2)
ALBUMIN/GLOB SERPL: 1.7 G/DL
ALP SERPL-CCNC: 64 U/L (ref 39–117)
ALT SERPL W P-5'-P-CCNC: 27 U/L (ref 1–33)
ANION GAP SERPL CALCULATED.3IONS-SCNC: 15.3 MMOL/L (ref 5–15)
AST SERPL-CCNC: 21 U/L (ref 1–32)
BASOPHILS # BLD AUTO: 0.05 10*3/MM3 (ref 0–0.2)
BASOPHILS NFR BLD AUTO: 0.8 % (ref 0–1.5)
BILIRUB SERPL-MCNC: 0.5 MG/DL (ref 0.2–1.2)
BUN BLD-MCNC: 35 MG/DL (ref 8–23)
BUN/CREAT SERPL: 35.4 (ref 7–25)
CALCIUM SPEC-SCNC: 9.4 MG/DL (ref 8.6–10.5)
CHLORIDE SERPL-SCNC: 105 MMOL/L (ref 98–107)
CO2 SERPL-SCNC: 22.7 MMOL/L (ref 22–29)
CREAT BLD-MCNC: 0.99 MG/DL (ref 0.57–1)
DEPRECATED RDW RBC AUTO: 42.9 FL (ref 37–54)
EOSINOPHIL # BLD AUTO: 0.13 10*3/MM3 (ref 0–0.4)
EOSINOPHIL NFR BLD AUTO: 2.2 % (ref 0.3–6.2)
ERYTHROCYTE [DISTWIDTH] IN BLOOD BY AUTOMATED COUNT: 12.5 % (ref 12.3–15.4)
FERRITIN SERPL-MCNC: 163 NG/ML (ref 13–150)
FOLATE SERPL-MCNC: >20 NG/ML (ref 4.78–24.2)
GFR SERPL CREATININE-BSD FRML MDRD: 57 ML/MIN/1.73
GLOBULIN UR ELPH-MCNC: 2.4 GM/DL
GLUCOSE BLD-MCNC: 326 MG/DL (ref 65–99)
HCT VFR BLD AUTO: 40.9 % (ref 34–46.6)
HGB BLD-MCNC: 13.4 G/DL (ref 12–15.9)
IMM GRANULOCYTES # BLD AUTO: 0.01 10*3/MM3 (ref 0–0.05)
IMM GRANULOCYTES NFR BLD AUTO: 0.2 % (ref 0–0.5)
IRON 24H UR-MRATE: 68 MCG/DL (ref 37–145)
LYMPHOCYTES # BLD AUTO: 1.8 10*3/MM3 (ref 0.7–3.1)
LYMPHOCYTES NFR BLD AUTO: 30.6 % (ref 19.6–45.3)
MCH RBC QN AUTO: 30.3 PG (ref 26.6–33)
MCHC RBC AUTO-ENTMCNC: 32.8 G/DL (ref 31.5–35.7)
MCV RBC AUTO: 92.5 FL (ref 79–97)
MONOCYTES # BLD AUTO: 0.29 10*3/MM3 (ref 0.1–0.9)
MONOCYTES NFR BLD AUTO: 4.9 % (ref 5–12)
NEUTROPHILS # BLD AUTO: 3.61 10*3/MM3 (ref 1.7–7)
NEUTROPHILS NFR BLD AUTO: 61.3 % (ref 42.7–76)
NRBC BLD AUTO-RTO: 0 /100 WBC (ref 0–0.2)
PLATELET # BLD AUTO: 169 10*3/MM3 (ref 140–450)
PMV BLD AUTO: 10.1 FL (ref 6–12)
POTASSIUM BLD-SCNC: 4.4 MMOL/L (ref 3.5–5.2)
PREALB SERPL-MCNC: 21.5 MG/DL (ref 20–40)
PROT SERPL-MCNC: 6.4 G/DL (ref 6–8.5)
RBC # BLD AUTO: 4.42 10*6/MM3 (ref 3.77–5.28)
SODIUM BLD-SCNC: 143 MMOL/L (ref 136–145)
WBC NRBC COR # BLD: 5.89 10*3/MM3 (ref 3.4–10.8)

## 2020-05-22 PROCEDURE — 85025 COMPLETE CBC W/AUTO DIFF WBC: CPT

## 2020-05-22 PROCEDURE — 36415 COLL VENOUS BLD VENIPUNCTURE: CPT

## 2020-05-22 PROCEDURE — 80053 COMPREHEN METABOLIC PANEL: CPT

## 2020-05-22 PROCEDURE — 82728 ASSAY OF FERRITIN: CPT

## 2020-05-22 PROCEDURE — 84425 ASSAY OF VITAMIN B-1: CPT

## 2020-05-22 PROCEDURE — 83540 ASSAY OF IRON: CPT

## 2020-05-22 PROCEDURE — 83921 ORGANIC ACID SINGLE QUANT: CPT

## 2020-05-22 PROCEDURE — 82306 VITAMIN D 25 HYDROXY: CPT

## 2020-05-22 PROCEDURE — 84134 ASSAY OF PREALBUMIN: CPT

## 2020-05-22 PROCEDURE — 82746 ASSAY OF FOLIC ACID SERUM: CPT

## 2020-05-26 LAB
Lab: NORMAL
METHYLMALONATE SERPL-SCNC: 112 NMOL/L (ref 0–378)
VIT B1 BLD-SCNC: 274.3 NMOL/L (ref 66.5–200)

## 2020-06-04 ENCOUNTER — LAB (OUTPATIENT)
Dept: LAB | Facility: HOSPITAL | Age: 60
End: 2020-06-04

## 2020-06-04 ENCOUNTER — TRANSCRIBE ORDERS (OUTPATIENT)
Dept: ADMINISTRATIVE | Facility: HOSPITAL | Age: 60
End: 2020-06-04

## 2020-06-04 DIAGNOSIS — E78.2 MIXED HYPERLIPIDEMIA: ICD-10-CM

## 2020-06-04 DIAGNOSIS — E11.42 TYPE 2 DIABETES MELLITUS WITH POLYNEUROPATHY (HCC): ICD-10-CM

## 2020-06-04 DIAGNOSIS — E55.9 VITAMIN D DEFICIENCY: ICD-10-CM

## 2020-06-04 DIAGNOSIS — I10 ESSENTIAL (PRIMARY) HYPERTENSION: ICD-10-CM

## 2020-06-04 DIAGNOSIS — E11.42 TYPE 2 DIABETES MELLITUS WITH POLYNEUROPATHY (HCC): Primary | ICD-10-CM

## 2020-06-04 DIAGNOSIS — E53.8 VITAMIN B12 DEFICIENCY (NON ANEMIC): ICD-10-CM

## 2020-06-04 LAB
ALBUMIN SERPL-MCNC: 4.2 G/DL (ref 3.5–5.2)
ALBUMIN/GLOB SERPL: 1.8 G/DL
ALP SERPL-CCNC: 59 U/L (ref 39–117)
ALT SERPL W P-5'-P-CCNC: 24 U/L (ref 1–33)
ANION GAP SERPL CALCULATED.3IONS-SCNC: 9.9 MMOL/L (ref 5–15)
AST SERPL-CCNC: 22 U/L (ref 1–32)
BASOPHILS # BLD AUTO: 0.04 10*3/MM3 (ref 0–0.2)
BASOPHILS NFR BLD AUTO: 0.8 % (ref 0–1.5)
BILIRUB SERPL-MCNC: 0.3 MG/DL (ref 0.2–1.2)
BUN BLD-MCNC: 32 MG/DL (ref 8–23)
BUN/CREAT SERPL: 32.3 (ref 7–25)
CALCIUM SPEC-SCNC: 9.6 MG/DL (ref 8.6–10.5)
CHLORIDE SERPL-SCNC: 110 MMOL/L (ref 98–107)
CHOLEST SERPL-MCNC: 123 MG/DL (ref 0–200)
CO2 SERPL-SCNC: 23.1 MMOL/L (ref 22–29)
CREAT BLD-MCNC: 0.99 MG/DL (ref 0.57–1)
DEPRECATED RDW RBC AUTO: 43.5 FL (ref 37–54)
EOSINOPHIL # BLD AUTO: 0.09 10*3/MM3 (ref 0–0.4)
EOSINOPHIL NFR BLD AUTO: 1.8 % (ref 0.3–6.2)
ERYTHROCYTE [DISTWIDTH] IN BLOOD BY AUTOMATED COUNT: 12.8 % (ref 12.3–15.4)
FOLATE SERPL-MCNC: >20 NG/ML (ref 4.78–24.2)
GFR SERPL CREATININE-BSD FRML MDRD: 57 ML/MIN/1.73
GLOBULIN UR ELPH-MCNC: 2.4 GM/DL
GLUCOSE BLD-MCNC: 190 MG/DL (ref 65–99)
HBA1C MFR BLD: 11.24 % (ref 4.8–5.6)
HCT VFR BLD AUTO: 38.4 % (ref 34–46.6)
HDLC SERPL-MCNC: 38 MG/DL (ref 40–60)
HGB BLD-MCNC: 12.7 G/DL (ref 12–15.9)
IMM GRANULOCYTES # BLD AUTO: 0.01 10*3/MM3 (ref 0–0.05)
IMM GRANULOCYTES NFR BLD AUTO: 0.2 % (ref 0–0.5)
LDLC SERPL CALC-MCNC: 49 MG/DL (ref 0–100)
LDLC/HDLC SERPL: 1.28 {RATIO}
LYMPHOCYTES # BLD AUTO: 1.8 10*3/MM3 (ref 0.7–3.1)
LYMPHOCYTES NFR BLD AUTO: 36.2 % (ref 19.6–45.3)
MCH RBC QN AUTO: 30.4 PG (ref 26.6–33)
MCHC RBC AUTO-ENTMCNC: 33.1 G/DL (ref 31.5–35.7)
MCV RBC AUTO: 91.9 FL (ref 79–97)
MONOCYTES # BLD AUTO: 0.29 10*3/MM3 (ref 0.1–0.9)
MONOCYTES NFR BLD AUTO: 5.8 % (ref 5–12)
NEUTROPHILS # BLD AUTO: 2.74 10*3/MM3 (ref 1.7–7)
NEUTROPHILS NFR BLD AUTO: 55.2 % (ref 42.7–76)
NRBC BLD AUTO-RTO: 0 /100 WBC (ref 0–0.2)
PLATELET # BLD AUTO: 170 10*3/MM3 (ref 140–450)
PMV BLD AUTO: 10.6 FL (ref 6–12)
POTASSIUM BLD-SCNC: 3.9 MMOL/L (ref 3.5–5.2)
PROT SERPL-MCNC: 6.6 G/DL (ref 6–8.5)
RBC # BLD AUTO: 4.18 10*6/MM3 (ref 3.77–5.28)
SODIUM BLD-SCNC: 143 MMOL/L (ref 136–145)
TRIGL SERPL-MCNC: 182 MG/DL (ref 0–150)
VIT B12 BLD-MCNC: >2000 PG/ML (ref 211–946)
VLDLC SERPL-MCNC: 36.4 MG/DL (ref 5–40)
WBC NRBC COR # BLD: 4.97 10*3/MM3 (ref 3.4–10.8)

## 2020-06-04 PROCEDURE — 80061 LIPID PANEL: CPT

## 2020-06-04 PROCEDURE — 36415 COLL VENOUS BLD VENIPUNCTURE: CPT

## 2020-06-04 PROCEDURE — 82746 ASSAY OF FOLIC ACID SERUM: CPT

## 2020-06-04 PROCEDURE — 80053 COMPREHEN METABOLIC PANEL: CPT

## 2020-06-04 PROCEDURE — 85025 COMPLETE CBC W/AUTO DIFF WBC: CPT

## 2020-06-04 PROCEDURE — 83036 HEMOGLOBIN GLYCOSYLATED A1C: CPT

## 2020-06-04 PROCEDURE — 82607 VITAMIN B-12: CPT

## 2020-08-10 ENCOUNTER — TELEMEDICINE (OUTPATIENT)
Dept: BARIATRICS/WEIGHT MGMT | Facility: CLINIC | Age: 60
End: 2020-08-10

## 2020-08-10 VITALS — HEIGHT: 66 IN | WEIGHT: 230 LBS | BODY MASS INDEX: 36.96 KG/M2

## 2020-08-10 DIAGNOSIS — E66.9 OBESITY, CLASS II, BMI 35-39.9: Primary | ICD-10-CM

## 2020-08-10 PROCEDURE — 99213 OFFICE O/P EST LOW 20 MIN: CPT | Performed by: PHYSICIAN ASSISTANT

## 2020-08-10 NOTE — PROGRESS NOTES
"[facility]  [address]  [address]  [facility] [id number] [phone]      Patient Name:  Jannette Onofre.  :  1960      Date of Visit: 08/10/2020      Reason for Visit:  9 months postop      HPI:  Jannette Onofre is a 60 y.o. female s/p LSG 19 GDW (hx AGB)    Feeling good, but says weight loss is still stalled.  Focuses on high protein and low carb (<50g/day).  Averages 2599-6158 gladis/day.  Exercising faithfully - walking 2-3 miles/day + doing core work 3x/week.      PCP labs 20 - A1C 11, GFR 57.   Bariatric labs 20 w/in acceptable limits.      Presurgery weight:  267 pounds. Today's weight is 104 kg (230 lb) pounds, today's Body mass index is 37.12 kg/m²., and her weight loss since surgery is 37 pounds.       Past Medical History:   Diagnosis Date   • Anxiety    • Coronary artery disease     s/p LAD stenting, follows w/ Dr. Ogden   • Depression    • Diabetes mellitus (CMS/HCC)     dx , initially improved s/p AGB but now back on insulin    • Dyspepsia    • Dyspnea on exertion    • Fatigue    • Hyperlipidemia    • Hypertension    • Melanoma (CMS/HCC)        • Morbid obesity (CMS/HCC)    • Myocardial infarction (CMS/HCC) 2016    s/p LAD stenting, on ASA 325mg + Plavix   • BRICE on CPAP     noncompliant w/ device, \"lucia me\"   • Umbilical hernia     multiple recurrent incisional hernias at the umbilicus, left undisturbed during LSG, can consider general surgery repair in the future, if needed.   • Wears glasses      Past Surgical History:   Procedure Laterality Date   • CARDIAC CATHETERIZATION N/A 2016    Procedure: Left Heart Cath;  Surgeon: Akhil Zuñiga MD;  Location:  [facility];  Service:    • CARDIAC CATHETERIZATION N/A 10/31/2017    Procedure: Left Heart Cath;  Surgeon: Lebron Ogden MD;  Location:  [facility];  Service:    • CARDIAC CATHETERIZATION  10/31/2017    Procedure: Functional " Flow [place];  Surgeon: Denilson Pena MD;  Location:  [facility];  Service:    • CARDIAC CATHETERIZATION      02, 3/5/07, 3/23/11   • CATARACT EXTRACTION Right    • CATARACT EXTRACTION Left      RIGHT 05   •  SECTION     • COLONOSCOPY      routine/ COLONGUARD    • ENDOSCOPY      3/09, ,    • ENDOSCOPY N/A 2019    Procedure: ESOPHAGOGASTRODUODENOSCOPY;  Surgeon: Ranjeet Dunbar MD;  Location:  [facility];  Service: Bariatric   • FINGER/THUMB CLOSED REDUCTION Left 8/3/2017    Procedure: CLOSED REDUCTION PERCUTANEOUS PINNING LEFT FIFTH METACARPAL SHAFT FRACTURE;  Surgeon: Oscar León MD;  Location:  [facility];  Service:    • GASTRIC BANDING REMOVAL  2019    w/ Dr. Dunbar   • GASTRIC SLEEVE LAPAROSCOPIC N/A 2019    Procedure: GASTRIC SLEEVE LAPAROSCOPIC;  Surgeon: Ranjeet Dunbar MD;  Location:  [facility];  Service: Bariatric   • LAPAROSCOPIC CHOLECYSTECTOMY      w/ UHR   • LAPAROSCOPIC GASTRIC BANDING  2013    s/p LAGB by Dr Russell   • OTHER SURGICAL HISTORY      melanoma removal/back   • [place] RT/LT HEART CATHETERS N/A 2016    Procedure: Percutaneous Coronary Intervention;  Surgeon: Akhil Zuñiga MD;  Location:  [facility];  Service: Cardiology   • SEPTOPLASTY     • UMBILICAL HERNIA REPAIR      no mesh     Outpatient Medications Marked as Taking for the 8/10/20 encounter (Telemedicine) with Carli Bernal PA   Medication Sig Dispense Refill   • aspirin 81 MG chewable tablet Chew 1 tablet by mouth Daily. 100 tablet 3   • atorvastatin (LIPITOR) 80 MG tablet Take 1 tablet by mouth Daily. 90 tablet 2   • carvedilol (COREG) 25 MG tablet Take 1 tablet by mouth 2 (Two) Times a Day With Meals. 180 tablet 3   • Cholecalciferol (VITAMIN D3) 15731 units tablet Take 10,000 Units by mouth Daily.     • clopidogrel (PLAVIX) 75 MG tablet Take 1 tablet by mouth Daily. 90 tablet 4   •  "Dulaglutide 0.75 MG/0.5ML solution pen-injector Inject 0.5 milliliter by subcutaneous route  every week in the abdomen, thigh, or upper arm rotating injection sites. 2 mL 5   • glipizide (GLUCOTROL XL) 10 MG 24 hr tablet Take 1 tablet by mouth 2 (two) times a day. 180 tablet 3   • isosorbide mononitrate (IMDUR) 60 MG 24 hr tablet Take 1 & 1/2 tablets by mouth Every Morning. 180 tablet 3   • lisinopril (PRINIVIL,ZESTRIL) 20 MG tablet Take 1 tablet by mouth every day 90 tablet 2   • metFORMIN (GLUCOPHAGE) 1000 MG tablet Take 1 tablet by mouth 2 (Two) Times a Day With morning and evening Meals. 180 tablet 11   • Multiple Vitamin (MULTI-VITAMIN DAILY PO) Take  by mouth.     • thiamine1 (VITAMIN B1) 250 MG tablet Take 250 mg by mouth Daily.       No Known Allergies    Social History     Socioeconomic History   • Marital status:      Spouse name: Not on file   • Number of children: Not on file   • Years of education: Not on file   • Highest education level: Not on file   Tobacco Use   • Smoking status: Former Smoker     Packs/day: 0.25     Years: 1.00     Pack years: 0.25     Types: Cigarettes     Last attempt to quit: 1989     Years since quittin.1   • Smokeless tobacco: Never Used   Substance and Sexual Activity   • Alcohol use: No   • Drug use: No   • Sexual activity: Defer   Social History Narrative    .  Lives in [facility].   @ [employer].        167.6 cm (66\")   Wt 104 kg (230 lb)   LMP 2013 (Approximate) Comment: LAST MAMMOGRAM   BMI 37.12 kg/m²     Physical Exam   Constitutional: She is oriented to person, place, and time. She appears well-developed and well-nourished. No distress.   Eyes: No scleral icterus.   Pulmonary/Chest: Effort normal.   Neurological: She is alert and oriented to person, place, and time.   Psychiatric: She has a normal mood and affect.         Assessment:  9 months s/p LSG 19 GDW (hx AGB)    ICD-10-CM ICD-9-CM   1. Obesity, " Class II, BMI 35-39.9 E66.9 278.00       Patient's Body mass index is 37.12 kg/m². BMI is above normal parameters. Recommendations include: exercise counseling and nutrition counseling.    Plan:  Continue to focus on healthy habits.  Continue protein 100g/day.  Increase calories to 1400/day.  Will refer to dietitian to assist w/ meal planning.  No labs ordered.  Call w/ problems/concerns.    The patient was instructed to follow up in 3 months, sooner if needed.     Note: This was an audio and video enabled telemedicine encounter to comply with social distancing guidelines during the COVID-19 pandemic.  Consent was obtained prior to the visit.

## 2020-08-24 ENCOUNTER — OFFICE VISIT (OUTPATIENT)
Dept: CARDIOLOGY | Facility: CLINIC | Age: 60
End: 2020-08-24

## 2020-08-24 VITALS
OXYGEN SATURATION: 99 % | RESPIRATION RATE: 16 BRPM | HEIGHT: 66 IN | WEIGHT: 235.6 LBS | BODY MASS INDEX: 37.86 KG/M2 | SYSTOLIC BLOOD PRESSURE: 121 MMHG | HEART RATE: 74 BPM | DIASTOLIC BLOOD PRESSURE: 79 MMHG | TEMPERATURE: 98.4 F

## 2020-08-24 DIAGNOSIS — E78.2 MIXED HYPERLIPIDEMIA: ICD-10-CM

## 2020-08-24 DIAGNOSIS — I10 ESSENTIAL HYPERTENSION: ICD-10-CM

## 2020-08-24 DIAGNOSIS — I21.09 ST ELEVATION MYOCARDIAL INFARCTION (STEMI) OF ANTERIOR WALL (HCC): Primary | ICD-10-CM

## 2020-08-24 PROCEDURE — 93000 ELECTROCARDIOGRAM COMPLETE: CPT | Performed by: PHYSICIAN ASSISTANT

## 2020-08-24 PROCEDURE — 99213 OFFICE O/P EST LOW 20 MIN: CPT | Performed by: PHYSICIAN ASSISTANT

## 2020-08-24 RX ORDER — ASCORBIC ACID 500 MG
500 TABLET ORAL DAILY
COMMUNITY

## 2020-08-24 RX ORDER — PHENOL 1.4 %
600 AEROSOL, SPRAY (ML) MUCOUS MEMBRANE DAILY
COMMUNITY

## 2020-08-24 RX ORDER — NITROGLYCERIN 0.4 MG/1
0.4 TABLET SUBLINGUAL
Qty: 25 TABLET | Refills: 12 | Status: SHIPPED | OUTPATIENT
Start: 2020-08-24

## 2020-08-24 RX ORDER — MAGNESIUM 200 MG
1 TABLET ORAL DAILY
COMMUNITY

## 2020-08-24 RX ORDER — FERROUS SULFATE 325(65) MG
325 TABLET ORAL
COMMUNITY

## 2020-08-24 NOTE — PROGRESS NOTES
Akhil Huerta MD  Jannette Onofre  1960 08/24/2020    Patient Active Problem List   Diagnosis   • Acute ST elevation myocardial infarction (STEMI) of anterior wall, 11/29/16 s/p stenting proxismal % occlusion, clinically stable.   • Type 2 diabetes mellitus (CMS/HCC)   • Chest pain   • ASCVD (arteriosclerotic cardiovascular disease)   • Fatigue   • Hyperlipidemia   • Essential hypertension   • Morbid obesity (CMS/HCC)   • Dyspnea on exertion (NYHA class 2-3)   • Angina, class III (CMS/HCC)   • Dyspepsia   • BRICE on CPAP   • Hypoxia   • Renal insufficiency       Dear Akhil Huerta MD:    Subjective     History of Present Illness:    Chief Complaint   Patient presents with   • Follow-up     6 month   • Med Management     list       Jannette Onofre is a pleasant 60 y.o. female with a past medical history significant for ASCVD, status post previous anterior wall STEMI in 2016 followed by acute coronary intervention at that time.  She has done well since with stable clinical course.  Her last cardiac catheterization 2017 revealed patent stent with nonobstructive disease. She comes in for routine cardiology follow up.     Mrs. Onofre reports that she has been doing well she even reports that she has been walking 3 miles multiple times weekly.  She does deny any chest pains whatsoever including when she walks.  She also denies any shortness of breath, palpitations, dizziness, or syncope.  She also reports that her blood pressure has been running excellent at around 115 to 120 mmHg systolic.    No Known Allergies:      Current Outpatient Medications:   •  aspirin 81 MG chewable tablet, Chew 1 tablet by mouth Daily., Disp: 100 tablet, Rfl: 3  •  atorvastatin (LIPITOR) 80 MG tablet, Take 1 tablet by mouth Daily., Disp: 90 tablet, Rfl: 2  •  calcium carbonate (OS-PARI) 600 MG tablet, Take 600 mg by mouth Daily. 2 tablets twice daily, Disp: , Rfl:   •  carvedilol (COREG) 25 MG tablet, Take 1 tablet by mouth 2  (Two) Times a Day With Meals., Disp: 180 tablet, Rfl: 3  •  Cholecalciferol (VITAMIN D3) 21471 units tablet, Take 10,000 Units by mouth Daily., Disp: , Rfl:   •  clopidogrel (PLAVIX) 75 MG tablet, Take 1 tablet by mouth Daily., Disp: 90 tablet, Rfl: 4  •  Cyanocobalamin (VITAMIN B-12) 1000 MCG sublingual tablet, Place 1 tablet under the tongue Daily., Disp: , Rfl:   •  Dulaglutide 0.75 MG/0.5ML solution pen-injector, Inject 0.5 milliliter by subcutaneous route  every week in the abdomen, thigh, or upper arm rotating injection sites., Disp: 2 mL, Rfl: 5  •  ferrous sulfate 325 (65 FE) MG tablet, Take 325 mg by mouth Daily With Breakfast., Disp: , Rfl:   •  glipizide (GLUCOTROL XL) 10 MG 24 hr tablet, Take 1 tablet by mouth 2 (two) times a day., Disp: 180 tablet, Rfl: 3  •  glucose blood test strip, use one strip to check glucose as needed., Disp: 100 each, Rfl: 3  •  isosorbide mononitrate (IMDUR) 60 MG 24 hr tablet, Take 1 & 1/2 tablets by mouth Every Morning., Disp: 180 tablet, Rfl: 3  •  Lancets Thin misc, Use one strip to check glucose as needed., Disp: 300 each, Rfl: 3  •  lisinopril (PRINIVIL,ZESTRIL) 20 MG tablet, Take 1 tablet by mouth every day, Disp: 90 tablet, Rfl: 2  •  metFORMIN (GLUCOPHAGE) 1000 MG tablet, Take 1 tablet by mouth 2 (Two) Times a Day With morning and evening Meals., Disp: 180 tablet, Rfl: 11  •  Multiple Vitamin (MULTI-VITAMIN DAILY PO), Take  by mouth., Disp: , Rfl:   •  nitroglycerin (NITROSTAT) 0.4 MG SL tablet, Place 1 tablet under the tongue Every 5 (Five) Minutes As Needed for Chest Pain (max of 3 doses in 15 minutes. If no relief, go to ER., Disp: 25 tablet, Rfl: 12  •  thiamine1 (VITAMIN B1) 250 MG tablet, Take 250 mg by mouth Daily., Disp: , Rfl:   •  vitamin C (ASCORBIC ACID) 500 MG tablet, Take 500 mg by mouth Daily., Disp: , Rfl:     The following portions of the patient's history were reviewed and updated as appropriate: allergies, current medications, past family history,  "past medical history, past social history, past surgical history and problem list.    Social History     Tobacco Use   • Smoking status: Former Smoker     Packs/day: 0.25     Years: 1.00     Pack years: 0.25     Types: Cigarettes     Last attempt to quit: 1989     Years since quittin.1   • Smokeless tobacco: Never Used   Substance Use Topics   • Alcohol use: No   • Drug use: No       Review of Systems   Constitution: Negative for malaise/fatigue.   Cardiovascular: Negative for chest pain, dyspnea on exertion and irregular heartbeat.   Respiratory: Negative for cough and shortness of breath.    Hematologic/Lymphatic: Negative for bleeding problem. Does not bruise/bleed easily.   Gastrointestinal: Negative for nausea and vomiting.   Neurological: Negative for weakness.       Objective   Vitals:    20 1523   BP: 121/79   BP Location: Left arm   Patient Position: Sitting   Cuff Size: Large Adult   Pulse: 74   Resp: 16   Temp: 98.4 °F (36.9 °C)   TempSrc: Temporal   SpO2: 99%   Weight: 107 kg (235 lb 9.6 oz)   Height: 167.6 cm (65.98\")     Body mass index is 38.05 kg/m².    Physical Exam   Constitutional: She is oriented to person, place, and time. She appears well-developed and well-nourished. No distress.   HENT:   Head: Normocephalic and atraumatic.   Cardiovascular: Normal rate, regular rhythm and normal heart sounds.   Pulmonary/Chest: Effort normal and breath sounds normal. No respiratory distress.   Musculoskeletal: She exhibits no edema.   Neurological: She is alert and oriented to person, place, and time.   Skin: She is not diaphoretic.     Lab Results   Component Value Date     2020    K 3.9 2020     (H) 2020    CO2 23.1 2020    BUN 32 (H) 2020    CREATININE 0.99 2020    GLUCOSE 190 (H) 2020    CALCIUM 9.6 2020    AST 22 2020    ALT 24 2020    ALKPHOS 59 2020    LABIL2 1.6 2015     Lab Results   Component Value " Date    CKTOTAL 35 10/29/2017     Lab Results   Component Value Date    WBC 4.97 06/04/2020    HGB 12.7 06/04/2020    HCT 38.4 06/04/2020     06/04/2020     Lab Results   Component Value Date    INR 0.98 10/28/2017    INR 0.89 11/29/2016    INR 0.82 11/27/2016     No results found for: MG  Lab Results   Component Value Date    TSH 2.190 02/18/2020    CHLPL 213 (H) 03/14/2016    TRIG 182 (H) 06/04/2020    HDL 38 (L) 06/04/2020    LDL 49 06/04/2020      Lab Results   Component Value Date    BNP 27.0 12/29/2016       During this visit the following were done:  Labs Reviewed [x]    Labs Ordered []    Radiology Reports Reviewed [x]    Radiology Ordered []    PCP Records Reviewed []    Referring Provider Records Reviewed []    ER Records Reviewed []    Hospital Records Reviewed []    History Obtained From Family []    Radiology Images Reviewed []    Other Reviewed []    Records Requested []         ECG 12 Lead  Date/Time: 8/24/2020 3:26 PM  Performed by: Sathya Redman PA-C  Authorized by: Sathya Redman PA-C   Comparison: compared with previous ECG   Similar to previous ECG  Rhythm: sinus rhythm  Conduction: right bundle branch block  ST Segments: ST segments normal    Clinical impression: normal ECG            Assessment/Plan    Diagnosis Plan   1. Acute ST elevation myocardial infarction (STEMI) of anterior wall, 11/29/16 s/p stenting proxismal % occlusion, clinically stable.     2. Mixed hyperlipidemia     3. Essential hypertension              Recommendations:  1. Overall patient does appear stable from cardiac standpoint she is exercising almost daily walking 3 miles without any issues.  Her cholesterol is also very controlled with total cholesterol of 123 with excellent control blood pressure.  I will continue current regimen which includes lisinopril, Imdur, Plavix, Coreg, Lipitor, and aspirin.      Return in about 1 year (around 8/24/2021).    As always, I appreciate very much the opportunity  to participate in the cardiovascular care of your patients.      With Best Regards,    Sathya Redman PA-C

## 2020-08-25 ENCOUNTER — EPISODE CHANGES (OUTPATIENT)
Dept: CASE MANAGEMENT | Facility: OTHER | Age: 60
End: 2020-08-25

## 2020-08-27 ENCOUNTER — DOCUMENTATION (OUTPATIENT)
Dept: BARIATRICS/WEIGHT MGMT | Facility: CLINIC | Age: 60
End: 2020-08-27

## 2020-08-27 NOTE — PROGRESS NOTES
"Telephone Nutrition Consultation  Metabolic and Bariatric Surgery  Nutrition Follow-up     Jannette Onofre  2020  [id number]  [id number]  1960  female      Past Medical History:   Diagnosis Date   • Anxiety    • Coronary artery disease     s/p LAD stenting, follows w/ Dr. Ogden   • Depression    • Diabetes mellitus (CMS/HCC)     dx , initially improved s/p AGB but now back on insulin    • Dyspepsia    • Dyspnea on exertion    • Fatigue    • Hyperlipidemia    • Hypertension    • Melanoma (CMS/HCC)        • Morbid obesity (CMS/HCC)    • Myocardial infarction (CMS/HCC) 2016    s/p LAD stenting, on ASA 325mg + Plavix   • BRICE on CPAP     noncompliant w/ device, \"lucia me\"   • Umbilical hernia     multiple recurrent incisional hernias at the umbilicus, left undisturbed during LSG, can consider general surgery repair in the future, if needed.   • Wears glasses      Past Surgical History:   Procedure Laterality Date   • CARDIAC CATHETERIZATION N/A 2016    Procedure: Left Heart Cath;  Surgeon: Akhil Zuñiga MD;  Location: [facility] INVASIVE LOCATION;  Service:    • CARDIAC CATHETERIZATION N/A 10/31/2017    Procedure: Left Heart Cath;  Surgeon: Lebron Ogden MD;  Location: [facility] CATH INVASIVE LOCATION;  Service:    • CARDIAC CATHETERIZATION  10/31/2017    Procedure: Functional Flow [place];  Surgeon: Denilson Pena MD;  [facility]:  [facility];  Service:    • CARDIAC CATHETERIZATION      02, 3/5/07, 3/23/11   • CATARACT EXTRACTION Right    • CATARACT EXTRACTION Left      RIGHT 05   •  SECTION     • COLONOSCOPY      routine/ COLONGUARD    • ENDOSCOPY      3/09, ,    • ENDOSCOPY N/A 2019    Procedure: ESOPHAGOGASTRODUODENOSCOPY;  Surgeon: Ranjeet Dunbar MD;  Location: [facility];  Service: Bariatric   • FINGER/THUMB CLOSED REDUCTION Left 8/3/2017    Procedure: CLOSED REDUCTION PERCUTANEOUS PINNING LEFT FIFTH " METACARPAL SHAFT FRACTURE;  Surgeon: Oscar León MD;  Location:  [facility];  Service:    • GASTRIC BANDING REMOVAL  02/25/2019    w/ Dr. Dunbar   • GASTRIC SLEEVE LAPAROSCOPIC N/A 11/8/2019    Procedure: GASTRIC SLEEVE LAPAROSCOPIC;  Surgeon: Ranjeet Dunbar MD;  Location:  [facility];  Service: Bariatric   • LAPAROSCOPIC CHOLECYSTECTOMY  2002    w/ UHR   • LAPAROSCOPIC GASTRIC BANDING  04/2013    s/p LAGB by Dr Russell   • OTHER SURGICAL HISTORY  1998    melanoma removal/back   • [place] RT/LT HEART CATHETERS N/A 11/29/2016    Procedure: Percutaneous Coronary Intervention;  Surgeon: kAhil Zuñiga MD;  Location:  [facility];  Service: Cardiology   • SEPTOPLASTY  2006   • UMBILICAL HERNIA REPAIR  2002    no mesh     No Known Allergies    Current Outpatient Medications:   •  aspirin 81 MG chewable tablet, Chew 1 tablet by mouth Daily., Disp: 100 tablet, Rfl: 3  •  atorvastatin (LIPITOR) 80 MG tablet, Take 1 tablet by mouth Daily., Disp: 90 tablet, Rfl: 2  •  calcium carbonate (OS-PARI) 600 MG tablet, Take 600 mg by mouth Daily. 2 tablets twice daily, Disp: , Rfl:   •  carvedilol (COREG) 25 MG tablet, Take 1 tablet by mouth 2 (Two) Times a Day With Meals., Disp: 180 tablet, Rfl: 3  •  Cholecalciferol (VITAMIN D3) 53070 units tablet, Take 10,000 Units by mouth Daily., Disp: , Rfl:   •  clopidogrel (PLAVIX) 75 MG tablet, Take 1 tablet by mouth Daily., Disp: 90 tablet, Rfl: 4  •  Cyanocobalamin (VITAMIN B-12) 1000 MCG sublingual tablet, Place 1 tablet under the tongue Daily., Disp: , Rfl:   •  Dulaglutide 0.75 MG/0.5ML solution pen-injector, Inject 0.5 milliliter by subcutaneous route  every week in the abdomen, thigh, or upper arm rotating injection sites., Disp: 2 mL, Rfl: 5  •  ferrous sulfate 325 (65 FE) MG tablet, Take 325 mg by mouth Daily With Breakfast., Disp: , Rfl:   •  glipizide (GLUCOTROL XL) 10 MG 24 hr tablet, Take 1 tablet by mouth 2 (two) times a day., Disp: 180 tablet, Rfl: 3  •   glucose blood test strip, use one strip to check glucose as needed., Disp: 100 each, Rfl: 3  •  isosorbide mononitrate (IMDUR) 60 MG 24 hr tablet, Take 1 & 1/2 tablets by mouth Every Morning., Disp: 180 tablet, Rfl: 3  •  Lancets Thin misc, Use one strip to check glucose as needed., Disp: 300 each, Rfl: 3  •  lisinopril (PRINIVIL,ZESTRIL) 20 MG tablet, Take 1 tablet by mouth every day, Disp: 90 tablet, Rfl: 2  •  metFORMIN (GLUCOPHAGE) 1000 MG tablet, Take 1 tablet by mouth 2 (Two) Times a Day With morning and evening Meals., Disp: 180 tablet, Rfl: 11  •  Multiple Vitamin (MULTI-VITAMIN DAILY PO), Take  by mouth., Disp: , Rfl:   •  nitroglycerin (NITROSTAT) 0.4 MG SL tablet, Place 1 tablet under the tongue Every 5 (Five) Minutes As Needed for Chest Pain (max of 3 doses in 15 minutes. If no relief, go to ER., Disp: 25 tablet, Rfl: 12  •  thiamine1 (VITAMIN B1) 250 MG tablet, Take 250 mg by mouth Daily., Disp: , Rfl:   •  vitamin C (ASCORBIC ACID) 500 MG tablet, Take 500 mg by mouth Daily., Disp: , Rfl:       Nutrition Assessment    Estimated energy needs: 1850    Estimated calories for weight loss: 1400    IBW (Pounds):  175      Excess body weight (Pounds): 60           Exercise  Daily      Jannette is interested in increasing calories and reports that she currently consumes approximately 1200 calories per day.  I agreed that she could increase calories to 1400 per day.  Recommend approximately 105 grams protein, 105 gram CHO and 62 grams fat.       Exercise Goals  Continue current exercise routine         Ca Goins, LUISA  08/27/2020  10:09 AM

## 2020-08-28 ENCOUNTER — TRANSCRIBE ORDERS (OUTPATIENT)
Dept: ADMINISTRATIVE | Facility: HOSPITAL | Age: 60
End: 2020-08-28

## 2020-08-28 ENCOUNTER — LAB (OUTPATIENT)
Dept: LAB | Facility: HOSPITAL | Age: 60
End: 2020-08-28

## 2020-08-28 DIAGNOSIS — E53.8 VITAMIN B 12 DEFICIENCY: ICD-10-CM

## 2020-08-28 DIAGNOSIS — E86.0 DEHYDRATION: ICD-10-CM

## 2020-08-28 DIAGNOSIS — E11.42 DIABETIC SENSORIMOTOR NEUROPATHY (HCC): ICD-10-CM

## 2020-08-28 DIAGNOSIS — E78.2 MIXED HYPERLIPIDEMIA: ICD-10-CM

## 2020-08-28 DIAGNOSIS — E11.9 DIABETES MELLITUS WITHOUT COMPLICATION (HCC): ICD-10-CM

## 2020-08-28 DIAGNOSIS — E66.01 MORBID OBESITY (HCC): ICD-10-CM

## 2020-08-28 DIAGNOSIS — E11.42 DIABETIC SENSORIMOTOR NEUROPATHY (HCC): Primary | ICD-10-CM

## 2020-08-28 DIAGNOSIS — I25.10 DISEASE OF CARDIOVASCULAR SYSTEM: ICD-10-CM

## 2020-08-28 DIAGNOSIS — I10 ESSENTIAL HYPERTENSION, MALIGNANT: ICD-10-CM

## 2020-08-28 DIAGNOSIS — E53.8 BIOTIN-(PROPIONYL-COA-CARBOXYLASE) LIGASE DEFICIENCY: ICD-10-CM

## 2020-08-28 LAB
ALBUMIN SERPL-MCNC: 4.1 G/DL (ref 3.5–5.2)
ALBUMIN UR-MCNC: 2.1 MG/DL
ALBUMIN/GLOB SERPL: 1.8 G/DL
ALP SERPL-CCNC: 53 U/L (ref 39–117)
ALT SERPL W P-5'-P-CCNC: 24 U/L (ref 1–33)
ANION GAP SERPL CALCULATED.3IONS-SCNC: 8.9 MMOL/L (ref 5–15)
AST SERPL-CCNC: 20 U/L (ref 1–32)
BASOPHILS # BLD AUTO: 0.05 10*3/MM3 (ref 0–0.2)
BASOPHILS NFR BLD AUTO: 0.9 % (ref 0–1.5)
BILIRUB SERPL-MCNC: 0.6 MG/DL (ref 0–1.2)
BUN SERPL-MCNC: 18 MG/DL (ref 8–23)
BUN/CREAT SERPL: 19.6 (ref 7–25)
CALCIUM SPEC-SCNC: 9.5 MG/DL (ref 8.6–10.5)
CHLORIDE SERPL-SCNC: 108 MMOL/L (ref 98–107)
CHOLEST SERPL-MCNC: 109 MG/DL (ref 0–200)
CO2 SERPL-SCNC: 26.1 MMOL/L (ref 22–29)
CREAT SERPL-MCNC: 0.92 MG/DL (ref 0.57–1)
DEPRECATED RDW RBC AUTO: 41.5 FL (ref 37–54)
EOSINOPHIL # BLD AUTO: 0.23 10*3/MM3 (ref 0–0.4)
EOSINOPHIL NFR BLD AUTO: 4 % (ref 0.3–6.2)
ERYTHROCYTE [DISTWIDTH] IN BLOOD BY AUTOMATED COUNT: 12.5 % (ref 12.3–15.4)
FOLATE SERPL-MCNC: >20 NG/ML (ref 4.78–24.2)
GFR SERPL CREATININE-BSD FRML MDRD: 62 ML/MIN/1.73
GLOBULIN UR ELPH-MCNC: 2.3 GM/DL
GLUCOSE SERPL-MCNC: 124 MG/DL (ref 65–99)
HBA1C MFR BLD: 7.3 % (ref 4.8–5.6)
HCT VFR BLD AUTO: 37.5 % (ref 34–46.6)
HDLC SERPL-MCNC: 36 MG/DL (ref 40–60)
HGB BLD-MCNC: 12.8 G/DL (ref 12–15.9)
IMM GRANULOCYTES # BLD AUTO: 0.01 10*3/MM3 (ref 0–0.05)
IMM GRANULOCYTES NFR BLD AUTO: 0.2 % (ref 0–0.5)
LDLC SERPL CALC-MCNC: 37 MG/DL (ref 0–100)
LDLC/HDLC SERPL: 1.03 {RATIO}
LYMPHOCYTES # BLD AUTO: 1.83 10*3/MM3 (ref 0.7–3.1)
LYMPHOCYTES NFR BLD AUTO: 31.7 % (ref 19.6–45.3)
MCH RBC QN AUTO: 30.8 PG (ref 26.6–33)
MCHC RBC AUTO-ENTMCNC: 34.1 G/DL (ref 31.5–35.7)
MCV RBC AUTO: 90.4 FL (ref 79–97)
MONOCYTES # BLD AUTO: 0.4 10*3/MM3 (ref 0.1–0.9)
MONOCYTES NFR BLD AUTO: 6.9 % (ref 5–12)
NEUTROPHILS NFR BLD AUTO: 3.26 10*3/MM3 (ref 1.7–7)
NEUTROPHILS NFR BLD AUTO: 56.3 % (ref 42.7–76)
NRBC BLD AUTO-RTO: 0 /100 WBC (ref 0–0.2)
PLATELET # BLD AUTO: 192 10*3/MM3 (ref 140–450)
PMV BLD AUTO: 9.8 FL (ref 6–12)
POTASSIUM SERPL-SCNC: 4.5 MMOL/L (ref 3.5–5.2)
PROT SERPL-MCNC: 6.4 G/DL (ref 6–8.5)
RBC # BLD AUTO: 4.15 10*6/MM3 (ref 3.77–5.28)
SODIUM SERPL-SCNC: 143 MMOL/L (ref 136–145)
TRIGL SERPL-MCNC: 179 MG/DL (ref 0–150)
VIT B12 BLD-MCNC: >2000 PG/ML (ref 211–946)
VLDLC SERPL-MCNC: 35.8 MG/DL (ref 5–40)
WBC # BLD AUTO: 5.78 10*3/MM3 (ref 3.4–10.8)

## 2020-08-28 PROCEDURE — 36415 COLL VENOUS BLD VENIPUNCTURE: CPT

## 2020-08-28 PROCEDURE — 80061 LIPID PANEL: CPT

## 2020-08-28 PROCEDURE — 83036 HEMOGLOBIN GLYCOSYLATED A1C: CPT

## 2020-08-28 PROCEDURE — 82746 ASSAY OF FOLIC ACID SERUM: CPT

## 2020-08-28 PROCEDURE — 82607 VITAMIN B-12: CPT

## 2020-08-28 PROCEDURE — 85025 COMPLETE CBC W/AUTO DIFF WBC: CPT

## 2020-08-28 PROCEDURE — 84681 ASSAY OF C-PEPTIDE: CPT

## 2020-08-28 PROCEDURE — 80053 COMPREHEN METABOLIC PANEL: CPT

## 2020-08-28 PROCEDURE — 82043 UR ALBUMIN QUANTITATIVE: CPT

## 2020-08-29 LAB — C PEPTIDE SERPL-MCNC: 4.4 NG/ML (ref 1.1–4.4)

## 2020-10-30 ENCOUNTER — TRANSCRIBE ORDERS (OUTPATIENT)
Dept: ADMINISTRATIVE | Facility: HOSPITAL | Age: 60
End: 2020-10-30

## 2020-10-30 ENCOUNTER — LAB (OUTPATIENT)
Dept: LAB | Facility: HOSPITAL | Age: 60
End: 2020-10-30

## 2020-10-30 DIAGNOSIS — Z01.818 PRE-OPERATIVE CLEARANCE: Primary | ICD-10-CM

## 2020-10-30 DIAGNOSIS — Z01.818 PRE-OPERATIVE CLEARANCE: ICD-10-CM

## 2020-10-30 LAB — SARS-COV-2 RNA RESP QL NAA+PROBE: NOT DETECTED

## 2020-10-30 PROCEDURE — U0004 COV-19 TEST NON-CDC HGH THRU: HCPCS | Performed by: INTERNAL MEDICINE

## 2020-10-30 PROCEDURE — C9803 HOPD COVID-19 SPEC COLLECT: HCPCS

## 2020-11-19 ENCOUNTER — TELEMEDICINE (OUTPATIENT)
Dept: BARIATRICS/WEIGHT MGMT | Facility: CLINIC | Age: 60
End: 2020-11-19

## 2020-11-19 VITALS — HEIGHT: 66 IN | BODY MASS INDEX: 37.77 KG/M2 | WEIGHT: 235 LBS

## 2020-11-19 DIAGNOSIS — R53.83 FATIGUE, UNSPECIFIED TYPE: ICD-10-CM

## 2020-11-19 DIAGNOSIS — K91.2 POSTGASTRECTOMY MALABSORPTION: ICD-10-CM

## 2020-11-19 DIAGNOSIS — Z13.0 SCREENING, IRON DEFICIENCY ANEMIA: ICD-10-CM

## 2020-11-19 DIAGNOSIS — Z90.3 POSTGASTRECTOMY MALABSORPTION: ICD-10-CM

## 2020-11-19 DIAGNOSIS — E66.9 OBESITY, CLASS II, BMI 35-39.9: Primary | ICD-10-CM

## 2020-11-19 DIAGNOSIS — Z13.21 MALNUTRITION SCREEN: ICD-10-CM

## 2020-11-19 DIAGNOSIS — E55.9 HYPOVITAMINOSIS D: ICD-10-CM

## 2020-11-19 PROCEDURE — 99423 OL DIG E/M SVC 21+ MIN: CPT | Performed by: SURGERY

## 2020-11-19 NOTE — PROGRESS NOTES
"[facility] Bariatric Surgery  [address]  [address]  [facility] [address]  [phone]        Patient Name:  Jannette Onofre.  :  1960      Date of Visit: 2020      Reason for Visit:   1 year postop      HPI: Jannette Onofre is a 60 y.o. female s/p LSG 19 GDW (hx AGB)    Doing well.  No GI issues/concerns. Denies dysphagia, reflux, nausea, vomiting and abdominal pain.  Getting 100+g prot/day.  Drinking 64 fluid oz/day. Last labs revealed no vitamin deficiencies.  Taking MVI, B12, B1, Calcium, Vit D, iron and Vit C.  Exercise: 5 a.m. daily 1.5 mile walk. Walks 1.5 mile in afternoon.  Also does walk away the pounds and body groove videos.  She has been doing the exercise ever since she had surgery.       Presurgery weight: 267 pounds.  Today's weight is 107 kg (235 lb) pounds, today's  Body mass index is 37.93 kg/m²., and weight loss since surgery is 32 pounds.  Frustrated she isn't losing more weight, but has lost from size 22 to size 16 in pants.    She spoke with dietician, who suggested increase calories to 1400.      Snacks occasionally but not often.  Drinks no calorie beverages. breakfast/lunch/dinner.   Shoots for 1400 gladis/day, plus or minus.    Sometimes hungry.    Protein is appropriate.    She even tried the pouch reset.    Notes that she is very compliant.    Past Medical History:   Diagnosis Date   • Anxiety    • Coronary artery disease     s/p LAD stenting, follows w/ Dr. Ogden   • Depression    • Diabetes mellitus (CMS/HCC)     dx , initially improved s/p AGB but now back on insulin    • Dyspepsia    • Dyspnea on exertion    • Fatigue    • Hyperlipidemia    • Hypertension    • Melanoma (CMS/HCC)        • Morbid obesity (CMS/HCC)    • Myocardial infarction (CMS/HCC) 2016    s/p LAD stenting, on ASA 325mg + Plavix   • BRICE on CPAP     noncompliant w/ device, \"lucia me\"   • Umbilical hernia     multiple recurrent incisional hernias at the " umbilicus, left undisturbed during LSG, can consider general surgery repair in the future, if needed.   • Wears glasses      Past Surgical History:   Procedure Laterality Date   • CARDIAC CATHETERIZATION N/A 2016    Procedure: Left Heart Cath;  Surgeon: Akhil Zuñiga MD;  Location:  [facility];  Service:    • CARDIAC CATHETERIZATION N/A 10/31/2017    Procedure: Left Heart Cath;  Surgeon: Lebron Ogden MD;  Location:  [facility];  Service:    • CARDIAC CATHETERIZATION  10/31/2017    Procedure: Functional Flow [place];  Surgeon: Denilson Pena MD;  Location:  [facility];  Service:    • CARDIAC CATHETERIZATION      02, 3/5/07, 3/23/11   • CATARACT EXTRACTION Right    • CATARACT EXTRACTION Left      RIGHT 05   •  SECTION     • COLONOSCOPY      routine/ COLONGUARD    • ENDOSCOPY      3/09, ,    • ENDOSCOPY N/A 2019    Procedure: ESOPHAGOGASTRODUODENOSCOPY;  Surgeon: Ranjeet Dunbar MD;  Location:  [facility];  Service: Bariatric   • FINGER/THUMB CLOSED REDUCTION Left 8/3/2017    Procedure: CLOSED REDUCTION PERCUTANEOUS PINNING LEFT FIFTH METACARPAL SHAFT FRACTURE;  Surgeon: Oscar León MD;  Location:  [facility];  Service:    • GASTRIC BANDING REMOVAL  2019    w/ Dr. Dunbar   • GASTRIC SLEEVE LAPAROSCOPIC N/A 2019    Procedure: GASTRIC SLEEVE LAPAROSCOPIC;  Surgeon: Ranjeet Dunbar MD;  Location:  [facility];  Service: Bariatric   • LAPAROSCOPIC CHOLECYSTECTOMY      w/ UHR   • LAPAROSCOPIC GASTRIC BANDING  2013    s/p LAGB by Dr Russell   • OTHER SURGICAL HISTORY      melanoma removal/back   • OK RT/LT HEART CATHETERS N/A 2016    Procedure: Percutaneous Coronary Intervention;  Surgeon: Akhil Zuñiga MD;  Location:  [facility];  Service: Cardiology   • SEPTOPLASTY     • UMBILICAL HERNIA REPAIR      no mesh     No outpatient  "medications have been marked as taking for the 20 encounter (Telemedicine) with Swapna Dinh MD.       No Known Allergies    Social History     Socioeconomic History   • Marital status:      Spouse name: Not on file   • Number of children: Not on file   • Years of education: Not on file   • Highest education level: Not on file   Tobacco Use   • Smoking status: Former Smoker     Packs/day: 0.25     Years: 1.00     Pack years: 0.25     Types: Cigarettes     Quit date: 1989     Years since quittin.4   • Smokeless tobacco: Never Used   Substance and Sexual Activity   • Alcohol use: No   • Drug use: No   • Sexual activity: Defer   Social History Narrative    .  Lives in [facility].   @ [employer].        167.6 cm (66\")   Wt 107 kg (235 lb)   LMP 2013 (Approximate) Comment: LAST MAMMOGRAM   BMI 37.93 kg/m²     Physical Exam  Constitutional:       General: She is not in acute distress.     Appearance: She is well-developed. She is not diaphoretic.   HENT:      Head: Normocephalic and atraumatic.      Mouth/Throat:      Pharynx: No oropharyngeal exudate.   Eyes:      Conjunctiva/sclera: Conjunctivae normal.      Pupils: Pupils are equal, round, and reactive to light.   Pulmonary:      Effort: Pulmonary effort is normal. No respiratory distress.   Abdominal:      General: There is no distension.      Palpations: Abdomen is soft.   Skin:     General: Skin is warm and dry.      Coloration: Skin is not pale.   Neurological:      Mental Status: She is alert and oriented to person, place, and time.      Cranial Nerves: No cranial nerve deficit.   Psychiatric:         Behavior: Behavior normal.         Thought Content: Thought content normal.           Assessment:  1 year s/p LSG 19 GDW (hx AGB)    ICD-10-CM ICD-9-CM   1. Obesity, Class II, BMI 35-39.9  E66.9 278.00         Plan:  Doing well. Continue w/ good food choices and healthy habits.  " Continue protein >70g/day.  Continue routine exercise.  Routine bariatric labs ordered.  Continue vitamins w/ adjustments pending lab results.  Call w/ problems/concerns.     The patient was instructed to follow up in 6 months, sooner if needed.    She is exercising quite a bit--go up to 1600 gladis/day, increase intensity of cardio workouts.  I suspect since she has decreased so much on her pants size that she is actually building some muscle.  Not struggling with hunger/cravings, so would not pursue medical weight loss yet.    This visit was conducted as a video visit, in an effort to limit spread of the novel coronavirus during the 2020 pandemic.        Swapna Dinh MD

## 2020-11-25 ENCOUNTER — TRANSCRIBE ORDERS (OUTPATIENT)
Dept: ADMINISTRATIVE | Facility: HOSPITAL | Age: 60
End: 2020-11-25

## 2020-11-25 ENCOUNTER — LAB (OUTPATIENT)
Dept: LAB | Facility: HOSPITAL | Age: 60
End: 2020-11-25

## 2020-11-25 DIAGNOSIS — E66.01 MORBID OBESITY (HCC): ICD-10-CM

## 2020-11-25 DIAGNOSIS — E11.9 DIABETES MELLITUS WITHOUT COMPLICATION (HCC): ICD-10-CM

## 2020-11-25 DIAGNOSIS — E53.8 B12 DEFICIENCY: ICD-10-CM

## 2020-11-25 DIAGNOSIS — E55.9 VITAMIN D DEFICIENCY: ICD-10-CM

## 2020-11-25 DIAGNOSIS — E53.8 BIOTIN-(PROPIONYL-COA-CARBOXYLASE) LIGASE DEFICIENCY: ICD-10-CM

## 2020-11-25 DIAGNOSIS — E11.42 DIABETIC SENSORIMOTOR NEUROPATHY (HCC): ICD-10-CM

## 2020-11-25 DIAGNOSIS — E11.42 DIABETIC SENSORIMOTOR NEUROPATHY (HCC): Primary | ICD-10-CM

## 2020-11-25 LAB
25(OH)D3 SERPL-MCNC: 78.8 NG/ML (ref 30–100)
ALBUMIN SERPL-MCNC: 4.1 G/DL (ref 3.5–5.2)
ALBUMIN/GLOB SERPL: 2.2 G/DL
ALP SERPL-CCNC: 55 U/L (ref 39–117)
ALT SERPL W P-5'-P-CCNC: 25 U/L (ref 1–33)
ANION GAP SERPL CALCULATED.3IONS-SCNC: 7.7 MMOL/L (ref 5–15)
AST SERPL-CCNC: 23 U/L (ref 1–32)
BASOPHILS # BLD AUTO: 0.04 10*3/MM3 (ref 0–0.2)
BASOPHILS NFR BLD AUTO: 0.8 % (ref 0–1.5)
BILIRUB SERPL-MCNC: 0.5 MG/DL (ref 0–1.2)
BUN SERPL-MCNC: 18 MG/DL (ref 8–23)
BUN/CREAT SERPL: 20.7 (ref 7–25)
CALCIUM SPEC-SCNC: 9.4 MG/DL (ref 8.6–10.5)
CHLORIDE SERPL-SCNC: 108 MMOL/L (ref 98–107)
CHOLEST SERPL-MCNC: 95 MG/DL (ref 0–200)
CO2 SERPL-SCNC: 28.3 MMOL/L (ref 22–29)
CREAT SERPL-MCNC: 0.87 MG/DL (ref 0.57–1)
DEPRECATED RDW RBC AUTO: 39.7 FL (ref 37–54)
EOSINOPHIL # BLD AUTO: 0.19 10*3/MM3 (ref 0–0.4)
EOSINOPHIL NFR BLD AUTO: 3.6 % (ref 0.3–6.2)
ERYTHROCYTE [DISTWIDTH] IN BLOOD BY AUTOMATED COUNT: 11.9 % (ref 12.3–15.4)
FERRITIN SERPL-MCNC: 148 NG/ML (ref 13–150)
FOLATE SERPL-MCNC: >20 NG/ML (ref 4.78–24.2)
GFR SERPL CREATININE-BSD FRML MDRD: 66 ML/MIN/1.73
GLOBULIN UR ELPH-MCNC: 1.9 GM/DL
GLUCOSE SERPL-MCNC: 102 MG/DL (ref 65–99)
HBA1C MFR BLD: 6.88 % (ref 4.8–5.6)
HCT VFR BLD AUTO: 38.8 % (ref 34–46.6)
HDLC SERPL-MCNC: 38 MG/DL (ref 40–60)
HGB BLD-MCNC: 12.7 G/DL (ref 12–15.9)
IMM GRANULOCYTES # BLD AUTO: 0.01 10*3/MM3 (ref 0–0.05)
IMM GRANULOCYTES NFR BLD AUTO: 0.2 % (ref 0–0.5)
IRON 24H UR-MRATE: 81 MCG/DL (ref 37–145)
LDLC SERPL CALC-MCNC: 31 MG/DL (ref 0–100)
LDLC/HDLC SERPL: 0.67 {RATIO}
LYMPHOCYTES # BLD AUTO: 2.32 10*3/MM3 (ref 0.7–3.1)
LYMPHOCYTES NFR BLD AUTO: 44.5 % (ref 19.6–45.3)
MCH RBC QN AUTO: 30 PG (ref 26.6–33)
MCHC RBC AUTO-ENTMCNC: 32.7 G/DL (ref 31.5–35.7)
MCV RBC AUTO: 91.5 FL (ref 79–97)
MONOCYTES # BLD AUTO: 0.32 10*3/MM3 (ref 0.1–0.9)
MONOCYTES NFR BLD AUTO: 6.1 % (ref 5–12)
NEUTROPHILS NFR BLD AUTO: 2.33 10*3/MM3 (ref 1.7–7)
NEUTROPHILS NFR BLD AUTO: 44.8 % (ref 42.7–76)
NRBC BLD AUTO-RTO: 0 /100 WBC (ref 0–0.2)
PLATELET # BLD AUTO: 188 10*3/MM3 (ref 140–450)
PMV BLD AUTO: 9.9 FL (ref 6–12)
POTASSIUM SERPL-SCNC: 4.6 MMOL/L (ref 3.5–5.2)
PREALB SERPL-MCNC: 27.4 MG/DL (ref 20–40)
PROT SERPL-MCNC: 6 G/DL (ref 6–8.5)
RBC # BLD AUTO: 4.24 10*6/MM3 (ref 3.77–5.28)
SODIUM SERPL-SCNC: 144 MMOL/L (ref 136–145)
TRIGL SERPL-MCNC: 157 MG/DL (ref 0–150)
TSH SERPL DL<=0.05 MIU/L-ACNC: 2.68 UIU/ML (ref 0.27–4.2)
VLDLC SERPL-MCNC: 26 MG/DL (ref 5–40)
WBC # BLD AUTO: 5.21 10*3/MM3 (ref 3.4–10.8)

## 2020-11-25 PROCEDURE — 82746 ASSAY OF FOLIC ACID SERUM: CPT | Performed by: SURGERY

## 2020-11-25 PROCEDURE — 84443 ASSAY THYROID STIM HORMONE: CPT | Performed by: SURGERY

## 2020-11-25 PROCEDURE — 85025 COMPLETE CBC W/AUTO DIFF WBC: CPT | Performed by: SURGERY

## 2020-11-25 PROCEDURE — 80061 LIPID PANEL: CPT

## 2020-11-25 PROCEDURE — 83921 ORGANIC ACID SINGLE QUANT: CPT | Performed by: SURGERY

## 2020-11-25 PROCEDURE — 36415 COLL VENOUS BLD VENIPUNCTURE: CPT | Performed by: SURGERY

## 2020-11-25 PROCEDURE — 83540 ASSAY OF IRON: CPT | Performed by: SURGERY

## 2020-11-25 PROCEDURE — 82306 VITAMIN D 25 HYDROXY: CPT | Performed by: SURGERY

## 2020-11-25 PROCEDURE — 83036 HEMOGLOBIN GLYCOSYLATED A1C: CPT

## 2020-11-25 PROCEDURE — 84425 ASSAY OF VITAMIN B-1: CPT | Performed by: SURGERY

## 2020-11-25 PROCEDURE — 84134 ASSAY OF PREALBUMIN: CPT | Performed by: SURGERY

## 2020-11-25 PROCEDURE — 82728 ASSAY OF FERRITIN: CPT | Performed by: SURGERY

## 2020-11-25 PROCEDURE — 80053 COMPREHEN METABOLIC PANEL: CPT | Performed by: SURGERY

## 2020-12-02 LAB — VIT B1 BLD-SCNC: 299 NMOL/L (ref 66.5–200)

## 2020-12-04 LAB
Lab: NORMAL
METHYLMALONATE SERPL-SCNC: 192 NMOL/L (ref 0–378)

## 2020-12-31 ENCOUNTER — IMMUNIZATION (OUTPATIENT)
Dept: VACCINE CLINIC | Facility: HOSPITAL | Age: 60
End: 2020-12-31

## 2020-12-31 PROCEDURE — 91300 HC SARSCOV02 VAC 30MCG/0.3ML IM: CPT | Performed by: FAMILY MEDICINE

## 2020-12-31 PROCEDURE — 0001A: CPT | Performed by: FAMILY MEDICINE

## 2021-01-21 ENCOUNTER — IMMUNIZATION (OUTPATIENT)
Dept: VACCINE CLINIC | Facility: HOSPITAL | Age: 61
End: 2021-01-21

## 2021-01-21 PROCEDURE — 91300 HC SARSCOV02 VAC 30MCG/0.3ML IM: CPT

## 2021-01-21 PROCEDURE — 0002A: CPT

## 2021-04-23 ENCOUNTER — TRANSCRIBE ORDERS (OUTPATIENT)
Dept: ADMINISTRATIVE | Facility: HOSPITAL | Age: 61
End: 2021-04-23

## 2021-04-23 ENCOUNTER — LAB (OUTPATIENT)
Dept: LAB | Facility: HOSPITAL | Age: 61
End: 2021-04-23

## 2021-04-23 DIAGNOSIS — E53.8 DEFICIENCY OF OTHER SPECIFIED B GROUP VITAMINS: ICD-10-CM

## 2021-04-23 DIAGNOSIS — E66.01 MORBID OBESITY (HCC): ICD-10-CM

## 2021-04-23 DIAGNOSIS — E11.42 DIABETIC SENSORIMOTOR NEUROPATHY (HCC): ICD-10-CM

## 2021-04-23 DIAGNOSIS — E11.42 DIABETIC SENSORIMOTOR NEUROPATHY (HCC): Primary | ICD-10-CM

## 2021-04-23 DIAGNOSIS — E11.9 DIABETES MELLITUS WITHOUT COMPLICATION (HCC): ICD-10-CM

## 2021-04-23 DIAGNOSIS — E55.9 VITAMIN D DEFICIENCY: ICD-10-CM

## 2021-04-23 LAB
25(OH)D3 SERPL-MCNC: 128 NG/ML
ALBUMIN UR-MCNC: 1.5 MG/DL
BASOPHILS # BLD AUTO: 0.04 10*3/MM3 (ref 0–0.2)
BASOPHILS NFR BLD AUTO: 0.6 % (ref 0–1.5)
CHOLEST SERPL-MCNC: 109 MG/DL (ref 0–200)
CREAT UR-MCNC: 144.2 MG/DL
CRP SERPL-MCNC: <0.3 MG/DL (ref 0–0.5)
DEPRECATED RDW RBC AUTO: 43.4 FL (ref 37–54)
EOSINOPHIL # BLD AUTO: 0.17 10*3/MM3 (ref 0–0.4)
EOSINOPHIL NFR BLD AUTO: 2.7 % (ref 0.3–6.2)
ERYTHROCYTE [DISTWIDTH] IN BLOOD BY AUTOMATED COUNT: 12.5 % (ref 12.3–15.4)
FOLATE SERPL-MCNC: >20 NG/ML (ref 4.78–24.2)
HBA1C MFR BLD: 7.03 % (ref 4.8–5.6)
HCT VFR BLD AUTO: 41 % (ref 34–46.6)
HDLC SERPL-MCNC: 46 MG/DL (ref 40–60)
HGB BLD-MCNC: 12.7 G/DL (ref 12–15.9)
IMM GRANULOCYTES # BLD AUTO: 0.02 10*3/MM3 (ref 0–0.05)
IMM GRANULOCYTES NFR BLD AUTO: 0.3 % (ref 0–0.5)
LDLC SERPL CALC-MCNC: 42 MG/DL (ref 0–100)
LDLC/HDLC SERPL: 0.87 {RATIO}
LYMPHOCYTES # BLD AUTO: 1.82 10*3/MM3 (ref 0.7–3.1)
LYMPHOCYTES NFR BLD AUTO: 29 % (ref 19.6–45.3)
MCH RBC QN AUTO: 29.3 PG (ref 26.6–33)
MCHC RBC AUTO-ENTMCNC: 31 G/DL (ref 31.5–35.7)
MCV RBC AUTO: 94.5 FL (ref 79–97)
MICROALBUMIN/CREAT UR: 10.4 MG/G
MONOCYTES # BLD AUTO: 0.36 10*3/MM3 (ref 0.1–0.9)
MONOCYTES NFR BLD AUTO: 5.7 % (ref 5–12)
NEUTROPHILS NFR BLD AUTO: 3.87 10*3/MM3 (ref 1.7–7)
NEUTROPHILS NFR BLD AUTO: 61.7 % (ref 42.7–76)
NRBC BLD AUTO-RTO: 0 /100 WBC (ref 0–0.2)
PLATELET # BLD AUTO: 183 10*3/MM3 (ref 140–450)
PMV BLD AUTO: 9.6 FL (ref 6–12)
RBC # BLD AUTO: 4.34 10*6/MM3 (ref 3.77–5.28)
TRIGL SERPL-MCNC: 115 MG/DL (ref 0–150)
VIT B12 BLD-MCNC: 792 PG/ML (ref 211–946)
VLDLC SERPL-MCNC: 21 MG/DL (ref 5–40)
WBC # BLD AUTO: 6.28 10*3/MM3 (ref 3.4–10.8)

## 2021-04-23 PROCEDURE — 82570 ASSAY OF URINE CREATININE: CPT

## 2021-04-23 PROCEDURE — 82043 UR ALBUMIN QUANTITATIVE: CPT

## 2021-04-23 PROCEDURE — 36415 COLL VENOUS BLD VENIPUNCTURE: CPT

## 2021-04-23 PROCEDURE — 86140 C-REACTIVE PROTEIN: CPT

## 2021-04-23 PROCEDURE — 83036 HEMOGLOBIN GLYCOSYLATED A1C: CPT

## 2021-04-23 PROCEDURE — 82607 VITAMIN B-12: CPT

## 2021-04-23 PROCEDURE — 82306 VITAMIN D 25 HYDROXY: CPT

## 2021-04-23 PROCEDURE — 80061 LIPID PANEL: CPT

## 2021-04-23 PROCEDURE — 85025 COMPLETE CBC W/AUTO DIFF WBC: CPT

## 2021-04-23 PROCEDURE — 82746 ASSAY OF FOLIC ACID SERUM: CPT

## 2021-04-29 ENCOUNTER — TRANSCRIBE ORDERS (OUTPATIENT)
Dept: PHYSICAL THERAPY | Facility: CLINIC | Age: 61
End: 2021-04-29

## 2021-04-29 DIAGNOSIS — R42 VERTIGO: Primary | ICD-10-CM

## 2021-05-24 ENCOUNTER — TREATMENT (OUTPATIENT)
Dept: PHYSICAL THERAPY | Facility: CLINIC | Age: 61
End: 2021-05-24

## 2021-05-24 DIAGNOSIS — H81.10 BENIGN PAROXYSMAL POSITIONAL VERTIGO, UNSPECIFIED LATERALITY: Primary | ICD-10-CM

## 2021-05-24 PROCEDURE — 97162 PT EVAL MOD COMPLEX 30 MIN: CPT | Performed by: PHYSICAL THERAPIST

## 2021-05-24 NOTE — PROGRESS NOTES
Physical Therapy Initial Evaluation and Plan of Care        Patient: Jannette Onofre   : 1960  Diagnosis/ICD-10 Code:  Benign paroxysmal positional vertigo, unspecified laterality [H81.10]  Referring practitioner: Akhil Huerta MD  Date of Initial Visit: 2021  Today's Date: 2021  Patient seen for 1 sessions    Visit Diagnoses:    ICD-10-CM ICD-9-CM   1. Benign paroxysmal positional vertigo, unspecified laterality  H81.10 386.11            Subjective Questionnaire:       Subjective Evaluation    History of Present Illness  Onset date: 2021.  Mechanism of injury: In 2021 the patient was getting out of bed, and while turning her head to the right she experienced increased increased dizziness for approximately one hour.  The dizziness continued for three consecutive days and now she has dizziness around once every two weeks.  The patient was evaluated by her PCP last month and was referred to therapy for the Epley maneuver.  The patient has delayed initiating therapy due to caring for her injured spouse.      Quality of life: good    Pain  No pain reported    Diagnostic Tests  No diagnostic tests performed    Patient Goals  Patient goals for therapy: improved balance             Objective        Special Questions      Additional Special Questions  Denies syncope; denies night sweats; no tinnitus      Strength/Myotome Testing     Left Shoulder     Planes of Motion   Abduction: 4+     Right Shoulder     Planes of Motion   Abduction: 4+     Left Elbow   Flexion: 4+  Extension: 4+    Right Elbow   Flexion: 4+  Extension: 4+    Left Hip   Planes of Motion   Flexion: 4+    Right Hip   Planes of Motion   Flexion: 4+    Left Knee   Flexion: 4+  Extension: 4+    Right Knee   Flexion: 4+  Extension: 4+    Tests     Additional Tests Details  Normal visual exam  Finger to nose eyes open and closed wnl  JAKOB wnl    Negative mod vertebrobasilar artery screen  Positive right Froilan mcmillan pike  testing          Assessment & Plan     Assessment  Impairments: impaired balance  Other impairment: dizziness  Assessment details: Pt is a 59 y/o female referred to therapy for evaluation for BPPV. Pt presented with a normal myotome and dermatome screen, good coordination and good visual exam.  Pt noted to present with a positive right Froilan Hallpike screen and Epley Maneuver performed to right twice.  Therapy will follow for one visit for instruction of self application of Epley,  Prognosis: good  Functional Limitations: sleeping, walking, standing and unable to perform repetitive tasks  Goals  Plan Goals: STG 2 weeks    1 Pt will report a 50% decrease in dizziness.  2 Pt will be instructed in self application of Epley maneuver.    Plan  Therapy options: will be seen for skilled physical therapy services  Planned modality interventions: cryotherapy, dry needling, thermotherapy (hydrocollator packs), traction and ultrasound  Planned therapy interventions: flexibility, home exercise program, manual therapy, neuromuscular re-education, postural training and balance/weight-bearing training  Duration in visits: 3  Duration in weeks: 2  Treatment plan discussed with: patient  Plan details: Will follow for optimal gains.    Moderate Evaluation  95020  Re-evaluation   10655  Therapeutic exercise  39874  Therapeutic activity    63526  Neuromuscular re-education   47674  Manual therapy   83681  Gait training  97192  Moist heat/cryotherapy 84988             Visit Diagnoses:    ICD-10-CM ICD-9-CM   1. Benign paroxysmal positional vertigo, unspecified laterality  H81.10 386.11       Timed:  Manual Therapy:         mins  24508;  Therapeutic Exercise:         mins  94381;     Neuromuscular Pierre:        mins  34964;    Therapeutic Activity:          mins  49494;     Gait Training:           mins  54889;     Ultrasound:          mins  91243;    Electrical Stimulation:         mins  17933 ( );    Untimed:  Electrical  Stimulation:         mins  12476 ( );  Mechanical Traction:         mins  12592;   Cannalith Repositioning 63207  Timed Treatment:      mins   Total Treatment:     40   mins    PT SIGNATURE: Casey Noriega, MIGUEL         Initial Certification  Certification Period: 5/24/2021 thru 8/22/2021  I certify that the therapy services are furnished while this patient is under my care.  The services outlined above are required by this patient, and will be reviewed every 90 days.     PHYSICIAN: Akhil Huerta MD      DATE:     Please sign and return via fax to [phone].. Thank you, [facility].

## 2021-05-26 ENCOUNTER — TREATMENT (OUTPATIENT)
Dept: PHYSICAL THERAPY | Facility: CLINIC | Age: 61
End: 2021-05-26

## 2021-05-26 DIAGNOSIS — H81.10 BENIGN PAROXYSMAL POSITIONAL VERTIGO, UNSPECIFIED LATERALITY: Primary | ICD-10-CM

## 2021-05-26 PROCEDURE — 95992 CANALITH REPOSITIONING PROC: CPT | Performed by: PHYSICAL THERAPIST

## 2021-05-26 NOTE — PROGRESS NOTES
Discharge  Patient: Jannette Onofre   : 1960  Referring practitioner: No ref. provider found  Date of Initial Visit: Type: THERAPY  Noted: 2021  Today's Date: 2021  Patient seen for 2 sessions           Subjective Evaluation    History of Present Illness    Subjective comment: Pt reports having no dizziness since last session.  Pt reports she has perfromed the Epley Maneuver twice with her spouse since last session.       Objective   See Exercise, Manual, and Modality Logs for complete treatment.       Assessment & Plan     Assessment  Assessment details: Pt reports being non symptomatic today and reports having no dizziness following treatment last session.  Today pt instructed in self application and Epley performed to right x 1 with good demonstration and understanding.  Pt has requested to be discharged at this time.    Plan  Plan details: Pt has met all goals and reports being non symptomatic.  Pt instructed in BPPV and Epley maneuver and was instructed to contact therapy as needed.  Pt will be discharged at this time.        Visit Diagnoses:    ICD-10-CM ICD-9-CM   1. Benign paroxysmal positional vertigo, unspecified laterality  H81.10 386.11       Other           Timed:  Manual Therapy:         mins  00209;  Therapeutic Exercise:         mins  60500;     Neuromuscular Pierre:        mins  11324;    Therapeutic Activity:          mins  90125;     Gait Training:           mins  57921;     Ultrasound:          mins  69712;    Electrical Stimulation:         mins  29683 ( );  Canalith repositioning 15 min 67825  Untimed:  Electrical Stimulation:         mins  52929 ( );  Mechanical Traction:         mins  25162;     Timed Treatment:      mins   Total Treatment:     15   mins  Casey Noriega, PT  Physical Therapist

## 2021-08-18 ENCOUNTER — TRANSCRIBE ORDERS (OUTPATIENT)
Dept: ADMINISTRATIVE | Facility: HOSPITAL | Age: 61
End: 2021-08-18

## 2021-08-18 ENCOUNTER — LAB (OUTPATIENT)
Dept: LAB | Facility: HOSPITAL | Age: 61
End: 2021-08-18

## 2021-08-18 DIAGNOSIS — Z20.822 EXPOSURE TO COVID-19 VIRUS: Primary | ICD-10-CM

## 2021-08-18 PROCEDURE — 0202U NFCT DS 22 TRGT SARS-COV-2: CPT | Performed by: INTERNAL MEDICINE

## 2021-08-19 LAB
B PARAPERT DNA SPEC QL NAA+PROBE: NOT DETECTED
B PERT DNA SPEC QL NAA+PROBE: NOT DETECTED
C PNEUM DNA NPH QL NAA+NON-PROBE: NOT DETECTED
FLUAV SUBTYP SPEC NAA+PROBE: NOT DETECTED
FLUBV RNA ISLT QL NAA+PROBE: NOT DETECTED
HADV DNA SPEC NAA+PROBE: NOT DETECTED
HCOV 229E RNA SPEC QL NAA+PROBE: NOT DETECTED
HCOV HKU1 RNA SPEC QL NAA+PROBE: NOT DETECTED
HCOV NL63 RNA SPEC QL NAA+PROBE: NOT DETECTED
HCOV OC43 RNA SPEC QL NAA+PROBE: NOT DETECTED
HMPV RNA NPH QL NAA+NON-PROBE: NOT DETECTED
HPIV1 RNA SPEC QL NAA+PROBE: NOT DETECTED
HPIV2 RNA SPEC QL NAA+PROBE: NOT DETECTED
HPIV3 RNA NPH QL NAA+PROBE: NOT DETECTED
HPIV4 P GENE NPH QL NAA+PROBE: NOT DETECTED
M PNEUMO IGG SER IA-ACNC: NOT DETECTED
RHINOVIRUS RNA SPEC NAA+PROBE: NOT DETECTED
RSV RNA NPH QL NAA+NON-PROBE: NOT DETECTED
SARS-COV-2 RNA NPH QL NAA+NON-PROBE: NOT DETECTED

## 2021-08-23 ENCOUNTER — LAB (OUTPATIENT)
Dept: LAB | Facility: HOSPITAL | Age: 61
End: 2021-08-23

## 2021-08-23 ENCOUNTER — TRANSCRIBE ORDERS (OUTPATIENT)
Dept: OTHER | Facility: OTHER | Age: 61
End: 2021-08-23

## 2021-08-23 DIAGNOSIS — E11.620 TYPE 2 DIABETES MELLITUS WITH DIABETIC DERMATITIS, UNSPECIFIED WHETHER LONG TERM INSULIN USE (HCC): Primary | ICD-10-CM

## 2021-08-23 DIAGNOSIS — E11.620 TYPE 2 DIABETES MELLITUS WITH DIABETIC DERMATITIS, UNSPECIFIED WHETHER LONG TERM INSULIN USE (HCC): ICD-10-CM

## 2021-08-23 DIAGNOSIS — E11.9 DIABETES MELLITUS WITHOUT COMPLICATION (HCC): ICD-10-CM

## 2021-08-23 DIAGNOSIS — E53.8 DEFICIENCY IN THE VITAMIN FOLIC ACID: ICD-10-CM

## 2021-08-23 DIAGNOSIS — E53.8 DEFICIENCY OF OTHER SPECIFIED B GROUP VITAMINS: ICD-10-CM

## 2021-08-23 DIAGNOSIS — E78.2 MIXED HYPERLIPIDEMIA: ICD-10-CM

## 2021-08-23 DIAGNOSIS — E55.9 VITAMIN D DEFICIENCY: ICD-10-CM

## 2021-08-23 LAB
25(OH)D3 SERPL-MCNC: 99.1 NG/ML
ALBUMIN SERPL-MCNC: 4.1 G/DL (ref 3.5–5.2)
ALBUMIN UR-MCNC: 1.9 MG/DL
ALBUMIN/GLOB SERPL: 1.8 G/DL
ALP SERPL-CCNC: 58 U/L (ref 39–117)
ALT SERPL W P-5'-P-CCNC: 22 U/L (ref 1–33)
ANION GAP SERPL CALCULATED.3IONS-SCNC: 11.9 MMOL/L (ref 5–15)
AST SERPL-CCNC: 20 U/L (ref 1–32)
BASOPHILS # BLD AUTO: 0.03 10*3/MM3 (ref 0–0.2)
BASOPHILS NFR BLD AUTO: 0.6 % (ref 0–1.5)
BILIRUB SERPL-MCNC: 0.4 MG/DL (ref 0–1.2)
BUN SERPL-MCNC: 19 MG/DL (ref 8–23)
BUN/CREAT SERPL: 18.6 (ref 7–25)
CALCIUM SPEC-SCNC: 8.9 MG/DL (ref 8.6–10.5)
CHLORIDE SERPL-SCNC: 112 MMOL/L (ref 98–107)
CHOLEST SERPL-MCNC: 117 MG/DL (ref 0–200)
CO2 SERPL-SCNC: 24.1 MMOL/L (ref 22–29)
CREAT SERPL-MCNC: 1.02 MG/DL (ref 0.57–1)
DEPRECATED RDW RBC AUTO: 40.3 FL (ref 37–54)
EOSINOPHIL # BLD AUTO: 0.25 10*3/MM3 (ref 0–0.4)
EOSINOPHIL NFR BLD AUTO: 4.9 % (ref 0.3–6.2)
ERYTHROCYTE [DISTWIDTH] IN BLOOD BY AUTOMATED COUNT: 12.5 % (ref 12.3–15.4)
FOLATE SERPL-MCNC: 18.7 NG/ML (ref 4.78–24.2)
GFR SERPL CREATININE-BSD FRML MDRD: 55 ML/MIN/1.73
GLOBULIN UR ELPH-MCNC: 2.3 GM/DL
GLUCOSE SERPL-MCNC: 118 MG/DL (ref 65–99)
HBA1C MFR BLD: 7.61 % (ref 4.8–5.6)
HCT VFR BLD AUTO: 36.9 % (ref 34–46.6)
HDLC SERPL-MCNC: 50 MG/DL (ref 40–60)
HGB BLD-MCNC: 12.2 G/DL (ref 12–15.9)
IMM GRANULOCYTES # BLD AUTO: 0.02 10*3/MM3 (ref 0–0.05)
IMM GRANULOCYTES NFR BLD AUTO: 0.4 % (ref 0–0.5)
LDLC SERPL CALC-MCNC: 38 MG/DL (ref 0–100)
LDLC/HDLC SERPL: 0.64 {RATIO}
LYMPHOCYTES # BLD AUTO: 1.81 10*3/MM3 (ref 0.7–3.1)
LYMPHOCYTES NFR BLD AUTO: 35.6 % (ref 19.6–45.3)
MCH RBC QN AUTO: 29.5 PG (ref 26.6–33)
MCHC RBC AUTO-ENTMCNC: 33.1 G/DL (ref 31.5–35.7)
MCV RBC AUTO: 89.1 FL (ref 79–97)
MONOCYTES # BLD AUTO: 0.3 10*3/MM3 (ref 0.1–0.9)
MONOCYTES NFR BLD AUTO: 5.9 % (ref 5–12)
NEUTROPHILS NFR BLD AUTO: 2.68 10*3/MM3 (ref 1.7–7)
NEUTROPHILS NFR BLD AUTO: 52.6 % (ref 42.7–76)
NRBC BLD AUTO-RTO: 0 /100 WBC (ref 0–0.2)
PLATELET # BLD AUTO: 180 10*3/MM3 (ref 140–450)
PMV BLD AUTO: 9.7 FL (ref 6–12)
POTASSIUM SERPL-SCNC: 4.6 MMOL/L (ref 3.5–5.2)
PROT SERPL-MCNC: 6.4 G/DL (ref 6–8.5)
RBC # BLD AUTO: 4.14 10*6/MM3 (ref 3.77–5.28)
SODIUM SERPL-SCNC: 148 MMOL/L (ref 136–145)
TRIGL SERPL-MCNC: 176 MG/DL (ref 0–150)
URATE SERPL-MCNC: 5.7 MG/DL (ref 2.4–5.7)
VIT B12 BLD-MCNC: 618 PG/ML (ref 211–946)
VLDLC SERPL-MCNC: 29 MG/DL (ref 5–40)
WBC # BLD AUTO: 5.09 10*3/MM3 (ref 3.4–10.8)

## 2021-08-23 PROCEDURE — 80061 LIPID PANEL: CPT

## 2021-08-23 PROCEDURE — 80053 COMPREHEN METABOLIC PANEL: CPT

## 2021-08-23 PROCEDURE — 82306 VITAMIN D 25 HYDROXY: CPT

## 2021-08-23 PROCEDURE — 84550 ASSAY OF BLOOD/URIC ACID: CPT

## 2021-08-23 PROCEDURE — 82607 VITAMIN B-12: CPT

## 2021-08-23 PROCEDURE — 83036 HEMOGLOBIN GLYCOSYLATED A1C: CPT

## 2021-08-23 PROCEDURE — 84681 ASSAY OF C-PEPTIDE: CPT

## 2021-08-23 PROCEDURE — 82043 UR ALBUMIN QUANTITATIVE: CPT

## 2021-08-23 PROCEDURE — 85025 COMPLETE CBC W/AUTO DIFF WBC: CPT

## 2021-08-23 PROCEDURE — 36415 COLL VENOUS BLD VENIPUNCTURE: CPT

## 2021-08-23 PROCEDURE — 82746 ASSAY OF FOLIC ACID SERUM: CPT

## 2021-08-24 LAB — C PEPTIDE SERPL-MCNC: 4.7 NG/ML (ref 1.1–4.4)

## 2021-09-16 ENCOUNTER — OFFICE VISIT (OUTPATIENT)
Dept: CARDIOLOGY | Facility: CLINIC | Age: 61
End: 2021-09-16

## 2021-09-16 VITALS
BODY MASS INDEX: 41.14 KG/M2 | TEMPERATURE: 97.3 F | HEIGHT: 66 IN | WEIGHT: 256 LBS | SYSTOLIC BLOOD PRESSURE: 127 MMHG | RESPIRATION RATE: 16 BRPM | DIASTOLIC BLOOD PRESSURE: 76 MMHG | HEART RATE: 73 BPM

## 2021-09-16 DIAGNOSIS — E78.2 MIXED HYPERLIPIDEMIA: ICD-10-CM

## 2021-09-16 DIAGNOSIS — I10 ESSENTIAL HYPERTENSION: ICD-10-CM

## 2021-09-16 DIAGNOSIS — I21.09 ST ELEVATION MYOCARDIAL INFARCTION (STEMI) OF ANTERIOR WALL (HCC): Primary | ICD-10-CM

## 2021-09-16 PROCEDURE — 99213 OFFICE O/P EST LOW 20 MIN: CPT | Performed by: PHYSICIAN ASSISTANT

## 2021-09-16 PROCEDURE — 93000 ELECTROCARDIOGRAM COMPLETE: CPT | Performed by: PHYSICIAN ASSISTANT

## 2021-09-16 RX ORDER — BIOTIN 10 MG
10 TABLET ORAL DAILY
COMMUNITY

## 2021-09-16 NOTE — PROGRESS NOTES
"Akhil Huerta MD  Jannette Onofre  1960 09/16/2021    Patient Active Problem List   Diagnosis   • Acute ST elevation myocardial infarction (STEMI) of anterior wall, 11/29/16 s/p stenting proxismal % occlusion, clinically stable.   • Type 2 diabetes mellitus (CMS/HCC)   • Chest pain   • ASCVD (arteriosclerotic cardiovascular disease)   • Fatigue   • Hyperlipidemia   • Essential hypertension   • Morbid obesity (CMS/HCC)   • Dyspnea on exertion (NYHA class 2-3)   • Angina, class III (CMS/HCC)   • Dyspepsia   • BRICE on CPAP   • Hypoxia   • Renal insufficiency       Dear Akhil Huerta MD:    Subjective     History of Present Illness:    Chief Complaint   Patient presents with   • Coronary Artery Disease     1 year follow   • Med Management     list provided   • Dizziness     \"better\"       Jannette Onofre is a pleasant 61 y.o. female with a past medical history significant for ASCVD, status post previous anterior wall STEMI in 2016 followed by acute coronary intervention at that time.  She has done well since with stable clinical course.  Her last cardiac catheterization 2017 revealed patent stent with nonobstructive disease.  She does also have history of essential hypertension, diabetes mellitus, and dyslipidemia.  She comes in for routine cardiology follow up.     Crystal denies any cardiac complaints with open questions today.  Upon further questioning she does still deny any chest pains, shortness of breath, palpitations, dizziness, or syncope.  She does report she has not been out of walk as much as previously due to her  being injured from a fall.    No Known Allergies:      Current Outpatient Medications:   •  aspirin 81 MG chewable tablet, chew and swallow 1 tablet  every day, Disp: 100 tablet, Rfl: 3  •  atorvastatin (LIPITOR) 80 MG tablet, Take 1 tablet by mouth Daily., Disp: 90 tablet, Rfl: 2  •  Biotin 10 MG tablet, Take 10 mg by mouth Daily., Disp: , Rfl:   •  calcium carbonate " (OS-PARI) 600 MG tablet, Take 600 mg by mouth Daily. 2 tablets twice daily, Disp: , Rfl:   •  carvedilol (COREG) 25 MG tablet, Take 1 tablet by mouth 2 (two) times a day with food., Disp: 180 tablet, Rfl: 3  •  Cholecalciferol (VITAMIN D3) 88293 units tablet, Take 10,000 Units by mouth Daily., Disp: , Rfl:   •  clopidogrel (PLAVIX) 75 MG tablet, Take 1 tablet by mouth Daily., Disp: 90 tablet, Rfl: 4  •  Dulaglutide (Trulicity) 0.75 MG/0.5ML solution pen-injector, Inject 0.5 milliliter by subcutaneous route every week in the abdomen, thigh, or upper arm rotating injection sites., Disp: 6 mL, Rfl: 2  •  Dulaglutide (Trulicity) 1.5 MG/0.5ML solution pen-injector, Inject 1.5MG by subcutaneous route  every week, Disp: 6 mL, Rfl: 2  •  ferrous sulfate 325 (65 FE) MG tablet, Take 325 mg by mouth Daily With Breakfast., Disp: , Rfl:   •  glipizide (GLUCOTROL XL) 10 MG 24 hr tablet, Take 1 tablet by mouth 2 (two) times a day., Disp: 180 tablet, Rfl: 3  •  isosorbide mononitrate (IMDUR) 60 MG 24 hr tablet, Take 1 & 1/2 tablets by mouth every  morning., Disp: 180 tablet, Rfl: 3  •  lisinopril (PRINIVIL,ZESTRIL) 10 MG tablet, Take 1 tablet by mouth Daily., Disp: 90 tablet, Rfl: 2  •  metFORMIN (GLUCOPHAGE) 1000 MG tablet, take 1 tablet by mouth 2 times every day with morning and evening meals, Disp: 180 tablet, Rfl: 11  •  Multiple Vitamin (MULTI-VITAMIN DAILY PO), Take  by mouth., Disp: , Rfl:   •  nitroglycerin (NITROSTAT) 0.4 MG SL tablet, Place 1 tablet under the tongue Every 5 (Five) Minutes As Needed for Chest Pain (max of 3 doses in 15 minutes. If no relief, go to ER., Disp: 25 tablet, Rfl: 12  •  rOPINIRole (REQUIP) 0.25 MG tablet, Take 1 or 2 tablets by mouth every night at bedtime for Restless leg syndrome., Disp: 180 tablet, Rfl: 3  •  vitamin C (ASCORBIC ACID) 500 MG tablet, Take 500 mg by mouth Daily., Disp: , Rfl:   •  aspirin 81 MG chewable tablet, Chew 1 tablet by mouth Daily., Disp: 100 tablet, Rfl: 3  •   clopidogrel (PLAVIX) 75 MG tablet, Take 1 tablet by mouth Daily., Disp: 90 tablet, Rfl: 4  •  Cyanocobalamin (VITAMIN B-12) 1000 MCG sublingual tablet, Place 1 tablet under the tongue Daily., Disp: , Rfl:   •  glucose blood test strip, use one strip to check glucose as needed., Disp: 100 each, Rfl: 3  •  insulin aspart (NovoLOG FlexPen) 100 UNIT/ML solution pen-injector sc pen, Inject 25 units by subcutaneous route 4 times every day with meals or snacks. as needed for glucose above 200, Disp: 15 mL, Rfl: 3  •  Insulin Lispro, 1 Unit Dial, (HUMALOG) 100 UNIT/ML solution pen-injector, Inject 25 Units under the skin as directed 4 (Four) Times a Day As Needed with meals and snacks. (Use for glucose greater than 200). Discontinue Novolog, Disp: 30 mL, Rfl: 3  •  Insulin Lispro, 1 Unit Dial, (HUMALOG) 100 UNIT/ML solution pen-injector, Inject 25 units under the skin 4 times daily as needed for meals & snacks (use as directed for a glucose greater than 200). Stop Novolog, Disp: 15 mL, Rfl: 3  •  Insulin Pen Needle 32G X 4 MM misc, USE 4 TIMES DAILY AS NEEDED WITH INSULIN, Disp: 100 each, Rfl: 1  •  isosorbide mononitrate (IMDUR) 60 MG 24 hr tablet, Take 1 & 1/2 tablets by mouth Every Morning., Disp: 180 tablet, Rfl: 3  •  Lancets Thin misc, Use one strip to check glucose as needed., Disp: 300 each, Rfl: 3  •  lisinopril (PRINIVIL,ZESTRIL) 20 MG tablet, Take 1 tablet by mouth every day (Patient taking differently: 10 mg Daily.), Disp: 90 tablet, Rfl: 2  •  metFORMIN (GLUCOPHAGE) 1000 MG tablet, Take 1 tablet by mouth 2 (Two) Times a Day With morning and evening Meals., Disp: 180 tablet, Rfl: 11  •  thiamine1 (VITAMIN B1) 250 MG tablet, Take 250 mg by mouth Daily., Disp: , Rfl:     The following portions of the patient's history were reviewed and updated as appropriate: allergies, current medications, past family history, past medical history, past social history, past surgical history and problem list.    Social History  "    Tobacco Use   • Smoking status: Former Smoker     Packs/day: 0.25     Years: 1.00     Pack years: 0.25     Types: Cigarettes     Quit date: 1989     Years since quittin.2   • Smokeless tobacco: Never Used   Substance Use Topics   • Alcohol use: No   • Drug use: No         Objective   Vitals:    21 0956   BP: 127/76   Pulse: 73   Resp: 16   Temp: 97.3 °F (36.3 °C)   Weight: 116 kg (256 lb)   Height: 167.6 cm (66\")     Body mass index is 41.32 kg/m².    Constitutional:       General: Not in acute distress.     Appearance: Healthy appearance. Well-developed and not in distress. Not diaphoretic.   Eyes:      Conjunctiva/sclera: Conjunctivae normal.      Pupils: Pupils are equal, round, and reactive to light.   HENT:      Head: Normocephalic and atraumatic.   Neck:      Vascular: No carotid bruit or JVD.   Pulmonary:      Effort: Pulmonary effort is normal. No respiratory distress.      Breath sounds: Normal breath sounds.   Cardiovascular:      Normal rate. Regular rhythm.   Skin:     General: Skin is cool.   Neurological:      Mental Status: Alert, oriented to person, place, and time and oriented to person, place and time.         Lab Results   Component Value Date     (H) 2021    K 4.6 2021     (H) 2021    CO2 24.1 2021    BUN 19 2021    CREATININE 1.02 (H) 2021    GLUCOSE 118 (H) 2021    CALCIUM 8.9 2021    AST 20 2021    ALT 22 2021    ALKPHOS 58 2021    LABIL2 1.6 2015     Lab Results   Component Value Date    CKTOTAL 35 10/29/2017     Lab Results   Component Value Date    WBC 5.09 2021    HGB 12.2 2021    HCT 36.9 2021     2021     Lab Results   Component Value Date    INR 0.98 10/28/2017    INR 0.89 2016    INR 0.82 2016     No results found for: MG  Lab Results   Component Value Date    TSH 2.680 2020    CHLPL 213 (H) 2016    TRIG 176 (H) 2021    HDL " 50 08/23/2021    LDL 38 08/23/2021      Lab Results   Component Value Date    BNP 27.0 12/29/2016       During this visit the following were done:  Labs Reviewed []    Labs Ordered []    Radiology Reports Reviewed []    Radiology Ordered []    PCP Records Reviewed []    Referring Provider Records Reviewed []    ER Records Reviewed []    Hospital Records Reviewed []    History Obtained From Family []    Radiology Images Reviewed []    Other Reviewed []    Records Requested []         ECG 12 Lead    Date/Time: 9/16/2021 9:57 AM  Performed by: Sathya Redman PA-C  Authorized by: Sathya Redman PA-C   Comparison: compared with previous ECG   Similar to previous ECG  Rhythm: sinus rhythm  Conduction: right bundle branch block    Clinical impression: non-specific ECG            Assessment/Plan    Diagnosis Plan   1. Acute ST elevation myocardial infarction (STEMI) of anterior wall, 11/29/16 s/p stenting proxismal % occlusion, clinically stable.     2. Essential hypertension     3. Mixed hyperlipidemia              Recommendations:  1. ASCVD  1. Patient still completely asymptomatic doing well clinically.  I will continue aspirin, Lipitor, carvedilol, Plavix, Imdur, and lisinopril.  2. I did review blood work done last month.      No follow-ups on file.    As always, I appreciate very much the opportunity to participate in the cardiovascular care of your patients.      With Best Regards,    Sathya Redman PA-C

## 2021-09-24 ENCOUNTER — LAB (OUTPATIENT)
Dept: LAB | Facility: HOSPITAL | Age: 61
End: 2021-09-24

## 2021-09-24 ENCOUNTER — TRANSCRIBE ORDERS (OUTPATIENT)
Dept: ADMINISTRATIVE | Facility: HOSPITAL | Age: 61
End: 2021-09-24

## 2021-09-24 DIAGNOSIS — Z11.52 ENCOUNTER FOR SCREENING FOR COVID-19: Primary | ICD-10-CM

## 2021-09-24 DIAGNOSIS — Z11.52 ENCOUNTER FOR SCREENING FOR COVID-19: ICD-10-CM

## 2021-09-24 LAB
FLUAV RNA RESP QL NAA+PROBE: NOT DETECTED
FLUBV RNA RESP QL NAA+PROBE: NOT DETECTED
SARS-COV-2 RNA RESP QL NAA+PROBE: NOT DETECTED

## 2021-09-24 PROCEDURE — 87636 SARSCOV2 & INF A&B AMP PRB: CPT | Performed by: FAMILY MEDICINE

## 2021-09-24 PROCEDURE — C9803 HOPD COVID-19 SPEC COLLECT: HCPCS

## 2021-09-29 ENCOUNTER — IMMUNIZATION (OUTPATIENT)
Dept: VACCINE CLINIC | Facility: HOSPITAL | Age: 61
End: 2021-09-29

## 2021-09-29 PROCEDURE — 0003A: CPT | Performed by: INTERNAL MEDICINE

## 2021-09-29 PROCEDURE — 91300 HC SARSCOV02 VAC 30MCG/0.3ML IM: CPT | Performed by: INTERNAL MEDICINE

## 2021-10-28 ENCOUNTER — HOSPITAL ENCOUNTER (OUTPATIENT)
Dept: GENERAL RADIOLOGY | Facility: HOSPITAL | Age: 61
Discharge: HOME | End: 2021-10-28

## 2021-10-28 ENCOUNTER — OFFICE VISIT (OUTPATIENT)
Dept: FAMILY MEDICINE CLINIC | Age: 61
End: 2021-10-28

## 2021-10-28 VITALS
DIASTOLIC BLOOD PRESSURE: 86 MMHG | WEIGHT: 253 LBS | OXYGEN SATURATION: 98 % | SYSTOLIC BLOOD PRESSURE: 136 MMHG | HEART RATE: 111 BPM | HEIGHT: 66 IN | BODY MASS INDEX: 40.66 KG/M2 | TEMPERATURE: 96.8 F

## 2021-10-28 DIAGNOSIS — S86.919A MUSCLE STRAIN OF LOWER LEG, INITIAL ENCOUNTER: ICD-10-CM

## 2021-10-28 DIAGNOSIS — S86.919A MUSCLE STRAIN OF LOWER LEG, INITIAL ENCOUNTER: Primary | ICD-10-CM

## 2021-10-28 PROCEDURE — 99213 OFFICE O/P EST LOW 20 MIN: CPT | Performed by: NURSE PRACTITIONER

## 2021-10-28 PROCEDURE — 73523 X-RAY EXAM HIPS BI 5/> VIEWS: CPT

## 2021-10-28 PROCEDURE — 96372 THER/PROPH/DIAG INJ SC/IM: CPT | Performed by: NURSE PRACTITIONER

## 2021-10-28 PROCEDURE — 73523 X-RAY EXAM HIPS BI 5/> VIEWS: CPT | Performed by: RADIOLOGY

## 2021-10-28 PROCEDURE — 72110 X-RAY EXAM L-2 SPINE 4/>VWS: CPT

## 2021-10-28 PROCEDURE — 72110 X-RAY EXAM L-2 SPINE 4/>VWS: CPT | Performed by: RADIOLOGY

## 2021-10-28 RX ORDER — BACLOFEN 10 MG/1
10 TABLET ORAL 3 TIMES DAILY PRN
Qty: 60 TABLET | Refills: 0 | Status: SHIPPED | OUTPATIENT
Start: 2021-10-28

## 2021-10-28 RX ORDER — KETOROLAC TROMETHAMINE 10 MG/1
10 TABLET, FILM COATED ORAL EVERY 6 HOURS PRN
Qty: 10 TABLET | Refills: 0 | Status: SHIPPED | OUTPATIENT
Start: 2021-10-28 | End: 2021-11-02

## 2021-10-28 RX ORDER — KETOROLAC TROMETHAMINE 30 MG/ML
60 INJECTION, SOLUTION INTRAMUSCULAR; INTRAVENOUS ONCE
Status: COMPLETED | OUTPATIENT
Start: 2021-10-28 | End: 2021-10-28

## 2021-10-28 RX ADMIN — KETOROLAC TROMETHAMINE 60 MG: 30 INJECTION, SOLUTION INTRAMUSCULAR; INTRAVENOUS at 12:38

## 2021-10-29 NOTE — PROGRESS NOTES
"Chief Complaint  Back Pain (May have pulled a muscle per patient)    Subjective          Jannette Onofre presents to [facility]  Muscle Pain  This is a new problem. The current episode started in the past 7 days. The problem occurs constantly. The problem is unchanged. The pain occurs in the context of recent physical stress. The pain is present in the right upper leg, right hip, left upper leg and left hip. The pain is severe. The symptoms are aggravated by any movement. Past treatments include OTC NSAID and acetaminophen. The treatment provided no relief. There is no swelling present. She has been behaving normally. Her past medical history is significant for chronic back pain.       Objective   Vital Signs:   /86 (BP Location: Right arm, Patient Position: Sitting, Cuff Size: Adult)   Pulse 111   Temp 96.8 °F (36 °C) (Temporal)   Ht 167.6 cm (65.98\")   Wt 115 kg (253 lb)   SpO2 98%   BMI 40.86 kg/m²     Physical Exam  Vitals reviewed.   Constitutional:       Appearance: Normal appearance. She is well-developed. She is not ill-appearing.   HENT:      Head: Normocephalic.      Nose: Nose normal.      Mouth/Throat:      Mouth: Mucous membranes are moist.   Eyes:      Pupils: Pupils are equal, round, and reactive to light.   Cardiovascular:      Rate and Rhythm: Regular rhythm. Tachycardia present.      Pulses: Normal pulses.      Heart sounds: Normal heart sounds. No murmur heard.      Pulmonary:      Effort: Pulmonary effort is normal. No respiratory distress.      Breath sounds: Normal breath sounds. No wheezing.   Abdominal:      General: Bowel sounds are normal.      Palpations: Abdomen is soft.      Tenderness: There is no abdominal tenderness. There is no guarding or rebound.   Musculoskeletal:      Cervical back: Normal range of motion and neck supple.      Lumbar back: Laceration, tenderness and bony tenderness present. Decreased range of motion.      Right hip: " Tenderness present. Decreased range of motion.      Left hip: Decreased range of motion.      Right upper leg: Tenderness present.      Left upper leg: Tenderness present.   Skin:     General: Skin is warm and dry.   Neurological:      Mental Status: She is alert and oriented to person, place, and time.   Psychiatric:         Mood and Affect: Mood normal.         Behavior: Behavior normal.        Result Review :                 Assessment and Plan    Diagnoses and all orders for this visit:    1. Muscle strain of lower leg, initial encounter (Primary)  -     ketorolac (TORADOL) injection 60 mg  -     ketorolac (TORADOL) 10 MG tablet; Take 1 tablet by mouth Every 6 (Six) Hours As Needed for Moderate Pain  for up to 5 days.  Dispense: 10 tablet; Refill: 0  -     baclofen (LIORESAL) 10 MG tablet; Take 1 tablet by mouth 3 (Three) Times a Day As Needed for Muscle Spasms.  Dispense: 60 tablet; Refill: 0  -     XR Hips Bilateral With or Without Pelvis 5 View; Future  -     XR Spine Lumbar 4+ View; Future        Follow Up   Return if symptoms worsen or fail to improve.  Patient was given instructions and counseling regarding her condition or for health maintenance advice. Please see specific information pulled into the AVS if appropriate.

## 2021-10-29 NOTE — PATIENT INSTRUCTIONS
Muscle Strain  A muscle strain is an injury that happens when a muscle is stretched longer than normal. This can happen during a fall, sports, or lifting. This can tear some muscle fibers. Usually, recovery from muscle strain takes 1-2 weeks. Complete healing normally takes 5-6 weeks.  This condition is first treated with PRICE therapy. This involves:  · Protecting your muscle from being injured again.  · Resting your injured muscle.  · Icing your injured muscle.  · Applying pressure (compression) to your injured muscle. This may be done with a splint or elastic bandage.  · Raising (elevating) your injured muscle.  Your doctor may also recommend medicine for pain.  Follow these instructions at home:  If you have a splint:  · Wear the splint as told by your doctor. Take it off only as told by your doctor.  · Loosen the splint if your fingers or toes tingle, get numb, or turn cold and blue.  · Keep the splint clean.  · If the splint is not waterproof:  ? Do not let it get wet.  ? Cover it with a watertight covering when you take a bath or a shower.  Managing pain, stiffness, and swelling    · If told, put ice on your injured area.  ? If you have a removable splint, take it off as told by your doctor.  ? Put ice in a plastic bag.  ? Place a towel between your skin and the bag.  ? Leave the ice on for 20 minutes, 2-3 times a day.  · Move your fingers or toes often. This helps to avoid stiffness and lessen swelling.  · Raise your injured area above the level of your heart while you are sitting or lying down.  · Wear an elastic bandage as told by your doctor. Make sure it is not too tight.    General instructions  · Take over-the-counter and prescription medicines only as told by your doctor. This may include medicines for pain and swelling that are taken by mouth or put on the skin, prescription pain medicine, or muscle relaxants.  · Limit your activity. Rest your injured muscle as told by your doctor. Your doctor may  say that gentle movements are okay.  · If physical therapy was prescribed, do exercises as told by your doctor.  · Do not put pressure on any part of the splint until it is fully hardened. This may take many hours.  · Do not use any products that contain nicotine or tobacco, such as cigarettes and e-cigarettes. These can delay bone healing. If you need help quitting, ask your doctor.  · Warm up before you exercise. This helps to prevent more muscle strains.  · Ask your doctor when it is safe to drive if you have a splint.  · Keep all follow-up visits as told by your doctor. This is important.  Contact a doctor if:  · You have more pain or swelling in your injured area.  Get help right away if:  · You have any of these problems in your injured area:  ? You have numbness.  ? You have tingling.  ? You lose a lot of strength.  Summary  · A muscle strain is an injury that happens when a muscle is stretched longer than normal.  · This condition is first treated with PRICE therapy. This includes protecting, resting, icing, adding pressure, and raising your injury.  · Limit your activity. Rest your injured muscle as told by your doctor. Your doctor may say that gentle movements are okay.  · Warm up before you exercise. This helps to prevent more muscle strains.  This information is not intended to replace advice given to you by your health care provider. Make sure you discuss any questions you have with your health care provider.  Document Revised: 10/15/2020 Document Reviewed: 01/24/2018  Elsevier Patient Education © 2021 [employer]

## 2022-01-03 ENCOUNTER — LAB (OUTPATIENT)
Dept: LAB | Facility: HOSPITAL | Age: 62
End: 2022-01-03

## 2022-01-03 ENCOUNTER — TRANSCRIBE ORDERS (OUTPATIENT)
Dept: ADMINISTRATIVE | Facility: HOSPITAL | Age: 62
End: 2022-01-03

## 2022-01-03 DIAGNOSIS — E53.8 BIOTIN-(PROPIONYL-COA-CARBOXYLASE) LIGASE DEFICIENCY: ICD-10-CM

## 2022-01-03 DIAGNOSIS — E11.69 DIABETES MELLITUS ASSOCIATED WITH HORMONAL ETIOLOGY: ICD-10-CM

## 2022-01-03 DIAGNOSIS — E11.42 DIABETIC SENSORIMOTOR NEUROPATHY: ICD-10-CM

## 2022-01-03 DIAGNOSIS — E11.9 DIABETES MELLITUS WITHOUT COMPLICATION: ICD-10-CM

## 2022-01-03 DIAGNOSIS — E11.42 DIABETIC SENSORIMOTOR NEUROPATHY: Primary | ICD-10-CM

## 2022-01-03 LAB
25(OH)D3 SERPL-MCNC: 98.1 NG/ML
ALBUMIN SERPL-MCNC: 4.4 G/DL (ref 3.5–5.2)
ALBUMIN UR-MCNC: 3.9 MG/DL
ALBUMIN/GLOB SERPL: 1.9 G/DL
ALP SERPL-CCNC: 62 U/L (ref 39–117)
ALT SERPL W P-5'-P-CCNC: 24 U/L (ref 1–33)
ANION GAP SERPL CALCULATED.3IONS-SCNC: 11 MMOL/L (ref 5–15)
AST SERPL-CCNC: 21 U/L (ref 1–32)
BASOPHILS # BLD AUTO: 0.05 10*3/MM3 (ref 0–0.2)
BASOPHILS NFR BLD AUTO: 0.8 % (ref 0–1.5)
BILIRUB SERPL-MCNC: 0.4 MG/DL (ref 0–1.2)
BUN SERPL-MCNC: 17 MG/DL (ref 8–23)
BUN/CREAT SERPL: 17.9 (ref 7–25)
CALCIUM SPEC-SCNC: 9.3 MG/DL (ref 8.6–10.5)
CHLORIDE SERPL-SCNC: 110 MMOL/L (ref 98–107)
CHOLEST SERPL-MCNC: 115 MG/DL (ref 0–200)
CO2 SERPL-SCNC: 24 MMOL/L (ref 22–29)
CREAT SERPL-MCNC: 0.95 MG/DL (ref 0.57–1)
CREAT UR-MCNC: 286.7 MG/DL
DEPRECATED RDW RBC AUTO: 42.2 FL (ref 37–54)
EOSINOPHIL # BLD AUTO: 0.21 10*3/MM3 (ref 0–0.4)
EOSINOPHIL NFR BLD AUTO: 3.3 % (ref 0.3–6.2)
ERYTHROCYTE [DISTWIDTH] IN BLOOD BY AUTOMATED COUNT: 13 % (ref 12.3–15.4)
FOLATE SERPL-MCNC: >20 NG/ML (ref 4.78–24.2)
GFR SERPL CREATININE-BSD FRML MDRD: 60 ML/MIN/1.73
GLOBULIN UR ELPH-MCNC: 2.3 GM/DL
GLUCOSE SERPL-MCNC: 198 MG/DL (ref 65–99)
HBA1C MFR BLD: 8.18 % (ref 4.8–5.6)
HCT VFR BLD AUTO: 39.8 % (ref 34–46.6)
HDLC SERPL-MCNC: 36 MG/DL (ref 40–60)
HGB BLD-MCNC: 12.7 G/DL (ref 12–15.9)
IMM GRANULOCYTES # BLD AUTO: 0.08 10*3/MM3 (ref 0–0.05)
IMM GRANULOCYTES NFR BLD AUTO: 1.3 % (ref 0–0.5)
LDLC SERPL CALC-MCNC: 47 MG/DL (ref 0–100)
LDLC/HDLC SERPL: 1.09 {RATIO}
LYMPHOCYTES # BLD AUTO: 1.85 10*3/MM3 (ref 0.7–3.1)
LYMPHOCYTES NFR BLD AUTO: 29.4 % (ref 19.6–45.3)
MCH RBC QN AUTO: 28.4 PG (ref 26.6–33)
MCHC RBC AUTO-ENTMCNC: 31.9 G/DL (ref 31.5–35.7)
MCV RBC AUTO: 89 FL (ref 79–97)
MICROALBUMIN/CREAT UR: 13.6 MG/G
MONOCYTES # BLD AUTO: 0.33 10*3/MM3 (ref 0.1–0.9)
MONOCYTES NFR BLD AUTO: 5.2 % (ref 5–12)
NEUTROPHILS NFR BLD AUTO: 3.77 10*3/MM3 (ref 1.7–7)
NEUTROPHILS NFR BLD AUTO: 60 % (ref 42.7–76)
NRBC BLD AUTO-RTO: 0.2 /100 WBC (ref 0–0.2)
PLATELET # BLD AUTO: 224 10*3/MM3 (ref 140–450)
PMV BLD AUTO: 9.7 FL (ref 6–12)
POTASSIUM SERPL-SCNC: 4.4 MMOL/L (ref 3.5–5.2)
PROT SERPL-MCNC: 6.7 G/DL (ref 6–8.5)
RBC # BLD AUTO: 4.47 10*6/MM3 (ref 3.77–5.28)
SODIUM SERPL-SCNC: 145 MMOL/L (ref 136–145)
T3 SERPL-MCNC: 114 NG/DL (ref 80–200)
T4 SERPL-MCNC: 8.7 MCG/DL (ref 4.5–11.7)
TRIGL SERPL-MCNC: 199 MG/DL (ref 0–150)
TSH SERPL DL<=0.05 MIU/L-ACNC: 2.16 UIU/ML (ref 0.27–4.2)
URATE SERPL-MCNC: 5.2 MG/DL (ref 2.4–5.7)
VIT B12 BLD-MCNC: 866 PG/ML (ref 211–946)
VLDLC SERPL-MCNC: 32 MG/DL (ref 5–40)
WBC NRBC COR # BLD: 6.29 10*3/MM3 (ref 3.4–10.8)

## 2022-01-03 PROCEDURE — 80061 LIPID PANEL: CPT

## 2022-01-03 PROCEDURE — 84550 ASSAY OF BLOOD/URIC ACID: CPT

## 2022-01-03 PROCEDURE — 80053 COMPREHEN METABOLIC PANEL: CPT

## 2022-01-03 PROCEDURE — 84443 ASSAY THYROID STIM HORMONE: CPT

## 2022-01-03 PROCEDURE — 82746 ASSAY OF FOLIC ACID SERUM: CPT

## 2022-01-03 PROCEDURE — 82570 ASSAY OF URINE CREATININE: CPT

## 2022-01-03 PROCEDURE — 84436 ASSAY OF TOTAL THYROXINE: CPT

## 2022-01-03 PROCEDURE — 84480 ASSAY TRIIODOTHYRONINE (T3): CPT

## 2022-01-03 PROCEDURE — 85025 COMPLETE CBC W/AUTO DIFF WBC: CPT

## 2022-01-03 PROCEDURE — 82607 VITAMIN B-12: CPT

## 2022-01-03 PROCEDURE — 82306 VITAMIN D 25 HYDROXY: CPT

## 2022-01-03 PROCEDURE — 82043 UR ALBUMIN QUANTITATIVE: CPT

## 2022-01-03 PROCEDURE — 36415 COLL VENOUS BLD VENIPUNCTURE: CPT

## 2022-01-03 PROCEDURE — 83036 HEMOGLOBIN GLYCOSYLATED A1C: CPT

## 2022-03-14 ENCOUNTER — HOSPITAL ENCOUNTER (OUTPATIENT)
Dept: MAMMOGRAPHY | Facility: HOSPITAL | Age: 62
Discharge: HOME | End: 2022-03-14

## 2022-03-14 ENCOUNTER — HOSPITAL ENCOUNTER (OUTPATIENT)
Dept: BONE DENSITY | Facility: HOSPITAL | Age: 62
Discharge: HOME | End: 2022-03-14

## 2022-03-14 DIAGNOSIS — Z78.0 POSTMENOPAUSAL STATUS: ICD-10-CM

## 2022-03-14 DIAGNOSIS — R92.8 ABNORMAL MAMMOGRAM: ICD-10-CM

## 2022-03-14 DIAGNOSIS — Z12.31 VISIT FOR SCREENING MAMMOGRAM: ICD-10-CM

## 2022-03-14 PROCEDURE — 77065 DX MAMMO INCL CAD UNI: CPT

## 2022-03-14 PROCEDURE — 77080 DXA BONE DENSITY AXIAL: CPT

## 2022-03-14 PROCEDURE — 77063 BREAST TOMOSYNTHESIS BI: CPT | Performed by: RADIOLOGY

## 2022-03-14 PROCEDURE — 77063 BREAST TOMOSYNTHESIS BI: CPT

## 2022-03-14 PROCEDURE — 77080 DXA BONE DENSITY AXIAL: CPT | Performed by: RADIOLOGY

## 2022-03-14 PROCEDURE — 77067 SCR MAMMO BI INCL CAD: CPT | Performed by: RADIOLOGY

## 2022-03-14 PROCEDURE — 77065 DX MAMMO INCL CAD UNI: CPT | Performed by: RADIOLOGY

## 2022-03-14 PROCEDURE — 77067 SCR MAMMO BI INCL CAD: CPT

## 2022-05-06 ENCOUNTER — LAB (OUTPATIENT)
Dept: LAB | Facility: HOSPITAL | Age: 62
End: 2022-05-06

## 2022-05-06 ENCOUNTER — TRANSCRIBE ORDERS (OUTPATIENT)
Dept: ADMINISTRATIVE | Facility: HOSPITAL | Age: 62
End: 2022-05-06

## 2022-05-06 DIAGNOSIS — E53.8 VITAMIN B 12 DEFICIENCY: ICD-10-CM

## 2022-05-06 DIAGNOSIS — E11.9 DIABETES MELLITUS WITHOUT COMPLICATION: ICD-10-CM

## 2022-05-06 DIAGNOSIS — E11.620 DIABETIC NECROBIOSIS LIPOIDICA: ICD-10-CM

## 2022-05-06 DIAGNOSIS — E11.42 DIABETIC SENSORIMOTOR NEUROPATHY: ICD-10-CM

## 2022-05-06 DIAGNOSIS — E11.42 DIABETIC SENSORIMOTOR NEUROPATHY: Primary | ICD-10-CM

## 2022-05-06 DIAGNOSIS — E11.69 DIABETES MELLITUS ASSOCIATED WITH HORMONAL ETIOLOGY: ICD-10-CM

## 2022-05-06 LAB
25(OH)D3 SERPL-MCNC: 149 NG/ML (ref 30–100)
ALBUMIN SERPL-MCNC: 4.3 G/DL (ref 3.5–5.2)
ALBUMIN UR-MCNC: <1.2 MG/DL
ALBUMIN/GLOB SERPL: 1.6 G/DL
ALP SERPL-CCNC: 63 U/L (ref 39–117)
ALT SERPL W P-5'-P-CCNC: 25 U/L (ref 1–33)
ANION GAP SERPL CALCULATED.3IONS-SCNC: 11.3 MMOL/L (ref 5–15)
AST SERPL-CCNC: 20 U/L (ref 1–32)
BASOPHILS # BLD AUTO: 0.06 10*3/MM3 (ref 0–0.2)
BASOPHILS NFR BLD AUTO: 1 % (ref 0–1.5)
BILIRUB SERPL-MCNC: 0.5 MG/DL (ref 0–1.2)
BUN SERPL-MCNC: 20 MG/DL (ref 8–23)
BUN/CREAT SERPL: 16.7 (ref 7–25)
CALCIUM SPEC-SCNC: 9.2 MG/DL (ref 8.6–10.5)
CHLORIDE SERPL-SCNC: 110 MMOL/L (ref 98–107)
CHOLEST SERPL-MCNC: 101 MG/DL (ref 0–200)
CO2 SERPL-SCNC: 21.7 MMOL/L (ref 22–29)
CREAT SERPL-MCNC: 1.2 MG/DL (ref 0.57–1)
CREAT UR-MCNC: 120 MG/DL
DEPRECATED RDW RBC AUTO: 42.9 FL (ref 37–54)
EGFRCR SERPLBLD CKD-EPI 2021: 51.3 ML/MIN/1.73
EOSINOPHIL # BLD AUTO: 0.23 10*3/MM3 (ref 0–0.4)
EOSINOPHIL NFR BLD AUTO: 3.8 % (ref 0.3–6.2)
ERYTHROCYTE [DISTWIDTH] IN BLOOD BY AUTOMATED COUNT: 13 % (ref 12.3–15.4)
FOLATE SERPL-MCNC: >20 NG/ML (ref 4.78–24.2)
GLOBULIN UR ELPH-MCNC: 2.7 GM/DL
GLUCOSE SERPL-MCNC: 154 MG/DL (ref 65–99)
HBA1C MFR BLD: 7.8 % (ref 4.8–5.6)
HCT VFR BLD AUTO: 42.7 % (ref 34–46.6)
HDLC SERPL-MCNC: 39 MG/DL (ref 40–60)
HGB BLD-MCNC: 13.7 G/DL (ref 12–15.9)
IMM GRANULOCYTES # BLD AUTO: 0.01 10*3/MM3 (ref 0–0.05)
IMM GRANULOCYTES NFR BLD AUTO: 0.2 % (ref 0–0.5)
LDLC SERPL CALC-MCNC: 31 MG/DL (ref 0–100)
LDLC/HDLC SERPL: 0.62 {RATIO}
LYMPHOCYTES # BLD AUTO: 1.73 10*3/MM3 (ref 0.7–3.1)
LYMPHOCYTES NFR BLD AUTO: 28.3 % (ref 19.6–45.3)
MCH RBC QN AUTO: 29 PG (ref 26.6–33)
MCHC RBC AUTO-ENTMCNC: 32.1 G/DL (ref 31.5–35.7)
MCV RBC AUTO: 90.5 FL (ref 79–97)
MICROALBUMIN/CREAT UR: NORMAL MG/G{CREAT}
MONOCYTES # BLD AUTO: 0.31 10*3/MM3 (ref 0.1–0.9)
MONOCYTES NFR BLD AUTO: 5.1 % (ref 5–12)
NEUTROPHILS NFR BLD AUTO: 3.78 10*3/MM3 (ref 1.7–7)
NEUTROPHILS NFR BLD AUTO: 61.6 % (ref 42.7–76)
NRBC BLD AUTO-RTO: 0 /100 WBC (ref 0–0.2)
PLATELET # BLD AUTO: 198 10*3/MM3 (ref 140–450)
PMV BLD AUTO: 10.2 FL (ref 6–12)
POTASSIUM SERPL-SCNC: 4.3 MMOL/L (ref 3.5–5.2)
PROT SERPL-MCNC: 7 G/DL (ref 6–8.5)
RBC # BLD AUTO: 4.72 10*6/MM3 (ref 3.77–5.28)
SODIUM SERPL-SCNC: 143 MMOL/L (ref 136–145)
T3 SERPL-MCNC: 126 NG/DL (ref 80–200)
T4 SERPL-MCNC: 8.35 MCG/DL (ref 4.5–11.7)
TRIGL SERPL-MCNC: 190 MG/DL (ref 0–150)
TSH SERPL DL<=0.05 MIU/L-ACNC: 2.49 UIU/ML (ref 0.27–4.2)
URATE SERPL-MCNC: 5.9 MG/DL (ref 2.4–5.7)
VIT B12 BLD-MCNC: 614 PG/ML (ref 211–946)
VLDLC SERPL-MCNC: 31 MG/DL (ref 5–40)
WBC NRBC COR # BLD: 6.12 10*3/MM3 (ref 3.4–10.8)

## 2022-05-06 PROCEDURE — 80061 LIPID PANEL: CPT

## 2022-05-06 PROCEDURE — 84436 ASSAY OF TOTAL THYROXINE: CPT

## 2022-05-06 PROCEDURE — 82607 VITAMIN B-12: CPT

## 2022-05-06 PROCEDURE — 84681 ASSAY OF C-PEPTIDE: CPT

## 2022-05-06 PROCEDURE — 82746 ASSAY OF FOLIC ACID SERUM: CPT

## 2022-05-06 PROCEDURE — 36415 COLL VENOUS BLD VENIPUNCTURE: CPT

## 2022-05-06 PROCEDURE — 82306 VITAMIN D 25 HYDROXY: CPT

## 2022-05-06 PROCEDURE — 84480 ASSAY TRIIODOTHYRONINE (T3): CPT

## 2022-05-06 PROCEDURE — 83036 HEMOGLOBIN GLYCOSYLATED A1C: CPT

## 2022-05-06 PROCEDURE — 82043 UR ALBUMIN QUANTITATIVE: CPT

## 2022-05-06 PROCEDURE — 84550 ASSAY OF BLOOD/URIC ACID: CPT

## 2022-05-06 PROCEDURE — 80050 GENERAL HEALTH PANEL: CPT

## 2022-05-06 PROCEDURE — 82570 ASSAY OF URINE CREATININE: CPT

## 2022-05-07 LAB — C PEPTIDE SERPL-MCNC: 5.2 NG/ML (ref 1.1–4.4)

## 2022-06-13 ENCOUNTER — OFFICE VISIT (OUTPATIENT)
Dept: CARDIOLOGY | Facility: CLINIC | Age: 62
End: 2022-06-13

## 2022-06-13 VITALS
TEMPERATURE: 97.1 F | BODY MASS INDEX: 39.86 KG/M2 | DIASTOLIC BLOOD PRESSURE: 73 MMHG | HEART RATE: 78 BPM | WEIGHT: 248 LBS | SYSTOLIC BLOOD PRESSURE: 119 MMHG | HEIGHT: 66 IN | OXYGEN SATURATION: 95 %

## 2022-06-13 DIAGNOSIS — E78.2 MIXED HYPERLIPIDEMIA: ICD-10-CM

## 2022-06-13 DIAGNOSIS — I21.09 ST ELEVATION MYOCARDIAL INFARCTION (STEMI) OF ANTERIOR WALL: Primary | ICD-10-CM

## 2022-06-13 DIAGNOSIS — I10 ESSENTIAL HYPERTENSION: ICD-10-CM

## 2022-06-13 DIAGNOSIS — R07.2 PRECORDIAL PAIN: ICD-10-CM

## 2022-06-13 PROCEDURE — 99213 OFFICE O/P EST LOW 20 MIN: CPT | Performed by: PHYSICIAN ASSISTANT

## 2022-06-13 PROCEDURE — 93000 ELECTROCARDIOGRAM COMPLETE: CPT | Performed by: PHYSICIAN ASSISTANT

## 2022-06-13 NOTE — PROGRESS NOTES
Akhil Huerta MD  Jannette Onofre  1960 06/13/2022    Patient Active Problem List   Diagnosis   • Acute ST elevation myocardial infarction (STEMI) of anterior wall, 11/29/16 s/p stenting proxismal % occlusion, clinically stable.   • Type 2 diabetes mellitus (HCC)   • Chest pain   • ASCVD (arteriosclerotic cardiovascular disease)   • Fatigue   • Hyperlipidemia   • Essential hypertension   • Morbid obesity (HCC)   • Dyspnea on exertion (NYHA class 2-3)   • Angina, class III (HCC)   • Dyspepsia   • BRICE on CPAP   • Hypoxia   • Renal insufficiency       Dear Akhil Huerta MD:    Subjective     History of Present Illness:    Chief Complaint   Patient presents with   • Chest Pain   • Palpitations   • Follow-up     1 year   • Med Management     Verbal   • Fatigue       Jannette Onofre is a pleasant 62 y.o. female with a past medical history significant for ACS with anterior wall STEMI in 2016 with subsequent stenting in the LAD.  Most recent coronary angiography was 2017 which did reveal patent stent with otherwise nonobstructive disease.  She does have essential hypertension, diabetes mellitus, and dyslipidemia.  She comes in today for cardiology follow-up.    Crystal reports that 6/8/2022 she did develop chest pain that she describes as severe.  She describes this pain as a pressure with some characteristics of sharp pains as well.  She reports that this pain was in upper left side of her chest.  She did decide to take sublingual nitroglycerin which did resolve the pain.  She admits to associated symptoms of shortness of breath and diaphoresis denies nausea.  She does report that she took nitroglycerin 5 minutes after feeling this chest pain which resolved promptly.    No Known Allergies:      Current Outpatient Medications:   •  alendronate (FOSAMAX) 70 MG tablet, Take 1 tablet by mouth every week in the morning, at least 30 min before first food, beverage, or medication of day, Disp: 12 tablet, Rfl: 3  •   aspirin 81 MG chewable tablet, Chew & Swallow 1 tablet Daily., Disp: 100 tablet, Rfl: 3  •  atorvastatin (LIPITOR) 80 MG tablet, Take 1 tablet by mouth Daily., Disp: 90 tablet, Rfl: 2  •  Biotin 10 MG tablet, Take 10 mg by mouth Daily., Disp: , Rfl:   •  calcium carbonate (OS-PARI) 600 MG tablet, Take 600 mg by mouth Daily. 2 tablets twice daily, Disp: , Rfl:   •  carvedilol (COREG) 25 MG tablet, Take 1 tablet by mouth 2 (two) times a day with food., Disp: 180 tablet, Rfl: 3  •  Cholecalciferol (VITAMIN D3) 29233 units tablet, Take 10,000 Units by mouth Daily., Disp: , Rfl:   •  clopidogrel (PLAVIX) 75 MG tablet, Take 1 tablet by mouth Daily., Disp: 90 tablet, Rfl: 4  •  Cyanocobalamin (VITAMIN B-12) 1000 MCG sublingual tablet, Place 1 tablet under the tongue Daily., Disp: , Rfl:   •  Dulaglutide (Trulicity) 3 MG/0.5ML solution pen-injector, Inject 3MG by subcutaneous route once every week., Disp: 3 mL, Rfl: 4  •  empagliflozin (Jardiance) 10 MG tablet tablet, Take 1 tablet by mouth Every Morning., Disp: 90 tablet, Rfl: 3  •  ferrous sulfate 325 (65 FE) MG tablet, Take 325 mg by mouth Daily With Breakfast., Disp: , Rfl:   •  fluconazole (DIFLUCAN) 150 MG tablet, Take 1 tablet by mouth once., Disp: 7 tablet, Rfl: 1  •  glipizide (GLUCOTROL XL) 10 MG 24 hr tablet, Take 1 tablet by mouth 2 (two) times a day., Disp: 180 tablet, Rfl: 3  •  Insulin Pen Needle (BD Pen Needle Micro U/F) 32G X 6 MM misc, Use 4 times daily as needed for insulin injections., Disp: 100 each, Rfl: 1  •  isosorbide mononitrate (IMDUR) 60 MG 24 hr tablet, Take 1 & 1/2 tablets by mouth every  morning., Disp: 180 tablet, Rfl: 3  •  lisinopril (PRINIVIL,ZESTRIL) 10 MG tablet, Take 1 tablet by mouth every day., Disp: 90 tablet, Rfl: 2  •  metFORMIN (GLUCOPHAGE) 1000 MG tablet, take 1 tablet by mouth 2 times every day with morning and evening meals, Disp: 180 tablet, Rfl: 11  •  Multiple Vitamin (MULTI-VITAMIN DAILY PO), Take  by mouth., Disp: , Rfl:    •  nitroglycerin (NITROSTAT) 0.4 MG SL tablet, Place 1 tablet under the tongue Every 5 (Five) Minutes As Needed for Chest Pain (max of 3 doses in 15 minutes. If no relief, go to ER., Disp: 25 tablet, Rfl: 12  •  rOPINIRole (REQUIP) 0.25 MG tablet, Take 1 or 2 tablets by mouth every night at bedtime for Restless leg syndrome., Disp: 180 tablet, Rfl: 3  •  thiamine1 (VITAMIN B1) 250 MG tablet, Take 250 mg by mouth Daily., Disp: , Rfl:   •  vitamin C (ASCORBIC ACID) 500 MG tablet, Take 500 mg by mouth Daily., Disp: , Rfl:   •  vitamin D (ERGOCALCIFEROL) 1.25 MG (17018 UT) capsule capsule, Take 1 capsule by mouth daily for 5 days., Disp: 5 capsule, Rfl: 0  •  aspirin 81 MG chewable tablet, Chew 1 tablet by mouth Daily., Disp: 100 tablet, Rfl: 3  •  atorvastatin (LIPITOR) 80 MG tablet, Take 1 tablet by mouth Daily., Disp: 90 tablet, Rfl: 2  •  baclofen (LIORESAL) 10 MG tablet, Take 1 tablet by mouth 3 (Three) Times a Day As Needed for Muscle Spasms., Disp: 60 tablet, Rfl: 0  •  Baloxavir Marboxil,80 MG Dose, (Xofluza, 80 MG Dose,) 1 x 80 MG tablet therapy pack, Take 1 tablet by by mouth for 1 dose, Disp: 1 each, Rfl: 0  •  carvedilol (COREG) 25 MG tablet, Take 1 tablet by mouth 2 times every day with food., Disp: 180 tablet, Rfl: 3  •  clopidogrel (PLAVIX) 75 MG tablet, Take 1 tablet by mouth Daily., Disp: 90 tablet, Rfl: 4  •  clopidogrel (PLAVIX) 75 MG tablet, Take 1 tablet by mouth Daily., Disp: 90 tablet, Rfl: 4  •  Dulaglutide (Trulicity) 0.75 MG/0.5ML solution pen-injector, Inject 0.5 milliliter by subcutaneous route every week in the abdomen, thigh, or upper arm rotating injection sites., Disp: 6 mL, Rfl: 2  •  Dulaglutide (Trulicity) 1.5 MG/0.5ML solution pen-injector, Inject 1.5 mg under the skin into the appropriate area as directed 1 (One) Time Per Week., Disp: 6 mL, Rfl: 2  •  glipizide (GLUCOTROL XL) 10 MG 24 hr tablet, Take 1 tablet by mouth 2 (Two) Times a Day., Disp: 180 tablet, Rfl: 3  •  glucose blood  (FREESTYLE LITE) test strip, Use one strip to check glucose as needed., Disp: 100 each, Rfl: 3  •  glucose blood test strip, use one strip to check glucose as needed., Disp: 100 each, Rfl: 3  •  insulin aspart (NovoLOG FlexPen) 100 UNIT/ML solution pen-injector sc pen, Inject 25 units by subcutaneous route 4 times every day with meals or snacks. as needed for glucose above 200, Disp: 15 mL, Rfl: 3  •  Insulin Lispro, 1 Unit Dial, (HUMALOG) 100 UNIT/ML solution pen-injector, Inject 25 Units under the skin as directed 4 (Four) Times a Day As Needed with meals and snacks. (Use for glucose greater than 200). Discontinue Novolog, Disp: 30 mL, Rfl: 3  •  Insulin Lispro, 1 Unit Dial, (HUMALOG) 100 UNIT/ML solution pen-injector, Inject 25 units under the skin 4 times daily as needed for meals & snacks (use as directed for a glucose greater than 200). Stop Novolog, Disp: 15 mL, Rfl: 3  •  Insulin Lispro, 1 Unit Dial, (HUMALOG) 100 UNIT/ML solution pen-injector, Inject 25 units by subcutaneous route 4 times daily as needed for meals & snacks ( use as directed for a glucose greater than 200)  D/C Novolog, Disp: 15 mL, Rfl: 3  •  Insulin Pen Needle 32G X 4 MM misc, USE 4 TIMES DAILY AS NEEDED WITH INSULIN, Disp: 100 each, Rfl: 1  •  isosorbide mononitrate (IMDUR) 60 MG 24 hr tablet, Take 1 & 1/2 tablets by mouth Every Morning., Disp: 180 tablet, Rfl: 3  •  isosorbide mononitrate (IMDUR) 60 MG 24 hr tablet, take 1 & 1/2 tablets by mouth  every day in the morning, Disp: 180 tablet, Rfl: 3  •  isosorbide mononitrate (IMDUR) 60 MG 24 hr tablet, take 1 & 1/2 tablets by mouth every day in the morning., Disp: 180 tablet, Rfl: 3  •  Lancets Thin misc, Use one strip to check glucose as needed., Disp: 300 each, Rfl: 3  •  lisinopril (PRINIVIL,ZESTRIL) 10 MG tablet, Take 1 tablet by mouth Daily., Disp: 90 tablet, Rfl: 2  •  lisinopril (PRINIVIL,ZESTRIL) 20 MG tablet, Take 1 tablet by mouth every day (Patient taking differently: 10 mg  "Daily.), Disp: 90 tablet, Rfl: 2  •  metFORMIN (GLUCOPHAGE) 1000 MG tablet, Take 1 tablet by mouth 2 (Two) Times a Day With morning and evening Meals., Disp: 180 tablet, Rfl: 11  •  metFORMIN (GLUCOPHAGE) 1000 MG tablet, Take 1 tablet by mouth 2 times every day with morning and evening meals., Disp: 180 tablet, Rfl: 11  •  rOPINIRole (REQUIP) 0.25 MG tablet, Take 1 or 2 tablets by mouth every day at bedtime for Restless Leg syndrome., Disp: 180 tablet, Rfl: 3    The following portions of the patient's history were reviewed and updated as appropriate: allergies, current medications, past family history, past medical history, past social history, past surgical history and problem list.    Social History     Tobacco Use   • Smoking status: Former Smoker     Packs/day: 0.25     Years: 1.00     Pack years: 0.25     Types: Cigarettes     Quit date: 1989     Years since quittin.9   • Smokeless tobacco: Never Used   Substance Use Topics   • Alcohol use: No   • Drug use: No         Objective   Vitals:    22 0823   BP: 119/73   BP Location: Left arm   Patient Position: Sitting   Cuff Size: Large Adult   Pulse: 78   Temp: 97.1 °F (36.2 °C)   SpO2: 95%   Weight: 112 kg (248 lb)   Height: 167.6 cm (65.98\")     Body mass index is 40.05 kg/m².    Constitutional:       General: Not in acute distress.     Appearance: Healthy appearance. Well-developed and not in distress. Not diaphoretic.   Eyes:      Conjunctiva/sclera: Conjunctivae normal.      Pupils: Pupils are equal, round, and reactive to light.   HENT:      Head: Normocephalic and atraumatic.   Neck:      Vascular: No carotid bruit or JVD.   Pulmonary:      Effort: Pulmonary effort is normal. No respiratory distress.      Breath sounds: Normal breath sounds.   Cardiovascular:      Normal rate. Regular rhythm.   Skin:     General: Skin is cool.   Neurological:      Mental Status: Alert, oriented to person, place, and time and oriented to person, place and time. "         Lab Results   Component Value Date     05/06/2022    K 4.3 05/06/2022     (H) 05/06/2022    CO2 21.7 (L) 05/06/2022    BUN 20 05/06/2022    CREATININE 1.20 (H) 05/06/2022    GLUCOSE 154 (H) 05/06/2022    CALCIUM 9.2 05/06/2022    AST 20 05/06/2022    ALT 25 05/06/2022    ALKPHOS 63 05/06/2022    LABIL2 1.6 12/11/2015     Lab Results   Component Value Date    CKTOTAL 35 10/29/2017     Lab Results   Component Value Date    WBC 6.12 05/06/2022    HGB 13.7 05/06/2022    HCT 42.7 05/06/2022     05/06/2022     Lab Results   Component Value Date    INR 0.98 10/28/2017    INR 0.89 11/29/2016    INR 0.82 11/27/2016     No results found for: MG  Lab Results   Component Value Date    TSH 2.490 05/06/2022    CHLPL 213 (H) 03/14/2016    TRIG 190 (H) 05/06/2022    HDL 39 (L) 05/06/2022    LDL 31 05/06/2022      Lab Results   Component Value Date    BNP 27.0 12/29/2016       During this visit the following were done:  Labs Reviewed []    Labs Ordered []    Radiology Reports Reviewed []    Radiology Ordered []    PCP Records Reviewed []    Referring Provider Records Reviewed []    ER Records Reviewed []    Hospital Records Reviewed []    History Obtained From Family []    Radiology Images Reviewed []    Other Reviewed []    Records Requested []         ECG 12 Lead    Date/Time: 6/13/2022 8:43 AM  Performed by: Sathya Redman PA-C  Authorized by: Sathya Redman PA-C   Comparison: compared with previous ECG   Similar to previous ECG  Rhythm: sinus rhythm  Conduction: right bundle branch block  T inversion: V3    Clinical impression: non-specific ECG            Assessment & Plan    Diagnosis Plan   1. Acute ST elevation myocardial infarction (STEMI) of anterior wall, 11/29/16 s/p stenting proxismal % occlusion, clinically stable.     2. Essential hypertension     3. Mixed hyperlipidemia     4. Precordial pain  Stress Test With Myocardial Perfusion One Day    Adult Transthoracic Echo Complete W/  Cont if Necessary Per Protocol            Recommendations:  1. Coronary artery disease with precordial pain  1. We will evaluate further with Lexiscan sestamibi study and echocardiogram.  2. Patient is currently well optimized we will continue aspirin, Lipitor, Plavix, carvedilol, Jardiance, lisinopril, and isosorbide mononitrate.  3. Since she has only had one single episode will keep reigmen the same for now, however, if becomes recurrent will increase imdur to 120 mg daily.  Recommended her to continue to take sublingual nitroglycerin if chest pain presents again.  Also advised her to contact our office.      Return in about 2 months (around 8/13/2022).    As always, I appreciate very much the opportunity to participate in the cardiovascular care of your patients.      With Best Regards,    Sathya Redman PA-C

## 2022-07-11 ENCOUNTER — HOSPITAL ENCOUNTER (OUTPATIENT)
Dept: CARDIOLOGY | Facility: HOSPITAL | Age: 62
Discharge: HOME | End: 2022-07-11

## 2022-07-11 ENCOUNTER — HOSPITAL ENCOUNTER (OUTPATIENT)
Dept: NUCLEAR MEDICINE | Facility: HOSPITAL | Age: 62
Discharge: HOME | End: 2022-07-11

## 2022-07-11 DIAGNOSIS — R07.2 PRECORDIAL PAIN: ICD-10-CM

## 2022-07-11 LAB
BH CV ECHO MEAS - AO ROOT DIAM: 3 CM
BH CV ECHO MEAS - EDV(CUBED): 59.3 ML
BH CV ECHO MEAS - EDV(MOD-SP4): 32.1 ML
BH CV ECHO MEAS - EF(MOD-SP4): 64.5 %
BH CV ECHO MEAS - ESV(CUBED): 32.8 ML
BH CV ECHO MEAS - ESV(MOD-SP4): 11.4 ML
BH CV ECHO MEAS - FS: 17.9 %
BH CV ECHO MEAS - IVS/LVPW: 0.94 CM
BH CV ECHO MEAS - IVSD: 1.5 CM
BH CV ECHO MEAS - LA DIMENSION: 3.1 CM
BH CV ECHO MEAS - LAT PEAK E' VEL: 8.2 CM/SEC
BH CV ECHO MEAS - LV DIASTOLIC VOL/BSA (35-75): 14.6 CM2
BH CV ECHO MEAS - LV MASS(C)D: 236.6 GRAMS
BH CV ECHO MEAS - LV SYSTOLIC VOL/BSA (12-30): 5.2 CM2
BH CV ECHO MEAS - LVIDD: 3.9 CM
BH CV ECHO MEAS - LVIDS: 3.2 CM
BH CV ECHO MEAS - LVOT AREA: 2.5 CM2
BH CV ECHO MEAS - LVOT DIAM: 1.8 CM
BH CV ECHO MEAS - LVPWD: 1.6 CM
BH CV ECHO MEAS - MED PEAK E' VEL: 6.3 CM/SEC
BH CV ECHO MEAS - MV A MAX VEL: 95.2 CM/SEC
BH CV ECHO MEAS - MV E MAX VEL: 98.5 CM/SEC
BH CV ECHO MEAS - MV E/A: 1.03
BH CV ECHO MEAS - PA ACC TIME: 0.08 SEC
BH CV ECHO MEAS - PA PR(ACCEL): 42.6 MMHG
BH CV ECHO MEAS - RAP SYSTOLE: 10 MMHG
BH CV ECHO MEAS - RVSP: 27 MMHG
BH CV ECHO MEAS - SI(MOD-SP4): 9.4 ML/M2
BH CV ECHO MEAS - SV(MOD-SP4): 20.7 ML
BH CV ECHO MEAS - TR MAX PG: 17 MMHG
BH CV ECHO MEAS - TR MAX VEL: 206 CM/SEC
BH CV ECHO MEASUREMENTS AVERAGE E/E' RATIO: 13.59
BH CV NUCLEAR PRIOR STUDY: 3
BH CV REST NUCLEAR ISOTOPE DOSE: 9.2 MCI
BH CV STRESS BP STAGE 1: NORMAL
BH CV STRESS BP STAGE 2: NORMAL
BH CV STRESS COMMENTS STAGE 1: NORMAL
BH CV STRESS COMMENTS STAGE 2: NORMAL
BH CV STRESS DOSE REGADENOSON STAGE 1: 0.4
BH CV STRESS DURATION MIN STAGE 1: 0
BH CV STRESS DURATION MIN STAGE 2: 4
BH CV STRESS DURATION SEC STAGE 1: 10
BH CV STRESS DURATION SEC STAGE 2: 0
BH CV STRESS HR STAGE 1: 81
BH CV STRESS HR STAGE 2: 90
BH CV STRESS NUCLEAR ISOTOPE DOSE: 29.5 MCI
BH CV STRESS PROTOCOL 1: NORMAL
BH CV STRESS RECOVERY BP: NORMAL MMHG
BH CV STRESS RECOVERY HR: 74 BPM
BH CV STRESS STAGE 1: 1
BH CV STRESS STAGE 2: 2
LEFT ATRIUM VOLUME INDEX: 25.9 ML/M2
MAXIMAL PREDICTED HEART RATE: 157 BPM
MAXIMAL PREDICTED HEART RATE: 157 BPM
PERCENT MAX PREDICTED HR: 57.32 %
STRESS BASELINE BP: NORMAL MMHG
STRESS BASELINE HR: 64 BPM
STRESS PERCENT HR: 67 %
STRESS POST PEAK BP: NORMAL MMHG
STRESS POST PEAK HR: 90 BPM
STRESS TARGET HR: 133 BPM
STRESS TARGET HR: 133 BPM

## 2022-07-11 PROCEDURE — A9500 TC99M SESTAMIBI: HCPCS | Performed by: PHYSICIAN ASSISTANT

## 2022-07-11 PROCEDURE — 93017 CV STRESS TEST TRACING ONLY: CPT

## 2022-07-11 PROCEDURE — 25010000002 REGADENOSON 0.4 MG/5ML SOLUTION: Performed by: PHYSICIAN ASSISTANT

## 2022-07-11 PROCEDURE — 78452 HT MUSCLE IMAGE SPECT MULT: CPT | Performed by: INTERNAL MEDICINE

## 2022-07-11 PROCEDURE — 78452 HT MUSCLE IMAGE SPECT MULT: CPT

## 2022-07-11 PROCEDURE — 0 TECHNETIUM SESTAMIBI: Performed by: PHYSICIAN ASSISTANT

## 2022-07-11 PROCEDURE — 93306 TTE W/DOPPLER COMPLETE: CPT

## 2022-07-11 PROCEDURE — 93306 TTE W/DOPPLER COMPLETE: CPT | Performed by: INTERNAL MEDICINE

## 2022-07-11 PROCEDURE — 93018 CV STRESS TEST I&R ONLY: CPT | Performed by: INTERNAL MEDICINE

## 2022-07-11 RX ADMIN — TECHNETIUM TC 99M SESTAMIBI 1 DOSE: 1 INJECTION INTRAVENOUS at 10:35

## 2022-07-11 RX ADMIN — REGADENOSON 0.4 MG: 0.08 INJECTION, SOLUTION INTRAVENOUS at 11:45

## 2022-07-11 RX ADMIN — TECHNETIUM TC 99M SESTAMIBI 1 DOSE: 1 INJECTION INTRAVENOUS at 11:45

## 2022-08-05 ENCOUNTER — TRANSCRIBE ORDERS (OUTPATIENT)
Dept: ADMINISTRATIVE | Facility: HOSPITAL | Age: 62
End: 2022-08-05

## 2022-08-05 ENCOUNTER — LAB (OUTPATIENT)
Dept: LAB | Facility: HOSPITAL | Age: 62
End: 2022-08-05

## 2022-08-05 DIAGNOSIS — E53.8 DEFICIENCY OF OTHER SPECIFIED B GROUP VITAMINS: ICD-10-CM

## 2022-08-05 DIAGNOSIS — E11.42 TYPE 2 DIABETES MELLITUS WITH POLYNEUROPATHY: ICD-10-CM

## 2022-08-05 DIAGNOSIS — E11.620 TYPE 2 DIABETES MELLITUS WITH DIABETIC DERMATITIS, UNSPECIFIED WHETHER LONG TERM INSULIN USE: ICD-10-CM

## 2022-08-05 DIAGNOSIS — E11.42 TYPE 2 DIABETES MELLITUS WITH POLYNEUROPATHY: Primary | ICD-10-CM

## 2022-08-05 LAB
25(OH)D3 SERPL-MCNC: 77.7 NG/ML (ref 30–100)
ALBUMIN SERPL-MCNC: 4.1 G/DL (ref 3.5–5.2)
ALBUMIN/GLOB SERPL: 1.8 G/DL
ALP SERPL-CCNC: 42 U/L (ref 39–117)
ALT SERPL W P-5'-P-CCNC: 20 U/L (ref 1–33)
ANION GAP SERPL CALCULATED.3IONS-SCNC: 15 MMOL/L (ref 5–15)
AST SERPL-CCNC: 22 U/L (ref 1–32)
BASOPHILS # BLD AUTO: 0.03 10*3/MM3 (ref 0–0.2)
BASOPHILS NFR BLD AUTO: 0.5 % (ref 0–1.5)
BILIRUB SERPL-MCNC: 0.5 MG/DL (ref 0–1.2)
BUN SERPL-MCNC: 13 MG/DL (ref 8–23)
BUN/CREAT SERPL: 14.1 (ref 7–25)
CALCIUM SPEC-SCNC: 9 MG/DL (ref 8.6–10.5)
CHLORIDE SERPL-SCNC: 109 MMOL/L (ref 98–107)
CHOLEST SERPL-MCNC: 107 MG/DL (ref 0–200)
CO2 SERPL-SCNC: 21 MMOL/L (ref 22–29)
CREAT SERPL-MCNC: 0.92 MG/DL (ref 0.57–1)
CRP SERPL-MCNC: <0.3 MG/DL (ref 0–0.5)
DEPRECATED RDW RBC AUTO: 40.8 FL (ref 37–54)
EGFRCR SERPLBLD CKD-EPI 2021: 70.1 ML/MIN/1.73
EOSINOPHIL # BLD AUTO: 0.2 10*3/MM3 (ref 0–0.4)
EOSINOPHIL NFR BLD AUTO: 3.6 % (ref 0.3–6.2)
ERYTHROCYTE [DISTWIDTH] IN BLOOD BY AUTOMATED COUNT: 13 % (ref 12.3–15.4)
FOLATE SERPL-MCNC: >20 NG/ML (ref 4.78–24.2)
GLOBULIN UR ELPH-MCNC: 2.3 GM/DL
GLUCOSE SERPL-MCNC: 160 MG/DL (ref 65–99)
HBA1C MFR BLD: 7.7 % (ref 4.8–5.6)
HCT VFR BLD AUTO: 38.6 % (ref 34–46.6)
HDLC SERPL-MCNC: 41 MG/DL (ref 40–60)
HGB BLD-MCNC: 12.8 G/DL (ref 12–15.9)
IMM GRANULOCYTES # BLD AUTO: 0.01 10*3/MM3 (ref 0–0.05)
IMM GRANULOCYTES NFR BLD AUTO: 0.2 % (ref 0–0.5)
LDLC SERPL CALC-MCNC: 38 MG/DL (ref 0–100)
LDLC/HDLC SERPL: 0.77 {RATIO}
LYMPHOCYTES # BLD AUTO: 1.55 10*3/MM3 (ref 0.7–3.1)
LYMPHOCYTES NFR BLD AUTO: 28.2 % (ref 19.6–45.3)
MCH RBC QN AUTO: 29 PG (ref 26.6–33)
MCHC RBC AUTO-ENTMCNC: 33.2 G/DL (ref 31.5–35.7)
MCV RBC AUTO: 87.3 FL (ref 79–97)
MONOCYTES # BLD AUTO: 0.34 10*3/MM3 (ref 0.1–0.9)
MONOCYTES NFR BLD AUTO: 6.2 % (ref 5–12)
NEUTROPHILS NFR BLD AUTO: 3.37 10*3/MM3 (ref 1.7–7)
NEUTROPHILS NFR BLD AUTO: 61.3 % (ref 42.7–76)
NRBC BLD AUTO-RTO: 0.2 /100 WBC (ref 0–0.2)
PLATELET # BLD AUTO: 174 10*3/MM3 (ref 140–450)
PMV BLD AUTO: 10 FL (ref 6–12)
POTASSIUM SERPL-SCNC: 4.3 MMOL/L (ref 3.5–5.2)
PROT SERPL-MCNC: 6.4 G/DL (ref 6–8.5)
RBC # BLD AUTO: 4.42 10*6/MM3 (ref 3.77–5.28)
SODIUM SERPL-SCNC: 145 MMOL/L (ref 136–145)
TRIGL SERPL-MCNC: 173 MG/DL (ref 0–150)
VIT B12 BLD-MCNC: 556 PG/ML (ref 211–946)
VLDLC SERPL-MCNC: 28 MG/DL (ref 5–40)
WBC NRBC COR # BLD: 5.5 10*3/MM3 (ref 3.4–10.8)

## 2022-08-05 PROCEDURE — 86140 C-REACTIVE PROTEIN: CPT

## 2022-08-05 PROCEDURE — 36415 COLL VENOUS BLD VENIPUNCTURE: CPT

## 2022-08-05 PROCEDURE — 82607 VITAMIN B-12: CPT

## 2022-08-05 PROCEDURE — 83036 HEMOGLOBIN GLYCOSYLATED A1C: CPT

## 2022-08-05 PROCEDURE — 80061 LIPID PANEL: CPT

## 2022-08-05 PROCEDURE — 84681 ASSAY OF C-PEPTIDE: CPT

## 2022-08-05 PROCEDURE — 82306 VITAMIN D 25 HYDROXY: CPT

## 2022-08-05 PROCEDURE — 85025 COMPLETE CBC W/AUTO DIFF WBC: CPT

## 2022-08-05 PROCEDURE — 82746 ASSAY OF FOLIC ACID SERUM: CPT

## 2022-08-05 PROCEDURE — 80053 COMPREHEN METABOLIC PANEL: CPT

## 2022-08-06 LAB — C PEPTIDE SERPL-MCNC: 5.1 NG/ML (ref 1.1–4.4)

## 2022-08-15 ENCOUNTER — OFFICE VISIT (OUTPATIENT)
Dept: CARDIOLOGY | Facility: CLINIC | Age: 62
End: 2022-08-15

## 2022-08-15 VITALS
BODY MASS INDEX: 39.5 KG/M2 | RESPIRATION RATE: 16 BRPM | HEIGHT: 66 IN | SYSTOLIC BLOOD PRESSURE: 136 MMHG | HEART RATE: 77 BPM | DIASTOLIC BLOOD PRESSURE: 86 MMHG | WEIGHT: 245.8 LBS

## 2022-08-15 DIAGNOSIS — I10 ESSENTIAL HYPERTENSION: Primary | ICD-10-CM

## 2022-08-15 DIAGNOSIS — I25.10 ASCVD (ARTERIOSCLEROTIC CARDIOVASCULAR DISEASE): ICD-10-CM

## 2022-08-15 PROCEDURE — 99213 OFFICE O/P EST LOW 20 MIN: CPT | Performed by: PHYSICIAN ASSISTANT

## 2022-08-15 NOTE — PROGRESS NOTES
"Akhil Huerta MD  Jannette Tsanggs  1960  08/15/2022    Patient Active Problem List   Diagnosis   • Acute ST elevation myocardial infarction (STEMI) of anterior wall, 11/29/16 s/p stenting proxismal % occlusion, clinically stable.   • Type 2 diabetes mellitus (HCC)   • Chest pain   • ASCVD (arteriosclerotic cardiovascular disease)   • Fatigue   • Hyperlipidemia   • Essential hypertension   • Morbid obesity (HCC)   • Dyspnea on exertion (NYHA class 2-3)   • Angina, class III (HCC)   • Dyspepsia   • BRICE on CPAP   • Hypoxia   • Renal insufficiency       Dear Akhil Huerta MD:    Subjective     History of Present Illness:    Chief Complaint   Patient presents with   • Follow-up     Stress findings   • Shortness of Breath     \"Little\"   • Edema     LE   • Med Management     List provided       Jannette Onofre is a pleasant 62 y.o. female with a past medical history significant for ACS with anterior wall STEMI in 2016 with subsequent stenting in the LAD.  Most recent coronary angiography was 2017 which did reveal patent stent with otherwise nonobstructive disease.  She does have essential hypertension, diabetes mellitus, and dyslipidemia.  She comes in today for cardiology follow-up.    Crystal did have stress test and echocardiogram performed.  Stress test revealed normal myocardial perfusion.  Echocardiogram revealed normal LVEF.  Clinically, reports she has had no further episodes of chest pains, worsening shortness of breath from baseline, palpitations, dizziness, or syncope.    No Known Allergies:      Current Outpatient Medications:   •  alendronate (FOSAMAX) 70 MG tablet, Take 1 tablet by mouth every week in the morning, at least 30 min before first food, beverage, or medication of day, Disp: 12 tablet, Rfl: 3  •  aspirin 81 MG chewable tablet, Chew & Swallow 1 tablet Daily., Disp: 100 tablet, Rfl: 3  •  atorvastatin (LIPITOR) 80 MG tablet, Take 1 tablet by mouth Daily., Disp: 90 tablet, Rfl: 2  •  " Biotin 10 MG tablet, Take 10 mg by mouth Daily., Disp: , Rfl:   •  calcium carbonate (OS-PARI) 600 MG tablet, Take 600 mg by mouth Daily. 2 tablets twice daily, Disp: , Rfl:   •  carvedilol (COREG) 25 MG tablet, Take 1 tablet by mouth 2 (two) times a day with food., Disp: 180 tablet, Rfl: 3  •  clopidogrel (PLAVIX) 75 MG tablet, Take 1 tablet by mouth Daily., Disp: 90 tablet, Rfl: 4  •  Dulaglutide (Trulicity) 1.5 MG/0.5ML solution pen-injector, Inject 1.5 mg under the skin into the appropriate area as directed 1 (One) Time Per Week., Disp: 6 mL, Rfl: 2  •  empagliflozin (Jardiance) 10 MG tablet tablet, Take 1 tablet by mouth Every Morning., Disp: 90 tablet, Rfl: 3  •  glipizide (GLUCOTROL XL) 10 MG 24 hr tablet, Take 1 tablet by mouth 2 (two) times a day., Disp: 180 tablet, Rfl: 3  •  isosorbide mononitrate (IMDUR) 60 MG 24 hr tablet, Take 1 & 1/2 tablets by mouth every  morning. (Patient taking differently: Take 60 mg by mouth.), Disp: 180 tablet, Rfl: 3  •  lisinopril (PRINIVIL,ZESTRIL) 10 MG tablet, Take 1 tablet by mouth Daily., Disp: 90 tablet, Rfl: 2  •  metFORMIN (GLUCOPHAGE) 1000 MG tablet, Take 1 tablet by mouth 2 times every day with morning and evening meals., Disp: 180 tablet, Rfl: 11  •  Multiple Vitamin (MULTI-VITAMIN DAILY PO), Take  by mouth., Disp: , Rfl:   •  rOPINIRole (REQUIP) 0.25 MG tablet, Take 1 or 2 tablets by mouth every day at bedtime for Restless Leg syndrome., Disp: 180 tablet, Rfl: 3  •  vitamin C (ASCORBIC ACID) 500 MG tablet, Take 500 mg by mouth Daily., Disp: , Rfl:   •  aspirin 81 MG chewable tablet, Chew 1 tablet by mouth Daily., Disp: 100 tablet, Rfl: 3  •  atorvastatin (LIPITOR) 80 MG tablet, Take 1 tablet by mouth Daily., Disp: 90 tablet, Rfl: 2  •  baclofen (LIORESAL) 10 MG tablet, Take 1 tablet by mouth 3 (Three) Times a Day As Needed for Muscle Spasms., Disp: 60 tablet, Rfl: 0  •  Baloxavir Marboxil,80 MG Dose, (Xofluza, 80 MG Dose,) 1 x 80 MG tablet therapy pack, Take 1  tablet by by mouth for 1 dose, Disp: 1 each, Rfl: 0  •  carvedilol (COREG) 25 MG tablet, Take 1 tablet by mouth 2 times every day with food., Disp: 180 tablet, Rfl: 3  •  Cholecalciferol (VITAMIN D3) 11931 units tablet, Take 10,000 Units by mouth Daily., Disp: , Rfl:   •  clopidogrel (PLAVIX) 75 MG tablet, Take 1 tablet by mouth Daily., Disp: 90 tablet, Rfl: 4  •  clopidogrel (PLAVIX) 75 MG tablet, Take 1 tablet by mouth Daily., Disp: 90 tablet, Rfl: 4  •  Cyanocobalamin (VITAMIN B-12) 1000 MCG sublingual tablet, Place 1 tablet under the tongue Daily., Disp: , Rfl:   •  Dulaglutide (Trulicity) 0.75 MG/0.5ML solution pen-injector, Inject 0.5 milliliter by subcutaneous route every week in the abdomen, thigh, or upper arm rotating injection sites., Disp: 6 mL, Rfl: 2  •  ferrous sulfate 325 (65 FE) MG tablet, Take 325 mg by mouth Daily With Breakfast., Disp: , Rfl:   •  fluconazole (DIFLUCAN) 150 MG tablet, Take 1 tablet by mouth once., Disp: 7 tablet, Rfl: 1  •  glipizide (GLUCOTROL XL) 10 MG 24 hr tablet, Take 1 tablet by mouth 2 (Two) Times a Day., Disp: 180 tablet, Rfl: 3  •  glucose blood (FREESTYLE LITE) test strip, Use one strip to check glucose as needed., Disp: 100 each, Rfl: 3  •  glucose blood test strip, use one strip to check glucose as needed., Disp: 100 each, Rfl: 3  •  insulin aspart (NovoLOG FlexPen) 100 UNIT/ML solution pen-injector sc pen, Inject 25 units by subcutaneous route 4 times every day with meals or snacks. as needed for glucose above 200, Disp: 15 mL, Rfl: 3  •  Insulin Lispro, 1 Unit Dial, (HUMALOG) 100 UNIT/ML solution pen-injector, Inject 25 Units under the skin as directed 4 (Four) Times a Day As Needed with meals and snacks. (Use for glucose greater than 200). Discontinue Novolog, Disp: 30 mL, Rfl: 3  •  Insulin Lispro, 1 Unit Dial, (HUMALOG) 100 UNIT/ML solution pen-injector, Inject 25 units under the skin 4 times daily as needed for meals & snacks (use as directed for a glucose  greater than 200). Stop Novolog, Disp: 15 mL, Rfl: 3  •  Insulin Lispro, 1 Unit Dial, (HUMALOG) 100 UNIT/ML solution pen-injector, Inject 25 units by subcutaneous route 4 times daily as needed for meals & snacks ( use as directed for a glucose greater than 200)  D/C Novolog, Disp: 15 mL, Rfl: 3  •  Insulin Pen Needle (BD Pen Needle Micro U/F) 32G X 6 MM misc, Use 4 times daily as needed for insulin injections., Disp: 100 each, Rfl: 1  •  Insulin Pen Needle 32G X 4 MM misc, USE 4 TIMES DAILY AS NEEDED WITH INSULIN, Disp: 100 each, Rfl: 1  •  isosorbide mononitrate (IMDUR) 60 MG 24 hr tablet, Take 1 & 1/2 tablets by mouth Every Morning., Disp: 180 tablet, Rfl: 3  •  isosorbide mononitrate (IMDUR) 60 MG 24 hr tablet, take 1 & 1/2 tablets by mouth  every day in the morning, Disp: 180 tablet, Rfl: 3  •  isosorbide mononitrate (IMDUR) 60 MG 24 hr tablet, take 1 & 1/2 tablets by mouth every day in the morning., Disp: 180 tablet, Rfl: 3  •  Lancets Thin misc, Use one strip to check glucose as needed., Disp: 300 each, Rfl: 3  •  lisinopril (PRINIVIL,ZESTRIL) 10 MG tablet, Take 1 tablet by mouth every day., Disp: 90 tablet, Rfl: 2  •  lisinopril (PRINIVIL,ZESTRIL) 20 MG tablet, Take 1 tablet by mouth every day (Patient taking differently: 10 mg Daily.), Disp: 90 tablet, Rfl: 2  •  metFORMIN (GLUCOPHAGE) 1000 MG tablet, Take 1 tablet by mouth 2 (Two) Times a Day With morning and evening Meals., Disp: 180 tablet, Rfl: 11  •  metFORMIN (GLUCOPHAGE) 1000 MG tablet, take 1 tablet by mouth 2 times every day with morning and evening meals, Disp: 180 tablet, Rfl: 11  •  nitroglycerin (NITROSTAT) 0.4 MG SL tablet, Place 1 tablet under the tongue Every 5 (Five) Minutes As Needed for Chest Pain (max of 3 doses in 15 minutes. If no relief, go to ER., Disp: 25 tablet, Rfl: 12  •  rOPINIRole (REQUIP) 0.25 MG tablet, Take 1 or 2 tablets by mouth every night at bedtime for Restless leg syndrome., Disp: 180 tablet, Rfl: 3  •  thiamine1  "(VITAMIN B1) 250 MG tablet, Take 250 mg by mouth Daily., Disp: , Rfl:   •  Tirzepatide (Mounjaro) 2.5 MG/0.5ML solution pen-injector, Inject 2.5mg under the skin every week for 4 weeks., Disp: 2 mL, Rfl: 2  •  vitamin D (ERGOCALCIFEROL) 1.25 MG (31140 UT) capsule capsule, Take 1 capsule by mouth daily for 5 days., Disp: 5 capsule, Rfl: 0    The following portions of the patient's history were reviewed and updated as appropriate: allergies, current medications, past family history, past medical history, past social history, past surgical history and problem list.    Social History     Tobacco Use   • Smoking status: Former Smoker     Packs/day: 0.25     Years: 1.00     Pack years: 0.25     Types: Cigarettes     Quit date: 1989     Years since quittin.1   • Smokeless tobacco: Never Used   Substance Use Topics   • Alcohol use: No   • Drug use: No         Objective   Vitals:    08/15/22 0833   BP: 136/86   Pulse: 77   Resp: 16   Weight: 111 kg (245 lb 12.8 oz)   Height: 167.6 cm (66\")     Body mass index is 39.67 kg/m².    Constitutional:       General: Not in acute distress.     Appearance: Healthy appearance. Well-developed and not in distress. Not diaphoretic.   Eyes:      Conjunctiva/sclera: Conjunctivae normal.      Pupils: Pupils are equal, round, and reactive to light.   HENT:      Head: Normocephalic and atraumatic.   Neck:      Vascular: No carotid bruit or JVD.   Pulmonary:      Effort: Pulmonary effort is normal. No respiratory distress.      Breath sounds: Normal breath sounds.   Cardiovascular:      Normal rate. Regular rhythm.   Skin:     General: Skin is cool.   Neurological:      Mental Status: Alert, oriented to person, place, and time and oriented to person, place and time.         Lab Results   Component Value Date     2022    K 4.3 2022     (H) 2022    CO2 21.0 (L) 2022    BUN 13 2022    CREATININE 0.92 2022    GLUCOSE 160 (H) 2022    " CALCIUM 9.0 08/05/2022    AST 22 08/05/2022    ALT 20 08/05/2022    ALKPHOS 42 08/05/2022    LABIL2 1.6 12/11/2015     Lab Results   Component Value Date    CKTOTAL 35 10/29/2017     Lab Results   Component Value Date    WBC 5.50 08/05/2022    HGB 12.8 08/05/2022    HCT 38.6 08/05/2022     08/05/2022     Lab Results   Component Value Date    INR 0.98 10/28/2017    INR 0.89 11/29/2016    INR 0.82 11/27/2016     No results found for: MG  Lab Results   Component Value Date    TSH 2.490 05/06/2022    CHLPL 213 (H) 03/14/2016    TRIG 173 (H) 08/05/2022    HDL 41 08/05/2022    LDL 38 08/05/2022      Lab Results   Component Value Date    BNP 27.0 12/29/2016       During this visit the following were done:  Labs Reviewed []    Labs Ordered []    Radiology Reports Reviewed []    Radiology Ordered []    PCP Records Reviewed []    Referring Provider Records Reviewed []    ER Records Reviewed []    Hospital Records Reviewed []    History Obtained From Family []    Radiology Images Reviewed []    Other Reviewed []    Records Requested []       Procedures    Assessment & Plan    Diagnosis Plan   1. Essential hypertension     2. ASCVD (arteriosclerotic cardiovascular disease)              Recommendations:  1. ASCVD  1. No recent anginal symptoms since she was last seen.  Stress test was normal and as well as her echocardiogram.  I did discuss these results with her.  2. Since she is now asymptomatic we will continue with GDMT of aspirin, Lipitor, carvedilol, Plavix, Imdur, lisinopril.      Return in about 6 months (around 2/15/2023).    As always, I appreciate very much the opportunity to participate in the cardiovascular care of your patients.      With Best Regards,    Sathya Redman PA-C

## 2022-09-01 ENCOUNTER — OFFICE VISIT (OUTPATIENT)
Dept: PSYCHIATRY | Facility: CLINIC | Age: 62
End: 2022-09-01

## 2022-09-01 DIAGNOSIS — Z63.4 BEREAVEMENT: ICD-10-CM

## 2022-09-01 DIAGNOSIS — Z63.8 FAMILY CONFLICT: ICD-10-CM

## 2022-09-01 DIAGNOSIS — F41.1 GENERALIZED ANXIETY DISORDER: Primary | ICD-10-CM

## 2022-09-01 DIAGNOSIS — F33.1 MAJOR DEPRESSIVE DISORDER, RECURRENT EPISODE, MODERATE: ICD-10-CM

## 2022-09-01 PROCEDURE — 90791 PSYCH DIAGNOSTIC EVALUATION: CPT | Performed by: COUNSELOR

## 2022-09-01 SDOH — SOCIAL STABILITY - SOCIAL INSECURITY: DISSAPEARANCE AND DEATH OF FAMILY MEMBER: Z63.4

## 2022-09-01 SDOH — SOCIAL STABILITY - SOCIAL INSECURITY: OTHER SPECIFIED PROBLEMS RELATED TO PRIMARY SUPPORT GROUP: Z63.8
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
## 2022-09-13 ENCOUNTER — HOSPITAL ENCOUNTER (OUTPATIENT)
Dept: MAMMOGRAPHY | Facility: HOSPITAL | Age: 62
Discharge: HOME | End: 2022-09-13
Admitting: RADIOLOGY

## 2022-09-13 DIAGNOSIS — R92.8 ABNORMAL MAMMOGRAM: ICD-10-CM

## 2022-09-13 PROCEDURE — G0279 TOMOSYNTHESIS, MAMMO: HCPCS

## 2022-09-13 PROCEDURE — 77061 BREAST TOMOSYNTHESIS UNI: CPT | Performed by: RADIOLOGY

## 2022-09-13 PROCEDURE — 77065 DX MAMMO INCL CAD UNI: CPT

## 2022-09-13 PROCEDURE — 77065 DX MAMMO INCL CAD UNI: CPT | Performed by: RADIOLOGY

## 2022-10-04 ENCOUNTER — OFFICE VISIT (OUTPATIENT)
Dept: PSYCHIATRY | Facility: CLINIC | Age: 62
End: 2022-10-04

## 2022-10-04 DIAGNOSIS — F41.1 GENERALIZED ANXIETY DISORDER: Primary | ICD-10-CM

## 2022-10-04 DIAGNOSIS — F32.A MILD EPISODE OF DEPRESSION: ICD-10-CM

## 2022-10-04 PROCEDURE — 90837 PSYTX W PT 60 MINUTES: CPT | Performed by: COUNSELOR

## 2022-10-04 NOTE — PROGRESS NOTES
"    PROGRESS NOTE  Data:  Jannette Onofre came in 10/27/2022 for her regularly scheduled therapy session starting at 8:00 AM and ending at 9:00 AM, with Parvin Vang [facility].     (Scales based on 0 - 10 with 10 being the worst)  Depression: 2 Anxiety: 5     HPI:   Patient reported things are \"going better.  It helped getting my mind on the right track.  Before I did not think at all, but now I pause and try to understand but I used to get so aggravated.  Since our talk I stop and see the words written out of the song, \"God is on the move in a mighty way today.\"  It is a way of surrendering.  Previously I tried to take care of it myself I was quiet and bottled it up around people/coworkers.\"     \"I pushed Him away, I did not going to [facility], I had not been in a long time, probably over 6 months, maybe 3 times in a year.  If I did not have to do it, what ever, I did not.  I had to go to work to pay bills and to get insurance.  I was tired of it.  Patient rated the intensity of her depression at 8/10.  \"I was tired of it, I want my life!.  I decided left was enough.  I do daily devotionals, but I did not feel anything.  I met some [employer] ladies and they did not say anything but I could tell they care.  I want to feel it in my heart.  I was not feeling anything.  I told myself, 'You are not not getting anything out of it, so why go.\"    \"It helps to talk.\"    Clinical Maneuvering/Intervention:  Assisted patient in processing above session content; acknowledged and normalized patient’s thoughts, feelings, and concerns.     Used the metaphor of the train to illustrate reduce CBT.  Shared the 10 patterns of irrational thinking and what forgiveness is and what it is not.  Provided handouts on these topics.  Challenged patient on relying on feelings which can be unreliable because they often come from irrational thinking patterns.    Allowed patient to freely discuss issues without interruption or judgment. Provided safe, " confidential environment to facilitate the development of positive therapeutic relationship and encourage open, honest communication. Assisted patient in identifying risk factors which would indicate the need for higher level of care including thoughts to harm self or others and/or self-harming behavior and encouraged patient to contact this office, call 911, or present to the nearest emergency room should any of these events occur. Discussed crisis intervention services and means to access.  Patient adamantly and convincingly denies current suicidal or homicidal ideation or perceptual disturbance.        Assessment     Patient is stable, positive and progressing.    Diagnosis:   Encounter Diagnoses   Name Primary?   • Generalized anxiety disorder Yes   • Mild episode of depression        Generalized anxiety disorder [F41.1]    Mental Status Exam  Hygiene:  good  Dress:  casual  Attitude:  Cooperative  Motor Activity:  Appropriate  Speech:  Normal  Mood:  within normal limits  Affect:  calm and pleasant  Thought Processes:  Goal directed and Linear  Thought Content:  normal  Suicidal Thoughts:  denies  Homicidal Thoughts:  denies  Crisis Safety Plan: yes, to come to the emergency room.  Hallucinations:  denies          Patient's Support Network Includes:  , son and extended family    Progress toward goal: Not at goal    Functional Status: Mild impairment     Prognosis: Good with Ongoing Treatment       Plan         Patient will adhere to medication regimen as prescribed and report any side effects. Patient will contact this office, call 911 or present to the nearest emergency room should suicidal or homicidal ideations occur. Provide Cognitive Behavioral Therapy and Integrative Therapy to improve functioning, maintain stability, and avoid decompensation and the need for higher level of care.            Return in about 3 weeks (around 10/25/2022).            This document electronically signed by Parvin MCCOLLUM  Ciera, GEOFF, NCC, CSAT  October 27, 2022 17:52 EDT    Plan

## 2022-12-05 ENCOUNTER — LAB (OUTPATIENT)
Dept: LAB | Facility: HOSPITAL | Age: 62
End: 2022-12-05

## 2022-12-05 ENCOUNTER — TRANSCRIBE ORDERS (OUTPATIENT)
Dept: ADMINISTRATIVE | Facility: HOSPITAL | Age: 62
End: 2022-12-05

## 2022-12-05 DIAGNOSIS — E53.8 VITAMIN B 12 DEFICIENCY: ICD-10-CM

## 2022-12-05 DIAGNOSIS — E78.2 MIXED HYPERLIPIDEMIA: ICD-10-CM

## 2022-12-05 DIAGNOSIS — E53.8 VITAMIN B12 DEFICIENCY (NON ANEMIC): ICD-10-CM

## 2022-12-05 DIAGNOSIS — E11.42 DIABETIC SENSORIMOTOR NEUROPATHY: ICD-10-CM

## 2022-12-05 DIAGNOSIS — E11.42 DIABETIC SENSORIMOTOR NEUROPATHY: Primary | ICD-10-CM

## 2022-12-05 LAB
ALBUMIN SERPL-MCNC: 4.1 G/DL (ref 3.5–5.2)
ALBUMIN UR-MCNC: <1.2 MG/DL
ALBUMIN/GLOB SERPL: 2 G/DL
ALP SERPL-CCNC: 44 U/L (ref 39–117)
ALT SERPL W P-5'-P-CCNC: 16 U/L (ref 1–33)
ANION GAP SERPL CALCULATED.3IONS-SCNC: 11 MMOL/L (ref 5–15)
AST SERPL-CCNC: 19 U/L (ref 1–32)
BASOPHILS # BLD AUTO: 0.05 10*3/MM3 (ref 0–0.2)
BASOPHILS NFR BLD AUTO: 0.7 % (ref 0–1.5)
BILIRUB SERPL-MCNC: 0.4 MG/DL (ref 0–1.2)
BUN SERPL-MCNC: 16 MG/DL (ref 8–23)
BUN/CREAT SERPL: 15.5 (ref 7–25)
CALCIUM SPEC-SCNC: 9.7 MG/DL (ref 8.6–10.5)
CHLORIDE SERPL-SCNC: 112 MMOL/L (ref 98–107)
CHOLEST SERPL-MCNC: 113 MG/DL (ref 0–200)
CO2 SERPL-SCNC: 24 MMOL/L (ref 22–29)
CREAT SERPL-MCNC: 1.03 MG/DL (ref 0.57–1)
CREAT UR-MCNC: 81 MG/DL
DEPRECATED RDW RBC AUTO: 42.9 FL (ref 37–54)
EGFRCR SERPLBLD CKD-EPI 2021: 61.2 ML/MIN/1.73
EOSINOPHIL # BLD AUTO: 0.2 10*3/MM3 (ref 0–0.4)
EOSINOPHIL NFR BLD AUTO: 2.7 % (ref 0.3–6.2)
ERYTHROCYTE [DISTWIDTH] IN BLOOD BY AUTOMATED COUNT: 13.2 % (ref 12.3–15.4)
GLOBULIN UR ELPH-MCNC: 2.1 GM/DL
GLUCOSE SERPL-MCNC: 142 MG/DL (ref 65–99)
HCT VFR BLD AUTO: 39.1 % (ref 34–46.6)
HDLC SERPL-MCNC: 38 MG/DL (ref 40–60)
HGB BLD-MCNC: 12.4 G/DL (ref 12–15.9)
IMM GRANULOCYTES # BLD AUTO: 0.04 10*3/MM3 (ref 0–0.05)
IMM GRANULOCYTES NFR BLD AUTO: 0.5 % (ref 0–0.5)
LDLC SERPL CALC-MCNC: 41 MG/DL (ref 0–100)
LDLC/HDLC SERPL: 0.86 {RATIO}
LYMPHOCYTES # BLD AUTO: 1.97 10*3/MM3 (ref 0.7–3.1)
LYMPHOCYTES NFR BLD AUTO: 26.5 % (ref 19.6–45.3)
MCH RBC QN AUTO: 28.2 PG (ref 26.6–33)
MCHC RBC AUTO-ENTMCNC: 31.7 G/DL (ref 31.5–35.7)
MCV RBC AUTO: 88.9 FL (ref 79–97)
MICROALBUMIN/CREAT UR: NORMAL MG/G{CREAT}
MONOCYTES # BLD AUTO: 0.47 10*3/MM3 (ref 0.1–0.9)
MONOCYTES NFR BLD AUTO: 6.3 % (ref 5–12)
NEUTROPHILS NFR BLD AUTO: 4.71 10*3/MM3 (ref 1.7–7)
NEUTROPHILS NFR BLD AUTO: 63.3 % (ref 42.7–76)
NRBC BLD AUTO-RTO: 0 /100 WBC (ref 0–0.2)
PLATELET # BLD AUTO: 203 10*3/MM3 (ref 140–450)
PMV BLD AUTO: 10.1 FL (ref 6–12)
POTASSIUM SERPL-SCNC: 4.6 MMOL/L (ref 3.5–5.2)
PROT SERPL-MCNC: 6.2 G/DL (ref 6–8.5)
RBC # BLD AUTO: 4.4 10*6/MM3 (ref 3.77–5.28)
SODIUM SERPL-SCNC: 147 MMOL/L (ref 136–145)
TRIGL SERPL-MCNC: 211 MG/DL (ref 0–150)
VLDLC SERPL-MCNC: 34 MG/DL (ref 5–40)
WBC NRBC COR # BLD: 7.44 10*3/MM3 (ref 3.4–10.8)

## 2022-12-05 PROCEDURE — 82570 ASSAY OF URINE CREATININE: CPT

## 2022-12-05 PROCEDURE — 80061 LIPID PANEL: CPT

## 2022-12-05 PROCEDURE — 36415 COLL VENOUS BLD VENIPUNCTURE: CPT

## 2022-12-05 PROCEDURE — 85025 COMPLETE CBC W/AUTO DIFF WBC: CPT

## 2022-12-05 PROCEDURE — 82043 UR ALBUMIN QUANTITATIVE: CPT

## 2022-12-05 PROCEDURE — 80053 COMPREHEN METABOLIC PANEL: CPT

## 2022-12-05 PROCEDURE — 84681 ASSAY OF C-PEPTIDE: CPT

## 2022-12-06 LAB — C PEPTIDE SERPL-MCNC: 4.7 NG/ML (ref 1.1–4.4)

## 2022-12-09 ENCOUNTER — OFFICE VISIT (OUTPATIENT)
Dept: PSYCHIATRY | Facility: CLINIC | Age: 62
End: 2022-12-09
Payer: COMMERCIAL

## 2022-12-09 DIAGNOSIS — F41.1 GENERALIZED ANXIETY DISORDER: ICD-10-CM

## 2022-12-09 DIAGNOSIS — F33.1 MAJOR DEPRESSIVE DISORDER, RECURRENT EPISODE, MODERATE: Primary | ICD-10-CM

## 2022-12-09 PROCEDURE — 90837 PSYTX W PT 60 MINUTES: CPT | Performed by: COUNSELOR

## 2023-01-05 NOTE — PROGRESS NOTES
PROGRESS NOTE  Data:  Jannette Onofre came in 12/9/2022 for her regularly scheduled therapy session starting at 8:00 AM and ending at 9:00 AM, with Parvin Vang [facility].     (Scales based on 0 - 10 with 10 being the worst)  Depression: 2 Anxiety: 5     HPI:   Patient reports she is \"doing much better\" and was been helping as \"mainly talking to you and getting my mindset.\"    Patient reported she only has 4 and her support system.  She feels appreciated watching her grand babies.    Patient shared she has a difficult time being heard and understood, feeling appreciated and accepted.  She admits to feeling anxious when she cannot get her emotional needs met.\"    Clinical Maneuvering/Intervention:  Assisted patient in processing above session content; acknowledged and normalized patient’s thoughts, feelings, and concerns.     Reviewed the 7 desires of every heart.  Shared the 10 patterns of irrational thinking and concepts of the serenity prayer.    Allowed patient to freely discuss issues without interruption or judgment. Provided safe, confidential environment to facilitate the development of positive therapeutic relationship and encourage open, honest communication. Assisted patient in identifying risk factors which would indicate the need for higher level of care including thoughts to harm self or others and/or self-harming behavior and encouraged patient to contact this office, call 911, or present to the nearest emergency room should any of these events occur. Discussed crisis intervention services and means to access.  Patient adamantly and convincingly denies current suicidal or homicidal ideation or perceptual disturbance.        Assessment     Patient is stable, positive and progressing.    Diagnosis:   Encounter Diagnoses   Name Primary?   • Major depressive disorder, recurrent episode, moderate (HCC) Yes   • Generalized anxiety disorder        Major depressive disorder, recurrent episode, moderate (HCC)  [F33.1]    Mental Status Exam  Hygiene:  good  Dress:  casual  Attitude:  Cooperative  Motor Activity:  Appropriate  Speech:  Normal  Mood:  within normal limits  Affect:  calm and pleasant  Thought Processes:  Goal directed and Linear  Thought Content:  normal  Suicidal Thoughts:  denies  Homicidal Thoughts:  denies  Crisis Safety Plan: yes, to come to the emergency room.  Hallucinations:  denies          Patient's Support Network Includes:  , son and extended family    Progress toward goal: Not at goal    Functional Status: Mild impairment     Prognosis: Good with Ongoing Treatment       Plan         Patient will adhere to medication regimen as prescribed and report any side effects. Patient will contact this office, call 911 or present to the nearest emergency room should suicidal or homicidal ideations occur. Provide Cognitive Behavioral Therapy and Integrative Therapy to improve functioning, maintain stability, and avoid decompensation and the need for higher level of care.            Return in about 4 weeks (around 1/6/2023).            This document electronically signed by Parvin Vang, GEOFF, NCC, CSAT  January 4, 2023 19:41 EST    Plan

## 2023-02-16 ENCOUNTER — HOSPITAL ENCOUNTER (OUTPATIENT)
Dept: GENERAL RADIOLOGY | Facility: HOSPITAL | Age: 63
Discharge: HOME | End: 2023-02-16
Admitting: PHYSICIAN ASSISTANT
Payer: COMMERCIAL

## 2023-02-16 ENCOUNTER — TRANSCRIBE ORDERS (OUTPATIENT)
Dept: ADMINISTRATIVE | Facility: HOSPITAL | Age: 63
End: 2023-02-16
Payer: COMMERCIAL

## 2023-02-16 DIAGNOSIS — M54.9 BACK PAIN WITHOUT RADIATION: ICD-10-CM

## 2023-02-16 DIAGNOSIS — M54.9 BACK PAIN WITHOUT RADIATION: Primary | ICD-10-CM

## 2023-02-16 PROCEDURE — 72100 X-RAY EXAM L-S SPINE 2/3 VWS: CPT

## 2023-02-16 PROCEDURE — 72100 X-RAY EXAM L-S SPINE 2/3 VWS: CPT | Performed by: RADIOLOGY

## 2023-03-13 ENCOUNTER — HOSPITAL ENCOUNTER (OUTPATIENT)
Dept: MAMMOGRAPHY | Facility: HOSPITAL | Age: 63
Discharge: HOME | End: 2023-03-13
Admitting: RADIOLOGY
Payer: COMMERCIAL

## 2023-03-13 DIAGNOSIS — Z12.31 VISIT FOR SCREENING MAMMOGRAM: ICD-10-CM

## 2023-03-13 PROCEDURE — 77067 SCR MAMMO BI INCL CAD: CPT

## 2023-03-13 PROCEDURE — 77067 SCR MAMMO BI INCL CAD: CPT | Performed by: RADIOLOGY

## 2023-03-13 PROCEDURE — 77063 BREAST TOMOSYNTHESIS BI: CPT | Performed by: RADIOLOGY

## 2023-03-13 PROCEDURE — 77063 BREAST TOMOSYNTHESIS BI: CPT

## 2023-05-18 ENCOUNTER — TRANSCRIBE ORDERS (OUTPATIENT)
Dept: ADMINISTRATIVE | Facility: HOSPITAL | Age: 63
End: 2023-05-18
Payer: COMMERCIAL

## 2023-05-18 ENCOUNTER — LAB (OUTPATIENT)
Dept: LAB | Facility: HOSPITAL | Age: 63
End: 2023-05-18
Payer: COMMERCIAL

## 2023-05-18 DIAGNOSIS — E53.8 DEFICIENCY OF OTHER SPECIFIED B GROUP VITAMINS: ICD-10-CM

## 2023-05-18 DIAGNOSIS — E78.2 MIXED HYPERLIPIDEMIA: ICD-10-CM

## 2023-05-18 DIAGNOSIS — E11.42 TYPE 2 DIABETES MELLITUS WITH DIABETIC POLYNEUROPATHY, WITHOUT LONG-TERM CURRENT USE OF INSULIN: ICD-10-CM

## 2023-05-18 DIAGNOSIS — E11.620 TYPE 2 DIABETES MELLITUS WITH DIABETIC DERMATITIS, WITHOUT LONG-TERM CURRENT USE OF INSULIN: ICD-10-CM

## 2023-05-18 DIAGNOSIS — E11.42 TYPE 2 DIABETES MELLITUS WITH DIABETIC POLYNEUROPATHY, WITHOUT LONG-TERM CURRENT USE OF INSULIN: Primary | ICD-10-CM

## 2023-05-18 DIAGNOSIS — E55.9 VITAMIN D DEFICIENCY, UNSPECIFIED: ICD-10-CM

## 2023-05-18 LAB
25(OH)D3 SERPL-MCNC: 55.3 NG/ML (ref 30–100)
ALBUMIN SERPL-MCNC: 4 G/DL (ref 3.5–5.2)
ALBUMIN/GLOB SERPL: 1.6 G/DL
ALP SERPL-CCNC: 43 U/L (ref 39–117)
ALT SERPL W P-5'-P-CCNC: 18 U/L (ref 1–33)
ANION GAP SERPL CALCULATED.3IONS-SCNC: 9.6 MMOL/L (ref 5–15)
AST SERPL-CCNC: 17 U/L (ref 1–32)
BASOPHILS # BLD AUTO: 0.06 10*3/MM3 (ref 0–0.2)
BASOPHILS NFR BLD AUTO: 0.9 % (ref 0–1.5)
BILIRUB SERPL-MCNC: 0.5 MG/DL (ref 0–1.2)
BUN SERPL-MCNC: 22 MG/DL (ref 8–23)
BUN/CREAT SERPL: 23.9 (ref 7–25)
CALCIUM SPEC-SCNC: 9.2 MG/DL (ref 8.6–10.5)
CHLORIDE SERPL-SCNC: 111 MMOL/L (ref 98–107)
CHOLEST SERPL-MCNC: 108 MG/DL (ref 0–200)
CO2 SERPL-SCNC: 22.4 MMOL/L (ref 22–29)
CREAT SERPL-MCNC: 0.92 MG/DL (ref 0.57–1)
CRP SERPL-MCNC: <0.3 MG/DL (ref 0–0.5)
DEPRECATED RDW RBC AUTO: 45.2 FL (ref 37–54)
EGFRCR SERPLBLD CKD-EPI 2021: 70.1 ML/MIN/1.73
EOSINOPHIL # BLD AUTO: 0.18 10*3/MM3 (ref 0–0.4)
EOSINOPHIL NFR BLD AUTO: 2.8 % (ref 0.3–6.2)
ERYTHROCYTE [DISTWIDTH] IN BLOOD BY AUTOMATED COUNT: 13.4 % (ref 12.3–15.4)
ERYTHROCYTE [SEDIMENTATION RATE] IN BLOOD: 12 MM/HR (ref 0–30)
FOLATE SERPL-MCNC: >20 NG/ML (ref 4.78–24.2)
GLOBULIN UR ELPH-MCNC: 2.5 GM/DL
GLUCOSE SERPL-MCNC: 107 MG/DL (ref 65–99)
HBA1C MFR BLD: 6.7 % (ref 4.8–5.6)
HCT VFR BLD AUTO: 40.3 % (ref 34–46.6)
HDLC SERPL-MCNC: 42 MG/DL (ref 40–60)
HGB BLD-MCNC: 12.4 G/DL (ref 12–15.9)
IMM GRANULOCYTES # BLD AUTO: 0.01 10*3/MM3 (ref 0–0.05)
IMM GRANULOCYTES NFR BLD AUTO: 0.2 % (ref 0–0.5)
LDLC SERPL CALC-MCNC: 41 MG/DL (ref 0–100)
LDLC/HDLC SERPL: 0.86 {RATIO}
LYMPHOCYTES # BLD AUTO: 2.06 10*3/MM3 (ref 0.7–3.1)
LYMPHOCYTES NFR BLD AUTO: 32.4 % (ref 19.6–45.3)
MCH RBC QN AUTO: 28.3 PG (ref 26.6–33)
MCHC RBC AUTO-ENTMCNC: 30.8 G/DL (ref 31.5–35.7)
MCV RBC AUTO: 92 FL (ref 79–97)
MONOCYTES # BLD AUTO: 0.42 10*3/MM3 (ref 0.1–0.9)
MONOCYTES NFR BLD AUTO: 6.6 % (ref 5–12)
NEUTROPHILS NFR BLD AUTO: 3.62 10*3/MM3 (ref 1.7–7)
NEUTROPHILS NFR BLD AUTO: 57.1 % (ref 42.7–76)
NRBC BLD AUTO-RTO: 0 /100 WBC (ref 0–0.2)
PLATELET # BLD AUTO: 205 10*3/MM3 (ref 140–450)
PMV BLD AUTO: 9.7 FL (ref 6–12)
POTASSIUM SERPL-SCNC: 4.4 MMOL/L (ref 3.5–5.2)
PROT SERPL-MCNC: 6.5 G/DL (ref 6–8.5)
RBC # BLD AUTO: 4.38 10*6/MM3 (ref 3.77–5.28)
SODIUM SERPL-SCNC: 143 MMOL/L (ref 136–145)
TRIGL SERPL-MCNC: 149 MG/DL (ref 0–150)
VIT B12 BLD-MCNC: 599 PG/ML (ref 211–946)
VLDLC SERPL-MCNC: 25 MG/DL (ref 5–40)
WBC NRBC COR # BLD: 6.35 10*3/MM3 (ref 3.4–10.8)

## 2023-05-18 PROCEDURE — 86140 C-REACTIVE PROTEIN: CPT

## 2023-05-18 PROCEDURE — 83036 HEMOGLOBIN GLYCOSYLATED A1C: CPT

## 2023-05-18 PROCEDURE — 80061 LIPID PANEL: CPT

## 2023-05-18 PROCEDURE — 82607 VITAMIN B-12: CPT

## 2023-05-18 PROCEDURE — 85025 COMPLETE CBC W/AUTO DIFF WBC: CPT

## 2023-05-18 PROCEDURE — 82746 ASSAY OF FOLIC ACID SERUM: CPT

## 2023-05-18 PROCEDURE — 80053 COMPREHEN METABOLIC PANEL: CPT

## 2023-05-18 PROCEDURE — 82306 VITAMIN D 25 HYDROXY: CPT

## 2023-05-18 PROCEDURE — 36415 COLL VENOUS BLD VENIPUNCTURE: CPT

## 2023-05-18 PROCEDURE — 85652 RBC SED RATE AUTOMATED: CPT

## 2023-07-27 ENCOUNTER — OFFICE VISIT (OUTPATIENT)
Dept: CARDIOLOGY | Facility: CLINIC | Age: 63
End: 2023-07-27
Payer: COMMERCIAL

## 2023-07-27 VITALS
HEART RATE: 91 BPM | WEIGHT: 233 LBS | BODY MASS INDEX: 37.45 KG/M2 | OXYGEN SATURATION: 98 % | DIASTOLIC BLOOD PRESSURE: 97 MMHG | HEIGHT: 66 IN | SYSTOLIC BLOOD PRESSURE: 146 MMHG

## 2023-07-27 DIAGNOSIS — I25.10 ASCVD (ARTERIOSCLEROTIC CARDIOVASCULAR DISEASE): Primary | ICD-10-CM

## 2023-07-27 PROCEDURE — 99214 OFFICE O/P EST MOD 30 MIN: CPT | Performed by: PHYSICIAN ASSISTANT

## 2023-07-27 PROCEDURE — 93000 ELECTROCARDIOGRAM COMPLETE: CPT | Performed by: PHYSICIAN ASSISTANT

## 2023-07-27 NOTE — PROGRESS NOTES
Akhil Huerta MD  Jannette Onofre  1960 07/27/2023    Patient Active Problem List   Diagnosis    Acute ST elevation myocardial infarction (STEMI) of anterior wall, 11/29/16 s/p stenting proxismal % occlusion, clinically stable.    Type 2 diabetes mellitus    Chest pain    ASCVD (arteriosclerotic cardiovascular disease)    Fatigue    Hyperlipidemia    Essential hypertension    Morbid obesity    Dyspnea on exertion (NYHA class 2-3)    Angina, class III    Dyspepsia    BRICE on CPAP    Hypoxia    Renal insufficiency     Dear Akhil Huerta MD:      Subjective     History of Present Illness:    Chief Complaint   Patient presents with    Med Management     List.     Chest Pain    Shortness of Breath    Edema    Dizziness    Palpitations       Jannette Onofre is a pleasant 63 y.o. female with a past medical history significant for anterior wall STEMI in 2016 with subsequent stenting in the LAD.  Most recent coronary angiography was 2017 which did reveal patent stent with otherwise nonobstructive disease.  She does have essential hypertension, diabetes mellitus, and dyslipidemia.  She comes in today for cardiology follow-up.     Crystal did have chest pains just yesterday, 7/26/2023.  She reports that this pain came on in the morning he felt this pain in the left upper chest wall that radiated into her left arm.  She reports that this pain lasted for a few hours until around noon when she took 1 dose of sublingual nitroglycerin which promptly resolved the pain.  She does report this pain was very similar to the pain she had in 2016 when she had a anterior wall STEMI.             No Known Allergies:      Current Outpatient Medications:     alendronate (FOSAMAX) 70 MG tablet, Take 1 tablet by mouth every week in the morning, at least 30 minutes before first food, beverage, or medication of day., Disp: 12 tablet, Rfl: 0    aspirin 81 MG chewable tablet, Chew & Swallow 1 tablet Daily., Disp: 100 tablet, Rfl: 3     atorvastatin (LIPITOR) 80 MG tablet, Take 1 tablet by mouth Daily., Disp: 90 tablet, Rfl: 3    carvedilol (COREG) 25 MG tablet, Take 1 tablet by mouth 2 times daily with food., Disp: 180 tablet, Rfl: 3    clopidogrel (PLAVIX) 75 MG tablet, Take 1 tablet by mouth daily, Disp: 90 tablet, Rfl: 4    empagliflozin (Jardiance) 10 MG tablet tablet, Take 1 tablet by mouth Every Morning., Disp: 90 tablet, Rfl: 3    isosorbide mononitrate (IMDUR) 60 MG 24 hr tablet, take 1 & 1/2 tablets by mouth every day in the morning., Disp: 180 tablet, Rfl: 3    lisinopril (PRINIVIL,ZESTRIL) 10 MG tablet, Take 1 tablet by mouth every day., Disp: 90 tablet, Rfl: 3    metFORMIN (GLUCOPHAGE) 1000 MG tablet, Take 1 tablet by mouth 2 times daily with morning and evening meals., Disp: 180 tablet, Rfl: 11    Multiple Vitamin (MULTI-VITAMIN DAILY PO), Take  by mouth., Disp: , Rfl:     nitroglycerin (NITROSTAT) 0.4 MG SL tablet, Place 1 tablet under the tongue Every 5 (Five) Minutes As Needed for Chest Pain (max of 3 doses in 15 minutes. If no relief, go to ER., Disp: 25 tablet, Rfl: 12    rOPINIRole (REQUIP) 0.25 MG tablet, Take 1 or 2 tablets by mouth every day at bedtime for Restless Leg syndrome., Disp: 180 tablet, Rfl: 3    Tirzepatide (Mounjaro) 12.5 MG/0.5ML solution pen-injector, Inject 12.5 mg subcutaneously once every week., Disp: 6 mL, Rfl: 2    aspirin 81 MG chewable tablet, Chew 1 tablet by mouth Daily., Disp: 100 tablet, Rfl: 3    Dulaglutide (Trulicity) 0.75 MG/0.5ML solution pen-injector, Inject 0.5 milliliter by subcutaneous route every week in the abdomen, thigh, or upper arm rotating injection sites., Disp: 6 mL, Rfl: 2    Dulaglutide (Trulicity) 1.5 MG/0.5ML solution pen-injector, Inject 1.5 mg under the skin into the appropriate area as directed 1 (One) Time Per Week., Disp: 6 mL, Rfl: 2    glipizide (GLUCOTROL XL) 10 MG 24 hr tablet, Take 1 tablet by mouth 2 (two) times a day., Disp: 180 tablet, Rfl: 3    glucose blood  (FREESTYLE LITE) test strip, Use one strip to check glucose as needed., Disp: 100 each, Rfl: 3    glucose blood test strip, use one strip to check glucose as needed., Disp: 100 each, Rfl: 3    insulin aspart (NovoLOG FlexPen) 100 UNIT/ML solution pen-injector sc pen, Inject 25 units by subcutaneous route 4 times every day with meals or snacks. as needed for glucose above 200, Disp: 15 mL, Rfl: 3    Insulin Lispro, 1 Unit Dial, (HUMALOG) 100 UNIT/ML solution pen-injector, Inject 25 Units under the skin as directed 4 (Four) Times a Day As Needed with meals and snacks. (Use for glucose greater than 200). Discontinue Novolog, Disp: 30 mL, Rfl: 3    Insulin Lispro, 1 Unit Dial, (HUMALOG) 100 UNIT/ML solution pen-injector, Inject 25 units under the skin 4 times daily as needed for meals & snacks (use as directed for a glucose greater than 200). Stop Novolog, Disp: 15 mL, Rfl: 3    Insulin Lispro, 1 Unit Dial, (HUMALOG) 100 UNIT/ML solution pen-injector, Inject 25 units by subcutaneous route 4 times daily as needed for meals & snacks ( use as directed for a glucose greater than 200)  D/C Novolog, Disp: 15 mL, Rfl: 3    Insulin Pen Needle (BD Pen Needle Micro U/F) 32G X 6 MM misc, Use 4 times daily as needed for insulin injections., Disp: 100 each, Rfl: 1    Insulin Pen Needle 32G X 4 MM misc, USE 4 TIMES DAILY AS NEEDED WITH INSULIN, Disp: 100 each, Rfl: 1    isosorbide mononitrate (IMDUR) 60 MG 24 hr tablet, Take 1 & 1/2 tablets by mouth Every Morning., Disp: 180 tablet, Rfl: 3    isosorbide mononitrate (IMDUR) 60 MG 24 hr tablet, Take 1 & 1/2 tablets by mouth every  morning. (Patient taking differently: Take 60 mg by mouth.), Disp: 180 tablet, Rfl: 3    isosorbide mononitrate (IMDUR) 60 MG 24 hr tablet, take 1 & 1/2 tablets by mouth  every day in the morning, Disp: 180 tablet, Rfl: 3    Lancets Thin misc, Use one strip to check glucose as needed., Disp: 300 each, Rfl: 3    metFORMIN (GLUCOPHAGE) 1000 MG tablet, Take 1  "tablet by mouth 2 (Two) Times a Day With morning and evening Meals., Disp: 180 tablet, Rfl: 11    metFORMIN (GLUCOPHAGE) 1000 MG tablet, take 1 tablet by mouth 2 times every day with morning and evening meals, Disp: 180 tablet, Rfl: 11    rOPINIRole (REQUIP) 0.25 MG tablet, Take 1 or 2 tablets by mouth every night at bedtime for Restless leg syndrome., Disp: 180 tablet, Rfl: 3    Tirzepatide (Mounjaro) 10 MG/0.5ML solution pen-injector, Inject 10 MG under the skin once weekly., Disp: 12 mL, Rfl: 2    The following portions of the patient's history were reviewed and updated as appropriate: allergies, current medications, past family history, past medical history, past social history, past surgical history and problem list.    Social History     Tobacco Use    Smoking status: Former     Packs/day: 0.25     Years: 1.00     Pack years: 0.25     Types: Cigarettes     Quit date: 1989     Years since quittin.1    Smokeless tobacco: Never   Vaping Use    Vaping Use: Never used   Substance Use Topics    Alcohol use: No    Drug use: Never         Objective   Vitals:    23 1533   BP: 146/97   BP Location: Left arm   Patient Position: Sitting   Cuff Size: Adult   Pulse: 91   SpO2: 98%   Weight: 106 kg (233 lb)   Height: 167.6 cm (66\")     Body mass index is 37.61 kg/m².    Constitutional:       General: Not in acute distress.     Appearance: Healthy appearance. Well-developed and not in distress. Not diaphoretic.   Eyes:      Conjunctiva/sclera: Conjunctivae normal.      Pupils: Pupils are equal, round, and reactive to light.   HENT:      Head: Normocephalic and atraumatic.   Neck:      Vascular: No carotid bruit or JVD.   Pulmonary:      Effort: Pulmonary effort is normal. No respiratory distress.      Breath sounds: Normal breath sounds.   Cardiovascular:      Normal rate. Regular rhythm.   Edema:     Peripheral edema absent.   Skin:     General: Skin is cool.   Neurological:      Mental Status: Alert, " oriented to person, place, and time and oriented to person, place and time.       Lab Results   Component Value Date     05/18/2023    K 4.4 05/18/2023     (H) 05/18/2023    CO2 22.4 05/18/2023    BUN 22 05/18/2023    CREATININE 0.92 05/18/2023    GLUCOSE 107 (H) 05/18/2023    CALCIUM 9.2 05/18/2023    AST 17 05/18/2023    ALT 18 05/18/2023    ALKPHOS 43 05/18/2023    LABIL2 1.6 12/11/2015     Lab Results   Component Value Date    CKTOTAL 35 10/29/2017     Lab Results   Component Value Date    WBC 6.35 05/18/2023    HGB 12.4 05/18/2023    HCT 40.3 05/18/2023     05/18/2023     Lab Results   Component Value Date    INR 0.98 10/28/2017    INR 0.89 11/29/2016    INR 0.82 11/27/2016     No results found for: MG  Lab Results   Component Value Date    TSH 2.490 05/06/2022    CHLPL 213 (H) 03/14/2016    TRIG 149 05/18/2023    HDL 42 05/18/2023    LDL 41 05/18/2023      Lab Results   Component Value Date    BNP 27.0 12/29/2016       During this visit the following were done:  Labs Reviewed []    Labs Ordered []    Radiology Reports Reviewed []    Radiology Ordered []    PCP Records Reviewed []    Referring Provider Records Reviewed []    ER Records Reviewed []    Hospital Records Reviewed []    History Obtained From Family []    Radiology Images Reviewed []    Other Reviewed []    Records Requested []         ECG 12 Lead    Date/Time: 7/27/2023 3:25 PM  Performed by: Sathya Redman PA-C  Authorized by: Sathya Redman PA-C   Comparison: compared with previous ECG   Similar to previous ECG  Rhythm: sinus rhythm  Conduction: right bundle branch block and left posterior fascicular block  Other findings: poor R wave progression    Clinical impression: abnormal EKG        Assessment & Plan    Diagnosis Plan   1. ASCVD (arteriosclerotic cardiovascular disease)  ECG 12 Lead               Recommendations:  Precordial pain  Although symptoms do sound suspicious I discussed the case with Dr. Ogden since this  was 1 isolated incident with no recurrence so far we will monitor for now.  She did have stress test exactly 12 months ago that was unremarkable.  I did explain to her that if she does have any recurrent episodes we definitely would be pursuing ischemic evaluation potentially with left heart catheterization.  She expressed understanding with this.  She still does have nitroglycerin encouraged her to take this should she have recurrent episodes.  She expressed understanding.  Continue aspirin, atorvastatin, carvedilol, and lisinopril.      No follow-ups on file.    As always, I appreciate very much the opportunity to participate in the cardiovascular care of your patients.      With Best Regards,    Sathya Redman PA-C

## 2023-08-04 ENCOUNTER — OFFICE VISIT (OUTPATIENT)
Dept: FAMILY MEDICINE CLINIC | Age: 63
End: 2023-08-04
Payer: COMMERCIAL

## 2023-08-04 VITALS
HEIGHT: 66 IN | HEART RATE: 84 BPM | DIASTOLIC BLOOD PRESSURE: 100 MMHG | OXYGEN SATURATION: 100 % | WEIGHT: 230 LBS | BODY MASS INDEX: 36.96 KG/M2 | SYSTOLIC BLOOD PRESSURE: 178 MMHG

## 2023-08-04 DIAGNOSIS — E66.01 CLASS 2 SEVERE OBESITY DUE TO EXCESS CALORIES WITH SERIOUS COMORBIDITY AND BODY MASS INDEX (BMI) OF 35.0 TO 35.9 IN ADULT: Primary | ICD-10-CM

## 2023-08-04 PROCEDURE — 99214 OFFICE O/P EST MOD 30 MIN: CPT | Performed by: NURSE PRACTITIONER

## 2023-08-04 NOTE — PROGRESS NOTES
"Chief Complaint  Weight Gain    Subjective            History of Present Illness  Jannette Onofre is a 63 y.o. female presents to [facility] today with interest in the weight loss program with a BMI of Body mass index is 37.14 kg/mý..   Patient denies a personal and family history of pancreatitis and medullary thyroid cancer. Patient also denies a personal history of gastroparesis or gallbladder disease.   Pt has an extensive med/surgical history including having the gastric sleeve procedure in 2019. She is currently being prescribed Mounjaro and lost approximately 80lbs. Pt states that she wants to continue her medication through our program to help with accountability and education on weight loss. Her BP was very high today. We discussed being evaluated in the ER for this. However patient states that she is currently under the care of cardiologist Dr Ogden who has recently adjusted her medications. She is going to call his office and also has a scheduled f/u appt on 08/10/23. Pt denies having any CP, arm pain, back pain or SOB at this time. She continues to exercise and states that she walks 2-3x a week for 1 to 1.5 miles a day. In addition she attempts to take in 90g protein daily and less refined carbs.             Objective   Vital Signs:  Vitals:    08/04/23 1352   BP: 178/100   BP Location: Right arm   Patient Position: Sitting   Cuff Size: Large Adult   Pulse: 84   SpO2: 100%   Weight: 104 kg (230 lb)   Height: 167.6 cm (65.98\")      Estimated body mass index is 37.14 kg/mý as calculated from the following:    Height as of this encounter: 167.6 cm (65.98\").    Weight as of this encounter: 104 kg (230 lb).             Physical Exam  Constitutional:       Appearance: She is obese.   HENT:      Head: Normocephalic.   Eyes:      Pupils: Pupils are equal, round, and reactive to light.   Neck:      Comments: No masses, nodules or cysts palpated   Cardiovascular:      Rate and Rhythm: " Normal rate and regular rhythm.      Pulses: Normal pulses.      Heart sounds: Normal heart sounds.   Pulmonary:      Effort: Pulmonary effort is normal.      Breath sounds: Normal breath sounds.   Musculoskeletal:      Cervical back: Full passive range of motion without pain, normal range of motion and neck supple.   Neurological:      Mental Status: She is alert.      Result Review :           Assessment and Plan     Diagnoses and all orders for this visit:    1. Class 2 severe obesity due to excess calories with serious comorbidity and body mass index (BMI) of 35.0 to 35.9 in adult (Primary)  -     Ambulatory Referral to Specialty Pharmacy  -     Hemoglobin A1c; Future  -     TSH; Future  -     Comprehensive Metabolic Panel; Future  -     C-reactive Protein; Future         Patient is a candidate for this program due to BMI of Obese Class II: 35-39.9kg/m2. Patient advised to go to the ER for her bp and in addition should call Dr. Ogden's office.  Patient verbalizes understanding.          Patient Education:     Patient is a candidate for the program with a BMI of Body mass index is 37.14 kg/mý.. Details regarding the process of the program discussed with patient. Offered education on weight loss medications.  Patient voiced understanding.  Referral sent to specialty pharmacy for PA.  Outpatient labs ordered for baseline. Patient had no further questions or concerns.     I explained that obesity and an elevated BMI can be a disease of body weight dysregulation influenced by factors including environment, genetics and hormones and intiated the discussion of semaglutides in appetite dysregulation and metabolic adaptation. We discussed possible side effects and complications of GLP1s including but not limited to nausea, vomiting, abdominal pain, bloating, constipation, diarrhea, slowed digestion, heartburn and gallstones or gallbladder disease. Reviewed that side effects are mild to moderate and dose dependent and  typically subside in 4-6 weeks. Will await lab results and if appropriate, refer to [facility] pharmacists.      Follow Up   Return in about 6 weeks (around 9/15/2023) for Recheck.  Patient was given instructions and counseling regarding her condition or for health maintenance advice. Please see specific information pulled into the AVS if appropriate.         This document has been electronically signed by BRYNN Rene  August 7, 2023 10:27 EDT

## 2023-08-07 ENCOUNTER — APPOINTMENT (OUTPATIENT)
Dept: CT IMAGING | Facility: HOSPITAL | Age: 63
End: 2023-08-07
Payer: COMMERCIAL

## 2023-08-07 ENCOUNTER — APPOINTMENT (OUTPATIENT)
Dept: GENERAL RADIOLOGY | Facility: HOSPITAL | Age: 63
End: 2023-08-07
Payer: COMMERCIAL

## 2023-08-07 ENCOUNTER — HOSPITAL ENCOUNTER (EMERGENCY)
Facility: HOSPITAL | Age: 63
Discharge: HOME | End: 2023-08-07
Attending: EMERGENCY MEDICINE | Admitting: EMERGENCY MEDICINE
Payer: COMMERCIAL

## 2023-08-07 ENCOUNTER — LAB (OUTPATIENT)
Dept: LAB | Facility: HOSPITAL | Age: 63
End: 2023-08-07
Payer: COMMERCIAL

## 2023-08-07 VITALS
SYSTOLIC BLOOD PRESSURE: 140 MMHG | HEART RATE: 84 BPM | RESPIRATION RATE: 18 BRPM | OXYGEN SATURATION: 100 % | TEMPERATURE: 97.6 F | BODY MASS INDEX: 36.8 KG/M2 | HEIGHT: 66 IN | DIASTOLIC BLOOD PRESSURE: 79 MMHG | WEIGHT: 229 LBS

## 2023-08-07 DIAGNOSIS — R07.9 CHEST PAIN, UNSPECIFIED TYPE: ICD-10-CM

## 2023-08-07 DIAGNOSIS — E66.01 CLASS 2 SEVERE OBESITY DUE TO EXCESS CALORIES WITH SERIOUS COMORBIDITY AND BODY MASS INDEX (BMI) OF 35.0 TO 35.9 IN ADULT: ICD-10-CM

## 2023-08-07 DIAGNOSIS — I10 PRIMARY HYPERTENSION: Primary | ICD-10-CM

## 2023-08-07 LAB
ALBUMIN SERPL-MCNC: 4.4 G/DL (ref 3.5–5.2)
ALBUMIN SERPL-MCNC: 4.6 G/DL (ref 3.5–5.2)
ALBUMIN/GLOB SERPL: 1.9 G/DL
ALBUMIN/GLOB SERPL: 2.1 G/DL
ALP SERPL-CCNC: 43 U/L (ref 39–117)
ALP SERPL-CCNC: 43 U/L (ref 39–117)
ALT SERPL W P-5'-P-CCNC: 19 U/L (ref 1–33)
ALT SERPL W P-5'-P-CCNC: 20 U/L (ref 1–33)
ANION GAP SERPL CALCULATED.3IONS-SCNC: 13.1 MMOL/L (ref 5–15)
ANION GAP SERPL CALCULATED.3IONS-SCNC: 14.8 MMOL/L (ref 5–15)
AST SERPL-CCNC: 20 U/L (ref 1–32)
AST SERPL-CCNC: 22 U/L (ref 1–32)
BASOPHILS # BLD AUTO: 0.04 10*3/MM3 (ref 0–0.2)
BASOPHILS NFR BLD AUTO: 0.6 % (ref 0–1.5)
BILIRUB SERPL-MCNC: 0.4 MG/DL (ref 0–1.2)
BILIRUB SERPL-MCNC: 0.6 MG/DL (ref 0–1.2)
BUN SERPL-MCNC: 23 MG/DL (ref 8–23)
BUN SERPL-MCNC: 26 MG/DL (ref 8–23)
BUN/CREAT SERPL: 20.7 (ref 7–25)
BUN/CREAT SERPL: 23.2 (ref 7–25)
CALCIUM SPEC-SCNC: 9.6 MG/DL (ref 8.6–10.5)
CALCIUM SPEC-SCNC: 9.6 MG/DL (ref 8.6–10.5)
CHLORIDE SERPL-SCNC: 109 MMOL/L (ref 98–107)
CHLORIDE SERPL-SCNC: 111 MMOL/L (ref 98–107)
CO2 SERPL-SCNC: 20.9 MMOL/L (ref 22–29)
CO2 SERPL-SCNC: 22.2 MMOL/L (ref 22–29)
CREAT SERPL-MCNC: 1.11 MG/DL (ref 0.57–1)
CREAT SERPL-MCNC: 1.12 MG/DL (ref 0.57–1)
CRP SERPL-MCNC: <0.3 MG/DL (ref 0–0.5)
DEPRECATED RDW RBC AUTO: 45.5 FL (ref 37–54)
EGFRCR SERPLBLD CKD-EPI 2021: 55 ML/MIN/1.73
EGFRCR SERPLBLD CKD-EPI 2021: 55.6 ML/MIN/1.73
EOSINOPHIL # BLD AUTO: 0.1 10*3/MM3 (ref 0–0.4)
EOSINOPHIL NFR BLD AUTO: 1.6 % (ref 0.3–6.2)
ERYTHROCYTE [DISTWIDTH] IN BLOOD BY AUTOMATED COUNT: 14 % (ref 12.3–15.4)
GEN 5 2HR TROPONIN T REFLEX: 7 NG/L
GLOBULIN UR ELPH-MCNC: 2.2 GM/DL
GLOBULIN UR ELPH-MCNC: 2.3 GM/DL
GLUCOSE BLDC GLUCOMTR-MCNC: 69 MG/DL (ref 70–130)
GLUCOSE BLDC GLUCOMTR-MCNC: 94 MG/DL (ref 70–130)
GLUCOSE SERPL-MCNC: 129 MG/DL (ref 65–99)
GLUCOSE SERPL-MCNC: 96 MG/DL (ref 65–99)
HBA1C MFR BLD: 6.5 % (ref 4.8–5.6)
HCT VFR BLD AUTO: 40.6 % (ref 34–46.6)
HGB BLD-MCNC: 12.7 G/DL (ref 12–15.9)
HOLD SPECIMEN: NORMAL
HOLD SPECIMEN: NORMAL
IMM GRANULOCYTES # BLD AUTO: 0.06 10*3/MM3 (ref 0–0.05)
IMM GRANULOCYTES NFR BLD AUTO: 1 % (ref 0–0.5)
INR PPP: 0.95 (ref 0.9–1.1)
LYMPHOCYTES # BLD AUTO: 1.97 10*3/MM3 (ref 0.7–3.1)
LYMPHOCYTES NFR BLD AUTO: 31.8 % (ref 19.6–45.3)
MAGNESIUM SERPL-MCNC: 1.6 MG/DL (ref 1.6–2.4)
MCH RBC QN AUTO: 28.1 PG (ref 26.6–33)
MCHC RBC AUTO-ENTMCNC: 31.3 G/DL (ref 31.5–35.7)
MCV RBC AUTO: 89.8 FL (ref 79–97)
MONOCYTES # BLD AUTO: 0.4 10*3/MM3 (ref 0.1–0.9)
MONOCYTES NFR BLD AUTO: 6.5 % (ref 5–12)
NEUTROPHILS NFR BLD AUTO: 3.62 10*3/MM3 (ref 1.7–7)
NEUTROPHILS NFR BLD AUTO: 58.5 % (ref 42.7–76)
NRBC BLD AUTO-RTO: 0 /100 WBC (ref 0–0.2)
PLATELET # BLD AUTO: 169 10*3/MM3 (ref 140–450)
PMV BLD AUTO: 9.8 FL (ref 6–12)
POTASSIUM SERPL-SCNC: 3.9 MMOL/L (ref 3.5–5.2)
POTASSIUM SERPL-SCNC: 4 MMOL/L (ref 3.5–5.2)
PROT SERPL-MCNC: 6.7 G/DL (ref 6–8.5)
PROT SERPL-MCNC: 6.8 G/DL (ref 6–8.5)
PROTHROMBIN TIME: 13.1 SECONDS (ref 12.1–14.7)
RBC # BLD AUTO: 4.52 10*6/MM3 (ref 3.77–5.28)
SODIUM SERPL-SCNC: 145 MMOL/L (ref 136–145)
SODIUM SERPL-SCNC: 146 MMOL/L (ref 136–145)
TROPONIN T DELTA: -2 NG/L
TROPONIN T SERPL HS-MCNC: 9 NG/L
TSH SERPL DL<=0.05 MIU/L-ACNC: 1.14 UIU/ML (ref 0.27–4.2)
TSH SERPL DL<=0.05 MIU/L-ACNC: 1.51 UIU/ML (ref 0.27–4.2)
WBC NRBC COR # BLD: 6.19 10*3/MM3 (ref 3.4–10.8)
WHOLE BLOOD HOLD COAG: NORMAL
WHOLE BLOOD HOLD SPECIMEN: NORMAL

## 2023-08-07 PROCEDURE — 71045 X-RAY EXAM CHEST 1 VIEW: CPT | Performed by: RADIOLOGY

## 2023-08-07 PROCEDURE — 80050 GENERAL HEALTH PANEL: CPT | Performed by: PHYSICIAN ASSISTANT

## 2023-08-07 PROCEDURE — 36415 COLL VENOUS BLD VENIPUNCTURE: CPT

## 2023-08-07 PROCEDURE — 99284 EMERGENCY DEPT VISIT MOD MDM: CPT

## 2023-08-07 PROCEDURE — 80053 COMPREHEN METABOLIC PANEL: CPT

## 2023-08-07 PROCEDURE — 86140 C-REACTIVE PROTEIN: CPT

## 2023-08-07 PROCEDURE — 82948 REAGENT STRIP/BLOOD GLUCOSE: CPT

## 2023-08-07 PROCEDURE — 85610 PROTHROMBIN TIME: CPT | Performed by: PHYSICIAN ASSISTANT

## 2023-08-07 PROCEDURE — 93005 ELECTROCARDIOGRAM TRACING: CPT

## 2023-08-07 PROCEDURE — 83735 ASSAY OF MAGNESIUM: CPT | Performed by: PHYSICIAN ASSISTANT

## 2023-08-07 PROCEDURE — 70450 CT HEAD/BRAIN W/O DYE: CPT

## 2023-08-07 PROCEDURE — 71045 X-RAY EXAM CHEST 1 VIEW: CPT

## 2023-08-07 PROCEDURE — 84443 ASSAY THYROID STIM HORMONE: CPT

## 2023-08-07 PROCEDURE — 83036 HEMOGLOBIN GLYCOSYLATED A1C: CPT

## 2023-08-07 PROCEDURE — 70450 CT HEAD/BRAIN W/O DYE: CPT | Performed by: RADIOLOGY

## 2023-08-07 PROCEDURE — 84484 ASSAY OF TROPONIN QUANT: CPT | Performed by: PHYSICIAN ASSISTANT

## 2023-08-07 PROCEDURE — 25010000002 HYDRALAZINE PER 20 MG: Performed by: PHYSICIAN ASSISTANT

## 2023-08-07 PROCEDURE — 96374 THER/PROPH/DIAG INJ IV PUSH: CPT

## 2023-08-07 RX ORDER — ASPIRIN 81 MG/1
324 TABLET, CHEWABLE ORAL ONCE
Status: COMPLETED | OUTPATIENT
Start: 2023-08-07 | End: 2023-08-07

## 2023-08-07 RX ORDER — HYDRALAZINE HYDROCHLORIDE 20 MG/ML
10 INJECTION INTRAMUSCULAR; INTRAVENOUS ONCE
Status: DISCONTINUED | OUTPATIENT
Start: 2023-08-07 | End: 2023-08-07

## 2023-08-07 RX ORDER — HYDRALAZINE HYDROCHLORIDE 20 MG/ML
10 INJECTION INTRAMUSCULAR; INTRAVENOUS ONCE
Status: COMPLETED | OUTPATIENT
Start: 2023-08-07 | End: 2023-08-07

## 2023-08-07 RX ORDER — SODIUM CHLORIDE 0.9 % (FLUSH) 0.9 %
10 SYRINGE (ML) INJECTION AS NEEDED
Status: DISCONTINUED | OUTPATIENT
Start: 2023-08-07 | End: 2023-08-07 | Stop reason: HOSPADM

## 2023-08-07 RX ORDER — AMLODIPINE BESYLATE 5 MG/1
10 TABLET ORAL
Status: DISCONTINUED | OUTPATIENT
Start: 2023-08-07 | End: 2023-08-07 | Stop reason: HOSPADM

## 2023-08-07 RX ADMIN — SODIUM CHLORIDE 500 ML: 9 INJECTION, SOLUTION INTRAVENOUS at 15:11

## 2023-08-07 RX ADMIN — ASPIRIN 324 MG: 81 TABLET, CHEWABLE ORAL at 12:25

## 2023-08-07 RX ADMIN — HYDRALAZINE HYDROCHLORIDE 10 MG: 20 INJECTION INTRAMUSCULAR; INTRAVENOUS at 13:54

## 2023-08-07 RX ADMIN — AMLODIPINE BESYLATE 10 MG: 5 TABLET ORAL at 15:11

## 2023-08-07 NOTE — ED NOTES
Provider made aware of glucose at this time. Pt provided with juice and juan crackers with peanut butter.

## 2023-08-07 NOTE — ED PROVIDER NOTES
Subjective   History of Present Illness  63-year-old female with past medical history of anxiety, depression, diabetes, coronary artery disease, dyspepsia, hyperlipidemia, hypertension, melanoma, and BRICE presents to the emergency room with hypertension and intermittent chest pain for the past 1 week.  Patient also endorses a headache for the past 2 weeks and chest pressure and squeezing.  Patient states last Wednesday he began taking nitroglycerin tablets which did resolve her pain and then the following Thursday was seen by her cardiologist, Hossein Redman and an EKG was performed at that time and she was told that there was no EKG changes for acute ischemia.  Patient states that it has been almost 1 year since she had a stress test and echocardiogram.  Patient states last week she went to the weight loss clinic and that is when it was discovered that her blood pressure was high and states it was 197/100-kisha and recommended that she come back this day for a recheck.  Patient states that she went back this day and blood pressure again was 197/108 and therefore was referred to the emergency room.  Denies any radiation of her chest pain.  She denies shortness of breath, nausea, vomiting, visual changes, hematuria, or back pain.  Denies any specific aggravating factors.  Alleviating factors do include nitroglycerin tablets.  No other complaints or concerns at this time.    History provided by:  Patient   used: No      Review of Systems   Constitutional: Negative.  Negative for fever.   Respiratory: Negative.  Negative for chest tightness.    Cardiovascular:  Positive for chest pain.        (+) HTN   Gastrointestinal: Negative.  Negative for abdominal pain, nausea and vomiting.   Endocrine: Negative.    Genitourinary: Negative.  Negative for dysuria.   Skin: Negative.    Neurological:  Positive for headaches.   Psychiatric/Behavioral: Negative.     All other systems reviewed and are negative.    Past  "Medical History:   Diagnosis Date    Anxiety     Coronary artery disease     s/p LAD stenting, follows w/ Dr. Ogden    Depression     Diabetes mellitus     dx , initially improved s/p AGB but now back on insulin     Dyspepsia     Dyspnea on exertion     Fatigue     Hyperlipidemia     Hypertension     Melanoma         Morbid obesity     Myocardial infarction 2016    s/p LAD stenting, on ASA 325mg + Plavix    BRICE on CPAP     noncompliant w/ device, \"lucia me\"    Umbilical hernia     multiple recurrent incisional hernias at the umbilicus, left undisturbed during LSG, can consider general surgery repair in the future, if needed.    Wears glasses        No Known Allergies    Past Surgical History:   Procedure Laterality Date    CARDIAC CATHETERIZATION N/A 2016    Procedure: Left Heart Cath;  Surgeon: Akhil Zuñiga MD;  Location:  [facility];  Service:     CARDIAC CATHETERIZATION N/A 10/31/2017    Procedure: Left Heart Cath;  Surgeon: Lebron Ogden MD;  Location:  [facility];  Service:     CARDIAC CATHETERIZATION  10/31/2017    Procedure: Functional Flow Snyder;  Surgeon: Denilson Pena MD;  Location:  [facility];  Service:     CARDIAC CATHETERIZATION      02, 3/5/07, 3/23/11    CATARACT EXTRACTION Right     CATARACT EXTRACTION Left      RIGHT 05     SECTION      COLONOSCOPY      routine/ COLONGUARD     ENDOSCOPY      3/09, ,     ENDOSCOPY N/A 2019    Procedure: ESOPHAGOGASTRODUODENOSCOPY;  Surgeon: Ranjeet Dunbar MD;  Location:  [facility];  Service: Bariatric    FINGER/THUMB CLOSED REDUCTION Left 8/3/2017    Procedure: CLOSED REDUCTION PERCUTANEOUS PINNING LEFT FIFTH METACARPAL SHAFT FRACTURE;  Surgeon: Oscar León MD;  Location: [facility];  Service:     GASTRIC BANDING REMOVAL  2019    w/ Dr. Dunbar    GASTRIC SLEEVE LAPAROSCOPIC N/A 2019    Procedure: " GASTRIC SLEEVE LAPAROSCOPIC;  Surgeon: Ranjeet Dunbar MD;  Location:  [facility];  Service: Bariatric    LAPAROSCOPIC CHOLECYSTECTOMY      w/ [facility]    LAPAROSCOPIC GASTRIC BANDING  2013    s/p LAGB by Dr Russell    OTHER SURGICAL HISTORY      melanoma removal/back    UT RT/LT HEART CATHETERS N/A 2016    Procedure: Percutaneous Coronary Intervention;  Surgeon: Akhil Zuñiga MD;  Location:  [facility];  Service: Cardiology    SEPTOPLASTY      UMBILICAL HERNIA REPAIR      no mesh       Family History   Problem Relation Age of Onset    Diabetes Mother     Heart disease Mother     Hyperlipidemia Mother     Hypertension Mother     Skin cancer Mother     COPD Father     Heart disease Father     Hyperlipidemia Father     Heart attack Father     Alzheimer's disease Father     Diabetes Maternal Aunt     Diabetes Maternal Grandmother     Heart disease Maternal Grandfather     Heart attack Maternal Grandfather     Hypertension Maternal Grandfather     Hypertension Sister     Melanoma Brother     Breast cancer Neg Hx        Social History     Socioeconomic History    Marital status:    Tobacco Use    Smoking status: Former     Packs/day: 0.25     Years: 1.00     Pack years: 0.25     Types: Cigarettes     Quit date: 1989     Years since quittin.1    Smokeless tobacco: Never   Vaping Use    Vaping Use: Never used   Substance and Sexual Activity    Alcohol use: No    Drug use: Never    Sexual activity: Defer           Objective   Physical Exam  Vitals and nursing note reviewed.   Constitutional:       General: She is not in acute distress.     Appearance: She is well-developed. She is not diaphoretic.   HENT:      Head: Normocephalic and atraumatic.      Right Ear: External ear normal.      Left Ear: External ear normal.      Nose: Nose normal.   Eyes:      Conjunctiva/sclera: Conjunctivae normal.      Pupils: Pupils are equal, round, and reactive to light.   Neck:       Vascular: No JVD.      Trachea: No tracheal deviation.   Cardiovascular:      Rate and Rhythm: Normal rate and regular rhythm.      Heart sounds: Normal heart sounds. No murmur heard.  Pulmonary:      Effort: Pulmonary effort is normal. No respiratory distress.      Breath sounds: Normal breath sounds. No wheezing.   Abdominal:      General: Bowel sounds are normal.      Palpations: Abdomen is soft.      Tenderness: There is no abdominal tenderness.   Musculoskeletal:         General: No deformity. Normal range of motion.      Cervical back: Normal range of motion and neck supple.   Skin:     General: Skin is warm and dry.      Coloration: Skin is not pale.      Findings: No erythema or rash.   Neurological:      Mental Status: She is alert and oriented to person, place, and time.      Cranial Nerves: No cranial nerve deficit.   Psychiatric:         Behavior: Behavior normal.         Thought Content: Thought content normal.       Procedures           ED Course  ED Course as of 08/07/23 2213   Mon Aug 07, 2023   1316 ECG 12 Lead Other; hypertension  Normal sinus rhythm.  Rate 94.  Right axis.  Right bundle branch block.  No acute ischemic changes.  Abnormal EKG.  Interpreted by me.  Electronically signed by Patrick Goldman MD, 08/07/23, 1:17 PM EDT.   [BC]   1451 XR Chest 1 View [TK]   1553 CT Head Without Contrast [TK]      ED Course User Index  [BC] Patrick Goldman MD  [TK] Laila Glaser PA-C           HEART SCORE:3    Results for orders placed or performed during the hospital encounter of 08/07/23   Comprehensive Metabolic Panel    Specimen: Blood   Result Value Ref Range    Glucose 129 (H) 65 - 99 mg/dL    BUN 26 (H) 8 - 23 mg/dL    Creatinine 1.12 (H) 0.57 - 1.00 mg/dL    Sodium 145 136 - 145 mmol/L    Potassium 3.9 3.5 - 5.2 mmol/L    Chloride 111 (H) 98 - 107 mmol/L    CO2 20.9 (L) 22.0 - 29.0 mmol/L    Calcium 9.6 8.6 - 10.5 mg/dL    Total Protein 6.7 6.0 - 8.5 g/dL    Albumin 4.4 3.5 - 5.2 g/dL    ALT  (SGPT) 20 1 - 33 U/L    AST (SGOT) 20 1 - 32 U/L    Alkaline Phosphatase 43 39 - 117 U/L    Total Bilirubin 0.4 0.0 - 1.2 mg/dL    Globulin 2.3 gm/dL    A/G Ratio 1.9 g/dL    BUN/Creatinine Ratio 23.2 7.0 - 25.0    Anion Gap 13.1 5.0 - 15.0 mmol/L    eGFR 55.0 (L) >60.0 mL/min/1.73   High Sensitivity Troponin T    Specimen: Blood   Result Value Ref Range    HS Troponin T 9 <10 ng/L   Protime-INR    Specimen: Blood   Result Value Ref Range    Protime 13.1 12.1 - 14.7 Seconds    INR 0.95 0.90 - 1.10   TSH    Specimen: Blood   Result Value Ref Range    TSH 1.140 0.270 - 4.200 uIU/mL   Magnesium    Specimen: Blood   Result Value Ref Range    Magnesium 1.6 1.6 - 2.4 mg/dL   CBC Auto Differential    Specimen: Blood   Result Value Ref Range    WBC 6.19 3.40 - 10.80 10*3/mm3    RBC 4.52 3.77 - 5.28 10*6/mm3    Hemoglobin 12.7 12.0 - 15.9 g/dL    Hematocrit 40.6 34.0 - 46.6 %    MCV 89.8 79.0 - 97.0 fL    MCH 28.1 26.6 - 33.0 pg    MCHC 31.3 (L) 31.5 - 35.7 g/dL    RDW 14.0 12.3 - 15.4 %    RDW-SD 45.5 37.0 - 54.0 fl    MPV 9.8 6.0 - 12.0 fL    Platelets 169 140 - 450 10*3/mm3    Neutrophil % 58.5 42.7 - 76.0 %    Lymphocyte % 31.8 19.6 - 45.3 %    Monocyte % 6.5 5.0 - 12.0 %    Eosinophil % 1.6 0.3 - 6.2 %    Basophil % 0.6 0.0 - 1.5 %    Immature Grans % 1.0 (H) 0.0 - 0.5 %    Neutrophils, Absolute 3.62 1.70 - 7.00 10*3/mm3    Lymphocytes, Absolute 1.97 0.70 - 3.10 10*3/mm3    Monocytes, Absolute 0.40 0.10 - 0.90 10*3/mm3    Eosinophils, Absolute 0.10 0.00 - 0.40 10*3/mm3    Basophils, Absolute 0.04 0.00 - 0.20 10*3/mm3    Immature Grans, Absolute 0.06 (H) 0.00 - 0.05 10*3/mm3    nRBC 0.0 0.0 - 0.2 /100 WBC   High Sensitivity Troponin T 2Hr    Specimen: Arm, Right; Blood   Result Value Ref Range    HS Troponin T 7 <10 ng/L    Troponin T Delta -2 >=-4 - <+4 ng/L   POC Glucose Once    Specimen: Blood   Result Value Ref Range    Glucose 69 (L) 70 - 130 mg/dL   POC Glucose Once    Specimen: Blood   Result Value Ref Range     Glucose 94 70 - 130 mg/dL   ECG 12 Lead Other; hypertension   Result Value Ref Range    QT Interval 410 ms    QTC Interval 512 ms   Green Top (Gel)   Result Value Ref Range    Extra Tube Hold for add-ons.    Lavender Top   Result Value Ref Range    Extra Tube hold for add-on    Gold Top - SST   Result Value Ref Range    Extra Tube Hold for add-ons.    Light Blue Top   Result Value Ref Range    Extra Tube Hold for add-ons.        CT Head Without Contrast   Final Result     Unremarkable exam demonstrating no CT evidence of acute intracranial   findings.       This report was finalized on 8/7/2023 3:50 PM by Dr. Hermann Mccauley MD.          XR Chest 1 View   Final Result     Mild cardiomegaly. Otherwise unremarkable chest.       This report was finalized on 8/7/2023 12:55 PM by Dr. Hermann Mccauley MD.                                                Medical Decision Making  63-year-old female with past medical history of anxiety, depression, diabetes, coronary artery disease, dyspepsia, hyperlipidemia, hypertension, melanoma, and BRICE presents to the emergency room with hypertension and intermittent chest pain for the past 1 week.  Patient also endorses a headache for the past 2 weeks and chest pressure and squeezing.  Patient states last Wednesday he began taking nitroglycerin tablets which did resolve her pain and then the following Thursday was seen by her cardiologist, Hossein Redman and an EKG was performed at that time and she was told that there was no EKG changes for acute ischemia.  Patient states that it has been almost 1 year since she had a stress test and echocardiogram.  Patient states last week she went to the weight loss clinic and that is when it was discovered that her blood pressure was high and states it was 197/100-kisha and recommended that she come back this day for a recheck.  Patient states that she went back this day and blood pressure again was 197/108 and therefore was referred to the emergency  room.  Denies any radiation of her chest pain.  She denies shortness of breath, nausea, vomiting, visual changes, hematuria, or back pain.  Denies any specific aggravating factors.  Alleviating factors do include nitroglycerin tablets.  No other complaints or concerns at this time.      Problems Addressed:  Chest pain, unspecified type: complicated acute illness or injury  Primary hypertension: complicated acute illness or injury    Amount and/or Complexity of Data Reviewed  Labs: ordered. Decision-making details documented in ED Course.  Radiology: ordered. Decision-making details documented in ED Course.  ECG/medicine tests: ordered. Decision-making details documented in ED Course.    Risk  OTC drugs.  Prescription drug management.        Final diagnoses:   Primary hypertension   Chest pain, unspecified type       ED Disposition  ED Disposition       ED Disposition   Discharge    Condition   Stable    Comment   --               Akhil Huerta MD  [address] [facility] [address]  [phone]    In 2 days           Medication List        Changed      * isosorbide mononitrate 60 MG 24 hr tablet  Commonly known as: IMDUR  Take 1 & 1/2 tablets by mouth Every Morning.  What changed: Another medication with the same name was changed. Make sure you understand how and when to take each.     * isosorbide mononitrate 60 MG 24 hr tablet  Commonly known as: IMDUR  Take 1 & 1/2 tablets by mouth every  morning.  What changed: how much to take     * isosorbide mononitrate 60 MG 24 hr tablet  Commonly known as: IMDUR  take 1 & 1/2 tablets by mouth  every day in the morning  What changed: Another medication with the same name was changed. Make sure you understand how and when to take each.     * isosorbide mononitrate 60 MG 24 hr tablet  Commonly known as: IMDUR  take 1 & 1/2 tablets by mouth every day in the morning.  What changed: Another medication with the same name was changed. Make sure you understand how and  when to take each.           * This list has 4 medication(s) that are the same as other medications prescribed for you. Read the directions carefully, and ask your doctor or other care provider to review them with you.                     Laila Glaser PA-C  08/07/23 [id number]

## 2023-08-08 ENCOUNTER — TELEPHONE (OUTPATIENT)
Dept: CARDIOLOGY | Facility: CLINIC | Age: 63
End: 2023-08-08

## 2023-08-08 ENCOUNTER — TELEPHONE (OUTPATIENT)
Dept: PHARMACY | Facility: HOSPITAL | Age: 63
End: 2023-08-08
Payer: COMMERCIAL

## 2023-08-08 ENCOUNTER — TRANSCRIBE ORDERS (OUTPATIENT)
Dept: ADMINISTRATIVE | Facility: HOSPITAL | Age: 63
End: 2023-08-08
Payer: COMMERCIAL

## 2023-08-08 DIAGNOSIS — R07.9 CHEST PAIN, UNSPECIFIED TYPE: Primary | ICD-10-CM

## 2023-08-08 LAB
QT INTERVAL: 410 MS
QTC INTERVAL: 512 MS

## 2023-08-08 NOTE — TELEPHONE ENCOUNTER
"Caller: Jannette Onofre \"Crystal\"    Relationship to patient: Self    Best call back number: [phone]    Patient is needing: PATIENT HAS A STRESS TEST SCHEDULED FOR THURSDAY. PATIENT WAS IN THE ER YESTERDAY BECAUSE OF HIGH BLOOD PRESSURE. PATIENT WOULD LIKE TO CANCEL HER APPOINTMENT WITH [facility] AND WOULD LIKE TO SEE DR. ROSENBERG INSTEAD.     "

## 2023-08-10 ENCOUNTER — HOSPITAL ENCOUNTER (OUTPATIENT)
Dept: NUCLEAR MEDICINE | Facility: HOSPITAL | Age: 63
Discharge: HOME | End: 2023-08-10
Payer: COMMERCIAL

## 2023-08-10 ENCOUNTER — HOSPITAL ENCOUNTER (OUTPATIENT)
Dept: CARDIOLOGY | Facility: HOSPITAL | Age: 63
Discharge: HOME | End: 2023-08-10
Payer: COMMERCIAL

## 2023-08-10 DIAGNOSIS — R07.9 CHEST PAIN, UNSPECIFIED TYPE: ICD-10-CM

## 2023-08-10 PROCEDURE — 93017 CV STRESS TEST TRACING ONLY: CPT

## 2023-08-10 PROCEDURE — 0 TECHNETIUM SESTAMIBI: Performed by: EMERGENCY MEDICINE

## 2023-08-10 PROCEDURE — 78452 HT MUSCLE IMAGE SPECT MULT: CPT

## 2023-08-10 PROCEDURE — A9500 TC99M SESTAMIBI: HCPCS | Performed by: EMERGENCY MEDICINE

## 2023-08-10 RX ADMIN — TECHNETIUM TC 99M SESTAMIBI 1 DOSE: 1 INJECTION INTRAVENOUS at 07:12

## 2023-08-10 RX ADMIN — TECHNETIUM TC 99M SESTAMIBI 1 DOSE: 1 INJECTION INTRAVENOUS at 08:22

## 2023-08-15 ENCOUNTER — PATIENT OUTREACH (OUTPATIENT)
Dept: CASE MANAGEMENT | Facility: OTHER | Age: 63
End: 2023-08-15
Payer: COMMERCIAL

## 2023-08-15 LAB
BH CV NUCLEAR PRIOR STUDY: 3
BH CV REST NUCLEAR ISOTOPE DOSE: 10.5 MCI
BH CV STRESS BP STAGE 1: NORMAL
BH CV STRESS DURATION MIN STAGE 1: 3
BH CV STRESS DURATION SEC STAGE 1: 0
BH CV STRESS DURATION SEC STAGE 2: 35
BH CV STRESS GRADE STAGE 1: 10
BH CV STRESS GRADE STAGE 2: 12
BH CV STRESS HR STAGE 1: 130
BH CV STRESS HR STAGE 2: 133
BH CV STRESS METS STAGE 1: 5
BH CV STRESS METS STAGE 2: 7.5
BH CV STRESS NUCLEAR ISOTOPE DOSE: 30.1 MCI
BH CV STRESS PROTOCOL 1: NORMAL
BH CV STRESS RECOVERY BP: NORMAL MMHG
BH CV STRESS RECOVERY HR: 71 BPM
BH CV STRESS SPEED STAGE 1: 1.7
BH CV STRESS SPEED STAGE 2: 2.5
BH CV STRESS STAGE 1: 1
BH CV STRESS STAGE 2: 2
LV EF NUC BP: 66 %
MAXIMAL PREDICTED HEART RATE: 156 BPM
PERCENT MAX PREDICTED HR: 85.26 %
STRESS BASELINE BP: NORMAL MMHG
STRESS BASELINE HR: 77 BPM
STRESS PERCENT HR: 100 %
STRESS POST ESTIMATED WORKLOAD: 6 METS
STRESS POST EXERCISE DUR MIN: 3 MIN
STRESS POST EXERCISE DUR SEC: 35 SEC
STRESS POST PEAK BP: NORMAL MMHG
STRESS POST PEAK HR: 133 BPM
STRESS TARGET HR: 133 BPM

## 2023-08-15 NOTE — OUTREACH NOTE
AMBULATORY CASE MANAGEMENT NOTE    Patient Outreach    Call to patient for f/u after recent ED visit. She states she is doing ok at this time. She has completed a stress test and is waiting to talk with MD about the results. She states her f/u appt is in 2 weeks. She is monitoring BP at home and still a little elevated but no longer c/o chest pain. Patient agrees to f/u after MD appt in 2 weeks    [facility]  Ambulatory Case Management    8/15/2023, 10:52 EDT

## 2023-08-28 ENCOUNTER — OFFICE VISIT (OUTPATIENT)
Dept: CARDIOLOGY | Facility: CLINIC | Age: 63
End: 2023-08-28
Payer: COMMERCIAL

## 2023-08-28 VITALS
OXYGEN SATURATION: 100 % | DIASTOLIC BLOOD PRESSURE: 95 MMHG | WEIGHT: 225 LBS | HEIGHT: 66 IN | SYSTOLIC BLOOD PRESSURE: 149 MMHG | RESPIRATION RATE: 18 BRPM | HEART RATE: 97 BPM | BODY MASS INDEX: 36.16 KG/M2

## 2023-08-28 DIAGNOSIS — I20.9 ANGINA, CLASS III: ICD-10-CM

## 2023-08-28 DIAGNOSIS — I25.10 ASCVD (ARTERIOSCLEROTIC CARDIOVASCULAR DISEASE): Primary | ICD-10-CM

## 2023-08-28 DIAGNOSIS — I10 UNCONTROLLED HYPERTENSION: ICD-10-CM

## 2023-08-28 DIAGNOSIS — R07.9 CHEST PAIN, UNSPECIFIED TYPE: Primary | ICD-10-CM

## 2023-08-28 RX ORDER — CARVEDILOL 25 MG/1
37.5 TABLET ORAL 2 TIMES DAILY WITH MEALS
Qty: 180 TABLET | Refills: 3 | Status: SHIPPED | OUTPATIENT
Start: 2023-08-28

## 2023-08-28 NOTE — PROGRESS NOTES
Akhil Huerta MD Conna EVONNE Jemima  1960 08/28/2023    Patient Active Problem List   Diagnosis    Acute ST elevation myocardial infarction (STEMI) of anterior wall, 11/29/16 s/p stenting proxismal % occlusion, clinically stable.    Type 2 diabetes mellitus    Chest pain    ASCVD (arteriosclerotic cardiovascular disease)    Fatigue    Hyperlipidemia    Essential hypertension    Morbid obesity    Dyspnea on exertion (NYHA class 2-3)    Angina, class III    Dyspepsia    BRICE on CPAP    Hypoxia    Renal insufficiency       Dear Akhil Huerta MD:    Subjective     Jannette Onofre is a 63 y.o. female with the problems as listed above, presents    Chief complaint: Recurrent chest pains and a history of known coronary artery disease.    History of Present Illness: Ms. Shane is a pleasant 63-year-old  female with history of known coronary artery disease, status post previous anterior wall ST elevation myocardial infarction in 2016 followed by acute coronary intervention.  She has been doing well until recently when she started to have recurrent chest pains which are felt as dull pains on the left side of her chest with radiation to the left arm which woke her up 1 day.  This was around 8/7/2023.  She went to the emergency department at [facility] where she was evaluated and discharged home and had an outpatient nuclear stress test performed on 8/10/2023.  She is here for follow-up of the same.  Her nuclear stress test was reported to be normal.      She on further questioning states that she still has some intermittent chest pains that are similar to what she had before the stenting and she is concerned about having significant coronary blockages.  She has associated shortness of breath with chest pains and relieved with sublingual nitroglycerin x1.  She also has been having some uncontrolled hypertension for which her medications are being adjusted by Dr. Huerta.  She recently was switched  over from lisinopril to valsartan 160 mg daily.  Blood pressure is doing better with this although is still running in the 150s to 160s systolic at home.    Jannette Onofre  Exercise Stress Test with Myocardial Perfusion SPECT (Multi Study)  Order# [id number]  Reading physician: Devin Howe MD Ordering physician: Patrick Goldman MD Study date: 8/10/23     Patient Information    Patient Name  Jannette Onofre MRN  [id number] Legal Sex  Female  (Age)  1960 (63 y.o.)     Interpretation Summary     Stress ECG findings-    A stress test was performed following the Ryley protocol.  She walked for 3:35 min and achieved 85% of THR and a workload of 6 METS.  The test was terminated due to fatigue and hypertensive BP response.    She experienced no chest pain    ECG showed no ST deviation diagnostic of ischemia.    No stress-induced arrhythmia.    Appropriate HR response and hypertensive BP response.    SPECT's imaging findings-    Breast attenuation artifact is present.    Myocardial perfusion imaging indicates a normal myocardial perfusion study with no evidence of ischemia.    Left ventricular ejection fraction is normal (Calculated EF = 66%).    Normal LV cavity size. Normal LV wall motion noted.    Impressions are consistent with low risk study.    No Known Allergies:    Current Outpatient Medications:     alendronate (FOSAMAX) 70 MG tablet, Take 1 tablet by mouth every week in the morning, at least 30 minutes before first food, beverage, or medication of day., Disp: 12 tablet, Rfl: 0    aspirin 81 MG chewable tablet, Chew & Swallow 1 tablet Daily., Disp: 100 tablet, Rfl: 3    atorvastatin (LIPITOR) 80 MG tablet, Take 1 tablet by mouth Daily., Disp: 90 tablet, Rfl: 3    carvedilol (COREG) 25 MG tablet, Take 1.5 tablets by mouth 2 (Two) Times a Day With Meals., Disp: 180 tablet, Rfl: 3    cloNIDine (CATAPRES) 0.1 MG tablet, Take 1 tablet by mouth 3 times every day as needed for systolic blood pressure above 150.,  Disp: 60 tablet, Rfl: 2    clopidogrel (PLAVIX) 75 MG tablet, Take 1 tablet by mouth daily, Disp: 90 tablet, Rfl: 4    empagliflozin (Jardiance) 10 MG tablet tablet, Take 1 tablet by mouth Every Morning., Disp: 90 tablet, Rfl: 3    isosorbide mononitrate (IMDUR) 60 MG 24 hr tablet, take 1 & 1/2 tablets by mouth every day in the morning., Disp: 180 tablet, Rfl: 3    metFORMIN (GLUCOPHAGE) 1000 MG tablet, Take 1 tablet by mouth 2 times daily with morning and evening meals., Disp: 180 tablet, Rfl: 11    Multiple Vitamin (MULTI-VITAMIN DAILY PO), Take  by mouth., Disp: , Rfl:     nitroglycerin (NITROSTAT) 0.4 MG SL tablet, Place 1 tablet under the tongue Every 5 (Five) Minutes As Needed for Chest Pain (max of 3 doses in 15 minutes. If no relief, go to ER., Disp: 25 tablet, Rfl: 12    rOPINIRole (REQUIP) 0.25 MG tablet, Take 1 or 2 tablets by mouth every day at bedtime for Restless Leg syndrome., Disp: 180 tablet, Rfl: 3    Tirzepatide (Mounjaro) 12.5 MG/0.5ML solution pen-injector, Inject 12.5 mg subcutaneously once every week., Disp: 6 mL, Rfl: 2    valsartan (DIOVAN) 160 MG tablet, Take 1 tablet by mouth Daily., Disp: 90 tablet, Rfl: 1    The following portions of the patient's history were reviewed and updated as appropriate: allergies, current medications, past family history, past medical history, past social history, past surgical history and problem list.    Social History     Tobacco Use    Smoking status: Former     Packs/day: 0.25     Years: 1.00     Pack years: 0.25     Types: Cigarettes     Quit date: 1989     Years since quittin.1    Smokeless tobacco: Never   Vaping Use    Vaping Use: Never used   Substance Use Topics    Alcohol use: No    Drug use: Never     Review of Systems   Constitutional: Negative for chills and fever.   HENT:  Negative for nosebleeds and sore throat.    Respiratory:  Negative for cough, hemoptysis and wheezing.    Gastrointestinal:  Negative for abdominal pain,  "hematemesis, hematochezia, melena, nausea and vomiting.   Genitourinary:  Negative for dysuria and hematuria.   Neurological:  Negative for headaches.   Objective   Vitals:    08/28/23 1025   BP: 149/95   BP Location: Right arm   Patient Position: Sitting   Cuff Size: Large Adult   Pulse: 97   Resp: 18   SpO2: 100%   Weight: 102 kg (225 lb)   Height: 167.6 cm (66\")     Body mass index is 36.32 kg/mý.    Vitals reviewed.   Constitutional:       Appearance: Well-developed.   Eyes:      Conjunctiva/sclera: Conjunctivae normal.   HENT:      Head: Normocephalic.   Neck:      Thyroid: No thyromegaly.      Vascular: No JVD.      Trachea: No tracheal deviation.   Pulmonary:      Effort: No respiratory distress.      Breath sounds: Normal breath sounds. No wheezing. No rales.   Cardiovascular:      PMI at left midclavicular line. Normal rate. Regular rhythm. Normal S1. Normal S2.       Murmurs: There is no murmur.      No gallop.  No click. No rub.   Pulses:     Intact distal pulses.   Edema:     Peripheral edema absent.   Abdominal:      General: Bowel sounds are normal.      Palpations: Abdomen is soft. There is no abdominal mass.      Tenderness: There is no abdominal tenderness.   Musculoskeletal:      Cervical back: Normal range of motion and neck supple. Skin:     General: Skin is warm and dry.   Neurological:      Mental Status: Alert and oriented to person, place, and time.      Cranial Nerves: No cranial nerve deficit.       Lab Results   Component Value Date     08/07/2023    K 3.9 08/07/2023     (H) 08/07/2023    CO2 20.9 (L) 08/07/2023    BUN 26 (H) 08/07/2023    CREATININE 1.12 (H) 08/07/2023    GLUCOSE 129 (H) 08/07/2023    CALCIUM 9.6 08/07/2023    AST 20 08/07/2023    ALT 20 08/07/2023    ALKPHOS 43 08/07/2023    LABIL2 1.6 12/11/2015     Lab Results   Component Value Date    CKTOTAL 35 10/29/2017     Lab Results   Component Value Date    WBC 6.19 08/07/2023    HGB 12.7 08/07/2023    HCT 40.6 " 08/07/2023     08/07/2023     Lab Results   Component Value Date    INR 0.95 08/07/2023    INR 0.98 10/28/2017    INR 0.89 11/29/2016     Lab Results   Component Value Date    MG 1.6 08/07/2023     Lab Results   Component Value Date    TSH 1.140 08/07/2023    CHLPL 213 (H) 03/14/2016    TRIG 149 05/18/2023    HDL 42 05/18/2023    LDL 41 05/18/2023      Lab Results   Component Value Date    BNP 27.0 12/29/2016       Assessment & Plan    Diagnosis Plan   1. ASCVD (arteriosclerotic cardiovascular disease), s/p acute anterior STEMI in 2016, s/p acute coronary intervention with a 3.25 x 23 mm Alpine CURRY on 11/29/2016.        2. Angina, class III        3. Uncontrolled hypertension          Recommendations  I have discussed with her about the options of continue to try medical therapy and get a better control of her blood pressure that might improve her symptoms and see how she does versus further cardiac evaluation with cardiac catheterization.  Patient seems to be concerned about her symptoms and concerned about having another heart attack before its treated.  Hence will go ahead and schedule her for cardiac catheterization in the next week or so.  In the meantime I will increase her carvedilol dose to 37.5 mg (1 and half tablet of the 25 mg) twice a day.  Continue with other antihypertensive medications as now.  Continue with aspirin and Plavix and atorvastatin at current doses.    Return in about 2 months (around 10/28/2023).    As always, Akhil Huerta MD  I appreciate very much the opportunity to participate in the cardiovascular care of your patients. Please do not hesitate to call me with any questions with regards to Jannette Tsanggs's evaluation and management.       With Best Regards,        Lebron Ogden MD, [facility]    Please note that portions of this note were completed with a voice recognition program.

## 2023-08-28 NOTE — H&P (VIEW-ONLY)
Akhil Huerta MD Conna EVONNE Jemima  1960 08/28/2023    Patient Active Problem List   Diagnosis    Acute ST elevation myocardial infarction (STEMI) of anterior wall, 11/29/16 s/p stenting proxismal % occlusion, clinically stable.    Type 2 diabetes mellitus    Chest pain    ASCVD (arteriosclerotic cardiovascular disease)    Fatigue    Hyperlipidemia    Essential hypertension    Morbid obesity    Dyspnea on exertion (NYHA class 2-3)    Angina, class III    Dyspepsia    BRICE on CPAP    Hypoxia    Renal insufficiency       Dear Akhil Huerta MD:    Subjective     Jannette Onofre is a 63 y.o. female with the problems as listed above, presents    Chief complaint: Recurrent chest pains and a history of known coronary artery disease.    History of Present Illness: Ms. Shane is a pleasant 63-year-old  female with history of known coronary artery disease, status post previous anterior wall ST elevation myocardial infarction in 2016 followed by acute coronary intervention.  She has been doing well until recently when she started to have recurrent chest pains which are felt as dull pains on the left side of her chest with radiation to the left arm which woke her up 1 day.  This was around 8/7/2023.  She went to the emergency department at [facility] where she was evaluated and discharged home and had an outpatient nuclear stress test performed on 8/10/2023.  She is here for follow-up of the same.  Her nuclear stress test was reported to be normal.      She on further questioning states that she still has some intermittent chest pains that are similar to what she had before the stenting and she is concerned about having significant coronary blockages.  She has associated shortness of breath with chest pains and relieved with sublingual nitroglycerin x1.  She also has been having some uncontrolled hypertension for which her medications are being adjusted by Dr. Huerta.  She recently was switched  over from lisinopril to valsartan 160 mg daily.  Blood pressure is doing better with this although is still running in the 150s to 160s systolic at home.    Jannette Onofre  Exercise Stress Test with Myocardial Perfusion SPECT (Multi Study)  Order# [id number]  Reading physician: Devin Howe MD Ordering physician: Patrick Goldman MD Study date: 8/10/23     Patient Information    Patient Name  Jannette Onofre MRN  [id number] Legal Sex  Female  (Age)  1960 (63 y.o.)     Interpretation Summary     Stress ECG findings-    A stress test was performed following the Ryley protocol.  She walked for 3:35 min and achieved 85% of THR and a workload of 6 METS.  The test was terminated due to fatigue and hypertensive BP response.    She experienced no chest pain    ECG showed no ST deviation diagnostic of ischemia.    No stress-induced arrhythmia.    Appropriate HR response and hypertensive BP response.    SPECT's imaging findings-    Breast attenuation artifact is present.    Myocardial perfusion imaging indicates a normal myocardial perfusion study with no evidence of ischemia.    Left ventricular ejection fraction is normal (Calculated EF = 66%).    Normal LV cavity size. Normal LV wall motion noted.    Impressions are consistent with low risk study.    No Known Allergies:    Current Outpatient Medications:     alendronate (FOSAMAX) 70 MG tablet, Take 1 tablet by mouth every week in the morning, at least 30 minutes before first food, beverage, or medication of day., Disp: 12 tablet, Rfl: 0    aspirin 81 MG chewable tablet, Chew & Swallow 1 tablet Daily., Disp: 100 tablet, Rfl: 3    atorvastatin (LIPITOR) 80 MG tablet, Take 1 tablet by mouth Daily., Disp: 90 tablet, Rfl: 3    carvedilol (COREG) 25 MG tablet, Take 1.5 tablets by mouth 2 (Two) Times a Day With Meals., Disp: 180 tablet, Rfl: 3    cloNIDine (CATAPRES) 0.1 MG tablet, Take 1 tablet by mouth 3 times every day as needed for systolic blood pressure above 150.,  Disp: 60 tablet, Rfl: 2    clopidogrel (PLAVIX) 75 MG tablet, Take 1 tablet by mouth daily, Disp: 90 tablet, Rfl: 4    empagliflozin (Jardiance) 10 MG tablet tablet, Take 1 tablet by mouth Every Morning., Disp: 90 tablet, Rfl: 3    isosorbide mononitrate (IMDUR) 60 MG 24 hr tablet, take 1 & 1/2 tablets by mouth every day in the morning., Disp: 180 tablet, Rfl: 3    metFORMIN (GLUCOPHAGE) 1000 MG tablet, Take 1 tablet by mouth 2 times daily with morning and evening meals., Disp: 180 tablet, Rfl: 11    Multiple Vitamin (MULTI-VITAMIN DAILY PO), Take  by mouth., Disp: , Rfl:     nitroglycerin (NITROSTAT) 0.4 MG SL tablet, Place 1 tablet under the tongue Every 5 (Five) Minutes As Needed for Chest Pain (max of 3 doses in 15 minutes. If no relief, go to ER., Disp: 25 tablet, Rfl: 12    rOPINIRole (REQUIP) 0.25 MG tablet, Take 1 or 2 tablets by mouth every day at bedtime for Restless Leg syndrome., Disp: 180 tablet, Rfl: 3    Tirzepatide (Mounjaro) 12.5 MG/0.5ML solution pen-injector, Inject 12.5 mg subcutaneously once every week., Disp: 6 mL, Rfl: 2    valsartan (DIOVAN) 160 MG tablet, Take 1 tablet by mouth Daily., Disp: 90 tablet, Rfl: 1    The following portions of the patient's history were reviewed and updated as appropriate: allergies, current medications, past family history, past medical history, past social history, past surgical history and problem list.    Social History     Tobacco Use    Smoking status: Former     Packs/day: 0.25     Years: 1.00     Pack years: 0.25     Types: Cigarettes     Quit date: 1989     Years since quittin.1    Smokeless tobacco: Never   Vaping Use    Vaping Use: Never used   Substance Use Topics    Alcohol use: No    Drug use: Never     Review of Systems   Constitutional: Negative for chills and fever.   HENT:  Negative for nosebleeds and sore throat.    Respiratory:  Negative for cough, hemoptysis and wheezing.    Gastrointestinal:  Negative for abdominal pain,  "hematemesis, hematochezia, melena, nausea and vomiting.   Genitourinary:  Negative for dysuria and hematuria.   Neurological:  Negative for headaches.   Objective   Vitals:    08/28/23 1025   BP: 149/95   BP Location: Right arm   Patient Position: Sitting   Cuff Size: Large Adult   Pulse: 97   Resp: 18   SpO2: 100%   Weight: 102 kg (225 lb)   Height: 167.6 cm (66\")     Body mass index is 36.32 kg/m².    Vitals reviewed.   Constitutional:       Appearance: Well-developed.   Eyes:      Conjunctiva/sclera: Conjunctivae normal.   HENT:      Head: Normocephalic.   Neck:      Thyroid: No thyromegaly.      Vascular: No JVD.      Trachea: No tracheal deviation.   Pulmonary:      Effort: No respiratory distress.      Breath sounds: Normal breath sounds. No wheezing. No rales.   Cardiovascular:      PMI at left midclavicular line. Normal rate. Regular rhythm. Normal S1. Normal S2.       Murmurs: There is no murmur.      No gallop.  No click. No rub.   Pulses:     Intact distal pulses.   Edema:     Peripheral edema absent.   Abdominal:      General: Bowel sounds are normal.      Palpations: Abdomen is soft. There is no abdominal mass.      Tenderness: There is no abdominal tenderness.   Musculoskeletal:      Cervical back: Normal range of motion and neck supple. Skin:     General: Skin is warm and dry.   Neurological:      Mental Status: Alert and oriented to person, place, and time.      Cranial Nerves: No cranial nerve deficit.       Lab Results   Component Value Date     08/07/2023    K 3.9 08/07/2023     (H) 08/07/2023    CO2 20.9 (L) 08/07/2023    BUN 26 (H) 08/07/2023    CREATININE 1.12 (H) 08/07/2023    GLUCOSE 129 (H) 08/07/2023    CALCIUM 9.6 08/07/2023    AST 20 08/07/2023    ALT 20 08/07/2023    ALKPHOS 43 08/07/2023    LABIL2 1.6 12/11/2015     Lab Results   Component Value Date    CKTOTAL 35 10/29/2017     Lab Results   Component Value Date    WBC 6.19 08/07/2023    HGB 12.7 08/07/2023    HCT 40.6 " 08/07/2023     08/07/2023     Lab Results   Component Value Date    INR 0.95 08/07/2023    INR 0.98 10/28/2017    INR 0.89 11/29/2016     Lab Results   Component Value Date    MG 1.6 08/07/2023     Lab Results   Component Value Date    TSH 1.140 08/07/2023    CHLPL 213 (H) 03/14/2016    TRIG 149 05/18/2023    HDL 42 05/18/2023    LDL 41 05/18/2023      Lab Results   Component Value Date    BNP 27.0 12/29/2016       Assessment & Plan    Diagnosis Plan   1. ASCVD (arteriosclerotic cardiovascular disease), s/p acute anterior STEMI in 2016, s/p acute coronary intervention with a 3.25 x 23 mm Alpine CURRY on 11/29/2016.        2. Angina, class III        3. Uncontrolled hypertension          Recommendations  I have discussed with her about the options of continue to try medical therapy and get a better control of her blood pressure that might improve her symptoms and see how she does versus further cardiac evaluation with cardiac catheterization.  Patient seems to be concerned about her symptoms and concerned about having another heart attack before its treated.  Hence will go ahead and schedule her for cardiac catheterization in the next week or so.  In the meantime I will increase her carvedilol dose to 37.5 mg (1 and half tablet of the 25 mg) twice a day.  Continue with other antihypertensive medications as now.  Continue with aspirin and Plavix and atorvastatin at current doses.    Return in about 2 months (around 10/28/2023).    As always, Akhil Huerta MD  I appreciate very much the opportunity to participate in the cardiovascular care of your patients. Please do not hesitate to call me with any questions with regards to Jannette Tsanggs's evaluation and management.       With Best Regards,        Lebron Ogden MD, [facility]    Please note that portions of this note were completed with a voice recognition program.

## 2023-08-28 NOTE — LETTER
August 29, 2023     Akhil Huerta MD  [address] [facility] [place] [address]    Patient: Jannette Onofre   YOB: 1960   Date of Visit: 8/28/2023     Dear Akhil Huerta MD:       Thank you for referring Jannette Onofre to me for evaluation. Below are the relevant portions of my assessment and plan of care.    If you have questions, please do not hesitate to call me. I look forward to following Jannette along with you.         Sincerely,        Lebron Ogden MD        CC: No Recipients    Lebron Ogden MD  08/29/23 1821  Sign when Signing Visit  Akhil Huerta MD  Jannette Onofre  1960 08/28/2023    Patient Active Problem List   Diagnosis    Acute ST elevation myocardial infarction (STEMI) of anterior wall, 11/29/16 s/p stenting proxismal % occlusion, clinically stable.    Type 2 diabetes mellitus    Chest pain    ASCVD (arteriosclerotic cardiovascular disease)    Fatigue    Hyperlipidemia    Essential hypertension    Morbid obesity    Dyspnea on exertion (NYHA class 2-3)    Angina, class III    Dyspepsia    BRICE on CPAP    Hypoxia    Renal insufficiency       Dear Akhil Huerta MD:    Subjective     Jannette Onofre is a 63 y.o. female with the problems as listed above, presents    Chief complaint: Recurrent chest pains and a history of known coronary artery disease.    History of Present Illness: Ms. Shane is a pleasant 63-year-old  female with history of known coronary artery disease, status post previous anterior wall ST elevation myocardial infarction in 2016 followed by acute coronary intervention.  She has been doing well until recently when she started to have recurrent chest pains which are felt as dull pains on the left side of her chest with radiation to the left arm which woke her up 1 day.  This was around 8/7/2023.  She went to the emergency department at [facility] where she was evaluated and discharged home and had an outpatient  nuclear stress test performed on 8/10/2023.  She is here for follow-up of the same.  Her nuclear stress test was reported to be normal.      She on further questioning states that she still has some intermittent chest pains that are similar to what she had before the stenting and she is concerned about having significant coronary blockages.  She has associated shortness of breath with chest pains and relieved with sublingual nitroglycerin x1.  She also has been having some uncontrolled hypertension for which her medications are being adjusted by Dr. Huerta.  She recently was switched over from lisinopril to valsartan 160 mg daily.  Blood pressure is doing better with this although is still running in the 150s to 160s systolic at home.    Jannette Onofre  Exercise Stress Test with Myocardial Perfusion SPECT (Multi Study)  Order# [id number]  Reading physician: Devin Howe MD Ordering physician: Patrick Goldman MD Study date: 8/10/23     Patient Information    Patient Name  Jannette Onofre MRN  [id number] Legal Sex  Female  (Age)  1960 (63 y.o.)     Interpretation Summary     Stress ECG findings-    A stress test was performed following the Ryley protocol.  She walked for 3:35 min and achieved 85% of THR and a workload of 6 METS.  The test was terminated due to fatigue and hypertensive BP response.    She experienced no chest pain    ECG showed no ST deviation diagnostic of ischemia.    No stress-induced arrhythmia.    Appropriate HR response and hypertensive BP response.    SPECT's imaging findings-    Breast attenuation artifact is present.    Myocardial perfusion imaging indicates a normal myocardial perfusion study with no evidence of ischemia.    Left ventricular ejection fraction is normal (Calculated EF = 66%).    Normal LV cavity size. Normal LV wall motion noted.    Impressions are consistent with low risk study.    No Known Allergies:    Current Outpatient Medications:     alendronate  (FOSAMAX) 70 MG tablet, Take 1 tablet by mouth every week in the morning, at least 30 minutes before first food, beverage, or medication of day., Disp: 12 tablet, Rfl: 0    aspirin 81 MG chewable tablet, Chew & Swallow 1 tablet Daily., Disp: 100 tablet, Rfl: 3    atorvastatin (LIPITOR) 80 MG tablet, Take 1 tablet by mouth Daily., Disp: 90 tablet, Rfl: 3    carvedilol (COREG) 25 MG tablet, Take 1.5 tablets by mouth 2 (Two) Times a Day With Meals., Disp: 180 tablet, Rfl: 3    cloNIDine (CATAPRES) 0.1 MG tablet, Take 1 tablet by mouth 3 times every day as needed for systolic blood pressure above 150., Disp: 60 tablet, Rfl: 2    clopidogrel (PLAVIX) 75 MG tablet, Take 1 tablet by mouth daily, Disp: 90 tablet, Rfl: 4    empagliflozin (Jardiance) 10 MG tablet tablet, Take 1 tablet by mouth Every Morning., Disp: 90 tablet, Rfl: 3    isosorbide mononitrate (IMDUR) 60 MG 24 hr tablet, take 1 & 1/2 tablets by mouth every day in the morning., Disp: 180 tablet, Rfl: 3    metFORMIN (GLUCOPHAGE) 1000 MG tablet, Take 1 tablet by mouth 2 times daily with morning and evening meals., Disp: 180 tablet, Rfl: 11    Multiple Vitamin (MULTI-VITAMIN DAILY PO), Take  by mouth., Disp: , Rfl:     nitroglycerin (NITROSTAT) 0.4 MG SL tablet, Place 1 tablet under the tongue Every 5 (Five) Minutes As Needed for Chest Pain (max of 3 doses in 15 minutes. If no relief, go to ER., Disp: 25 tablet, Rfl: 12    rOPINIRole (REQUIP) 0.25 MG tablet, Take 1 or 2 tablets by mouth every day at bedtime for Restless Leg syndrome., Disp: 180 tablet, Rfl: 3    Tirzepatide (Mounjaro) 12.5 MG/0.5ML solution pen-injector, Inject 12.5 mg subcutaneously once every week., Disp: 6 mL, Rfl: 2    valsartan (DIOVAN) 160 MG tablet, Take 1 tablet by mouth Daily., Disp: 90 tablet, Rfl: 1    The following portions of the patient's history were reviewed and updated as appropriate: allergies, current medications, past family history, past medical history, past  "social history, past surgical history and problem list.    Social History     Tobacco Use    Smoking status: Former     Packs/day: 0.25     Years: 1.00     Pack years: 0.25     Types: Cigarettes     Quit date: 1989     Years since quittin.1    Smokeless tobacco: Never   Vaping Use    Vaping Use: Never used   Substance Use Topics    Alcohol use: No    Drug use: Never     Review of Systems   Constitutional: Negative for chills and fever.   HENT:  Negative for nosebleeds and sore throat.    Respiratory:  Negative for cough, hemoptysis and wheezing.    Gastrointestinal:  Negative for abdominal pain, hematemesis, hematochezia, melena, nausea and vomiting.   Genitourinary:  Negative for dysuria and hematuria.   Neurological:  Negative for headaches.   Objective   Vitals:    23 1025   BP: 149/95   BP Location: Right arm   Patient Position: Sitting   Cuff Size: Large Adult   Pulse: 97   Resp: 18   SpO2: 100%   Weight: 102 kg (225 lb)   Height: 167.6 cm (66\")     Body mass index is 36.32 kg/mý.    Vitals reviewed.   Constitutional:       Appearance: Well-developed.   Eyes:      Conjunctiva/sclera: Conjunctivae normal.   HENT:      Head: Normocephalic.   Neck:      Thyroid: No thyromegaly.      Vascular: No JVD.      Trachea: No tracheal deviation.   Pulmonary:      Effort: No respiratory distress.      Breath sounds: Normal breath sounds. No wheezing. No rales.   Cardiovascular:      PMI at left midclavicular line. Normal rate. Regular rhythm. Normal S1. Normal S2.       Murmurs: There is no murmur.      No gallop.  No click. No rub.   Pulses:     Intact distal pulses.   Edema:     Peripheral edema absent.   Abdominal:      General: Bowel sounds are normal.      Palpations: Abdomen is soft. There is no abdominal mass.      Tenderness: There is no abdominal tenderness.   Musculoskeletal:      Cervical back: Normal range of motion and neck supple. Skin:     General: Skin is warm and dry.   Neurological:    "   Mental Status: Alert and oriented to person, place, and time.      Cranial Nerves: No cranial nerve deficit.       Lab Results   Component Value Date     08/07/2023    K 3.9 08/07/2023     (H) 08/07/2023    CO2 20.9 (L) 08/07/2023    BUN 26 (H) 08/07/2023    CREATININE 1.12 (H) 08/07/2023    GLUCOSE 129 (H) 08/07/2023    CALCIUM 9.6 08/07/2023    AST 20 08/07/2023    ALT 20 08/07/2023    ALKPHOS 43 08/07/2023    LABIL2 1.6 12/11/2015     Lab Results   Component Value Date    CKTOTAL 35 10/29/2017     Lab Results   Component Value Date    WBC 6.19 08/07/2023    HGB 12.7 08/07/2023    HCT 40.6 08/07/2023     08/07/2023     Lab Results   Component Value Date    INR 0.95 08/07/2023    INR 0.98 10/28/2017    INR 0.89 11/29/2016     Lab Results   Component Value Date    MG 1.6 08/07/2023     Lab Results   Component Value Date    TSH 1.140 08/07/2023    CHLPL 213 (H) 03/14/2016    TRIG 149 05/18/2023    HDL 42 05/18/2023    LDL 41 05/18/2023      Lab Results   Component Value Date    BNP 27.0 12/29/2016       Assessment & Plan    Diagnosis Plan   1. ASCVD (arteriosclerotic cardiovascular disease), s/p acute anterior STEMI in 2016, s/p acute coronary intervention with a 3.25 x 23 mm Alpine CURRY on 11/29/2016.        2. Angina, class III        3. Uncontrolled hypertension          Recommendations  I have discussed with her about the options of continue to try medical therapy and get a better control of her blood pressure that might improve her symptoms and see how she does versus further cardiac evaluation with cardiac catheterization.  Patient seems to be concerned about her symptoms and concerned about having another heart attack before its treated.  Hence will go ahead and schedule her for cardiac catheterization in the next week or so.  In the meantime I will increase her carvedilol dose to 37.5 mg (1 and half tablet of the 25 mg) twice a day.  Continue with other antihypertensive medications as  now.  Continue with aspirin and Plavix and atorvastatin at current doses.    Return in about 2 months (around 10/28/2023).    As always, Akhil Huerta MD  I appreciate very much the opportunity to participate in the cardiovascular care of your patients. Please do not hesitate to call me with any questions with regards to Jannette Onofre's evaluation and management.       With Best Regards,        Lebron Ogden MD, [facility]    Please note that portions of this note were completed with a voice recognition program.

## 2023-09-08 ENCOUNTER — HOSPITAL ENCOUNTER (INPATIENT)
Facility: HOSPITAL | Age: 63
LOS: 1 days | Discharge: HOME | DRG: 287 | End: 2023-09-08
Attending: INTERNAL MEDICINE | Admitting: INTERNAL MEDICINE
Payer: COMMERCIAL

## 2023-09-08 VITALS
OXYGEN SATURATION: 96 % | TEMPERATURE: 98.7 F | BODY MASS INDEX: 35.2 KG/M2 | DIASTOLIC BLOOD PRESSURE: 79 MMHG | WEIGHT: 219 LBS | HEART RATE: 74 BPM | RESPIRATION RATE: 18 BRPM | HEIGHT: 66 IN | SYSTOLIC BLOOD PRESSURE: 129 MMHG

## 2023-09-08 DIAGNOSIS — R07.9 CHEST PAIN, UNSPECIFIED TYPE: ICD-10-CM

## 2023-09-08 LAB
ANION GAP SERPL CALCULATED.3IONS-SCNC: 8.7 MMOL/L (ref 5–15)
BUN SERPL-MCNC: 16 MG/DL (ref 8–23)
BUN/CREAT SERPL: 15.4 (ref 7–25)
CALCIUM SPEC-SCNC: 9.4 MG/DL (ref 8.6–10.5)
CHLORIDE SERPL-SCNC: 110 MMOL/L (ref 98–107)
CO2 SERPL-SCNC: 25.3 MMOL/L (ref 22–29)
CREAT SERPL-MCNC: 1.04 MG/DL (ref 0.57–1)
DEPRECATED RDW RBC AUTO: 47.4 FL (ref 37–54)
EGFRCR SERPLBLD CKD-EPI 2021: 60.1 ML/MIN/1.73
ERYTHROCYTE [DISTWIDTH] IN BLOOD BY AUTOMATED COUNT: 14.1 % (ref 12.3–15.4)
GLUCOSE BLDC GLUCOMTR-MCNC: 159 MG/DL (ref 70–130)
GLUCOSE SERPL-MCNC: 188 MG/DL (ref 65–99)
HCT VFR BLD AUTO: 39.8 % (ref 34–46.6)
HGB BLD-MCNC: 12.4 G/DL (ref 12–15.9)
MCH RBC QN AUTO: 28.4 PG (ref 26.6–33)
MCHC RBC AUTO-ENTMCNC: 31.2 G/DL (ref 31.5–35.7)
MCV RBC AUTO: 91.3 FL (ref 79–97)
PLATELET # BLD AUTO: 159 10*3/MM3 (ref 140–450)
PMV BLD AUTO: 10.4 FL (ref 6–12)
POTASSIUM SERPL-SCNC: 4.6 MMOL/L (ref 3.5–5.2)
RBC # BLD AUTO: 4.36 10*6/MM3 (ref 3.77–5.28)
SODIUM SERPL-SCNC: 144 MMOL/L (ref 136–145)
WBC NRBC COR # BLD: 5.82 10*3/MM3 (ref 3.4–10.8)

## 2023-09-08 PROCEDURE — 25010000002 HEPARIN (PORCINE) PER 1000 UNITS: Performed by: INTERNAL MEDICINE

## 2023-09-08 PROCEDURE — 93458 L HRT ARTERY/VENTRICLE ANGIO: CPT | Performed by: INTERNAL MEDICINE

## 2023-09-08 PROCEDURE — 82948 REAGENT STRIP/BLOOD GLUCOSE: CPT

## 2023-09-08 PROCEDURE — C1894 INTRO/SHEATH, NON-LASER: HCPCS | Performed by: INTERNAL MEDICINE

## 2023-09-08 PROCEDURE — B2111ZZ FLUOROSCOPY OF MULTIPLE CORONARY ARTERIES USING LOW OSMOLAR CONTRAST: ICD-10-PCS | Performed by: INTERNAL MEDICINE

## 2023-09-08 PROCEDURE — 4A023N7 MEASUREMENT OF CARDIAC SAMPLING AND PRESSURE, LEFT HEART, PERCUTANEOUS APPROACH: ICD-10-PCS | Performed by: INTERNAL MEDICINE

## 2023-09-08 PROCEDURE — 25510000001 IOPAMIDOL PER 1 ML: Performed by: INTERNAL MEDICINE

## 2023-09-08 PROCEDURE — 85027 COMPLETE CBC AUTOMATED: CPT | Performed by: INTERNAL MEDICINE

## 2023-09-08 PROCEDURE — B2151ZZ FLUOROSCOPY OF LEFT HEART USING LOW OSMOLAR CONTRAST: ICD-10-PCS | Performed by: INTERNAL MEDICINE

## 2023-09-08 PROCEDURE — 25810000003 SODIUM CHLORIDE 0.9 % SOLUTION: Performed by: INTERNAL MEDICINE

## 2023-09-08 PROCEDURE — 80048 BASIC METABOLIC PNL TOTAL CA: CPT | Performed by: INTERNAL MEDICINE

## 2023-09-08 PROCEDURE — 94664 DEMO&/EVAL PT USE INHALER: CPT

## 2023-09-08 PROCEDURE — C1769 GUIDE WIRE: HCPCS | Performed by: INTERNAL MEDICINE

## 2023-09-08 PROCEDURE — 25010000002 MIDAZOLAM PER 1 MG: Performed by: INTERNAL MEDICINE

## 2023-09-08 PROCEDURE — 25010000002 FENTANYL CITRATE (PF) 50 MCG/ML SOLUTION: Performed by: INTERNAL MEDICINE

## 2023-09-08 RX ORDER — LIDOCAINE HYDROCHLORIDE 20 MG/ML
INJECTION, SOLUTION INFILTRATION; PERINEURAL
Status: DISCONTINUED | OUTPATIENT
Start: 2023-09-08 | End: 2023-09-08 | Stop reason: HOSPADM

## 2023-09-08 RX ORDER — VERAPAMIL HYDROCHLORIDE 2.5 MG/ML
INJECTION, SOLUTION INTRAVENOUS
Status: DISCONTINUED | OUTPATIENT
Start: 2023-09-08 | End: 2023-09-08 | Stop reason: HOSPADM

## 2023-09-08 RX ORDER — MIDAZOLAM HYDROCHLORIDE 1 MG/ML
INJECTION INTRAMUSCULAR; INTRAVENOUS
Status: DISCONTINUED | OUTPATIENT
Start: 2023-09-08 | End: 2023-09-08 | Stop reason: HOSPADM

## 2023-09-08 RX ORDER — HEPARIN SODIUM 1000 [USP'U]/ML
INJECTION, SOLUTION INTRAVENOUS; SUBCUTANEOUS
Status: DISCONTINUED | OUTPATIENT
Start: 2023-09-08 | End: 2023-09-08 | Stop reason: HOSPADM

## 2023-09-08 RX ORDER — SODIUM CHLORIDE 9 MG/ML
INJECTION, SOLUTION INTRAVENOUS
Status: COMPLETED | OUTPATIENT
Start: 2023-09-08 | End: 2023-09-08

## 2023-09-08 RX ORDER — FENTANYL CITRATE 50 UG/ML
INJECTION, SOLUTION INTRAMUSCULAR; INTRAVENOUS
Status: DISCONTINUED | OUTPATIENT
Start: 2023-09-08 | End: 2023-09-08 | Stop reason: HOSPADM

## 2023-09-08 RX ORDER — NITROGLYCERIN 0.4 MG/1
0.4 TABLET SUBLINGUAL
Status: DISCONTINUED | OUTPATIENT
Start: 2023-09-08 | End: 2023-09-08 | Stop reason: HOSPADM

## 2023-09-08 RX ORDER — SODIUM CHLORIDE 9 MG/ML
100 INJECTION, SOLUTION INTRAVENOUS CONTINUOUS
Status: DISCONTINUED | OUTPATIENT
Start: 2023-09-08 | End: 2023-09-08 | Stop reason: HOSPADM

## 2023-09-08 RX ADMIN — SODIUM CHLORIDE 100 ML/HR: 9 INJECTION, SOLUTION INTRAVENOUS at 11:12

## 2023-09-08 NOTE — PLAN OF CARE
Goal Outcome Evaluation:  Plan of Care Reviewed With: patient        Progress: improving          Patient being discharged. IV and tele removed.

## 2023-09-08 NOTE — Clinical Note
Hemostasis started on the right radial artery. R-Band was used in achieving hemostasis. Radial compression device applied to vessel. Hemostasis achieved successfully. Closure device additional comment: 11ml air applied to TR band.

## 2023-09-15 ENCOUNTER — PATIENT OUTREACH (OUTPATIENT)
Dept: CASE MANAGEMENT | Facility: OTHER | Age: 63
End: 2023-09-15
Payer: COMMERCIAL

## 2023-09-15 NOTE — OUTREACH NOTE
"AMBULATORY CASE MANAGEMENT NOTE    Name and Relationship of Patient/Support Person: Jannette Onofre \"Crystal\" - Self    Patient Outreach    Call to patient for f/u. States she has seen cardiology and repeated cath. No changes and stent was patent. Changes the way she was taking her Coreg and pt states she is feeling better. She has lost some wt as well and other than a little fatigue she is feeling good. Last A1C was 6.5. She says she has had 1 low bs and corrected it quickly but that was unsual for her. Discussed that is happening more frequently d/t better control and wt loss to discuss with MD, verified treat ment and understanding of hypoglycemia. No other concerns at this time and pt has ECM contact if needed    [facility]  Ambulatory Case Management    9/15/2023, 16:35 EDT  "

## 2023-10-16 ENCOUNTER — OFFICE VISIT (OUTPATIENT)
Dept: CARDIOLOGY | Facility: CLINIC | Age: 63
End: 2023-10-16
Payer: COMMERCIAL

## 2023-10-16 VITALS
SYSTOLIC BLOOD PRESSURE: 139 MMHG | HEART RATE: 95 BPM | WEIGHT: 227.8 LBS | DIASTOLIC BLOOD PRESSURE: 88 MMHG | BODY MASS INDEX: 36.61 KG/M2 | HEIGHT: 66 IN

## 2023-10-16 DIAGNOSIS — E78.5 DYSLIPIDEMIA: ICD-10-CM

## 2023-10-16 DIAGNOSIS — I10 ESSENTIAL HYPERTENSION: ICD-10-CM

## 2023-10-16 DIAGNOSIS — I25.10 ASCVD (ARTERIOSCLEROTIC CARDIOVASCULAR DISEASE): Primary | ICD-10-CM

## 2023-10-16 PROCEDURE — 99213 OFFICE O/P EST LOW 20 MIN: CPT | Performed by: INTERNAL MEDICINE

## 2023-10-16 NOTE — PROGRESS NOTES
Akhil Huerta MD  Jannette Onofre  1960  10/16/2023    Patient Active Problem List   Diagnosis    Acute ST elevation myocardial infarction (STEMI) of anterior wall, 11/29/16 s/p stenting proxismal % occlusion, clinically stable.    Type 2 diabetes mellitus    Chest pain    ASCVD (arteriosclerotic cardiovascular disease)    Fatigue    Hyperlipidemia    Essential hypertension    Morbid obesity    Dyspnea on exertion (NYHA class 2-3)    Angina, class III    Dyspepsia    BRICE on CPAP    Hypoxia    Renal insufficiency       Dear Akhil Huerta MD:    Subjective     Jannette Onofre is a 63 y.o. female with the problems as listed above, presents    Chief Complaint   Patient presents with    Follow-up     S/p cath-feeling well       History of Present Illness:  is a very pleasant 63-year-old  female with history of known CAD, status post previous anterior wall myocardial infarction followed by stenting of the proximal LAD in 2016, was recently having some intermittent chest pains for which she underwent cardiac evaluation with cardiac catheterization on 9/8/2023.  This revealed a patent stent in the proximal LAD and about chronic 50% stenosis in the mid LAD which was unchanged from previous study.  She had overall nonobstructive disease with a 40 to 50% diffuse narrowing in the dominant RCA and about 30% stenosis the left circumflex coronary artery.  She is being managed medically.  She is here as a regular cardiology follow-up.  On today's visit she states that she had couple of episodes of chest pains which are not as bad as before.  Overall she feels pretty good.    DATE OF PROCEDURE: 9/8/2023  INDICATION FOR PROCEDURE: chest pain  PRE PROCEDURE DIAGNOSIS:     Clinical frailty: 3- Managing Well     ASA: 2=2- Mild to moderate systemic disease, medially well controlled, with no functional limitation     Mallampati: Class 2=2- Able to visualize the soft palate, fauces, uvula.  The anterior &  posterior tonsilar pillars are hidden by the tongue.     POST PROCEDURE DIAGNOSIS:   Face to face mdoerate conscious  sedation time :    COMPLICATIONS : None     Specimens collected : None     PROCEDURE PERFORMED:      1. Selective right and left Coronary Angiogram  2. Left heart catheretization    3. Left Ventriculography     Description of the procedure:  Prior to the procedure risk, benefits and possible alternative were discussed with the patient and informed consent was obtained. Patient was brought to cardiac cath lab table in post absorbtive state. Patient was prepped and drape in usual sterile fashion. IV Versed and Fentanyl was used for moderate sedation. 2% Lidocaine was used for topical anesthesia. R radial arterial site was prepped and a micropuncture needle was used to access the artery and a 5 F slender sheath was placed. 2.5 mg of Verapamil and 200 mcg of NTG was given through the sheath intra arterial and 5000 units of Heparin was given once the catheter crossed the aortic arch.       5 F TIG 4 catheters was used for right and left coronary angiogram and 5 F pigtail catheter was used for Left heart hemodynamics and left ventriculography. All the catheters were exchanged over 0.035 wire. The R radial arterial sheath was removed and TR band was applied and immediate and complete hemostasis was achieved. The patient tolerate the entire procedure well without any immediate known complications.     Coronary anatomy findings:     LM: Is a large calibre vessel , normal take off from left cusp, divides into LAD and Lcx. No significant stenosis     LAD: Stent in the proximal mid LAD was patent, the mid LAD had moderate 50% stenosis unchanged from prior, distal LAD had mild diffuse disease, the diagonal arteries had no significant stenosis     LCX: Moderate calibre vessel, mild luminal irregularities, focal 30% stenosis, OM branches distally small calibre vessel with no stenosis     RCA: Large calibre,  dominant artery, normal take off from right cusp.  Entire RCA had mild diffuse 40-50% stenosis, R PDA and PL branches had mild diffuse disease.      Left Ventriculography:     LV systolic function was normal with visual estimated EF of 60%. No wall motion abnormalities.   No significant mitral regurgitation noted.      LVEDP: 8 mmHg  No gradient across the aortic valve on pull back.      EBL: Less than 10cc    Final Impression:  Moderate non obstructive CAD  Normal LV systolic function   Recommendations:  Aggressive guideline directed medical management for CAD       Suresh Marin MD, [facility]  Interventional Cardiology    No Known Allergies:    Current Outpatient Medications:     alendronate (FOSAMAX) 70 MG tablet, Take 1 tablet by mouth every week in the morning, at least 30 minutes before first food, beverage, or medication of day., Disp: 12 tablet, Rfl: 0    aspirin 81 MG chewable tablet, Chew & Swallow 1 tablet Daily., Disp: 100 tablet, Rfl: 3    atorvastatin (LIPITOR) 80 MG tablet, Take 1 tablet by mouth Daily., Disp: 90 tablet, Rfl: 3    carvedilol (COREG) 25 MG tablet, Take 1 & 1/2 tablets by mouth 2 (Two) Times a Day With Meals., Disp: 180 tablet, Rfl: 3    cloNIDine (CATAPRES) 0.1 MG tablet, Take 1 tablet by mouth 3 times every day as needed for systolic blood pressure above 150., Disp: 60 tablet, Rfl: 2    clopidogrel (PLAVIX) 75 MG tablet, Take 1 tablet by mouth daily, Disp: 90 tablet, Rfl: 4    empagliflozin (Jardiance) 10 MG tablet tablet, Take 1 tablet by mouth Every Morning., Disp: 90 tablet, Rfl: 3    isosorbide mononitrate (IMDUR) 60 MG 24 hr tablet, take 1 & 1/2 tablets by mouth every day in the morning., Disp: 180 tablet, Rfl: 3    metFORMIN (GLUCOPHAGE) 1000 MG tablet, Take 1 tablet by mouth 2 times daily with morning and evening meals., Disp: 180 tablet, Rfl: 11    Multiple Vitamin (MULTI-VITAMIN DAILY PO), Take  by mouth., Disp: , Rfl:     nitroglycerin (NITROSTAT) 0.4 MG SL tablet, Place 1  "tablet under the tongue Every 5 (Five) Minutes As Needed for Chest Pain (max of 3 doses in 15 minutes. If no relief, go to ER., Disp: 25 tablet, Rfl: 12    rOPINIRole (REQUIP) 0.25 MG tablet, Take 1 or 2 tablets by mouth every day at bedtime for Restless Leg syndrome., Disp: 180 tablet, Rfl: 3    Tirzepatide (Mounjaro) 12.5 MG/0.5ML solution pen-injector, Inject 12.5 mg subcutaneously once every week., Disp: 6 mL, Rfl: 2    valsartan (DIOVAN) 160 MG tablet, Take 1 tablet by mouth Daily., Disp: 90 tablet, Rfl: 1    The following portions of the patient's history were reviewed and updated as appropriate: allergies, current medications, past family history, past medical history, past social history, past surgical history and problem list.    Social History     Tobacco Use    Smoking status: Former     Packs/day: 0.25     Years: 1.00     Additional pack years: 0.00     Total pack years: 0.25     Types: Cigarettes     Quit date: 1989     Years since quittin.3    Smokeless tobacco: Never   Vaping Use    Vaping Use: Never used   Substance Use Topics    Alcohol use: No    Drug use: Never     Review of Systems   Constitutional: Negative for chills and fever.   HENT:  Negative for nosebleeds and sore throat.    Cardiovascular:  Positive for chest pain.   Respiratory:  Negative for cough, hemoptysis and wheezing.    Gastrointestinal:  Negative for abdominal pain, hematemesis, hematochezia, melena, nausea and vomiting.   Genitourinary:  Negative for dysuria and hematuria.   Neurological:  Negative for headaches.     Objective   Vitals:    10/16/23 0937   BP: 139/88   Pulse: 95   Weight: 103 kg (227 lb 12.8 oz)   Height: 167.6 cm (66\")     Body mass index is 36.77 kg/m².    Vitals reviewed.   Constitutional:       Appearance: Well-developed.   Eyes:      Conjunctiva/sclera: Conjunctivae normal.   HENT:      Head: Normocephalic.   Neck:      Thyroid: No thyromegaly.      Vascular: No JVD.      Trachea: No tracheal " deviation.   Pulmonary:      Effort: No respiratory distress.      Breath sounds: Normal breath sounds. No wheezing. No rales.   Cardiovascular:      PMI at left midclavicular line. Normal rate. Regular rhythm. Normal S1. Normal S2.       Murmurs: There is no murmur.      No gallop.  No click. No rub.   Pulses:     Intact distal pulses.   Edema:     Peripheral edema absent.   Abdominal:      General: Bowel sounds are normal.      Palpations: Abdomen is soft. There is no abdominal mass.      Tenderness: There is no abdominal tenderness.   Musculoskeletal:      Cervical back: Normal range of motion and neck supple. Skin:     General: Skin is warm and dry.   Neurological:      Mental Status: Alert and oriented to person, place, and time.      Cranial Nerves: No cranial nerve deficit.     Lab Results   Component Value Date     09/08/2023    K 4.6 09/08/2023     (H) 09/08/2023    CO2 25.3 09/08/2023    BUN 16 09/08/2023    CREATININE 1.04 (H) 09/08/2023    GLUCOSE 188 (H) 09/08/2023    CALCIUM 9.4 09/08/2023    AST 20 08/07/2023    ALT 20 08/07/2023    ALKPHOS 43 08/07/2023    LABIL2 1.6 12/11/2015     Lab Results   Component Value Date    CKTOTAL 35 10/29/2017     Lab Results   Component Value Date    WBC 5.82 09/08/2023    HGB 12.4 09/08/2023    HCT 39.8 09/08/2023     09/08/2023     Lab Results   Component Value Date    INR 0.95 08/07/2023    INR 0.98 10/28/2017    INR 0.89 11/29/2016     Lab Results   Component Value Date    MG 1.6 08/07/2023     Lab Results   Component Value Date    TSH 1.140 08/07/2023    CHLPL 213 (H) 03/14/2016    TRIG 149 05/18/2023    HDL 42 05/18/2023    LDL 41 05/18/2023      Lab Results   Component Value Date    BNP 27.0 12/29/2016       Assessment & Plan    Diagnosis Plan   1. ASCVD (arteriosclerotic cardiovascular disease), s/p acute anterior STEMI in 2016, s/p acute coronary intervention with a 3.25 x 23 mm Alpine CURRY on 11/29/2016.        2. Essential hypertension,  seems controlled.        3. Dyslipidemia          Recommendations  We will continue with aspirin, atorvastatin, clopidogrel and current dose of isosorbide mononitrate.  If she has recurrent anginal symptoms, may add ranolazine.  Continue emphasis on risk factor modification as needed.  Her lipid panel seems to be at goal and her blood pressure she states at home is usually in the 120s over 80s.  I told her to keep a close check on her blood sugar with her primary MD.    Return in about 5 months (around 3/16/2024).    As always, Akhil Huerta MD  I appreciate very much the opportunity to participate in the cardiovascular care of your patients. Please do not hesitate to call me with any questions with regards to Jannette Onofre's evaluation and management.       With Best Regards,        Lebron Ogden MD, [facility]    Please note that portions of this note were completed with a voice recognition program.

## 2023-10-16 NOTE — LETTER
October 18, 2023     Akhil Huerta MD  [address] [facility] [place] [address]    Patient: Jannette Onofre   YOB: 1960   Date of Visit: 10/16/2023     Dear Akhil Huerta MD:       Thank you for referring Jannette Onofre to me for evaluation. Below are the relevant portions of my assessment and plan of care.    If you have questions, please do not hesitate to call me. I look forward to following Jannette along with you.         Sincerely,        Lebron Ogden MD        CC: No Recipients    Lebron Ogden MD  10/18/23 2224  Sign when Signing Visit  Akhil Huerta MD  Jannette Onofre  1960  10/16/2023    Patient Active Problem List   Diagnosis   • Acute ST elevation myocardial infarction (STEMI) of anterior wall, 11/29/16 s/p stenting proxismal % occlusion, clinically stable.   • Type 2 diabetes mellitus   • Chest pain   • ASCVD (arteriosclerotic cardiovascular disease)   • Fatigue   • Hyperlipidemia   • Essential hypertension   • Morbid obesity   • Dyspnea on exertion (NYHA class 2-3)   • Angina, class III   • Dyspepsia   • BRICE on CPAP   • Hypoxia   • Renal insufficiency       Dear Akhil Huerta MD:    Subjective     Jannette Onofre is a 63 y.o. female with the problems as listed above, presents    Chief Complaint   Patient presents with   • Follow-up     S/p cath-feeling well       History of Present Illness:  is a very pleasant 63-year-old  female with history of known CAD, status post previous anterior wall myocardial infarction followed by stenting of the proximal LAD in 2016, was recently having some intermittent chest pains for which she underwent cardiac evaluation with cardiac catheterization on 9/8/2023.  This revealed a patent stent in the proximal LAD and about chronic 50% stenosis in the mid LAD which was unchanged from previous study.  She had overall nonobstructive disease with a 40 to 50% diffuse narrowing in the dominant RCA and about 30% stenosis the  left circumflex coronary artery.  She is being managed medically.  She is here as a regular cardiology follow-up.  On today's visit she states that she had couple of episodes of chest pains which are not as bad as before.  Overall she feels pretty good.    DATE OF PROCEDURE: 9/8/2023  INDICATION FOR PROCEDURE: chest pain  PRE PROCEDURE DIAGNOSIS:     Clinical frailty: 3- Managing Well     ASA: 2=2- Mild to moderate systemic disease, medially well controlled, with no functional limitation     Mallampati: Class 2=2- Able to visualize the soft palate, fauces, uvula.  The anterior & posterior tonsilar pillars are hidden by the tongue.     POST PROCEDURE DIAGNOSIS:   Face to face mdoerate conscious  sedation time :    COMPLICATIONS : None     Specimens collected : None     PROCEDURE PERFORMED:      1. Selective right and left Coronary Angiogram  2. Left heart catheretization    3. Left Ventriculography     Description of the procedure:  Prior to the procedure risk, benefits and possible alternative were discussed with the patient and informed consent was obtained. Patient was brought to cardiac cath lab table in post absorbtive state. Patient was prepped and drape in usual sterile fashion. IV Versed and Fentanyl was used for moderate sedation. 2% Lidocaine was used for topical anesthesia. R radial arterial site was prepped and a micropuncture needle was used to access the artery and a 5 F slender sheath was placed. 2.5 mg of Verapamil and 200 mcg of NTG was given through the sheath intra arterial and 5000 units of Heparin was given once the catheter crossed the aortic arch.       5 F TIG 4 catheters was used for right and left coronary angiogram and 5 F pigtail catheter was used for Left heart hemodynamics and left ventriculography. All the catheters were exchanged over 0.035 wire. The R radial arterial sheath was removed and TR band was applied and immediate and complete hemostasis was achieved. The patient tolerate the  entire procedure well without any immediate known complications.     Coronary anatomy findings:     LM: Is a large calibre vessel , normal take off from left cusp, divides into LAD and Lcx. No significant stenosis     LAD: Stent in the proximal mid LAD was patent, the mid LAD had moderate 50% stenosis unchanged from prior, distal LAD had mild diffuse disease, the diagonal arteries had no significant stenosis     LCX: Moderate calibre vessel, mild luminal irregularities, focal 30% stenosis, OM branches distally small calibre vessel with no stenosis     RCA: Large calibre, dominant artery, normal take off from right cusp.  Entire RCA had mild diffuse 40-50% stenosis, R PDA and PL branches had mild diffuse disease.      Left Ventriculography:     LV systolic function was normal with visual estimated EF of 60%. No wall motion abnormalities.   No significant mitral regurgitation noted.      LVEDP: 8 mmHg  No gradient across the aortic valve on pull back.      EBL: Less than 10cc    Final Impression:  Moderate non obstructive CAD  Normal LV systolic function   Recommendations:  Aggressive guideline directed medical management for CAD       Suresh MD Tania, [facility]  Interventional Cardiology    No Known Allergies:    Current Outpatient Medications:   •  alendronate (FOSAMAX) 70 MG tablet, Take 1 tablet by mouth every week in the morning, at least 30 minutes before first food, beverage, or medication of day., Disp: 12 tablet, Rfl: 0  •  aspirin 81 MG chewable tablet, Chew & Swallow 1 tablet Daily., Disp: 100 tablet, Rfl: 3  •  atorvastatin (LIPITOR) 80 MG tablet, Take 1 tablet by mouth Daily., Disp: 90 tablet, Rfl: 3  •  carvedilol (COREG) 25 MG tablet, Take 1 & 1/2 tablets by mouth 2 (Two) Times a Day With Meals., Disp: 180 tablet, Rfl: 3  •  cloNIDine (CATAPRES) 0.1 MG tablet, Take 1 tablet by mouth 3 times every day as needed for systolic blood pressure above 150., Disp: 60 tablet, Rfl: 2  •  clopidogrel (PLAVIX) 75  MG tablet, Take 1 tablet by mouth daily, Disp: 90 tablet, Rfl: 4  •  empagliflozin (Jardiance) 10 MG tablet tablet, Take 1 tablet by mouth Every Morning., Disp: 90 tablet, Rfl: 3  •  isosorbide mononitrate (IMDUR) 60 MG 24 hr tablet, take 1 & 1/2 tablets by mouth every day in the morning., Disp: 180 tablet, Rfl: 3  •  metFORMIN (GLUCOPHAGE) 1000 MG tablet, Take 1 tablet by mouth 2 times daily with morning and evening meals., Disp: 180 tablet, Rfl: 11  •  Multiple Vitamin (MULTI-VITAMIN DAILY PO), Take  by mouth., Disp: , Rfl:   •  nitroglycerin (NITROSTAT) 0.4 MG SL tablet, Place 1 tablet under the tongue Every 5 (Five) Minutes As Needed for Chest Pain (max of 3 doses in 15 minutes. If no relief, go to ER., Disp: 25 tablet, Rfl: 12  •  rOPINIRole (REQUIP) 0.25 MG tablet, Take 1 or 2 tablets by mouth every day at bedtime for Restless Leg syndrome., Disp: 180 tablet, Rfl: 3  •  Tirzepatide (Mounjaro) 12.5 MG/0.5ML solution pen-injector, Inject 12.5 mg subcutaneously once every week., Disp: 6 mL, Rfl: 2  •  valsartan (DIOVAN) 160 MG tablet, Take 1 tablet by mouth Daily., Disp: 90 tablet, Rfl: 1    The following portions of the patient's history were reviewed and updated as appropriate: allergies, current medications, past family history, past medical history, past social history, past surgical history and problem list.    Social History     Tobacco Use   • Smoking status: Former     Packs/day: 0.25     Years: 1.00     Additional pack years: 0.00     Total pack years: 0.25     Types: Cigarettes     Quit date: 1989     Years since quittin.3   • Smokeless tobacco: Never   Vaping Use   • Vaping Use: Never used   Substance Use Topics   • Alcohol use: No   • Drug use: Never     Review of Systems   Constitutional: Negative for chills and fever.   HENT:  Negative for nosebleeds and sore throat.    Cardiovascular:  Positive for chest pain.   Respiratory:  Negative for cough, hemoptysis and wheezing.   "  Gastrointestinal:  Negative for abdominal pain, hematemesis, hematochezia, melena, nausea and vomiting.   Genitourinary:  Negative for dysuria and hematuria.   Neurological:  Negative for headaches.     Objective   Vitals:    10/16/23 0937   BP: 139/88   Pulse: 95   Weight: 103 kg (227 lb 12.8 oz)   Height: 167.6 cm (66\")     Body mass index is 36.77 kg/m².    Vitals reviewed.   Constitutional:       Appearance: Well-developed.   Eyes:      Conjunctiva/sclera: Conjunctivae normal.   HENT:      Head: Normocephalic.   Neck:      Thyroid: No thyromegaly.      Vascular: No JVD.      Trachea: No tracheal deviation.   Pulmonary:      Effort: No respiratory distress.      Breath sounds: Normal breath sounds. No wheezing. No rales.   Cardiovascular:      PMI at left midclavicular line. Normal rate. Regular rhythm. Normal S1. Normal S2.       Murmurs: There is no murmur.      No gallop.  No click. No rub.   Pulses:     Intact distal pulses.   Edema:     Peripheral edema absent.   Abdominal:      General: Bowel sounds are normal.      Palpations: Abdomen is soft. There is no abdominal mass.      Tenderness: There is no abdominal tenderness.   Musculoskeletal:      Cervical back: Normal range of motion and neck supple. Skin:     General: Skin is warm and dry.   Neurological:      Mental Status: Alert and oriented to person, place, and time.      Cranial Nerves: No cranial nerve deficit.     Lab Results   Component Value Date     09/08/2023    K 4.6 09/08/2023     (H) 09/08/2023    CO2 25.3 09/08/2023    BUN 16 09/08/2023    CREATININE 1.04 (H) 09/08/2023    GLUCOSE 188 (H) 09/08/2023    CALCIUM 9.4 09/08/2023    AST 20 08/07/2023    ALT 20 08/07/2023    ALKPHOS 43 08/07/2023    LABIL2 1.6 12/11/2015     Lab Results   Component Value Date    CKTOTAL 35 10/29/2017     Lab Results   Component Value Date    WBC 5.82 09/08/2023    HGB 12.4 09/08/2023    HCT 39.8 09/08/2023     09/08/2023     Lab Results "   Component Value Date    INR 0.95 08/07/2023    INR 0.98 10/28/2017    INR 0.89 11/29/2016     Lab Results   Component Value Date    MG 1.6 08/07/2023     Lab Results   Component Value Date    TSH 1.140 08/07/2023    CHLPL 213 (H) 03/14/2016    TRIG 149 05/18/2023    HDL 42 05/18/2023    LDL 41 05/18/2023      Lab Results   Component Value Date    BNP 27.0 12/29/2016       Assessment & Plan    Diagnosis Plan   1. ASCVD (arteriosclerotic cardiovascular disease), s/p acute anterior STEMI in 2016, s/p acute coronary intervention with a 3.25 x 23 mm Alpine CURRY on 11/29/2016.        2. Essential hypertension, seems controlled.        3. Dyslipidemia          Recommendations  We will continue with aspirin, atorvastatin, clopidogrel and current dose of isosorbide mononitrate.  If she has recurrent anginal symptoms, may add ranolazine.  Continue emphasis on risk factor modification as needed.  Her lipid panel seems to be at goal and her blood pressure she states at home is usually in the 120s over 80s.  I told her to keep a close check on her blood sugar with her primary MD.    Return in about 5 months (around 3/16/2024).    As always, Akhil Huerta MD  I appreciate very much the opportunity to participate in the cardiovascular care of your patients. Please do not hesitate to call me with any questions with regards to Jannette Onofre's evaluation and management.       With Best Regards,        Lebron Ogden MD, [facility]    Please note that portions of this note were completed with a voice recognition program.

## 2023-11-22 ENCOUNTER — TRANSCRIBE ORDERS (OUTPATIENT)
Dept: ADMINISTRATIVE | Facility: HOSPITAL | Age: 63
End: 2023-11-22
Payer: COMMERCIAL

## 2023-11-22 ENCOUNTER — LAB (OUTPATIENT)
Dept: LAB | Facility: HOSPITAL | Age: 63
End: 2023-11-22
Payer: COMMERCIAL

## 2023-11-22 DIAGNOSIS — E55.9 VITAMIN D DEFICIENCY: ICD-10-CM

## 2023-11-22 DIAGNOSIS — E11.69 TYPE 2 DIABETES MELLITUS WITH OTHER SPECIFIED COMPLICATION, UNSPECIFIED WHETHER LONG TERM INSULIN USE: ICD-10-CM

## 2023-11-22 DIAGNOSIS — E11.42 TYPE 2 DIABETES MELLITUS WITH DIABETIC POLYNEUROPATHY, UNSPECIFIED WHETHER LONG TERM INSULIN USE: ICD-10-CM

## 2023-11-22 DIAGNOSIS — E53.8 DEFICIENCY OF OTHER SPECIFIED B GROUP VITAMINS: ICD-10-CM

## 2023-11-22 DIAGNOSIS — E11.42 TYPE 2 DIABETES MELLITUS WITH DIABETIC POLYNEUROPATHY, UNSPECIFIED WHETHER LONG TERM INSULIN USE: Primary | ICD-10-CM

## 2023-11-22 LAB
ALBUMIN SERPL-MCNC: 4.8 G/DL (ref 3.5–5.2)
ALBUMIN UR-MCNC: 1.7 MG/DL
ALBUMIN/GLOB SERPL: 2.4 G/DL
ALP SERPL-CCNC: 44 U/L (ref 39–117)
ALT SERPL W P-5'-P-CCNC: 26 U/L (ref 1–33)
ANION GAP SERPL CALCULATED.3IONS-SCNC: 11 MMOL/L (ref 5–15)
AST SERPL-CCNC: 23 U/L (ref 1–32)
BASOPHILS # BLD AUTO: 0.05 10*3/MM3 (ref 0–0.2)
BASOPHILS NFR BLD AUTO: 0.9 % (ref 0–1.5)
BILIRUB SERPL-MCNC: 0.4 MG/DL (ref 0–1.2)
BUN SERPL-MCNC: 31 MG/DL (ref 8–23)
BUN/CREAT SERPL: 29.8 (ref 7–25)
CALCIUM SPEC-SCNC: 9.4 MG/DL (ref 8.6–10.5)
CHLORIDE SERPL-SCNC: 110 MMOL/L (ref 98–107)
CHOLEST SERPL-MCNC: 144 MG/DL (ref 0–200)
CO2 SERPL-SCNC: 24 MMOL/L (ref 22–29)
CREAT SERPL-MCNC: 1.04 MG/DL (ref 0.57–1)
CREAT UR-MCNC: 80.1 MG/DL
DEPRECATED RDW RBC AUTO: 42.3 FL (ref 37–54)
EGFRCR SERPLBLD CKD-EPI 2021: 60.1 ML/MIN/1.73
EOSINOPHIL # BLD AUTO: 0.1 10*3/MM3 (ref 0–0.4)
EOSINOPHIL NFR BLD AUTO: 1.8 % (ref 0.3–6.2)
ERYTHROCYTE [DISTWIDTH] IN BLOOD BY AUTOMATED COUNT: 13.2 % (ref 12.3–15.4)
ERYTHROCYTE [SEDIMENTATION RATE] IN BLOOD: 18 MM/HR (ref 0–30)
FOLATE SERPL-MCNC: >20 NG/ML (ref 4.78–24.2)
GLOBULIN UR ELPH-MCNC: 2 GM/DL
GLUCOSE SERPL-MCNC: 115 MG/DL (ref 65–99)
HBA1C MFR BLD: 7.1 % (ref 4.8–5.6)
HCT VFR BLD AUTO: 41.9 % (ref 34–46.6)
HDLC SERPL-MCNC: 42 MG/DL (ref 40–60)
HGB BLD-MCNC: 13.6 G/DL (ref 12–15.9)
IMM GRANULOCYTES # BLD AUTO: 0.01 10*3/MM3 (ref 0–0.05)
IMM GRANULOCYTES NFR BLD AUTO: 0.2 % (ref 0–0.5)
LDLC SERPL CALC-MCNC: 67 MG/DL (ref 0–100)
LDLC/HDLC SERPL: 1.42 {RATIO}
LYMPHOCYTES # BLD AUTO: 1.87 10*3/MM3 (ref 0.7–3.1)
LYMPHOCYTES NFR BLD AUTO: 32.7 % (ref 19.6–45.3)
MCH RBC QN AUTO: 28.6 PG (ref 26.6–33)
MCHC RBC AUTO-ENTMCNC: 32.5 G/DL (ref 31.5–35.7)
MCV RBC AUTO: 88 FL (ref 79–97)
MICROALBUMIN/CREAT UR: 21.2 MG/G (ref 0–29)
MONOCYTES # BLD AUTO: 0.37 10*3/MM3 (ref 0.1–0.9)
MONOCYTES NFR BLD AUTO: 6.5 % (ref 5–12)
NEUTROPHILS NFR BLD AUTO: 3.31 10*3/MM3 (ref 1.7–7)
NEUTROPHILS NFR BLD AUTO: 57.9 % (ref 42.7–76)
NRBC BLD AUTO-RTO: 0 /100 WBC (ref 0–0.2)
PLATELET # BLD AUTO: 202 10*3/MM3 (ref 140–450)
PMV BLD AUTO: 10.2 FL (ref 6–12)
POTASSIUM SERPL-SCNC: 4.1 MMOL/L (ref 3.5–5.2)
PROT SERPL-MCNC: 6.8 G/DL (ref 6–8.5)
RBC # BLD AUTO: 4.76 10*6/MM3 (ref 3.77–5.28)
SODIUM SERPL-SCNC: 145 MMOL/L (ref 136–145)
TRIGL SERPL-MCNC: 211 MG/DL (ref 0–150)
VIT B12 BLD-MCNC: 858 PG/ML (ref 211–946)
VLDLC SERPL-MCNC: 35 MG/DL (ref 5–40)
WBC NRBC COR # BLD AUTO: 5.71 10*3/MM3 (ref 3.4–10.8)

## 2023-11-22 PROCEDURE — 82746 ASSAY OF FOLIC ACID SERUM: CPT

## 2023-11-22 PROCEDURE — 83036 HEMOGLOBIN GLYCOSYLATED A1C: CPT

## 2023-11-22 PROCEDURE — 82570 ASSAY OF URINE CREATININE: CPT

## 2023-11-22 PROCEDURE — 82043 UR ALBUMIN QUANTITATIVE: CPT

## 2023-11-22 PROCEDURE — 85652 RBC SED RATE AUTOMATED: CPT

## 2023-11-22 PROCEDURE — 85025 COMPLETE CBC W/AUTO DIFF WBC: CPT

## 2023-11-22 PROCEDURE — 80061 LIPID PANEL: CPT

## 2023-11-22 PROCEDURE — 82607 VITAMIN B-12: CPT

## 2023-11-22 PROCEDURE — 84681 ASSAY OF C-PEPTIDE: CPT

## 2023-11-22 PROCEDURE — 36415 COLL VENOUS BLD VENIPUNCTURE: CPT

## 2023-11-22 PROCEDURE — 80053 COMPREHEN METABOLIC PANEL: CPT

## 2023-11-24 LAB — C PEPTIDE SERPL-MCNC: 5 NG/ML (ref 1.1–4.4)

## 2024-03-01 ENCOUNTER — LAB (OUTPATIENT)
Dept: LAB | Facility: HOSPITAL | Age: 64
End: 2024-03-01
Payer: COMMERCIAL

## 2024-03-01 ENCOUNTER — TRANSCRIBE ORDERS (OUTPATIENT)
Dept: ADMINISTRATIVE | Facility: HOSPITAL | Age: 64
End: 2024-03-01
Payer: COMMERCIAL

## 2024-03-01 DIAGNOSIS — E55.9 VITAMIN D DEFICIENCY: ICD-10-CM

## 2024-03-01 DIAGNOSIS — I10 ESSENTIAL (PRIMARY) HYPERTENSION: ICD-10-CM

## 2024-03-01 DIAGNOSIS — E78.2 MIXED HYPERLIPIDEMIA: ICD-10-CM

## 2024-03-01 DIAGNOSIS — E53.8 BIOTIN-(PROPIONYL-COA-CARBOXYLASE) LIGASE DEFICIENCY: ICD-10-CM

## 2024-03-01 DIAGNOSIS — E11.42 DIABETIC SENSORIMOTOR NEUROPATHY: ICD-10-CM

## 2024-03-01 DIAGNOSIS — E11.42 DIABETIC SENSORIMOTOR NEUROPATHY: Primary | ICD-10-CM

## 2024-03-01 LAB
ALBUMIN SERPL-MCNC: 4.3 G/DL (ref 3.5–5.2)
ALBUMIN/GLOB SERPL: 1.8 G/DL
ALP SERPL-CCNC: 39 U/L (ref 39–117)
ALT SERPL W P-5'-P-CCNC: 23 U/L (ref 1–33)
ANION GAP SERPL CALCULATED.3IONS-SCNC: 10.9 MMOL/L (ref 5–15)
AST SERPL-CCNC: 22 U/L (ref 1–32)
BASOPHILS # BLD AUTO: 0.04 10*3/MM3 (ref 0–0.2)
BASOPHILS NFR BLD AUTO: 0.7 % (ref 0–1.5)
BILIRUB SERPL-MCNC: 0.5 MG/DL (ref 0–1.2)
BUN SERPL-MCNC: 18 MG/DL (ref 8–23)
BUN/CREAT SERPL: 18.8 (ref 7–25)
CALCIUM SPEC-SCNC: 9.3 MG/DL (ref 8.6–10.5)
CHLORIDE SERPL-SCNC: 110 MMOL/L (ref 98–107)
CHOLEST SERPL-MCNC: 119 MG/DL (ref 0–200)
CO2 SERPL-SCNC: 25.1 MMOL/L (ref 22–29)
CREAT SERPL-MCNC: 0.96 MG/DL (ref 0.57–1)
CRP SERPL-MCNC: <0.3 MG/DL (ref 0–0.5)
DEPRECATED RDW RBC AUTO: 46.9 FL (ref 37–54)
EGFRCR SERPLBLD CKD-EPI 2021: 66.2 ML/MIN/1.73
EOSINOPHIL # BLD AUTO: 0.13 10*3/MM3 (ref 0–0.4)
EOSINOPHIL NFR BLD AUTO: 2.3 % (ref 0.3–6.2)
ERYTHROCYTE [DISTWIDTH] IN BLOOD BY AUTOMATED COUNT: 14 % (ref 12.3–15.4)
FOLATE SERPL-MCNC: >20 NG/ML (ref 4.78–24.2)
GLOBULIN UR ELPH-MCNC: 2.4 GM/DL
GLUCOSE SERPL-MCNC: 166 MG/DL (ref 65–99)
HBA1C MFR BLD: 7.1 % (ref 4.8–5.6)
HCT VFR BLD AUTO: 41.9 % (ref 34–46.6)
HDLC SERPL-MCNC: 38 MG/DL (ref 40–60)
HGB BLD-MCNC: 13.3 G/DL (ref 12–15.9)
IMM GRANULOCYTES # BLD AUTO: 0.02 10*3/MM3 (ref 0–0.05)
IMM GRANULOCYTES NFR BLD AUTO: 0.3 % (ref 0–0.5)
LDLC SERPL CALC-MCNC: 47 MG/DL (ref 0–100)
LDLC/HDLC SERPL: 1.04 {RATIO}
LYMPHOCYTES # BLD AUTO: 1.58 10*3/MM3 (ref 0.7–3.1)
LYMPHOCYTES NFR BLD AUTO: 27.5 % (ref 19.6–45.3)
MCH RBC QN AUTO: 28.9 PG (ref 26.6–33)
MCHC RBC AUTO-ENTMCNC: 31.7 G/DL (ref 31.5–35.7)
MCV RBC AUTO: 90.9 FL (ref 79–97)
MONOCYTES # BLD AUTO: 0.49 10*3/MM3 (ref 0.1–0.9)
MONOCYTES NFR BLD AUTO: 8.5 % (ref 5–12)
NEUTROPHILS NFR BLD AUTO: 3.48 10*3/MM3 (ref 1.7–7)
NEUTROPHILS NFR BLD AUTO: 60.7 % (ref 42.7–76)
NRBC BLD AUTO-RTO: 0 /100 WBC (ref 0–0.2)
PLATELET # BLD AUTO: 159 10*3/MM3 (ref 140–450)
PMV BLD AUTO: 9.8 FL (ref 6–12)
POTASSIUM SERPL-SCNC: 4.5 MMOL/L (ref 3.5–5.2)
PROT SERPL-MCNC: 6.7 G/DL (ref 6–8.5)
RBC # BLD AUTO: 4.61 10*6/MM3 (ref 3.77–5.28)
SODIUM SERPL-SCNC: 146 MMOL/L (ref 136–145)
TRIGL SERPL-MCNC: 208 MG/DL (ref 0–150)
VIT B12 BLD-MCNC: 775 PG/ML (ref 211–946)
VLDLC SERPL-MCNC: 34 MG/DL (ref 5–40)
WBC NRBC COR # BLD AUTO: 5.74 10*3/MM3 (ref 3.4–10.8)

## 2024-03-01 PROCEDURE — 80053 COMPREHEN METABOLIC PANEL: CPT

## 2024-03-01 PROCEDURE — 36415 COLL VENOUS BLD VENIPUNCTURE: CPT

## 2024-03-01 PROCEDURE — 82746 ASSAY OF FOLIC ACID SERUM: CPT

## 2024-03-01 PROCEDURE — 85025 COMPLETE CBC W/AUTO DIFF WBC: CPT

## 2024-03-01 PROCEDURE — 82607 VITAMIN B-12: CPT

## 2024-03-01 PROCEDURE — 80061 LIPID PANEL: CPT

## 2024-03-01 PROCEDURE — 83036 HEMOGLOBIN GLYCOSYLATED A1C: CPT

## 2024-03-01 PROCEDURE — 86140 C-REACTIVE PROTEIN: CPT

## 2024-04-01 ENCOUNTER — OFFICE VISIT (OUTPATIENT)
Dept: CARDIOLOGY | Facility: CLINIC | Age: 64
End: 2024-04-01
Payer: COMMERCIAL

## 2024-04-01 VITALS
DIASTOLIC BLOOD PRESSURE: 76 MMHG | SYSTOLIC BLOOD PRESSURE: 113 MMHG | HEART RATE: 112 BPM | WEIGHT: 215.8 LBS | HEIGHT: 66 IN | OXYGEN SATURATION: 97 % | BODY MASS INDEX: 34.68 KG/M2

## 2024-04-01 DIAGNOSIS — I25.10 ASCVD (ARTERIOSCLEROTIC CARDIOVASCULAR DISEASE): Primary | ICD-10-CM

## 2024-04-01 DIAGNOSIS — I10 ESSENTIAL HYPERTENSION: ICD-10-CM

## 2024-04-01 DIAGNOSIS — E78.5 DYSLIPIDEMIA: ICD-10-CM

## 2024-04-01 PROCEDURE — 99213 OFFICE O/P EST LOW 20 MIN: CPT | Performed by: INTERNAL MEDICINE

## 2024-04-01 PROCEDURE — 93000 ELECTROCARDIOGRAM COMPLETE: CPT | Performed by: INTERNAL MEDICINE

## 2024-04-01 RX ORDER — CHLORAL HYDRATE 500 MG
1 CAPSULE ORAL DAILY
Qty: 30 CAPSULE | Refills: 5 | Status: SHIPPED | OUTPATIENT
Start: 2024-04-01

## 2024-04-01 NOTE — LETTER
April 1, 2024     Akhil Huerta MD  [address] [facility] [place] [address]    Patient: Jannette Onofre   YOB: 1960   Date of Visit: 4/1/2024     Dear Dwight:       Thank you for referring Jannette Onofre to me for evaluation. Below are the relevant portions of my assessment and plan of care.    If you have questions, please do not hesitate to call me. I look forward to following Jannette along with you.         Sincerely,        Lebron Ogden MD        CC: No Recipients    Lebron Ogden MD  04/01/24 2050  Sign when Signing Visit  Akhil Huerta MD  Jannette Onofre  1960 04/01/2024    Patient Active Problem List   Diagnosis   • Acute ST elevation myocardial infarction (STEMI) of anterior wall, 11/29/16 s/p stenting proxismal % occlusion, clinically stable.   • Type 2 diabetes mellitus   • Chest pain   • ASCVD (arteriosclerotic cardiovascular disease)   • Fatigue   • Hyperlipidemia   • Essential hypertension   • Morbid obesity   • Dyspnea on exertion (NYHA class 2-3)   • Angina, class III   • Dyspepsia   • BRICE on CPAP   • Hypoxia   • Renal insufficiency       Dear kAhil Huerta MD:    Subjective    Jannette Onofre is a 63 y.o. female with the problems as listed above, presents    Chief complaint: Follow-up of coronary artery disease.    History of Present Illness: Ms. Knight is a very pleasant 63-year-old lady with history of known coronary artery disease, status post previous myocardial infarction followed by stenting of the proximal LAD in 2016, is here for regular cardiology follow-up.  On today's visit she denies any complaints of chest pains or shortness of breath and overall feels pretty good.  Her last cardiac catheterization on 9/8/2023 revealed about 50% of the mid LAD with widely patent stent.  She states she had to fight the traffic to come to the office and that is why her heart rate is faster.  She says her heart rate usually runs in the 70s.    CARDIAC CATHETERIZATION  / INTERVENTION REPORT        DATE OF PROCEDURE: 9/8/2023     INDICATION FOR PROCEDURE: chest pain      PROCEDURE PERFORMED:      1. Selective right and left Coronary Angiogram  2. Left heart catheretization    3. Left Ventriculography     Coronary anatomy findings:     LM: Is a large calibre vessel , normal take off from left cusp, divides into LAD and Lcx. No significant stenosis     LAD: Stent in the proximal mid LAD was patent, the mid LAD had moderate 50% stenosis unchanged from prior, distal LAD had mild diffuse disease, the diagonal arteries had no significant stenosis     LCX: Moderate calibre vessel, mild luminal irregularities, focal 30% stenosis, OM branches distally small calibre vessel with no stenosis     RCA: Large calibre, dominant artery, normal take off from right cusp.  Entire RCA had mild diffuse 40-50% stenosis, R PDA and PL branches had mild diffuse disease.      Left Ventriculography:     LV systolic function was normal with visual estimated EF of 60%. No wall motion abnormalities.   No significant mitral regurgitation noted.      LVEDP: 8 mmHg  No gradient across the aortic valve on pull back.         Final Impression:  Moderate non obstructive CAD  Normal LV systolic function      Recommendations:  Aggressive guideline directed medical management for CAD      Suresh Marin MD, [facility]  Interventional Cardiology    No Known Allergies:    Current Outpatient Medications:   •  alendronate (FOSAMAX) 70 MG tablet, Take 1 tablet by mouth every week in the morning, at least 30 minutes before first food, beverage, or medication of day., Disp: 12 tablet, Rfl: 1  •  aspirin 81 MG chewable tablet, Chew & swallow 1 tablet every day., Disp: 100 tablet, Rfl: 3  •  atorvastatin (LIPITOR) 80 MG tablet, Take 1 tablet by mouth Daily., Disp: 90 tablet, Rfl: 3  •  carvedilol (COREG) 25 MG tablet, Take 1 & 1/2 tablets by mouth 2 (Two) Times a Day With Meals., Disp: 180 tablet, Rfl: 3  •  cloNIDine (CATAPRES) 0.1  MG tablet, Take 1 tablet by mouth 3 times every day as needed for SBP above 150, Disp: 60 tablet, Rfl: 2  •  clopidogrel (PLAVIX) 75 MG tablet, Take 1 tablet by mouth daily, Disp: 90 tablet, Rfl: 4  •  empagliflozin (Jardiance) 25 MG tablet tablet, Take 1 tablet by mouth every morning., Disp: 90 tablet, Rfl: 3  •  isosorbide mononitrate (IMDUR) 60 MG 24 hr tablet, Take 1 and 1/2 tablets by mouth every day in the morning, Disp: 180 tablet, Rfl: 3  •  metFORMIN (GLUCOPHAGE) 1000 MG tablet, Take 1 tablet by mouth 2 times daily with morning and evening meals., Disp: 180 tablet, Rfl: 11  •  Multiple Vitamin (MULTI-VITAMIN DAILY PO), Take  by mouth., Disp: , Rfl:   •  nitroglycerin (NITROSTAT) 0.4 MG SL tablet, Place 1 tablet under the tongue Every 5 (Five) Minutes As Needed for Chest Pain (max of 3 doses in 15 minutes. If no relief, go to ER., Disp: 25 tablet, Rfl: 12  •  Tirzepatide (Mounjaro) 15 MG/0.5ML solution pen-injector pen, Inject 15 mg under the skin once every week., Disp: 6 mL, Rfl: 2  •  valsartan (DIOVAN) 160 MG tablet, Take 1 tablet by mouth Daily., Disp: 90 tablet, Rfl: 3  •  vitamin D (ERGOCALCIFEROL) 1.25 MG (98276 UT) capsule capsule, Take 1 capsule by mouth once weekly., Disp: 15 capsule, Rfl: 3  •  Omega-3 Fatty Acids (Fish Oil) 1000 MG capsule delayed-release, Take 1 capsule by mouth Daily., Disp: 30 capsule, Rfl: 5    The following portions of the patient's history were reviewed and updated as appropriate: allergies, current medications, past family history, past medical history, past social history, past surgical history and problem list.    Social History     Tobacco Use   • Smoking status: Former     Current packs/day: 0.00     Average packs/day: 0.3 packs/day for 1 year (0.3 ttl pk-yrs)     Types: Cigarettes     Start date: 1988     Quit date: 1989     Years since quittin.7   • Smokeless tobacco: Never   Vaping Use   • Vaping status: Never Used   Substance Use Topics   • Alcohol  "use: No   • Drug use: Never     Review of Systems   Constitutional: Negative for chills and fever.   HENT:  Negative for nosebleeds and sore throat.    Respiratory:  Negative for cough, hemoptysis and wheezing.    Gastrointestinal:  Negative for abdominal pain, hematemesis, hematochezia, melena, nausea and vomiting.   Genitourinary:  Negative for dysuria and hematuria.   Neurological:  Negative for headaches.     Objective  Vitals:    04/01/24 0821   BP: 113/76   Pulse: 112   SpO2: 97%   Weight: 97.9 kg (215 lb 12.8 oz)   Height: 167.6 cm (66\")     Body mass index is 34.83 kg/m².    Vitals reviewed.   Constitutional:       Appearance: Well-developed.   Eyes:      Conjunctiva/sclera: Conjunctivae normal.   HENT:      Head: Normocephalic.   Neck:      Thyroid: No thyromegaly.      Vascular: No JVD.      Trachea: No tracheal deviation.   Pulmonary:      Effort: No respiratory distress.      Breath sounds: Normal breath sounds. No wheezing. No rales.   Cardiovascular:      PMI at left midclavicular line. Normal rate. Regular rhythm. Normal S1. Normal S2.       Murmurs: There is no murmur.      No gallop.  No click. No rub.   Pulses:     Intact distal pulses.   Edema:     Peripheral edema absent.   Abdominal:      General: Bowel sounds are normal.      Palpations: Abdomen is soft. There is no abdominal mass.      Tenderness: There is no abdominal tenderness.   Musculoskeletal:      Cervical back: Normal range of motion and neck supple. Skin:     General: Skin is warm and dry.   Neurological:      Mental Status: Alert and oriented to person, place, and time.      Cranial Nerves: No cranial nerve deficit.       Lab Results   Component Value Date     (H) 03/01/2024    K 4.5 03/01/2024     (H) 03/01/2024    CO2 25.1 03/01/2024    BUN 18 03/01/2024    CREATININE 0.96 03/01/2024    GLUCOSE 166 (H) 03/01/2024    CALCIUM 9.3 03/01/2024    AST 22 03/01/2024    ALT 23 03/01/2024    ALKPHOS 39 03/01/2024    LABIL2 1.6 " 12/11/2015     Lab Results   Component Value Date    CKTOTAL 35 10/29/2017     Lab Results   Component Value Date    WBC 5.74 03/01/2024    HGB 13.3 03/01/2024    HCT 41.9 03/01/2024     03/01/2024     Lab Results   Component Value Date    INR 0.95 08/07/2023    INR 0.98 10/28/2017    INR 0.89 11/29/2016     Lab Results   Component Value Date    MG 1.6 08/07/2023     Lab Results   Component Value Date    TSH 1.140 08/07/2023    CHLPL 213 (H) 03/14/2016    TRIG 208 (H) 03/01/2024    HDL 38 (L) 03/01/2024    LDL 47 03/01/2024      Lab Results   Component Value Date    BNP 27.0 12/29/2016       ECG 12 Lead    Date/Time: 4/1/2024 8:22 AM  Performed by: Lebron Ogden MD    Authorized by: Lebron Ogden MD  Comparison: compared with previous ECG from 8/7/2023  Similar to previous ECG  Rhythm: sinus rhythm  Conduction: right bundle branch block  Other findings: non-specific ST-T wave changes            Assessment & Plan   Diagnosis Plan   1. ASCVD (arteriosclerotic cardiovascular disease), s/p acute anterior STEMI in 2016, s/p acute coronary intervention with a 3.25 x 23 mm Alpine CURRY on 11/29/2016.        2. Essential hypertension, controlled.        3. Dyslipidemia, on atorvastatin 80 mg daily.          Recommendations  Continue with aspirin, clopidogrel, carvedilol and atorvastatin for her coronary artery disease  Will add fish oil 1000 mg daily for her hypertriglyceridemia.  If no significant improvement with this then we will consider changing to Lovaza or Vascepa.    Return in about 6 months (around 10/1/2024).    As always,  Dwight I appreciate very much the opportunity to participate in the cardiovascular care of your patients. Please do not hesitate to call me with any questions with regards to Jannette Tsanggs's evaluation and management.       With Best Regards,        Lebron Ogden MD, [facility]    Please note that portions of this note were completed with a voice recognition program.

## 2024-04-01 NOTE — PROGRESS NOTES
Akhil Huerta MD  Jannette Tsanggs  1960 04/01/2024    Patient Active Problem List   Diagnosis    Acute ST elevation myocardial infarction (STEMI) of anterior wall, 11/29/16 s/p stenting proxismal % occlusion, clinically stable.    Type 2 diabetes mellitus    Chest pain    ASCVD (arteriosclerotic cardiovascular disease)    Fatigue    Hyperlipidemia    Essential hypertension    Morbid obesity    Dyspnea on exertion (NYHA class 2-3)    Angina, class III    Dyspepsia    BRICE on CPAP    Hypoxia    Renal insufficiency       Dear Akhil Huerta MD:    Subjective     Jannette Onofre is a 63 y.o. female with the problems as listed above, presents    Chief complaint: Follow-up of coronary artery disease.    History of Present Illness: Ms. Knight is a very pleasant 63-year-old lady with history of known coronary artery disease, status post previous myocardial infarction followed by stenting of the proximal LAD in 2016, is here for regular cardiology follow-up.  On today's visit she denies any complaints of chest pains or shortness of breath and overall feels pretty good.  Her last cardiac catheterization on 9/8/2023 revealed about 50% of the mid LAD with widely patent stent.  She states she had to fight the traffic to come to the office and that is why her heart rate is faster.  She says her heart rate usually runs in the 70s.    CARDIAC CATHETERIZATION / INTERVENTION REPORT        DATE OF PROCEDURE: 9/8/2023     INDICATION FOR PROCEDURE: chest pain      PROCEDURE PERFORMED:      1. Selective right and left Coronary Angiogram  2. Left heart catheretization    3. Left Ventriculography     Coronary anatomy findings:     LM: Is a large calibre vessel , normal take off from left cusp, divides into LAD and Lcx. No significant stenosis     LAD: Stent in the proximal mid LAD was patent, the mid LAD had moderate 50% stenosis unchanged from prior, distal LAD had mild diffuse disease, the diagonal arteries had no  significant stenosis     LCX: Moderate calibre vessel, mild luminal irregularities, focal 30% stenosis, OM branches distally small calibre vessel with no stenosis     RCA: Large calibre, dominant artery, normal take off from right cusp.  Entire RCA had mild diffuse 40-50% stenosis, R PDA and PL branches had mild diffuse disease.      Left Ventriculography:     LV systolic function was normal with visual estimated EF of 60%. No wall motion abnormalities.   No significant mitral regurgitation noted.      LVEDP: 8 mmHg  No gradient across the aortic valve on pull back.         Final Impression:  Moderate non obstructive CAD  Normal LV systolic function      Recommendations:  Aggressive guideline directed medical management for CAD      Suresh MD Tania, [facility]  Interventional Cardiology    No Known Allergies:    Current Outpatient Medications:     alendronate (FOSAMAX) 70 MG tablet, Take 1 tablet by mouth every week in the morning, at least 30 minutes before first food, beverage, or medication of day., Disp: 12 tablet, Rfl: 1    aspirin 81 MG chewable tablet, Chew & swallow 1 tablet every day., Disp: 100 tablet, Rfl: 3    atorvastatin (LIPITOR) 80 MG tablet, Take 1 tablet by mouth Daily., Disp: 90 tablet, Rfl: 3    carvedilol (COREG) 25 MG tablet, Take 1 & 1/2 tablets by mouth 2 (Two) Times a Day With Meals., Disp: 180 tablet, Rfl: 3    cloNIDine (CATAPRES) 0.1 MG tablet, Take 1 tablet by mouth 3 times every day as needed for SBP above 150, Disp: 60 tablet, Rfl: 2    clopidogrel (PLAVIX) 75 MG tablet, Take 1 tablet by mouth daily, Disp: 90 tablet, Rfl: 4    empagliflozin (Jardiance) 25 MG tablet tablet, Take 1 tablet by mouth every morning., Disp: 90 tablet, Rfl: 3    isosorbide mononitrate (IMDUR) 60 MG 24 hr tablet, Take 1 and 1/2 tablets by mouth every day in the morning, Disp: 180 tablet, Rfl: 3    metFORMIN (GLUCOPHAGE) 1000 MG tablet, Take 1 tablet by mouth 2 times daily with morning and evening meals., Disp:  "180 tablet, Rfl: 11    Multiple Vitamin (MULTI-VITAMIN DAILY PO), Take  by mouth., Disp: , Rfl:     nitroglycerin (NITROSTAT) 0.4 MG SL tablet, Place 1 tablet under the tongue Every 5 (Five) Minutes As Needed for Chest Pain (max of 3 doses in 15 minutes. If no relief, go to ER., Disp: 25 tablet, Rfl: 12    Tirzepatide (Mounjaro) 15 MG/0.5ML solution pen-injector pen, Inject 15 mg under the skin once every week., Disp: 6 mL, Rfl: 2    valsartan (DIOVAN) 160 MG tablet, Take 1 tablet by mouth Daily., Disp: 90 tablet, Rfl: 3    vitamin D (ERGOCALCIFEROL) 1.25 MG (40696 UT) capsule capsule, Take 1 capsule by mouth once weekly., Disp: 15 capsule, Rfl: 3    Omega-3 Fatty Acids (Fish Oil) 1000 MG capsule delayed-release, Take 1 capsule by mouth Daily., Disp: 30 capsule, Rfl: 5    The following portions of the patient's history were reviewed and updated as appropriate: allergies, current medications, past family history, past medical history, past social history, past surgical history and problem list.    Social History     Tobacco Use    Smoking status: Former     Current packs/day: 0.00     Average packs/day: 0.3 packs/day for 1 year (0.3 ttl pk-yrs)     Types: Cigarettes     Start date: 1988     Quit date: 1989     Years since quittin.7    Smokeless tobacco: Never   Vaping Use    Vaping status: Never Used   Substance Use Topics    Alcohol use: No    Drug use: Never     Review of Systems   Constitutional: Negative for chills and fever.   HENT:  Negative for nosebleeds and sore throat.    Respiratory:  Negative for cough, hemoptysis and wheezing.    Gastrointestinal:  Negative for abdominal pain, hematemesis, hematochezia, melena, nausea and vomiting.   Genitourinary:  Negative for dysuria and hematuria.   Neurological:  Negative for headaches.     Objective   Vitals:    24 0821   BP: 113/76   Pulse: 112   SpO2: 97%   Weight: 97.9 kg (215 lb 12.8 oz)   Height: 167.6 cm (66\")     Body mass index is 34.83 " kg/m².    Vitals reviewed.   Constitutional:       Appearance: Well-developed.   Eyes:      Conjunctiva/sclera: Conjunctivae normal.   HENT:      Head: Normocephalic.   Neck:      Thyroid: No thyromegaly.      Vascular: No JVD.      Trachea: No tracheal deviation.   Pulmonary:      Effort: No respiratory distress.      Breath sounds: Normal breath sounds. No wheezing. No rales.   Cardiovascular:      PMI at left midclavicular line. Normal rate. Regular rhythm. Normal S1. Normal S2.       Murmurs: There is no murmur.      No gallop.  No click. No rub.   Pulses:     Intact distal pulses.   Edema:     Peripheral edema absent.   Abdominal:      General: Bowel sounds are normal.      Palpations: Abdomen is soft. There is no abdominal mass.      Tenderness: There is no abdominal tenderness.   Musculoskeletal:      Cervical back: Normal range of motion and neck supple. Skin:     General: Skin is warm and dry.   Neurological:      Mental Status: Alert and oriented to person, place, and time.      Cranial Nerves: No cranial nerve deficit.       Lab Results   Component Value Date     (H) 03/01/2024    K 4.5 03/01/2024     (H) 03/01/2024    CO2 25.1 03/01/2024    BUN 18 03/01/2024    CREATININE 0.96 03/01/2024    GLUCOSE 166 (H) 03/01/2024    CALCIUM 9.3 03/01/2024    AST 22 03/01/2024    ALT 23 03/01/2024    ALKPHOS 39 03/01/2024    LABIL2 1.6 12/11/2015     Lab Results   Component Value Date    CKTOTAL 35 10/29/2017     Lab Results   Component Value Date    WBC 5.74 03/01/2024    HGB 13.3 03/01/2024    HCT 41.9 03/01/2024     03/01/2024     Lab Results   Component Value Date    INR 0.95 08/07/2023    INR 0.98 10/28/2017    INR 0.89 11/29/2016     Lab Results   Component Value Date    MG 1.6 08/07/2023     Lab Results   Component Value Date    TSH 1.140 08/07/2023    CHLPL 213 (H) 03/14/2016    TRIG 208 (H) 03/01/2024    HDL 38 (L) 03/01/2024    LDL 47 03/01/2024      Lab Results   Component Value Date     BNP 27.0 12/29/2016       ECG 12 Lead    Date/Time: 4/1/2024 8:22 AM  Performed by: Lebron Ogden MD    Authorized by: Lebron Ogden MD  Comparison: compared with previous ECG from 8/7/2023  Similar to previous ECG  Rhythm: sinus rhythm  Conduction: right bundle branch block  Other findings: non-specific ST-T wave changes            Assessment & Plan    Diagnosis Plan   1. ASCVD (arteriosclerotic cardiovascular disease), s/p acute anterior STEMI in 2016, s/p acute coronary intervention with a 3.25 x 23 mm Alpine CURRY on 11/29/2016.        2. Essential hypertension, controlled.        3. Dyslipidemia, on atorvastatin 80 mg daily.          Recommendations  Continue with aspirin, clopidogrel, carvedilol and atorvastatin for her coronary artery disease  Will add fish oil 1000 mg daily for her hypertriglyceridemia.  If no significant improvement with this then we will consider changing to Lovaza or Vascepa.    Return in about 6 months (around 10/1/2024).    As always,  Dwight I appreciate very much the opportunity to participate in the cardiovascular care of your patients. Please do not hesitate to call me with any questions with regards to Teorenetta EVONNE Tsanggs's evaluation and management.       With Best Regards,        Lebron Ogden MD, [facility]    Please note that portions of this note were completed with a voice recognition program.

## 2024-04-11 ENCOUNTER — TRANSCRIBE ORDERS (OUTPATIENT)
Dept: ADMINISTRATIVE | Facility: HOSPITAL | Age: 64
End: 2024-04-11
Payer: COMMERCIAL

## 2024-04-11 DIAGNOSIS — Z12.31 VISIT FOR SCREENING MAMMOGRAM: Primary | ICD-10-CM

## 2024-04-29 ENCOUNTER — HOSPITAL ENCOUNTER (OUTPATIENT)
Facility: HOSPITAL | Age: 64
Discharge: HOME | End: 2024-04-29
Admitting: INTERNAL MEDICINE
Payer: COMMERCIAL

## 2024-04-29 DIAGNOSIS — Z12.31 VISIT FOR SCREENING MAMMOGRAM: ICD-10-CM

## 2024-04-29 PROCEDURE — 77063 BREAST TOMOSYNTHESIS BI: CPT

## 2024-04-29 PROCEDURE — 77067 SCR MAMMO BI INCL CAD: CPT

## 2024-04-29 PROCEDURE — 77063 BREAST TOMOSYNTHESIS BI: CPT | Performed by: RADIOLOGY

## 2024-04-29 PROCEDURE — 77067 SCR MAMMO BI INCL CAD: CPT | Performed by: RADIOLOGY

## 2024-05-30 ENCOUNTER — TRANSCRIBE ORDERS (OUTPATIENT)
Dept: ADMINISTRATIVE | Facility: HOSPITAL | Age: 64
End: 2024-05-30
Payer: COMMERCIAL

## 2024-05-30 ENCOUNTER — LAB (OUTPATIENT)
Dept: LAB | Facility: HOSPITAL | Age: 64
End: 2024-05-30
Payer: COMMERCIAL

## 2024-05-30 DIAGNOSIS — E11.42 TYPE 2 DIABETES MELLITUS WITH DIABETIC POLYNEUROPATHY, UNSPECIFIED WHETHER LONG TERM INSULIN USE: ICD-10-CM

## 2024-05-30 DIAGNOSIS — E78.2 MIXED HYPERLIPIDEMIA: ICD-10-CM

## 2024-05-30 DIAGNOSIS — E11.42 TYPE 2 DIABETES MELLITUS WITH DIABETIC POLYNEUROPATHY, UNSPECIFIED WHETHER LONG TERM INSULIN USE: Primary | ICD-10-CM

## 2024-05-30 DIAGNOSIS — E11.69 TYPE 2 DIABETES MELLITUS WITH OTHER SPECIFIED COMPLICATION, UNSPECIFIED WHETHER LONG TERM INSULIN USE: ICD-10-CM

## 2024-05-30 DIAGNOSIS — E55.9 VITAMIN D DEFICIENCY: ICD-10-CM

## 2024-05-30 DIAGNOSIS — E53.8 DEFICIENCY OF OTHER SPECIFIED B GROUP VITAMINS: ICD-10-CM

## 2024-05-30 LAB
25(OH)D3 SERPL-MCNC: 59 NG/ML (ref 30–100)
ALBUMIN SERPL-MCNC: 4.2 G/DL (ref 3.5–5.2)
ALBUMIN/GLOB SERPL: 2 G/DL
ALP SERPL-CCNC: 39 U/L (ref 39–117)
ALT SERPL W P-5'-P-CCNC: 34 U/L (ref 1–33)
ANION GAP SERPL CALCULATED.3IONS-SCNC: 10.8 MMOL/L (ref 5–15)
AST SERPL-CCNC: 26 U/L (ref 1–32)
BASOPHILS # BLD AUTO: 0.04 10*3/MM3 (ref 0–0.2)
BASOPHILS NFR BLD AUTO: 0.7 % (ref 0–1.5)
BILIRUB SERPL-MCNC: 0.6 MG/DL (ref 0–1.2)
BUN SERPL-MCNC: 12 MG/DL (ref 8–23)
BUN/CREAT SERPL: 12.4 (ref 7–25)
CALCIUM SPEC-SCNC: 9 MG/DL (ref 8.6–10.5)
CHLORIDE SERPL-SCNC: 112 MMOL/L (ref 98–107)
CHOLEST SERPL-MCNC: 123 MG/DL (ref 0–200)
CO2 SERPL-SCNC: 22.2 MMOL/L (ref 22–29)
CREAT SERPL-MCNC: 0.97 MG/DL (ref 0.57–1)
CRP SERPL-MCNC: <0.3 MG/DL (ref 0–0.5)
DEPRECATED RDW RBC AUTO: 41 FL (ref 37–54)
EGFRCR SERPLBLD CKD-EPI 2021: 65.4 ML/MIN/1.73
EOSINOPHIL # BLD AUTO: 0.19 10*3/MM3 (ref 0–0.4)
EOSINOPHIL NFR BLD AUTO: 3.2 % (ref 0.3–6.2)
ERYTHROCYTE [DISTWIDTH] IN BLOOD BY AUTOMATED COUNT: 12.6 % (ref 12.3–15.4)
ERYTHROCYTE [SEDIMENTATION RATE] IN BLOOD: 6 MM/HR (ref 0–30)
FOLATE SERPL-MCNC: >20 NG/ML (ref 4.78–24.2)
GLOBULIN UR ELPH-MCNC: 2.1 GM/DL
GLUCOSE SERPL-MCNC: 118 MG/DL (ref 65–99)
HBA1C MFR BLD: 6.9 % (ref 4.8–5.6)
HCT VFR BLD AUTO: 39.8 % (ref 34–46.6)
HDLC SERPL-MCNC: 40 MG/DL (ref 40–60)
HGB BLD-MCNC: 13 G/DL (ref 12–15.9)
IMM GRANULOCYTES # BLD AUTO: 0.01 10*3/MM3 (ref 0–0.05)
IMM GRANULOCYTES NFR BLD AUTO: 0.2 % (ref 0–0.5)
LDLC SERPL CALC-MCNC: 49 MG/DL (ref 0–100)
LDLC/HDLC SERPL: 1 {RATIO}
LYMPHOCYTES # BLD AUTO: 1.93 10*3/MM3 (ref 0.7–3.1)
LYMPHOCYTES NFR BLD AUTO: 32.5 % (ref 19.6–45.3)
MCH RBC QN AUTO: 29.2 PG (ref 26.6–33)
MCHC RBC AUTO-ENTMCNC: 32.7 G/DL (ref 31.5–35.7)
MCV RBC AUTO: 89.4 FL (ref 79–97)
MONOCYTES # BLD AUTO: 0.35 10*3/MM3 (ref 0.1–0.9)
MONOCYTES NFR BLD AUTO: 5.9 % (ref 5–12)
NEUTROPHILS NFR BLD AUTO: 3.42 10*3/MM3 (ref 1.7–7)
NEUTROPHILS NFR BLD AUTO: 57.5 % (ref 42.7–76)
NRBC BLD AUTO-RTO: 0 /100 WBC (ref 0–0.2)
PLATELET # BLD AUTO: 170 10*3/MM3 (ref 140–450)
PMV BLD AUTO: 9.7 FL (ref 6–12)
POTASSIUM SERPL-SCNC: 4.6 MMOL/L (ref 3.5–5.2)
PROT SERPL-MCNC: 6.3 G/DL (ref 6–8.5)
RBC # BLD AUTO: 4.45 10*6/MM3 (ref 3.77–5.28)
SODIUM SERPL-SCNC: 145 MMOL/L (ref 136–145)
T3 SERPL-MCNC: 94.7 NG/DL (ref 80–200)
T4 SERPL-MCNC: 7.45 MCG/DL (ref 4.5–11.7)
TRIGL SERPL-MCNC: 215 MG/DL (ref 0–150)
TSH SERPL DL<=0.05 MIU/L-ACNC: 2.16 UIU/ML (ref 0.27–4.2)
VIT B12 BLD-MCNC: 653 PG/ML (ref 211–946)
VLDLC SERPL-MCNC: 34 MG/DL (ref 5–40)
WBC NRBC COR # BLD AUTO: 5.94 10*3/MM3 (ref 3.4–10.8)

## 2024-05-30 PROCEDURE — 80061 LIPID PANEL: CPT

## 2024-05-30 PROCEDURE — 86140 C-REACTIVE PROTEIN: CPT

## 2024-05-30 PROCEDURE — 84436 ASSAY OF TOTAL THYROXINE: CPT

## 2024-05-30 PROCEDURE — 82306 VITAMIN D 25 HYDROXY: CPT

## 2024-05-30 PROCEDURE — 80050 GENERAL HEALTH PANEL: CPT

## 2024-05-30 PROCEDURE — 82746 ASSAY OF FOLIC ACID SERUM: CPT

## 2024-05-30 PROCEDURE — 83036 HEMOGLOBIN GLYCOSYLATED A1C: CPT

## 2024-05-30 PROCEDURE — 82607 VITAMIN B-12: CPT

## 2024-05-30 PROCEDURE — 36415 COLL VENOUS BLD VENIPUNCTURE: CPT

## 2024-05-30 PROCEDURE — 84480 ASSAY TRIIODOTHYRONINE (T3): CPT

## 2024-05-30 PROCEDURE — 85652 RBC SED RATE AUTOMATED: CPT

## 2024-07-01 ENCOUNTER — TELEPHONE (OUTPATIENT)
Dept: CARDIOLOGY | Facility: CLINIC | Age: 64
End: 2024-07-01
Payer: COMMERCIAL

## 2024-07-01 NOTE — TELEPHONE ENCOUNTER
Patient called and said that her blood pressure has been running really low. She said yesterday it was 66/44 and today it is 97/57. She said this has been going on for a couple of weeks and that it makes her dizzy as well.     She said she wants to know if Dr. Ogden wants to stop one of her blood pressure medications.     Thanks!

## 2024-07-01 NOTE — TELEPHONE ENCOUNTER
Called pt and advised her that with her BP that low that she needs to go to the ER. She expressed understanding. Her BP was 99/55 at 1 pm today. She stated she is dizzy and weakness.

## 2024-10-03 ENCOUNTER — TRANSCRIBE ORDERS (OUTPATIENT)
Dept: ADMINISTRATIVE | Facility: HOSPITAL | Age: 64
End: 2024-10-03
Payer: MEDICARE

## 2024-10-03 DIAGNOSIS — K75.81 NASH (NONALCOHOLIC STEATOHEPATITIS): ICD-10-CM

## 2024-10-03 DIAGNOSIS — I10 HTN (HYPERTENSION), BENIGN: Primary | ICD-10-CM

## 2024-10-03 DIAGNOSIS — E78.2 MIXED HYPERLIPIDEMIA: ICD-10-CM

## 2024-10-07 ENCOUNTER — TELEPHONE (OUTPATIENT)
Dept: CARDIOLOGY | Facility: CLINIC | Age: 64
End: 2024-10-07

## 2024-10-07 NOTE — TELEPHONE ENCOUNTER
"  Caller: Jannette Onofre \"Crystal\"    Relationship: Self    Best call back number: [phone]     What is the best time to reach you: ANYTIME    Who are you requesting to speak with (clinical staff, provider,  specific staff member): AGUSTÍN    Do you know the name of the person who called: NA    What was the call regarding: PT JUST WANTED ME TO LET AGUSTÍN KNOW WE GOT HER RESCHEDULED AND SHE DID TEST POSITIVE FOR COVID-19. PT REPORTS BEING UNABLE TO GET PAXLOVID SHOT, BUT RECEIVED A STEROID SHOT AND WAS TOLD TO LET THIS TAKE ITS COURSE. LET PT KNOW IF SHE NEEDS ANYTHING, SHE CAN FEEL FREE TO CALL US.     Is it okay if the provider responds through [facility]: NO.  "

## 2024-10-16 ENCOUNTER — HOSPITAL ENCOUNTER (OUTPATIENT)
Facility: HOSPITAL | Age: 64
Discharge: HOME | End: 2024-10-16
Admitting: INTERNAL MEDICINE
Payer: MEDICARE

## 2024-10-16 DIAGNOSIS — I10 HTN (HYPERTENSION), BENIGN: ICD-10-CM

## 2024-10-16 DIAGNOSIS — E78.2 MIXED HYPERLIPIDEMIA: ICD-10-CM

## 2024-10-16 DIAGNOSIS — K75.81 NASH (NONALCOHOLIC STEATOHEPATITIS): ICD-10-CM

## 2024-10-16 PROCEDURE — 76700 US EXAM ABDOM COMPLETE: CPT

## 2024-10-16 NOTE — ED NOTES
EKG done at 559 and given to Dr. Vincent Westbrook  08/01/17 0602     Received refill request from [facility] for  Dofetilide 250 mcg take one cap by mouth twice daily (take along with 125 mcg cap)  Dofetilide 125 mcg take one cap twice daily (take along with 250 mcg cap.   Prescriptions pended for approval. Navid LOMASN

## 2024-11-25 ENCOUNTER — OFFICE VISIT (OUTPATIENT)
Dept: CARDIOLOGY | Facility: CLINIC | Age: 64
End: 2024-11-25
Payer: MEDICARE

## 2024-11-25 VITALS
SYSTOLIC BLOOD PRESSURE: 149 MMHG | OXYGEN SATURATION: 97 % | DIASTOLIC BLOOD PRESSURE: 91 MMHG | BODY MASS INDEX: 34.39 KG/M2 | HEIGHT: 66 IN | HEART RATE: 94 BPM | WEIGHT: 214 LBS

## 2024-11-25 DIAGNOSIS — I25.10 ASCVD (ARTERIOSCLEROTIC CARDIOVASCULAR DISEASE): Primary | ICD-10-CM

## 2024-11-25 DIAGNOSIS — E78.5 DYSLIPIDEMIA: ICD-10-CM

## 2024-11-25 DIAGNOSIS — I10 ESSENTIAL HYPERTENSION: ICD-10-CM

## 2024-11-25 PROCEDURE — 3077F SYST BP >= 140 MM HG: CPT | Performed by: INTERNAL MEDICINE

## 2024-11-25 PROCEDURE — 3080F DIAST BP >= 90 MM HG: CPT | Performed by: INTERNAL MEDICINE

## 2024-11-25 PROCEDURE — 99213 OFFICE O/P EST LOW 20 MIN: CPT | Performed by: INTERNAL MEDICINE

## 2024-11-25 PROCEDURE — 93000 ELECTROCARDIOGRAM COMPLETE: CPT | Performed by: INTERNAL MEDICINE

## 2024-11-25 NOTE — PROGRESS NOTES
Akhil Huerta MD  Jannette Onofre  1960 11/25/2024    Patient Active Problem List   Diagnosis    Acute ST elevation myocardial infarction (STEMI) of anterior wall, 11/29/16 s/p stenting proxismal % occlusion, clinically stable.    Type 2 diabetes mellitus    Chest pain    ASCVD (arteriosclerotic cardiovascular disease)    Fatigue    Hyperlipidemia    Essential hypertension    Morbid obesity    Dyspnea on exertion (NYHA class 2-3)    Angina, class III    Dyspepsia    BRICE on CPAP    Hypoxia    Renal insufficiency       Dear Akhil Huerta MD:    Subjective     Jannette Onofre is a 64 y.o. female with the problems as listed above, presents    Chief complaint: Follow-up of coronary artery disease with previous myocardial infarction, status post stenting    History of Present Illness: Ms. Jannette Onofre is a very pleasant 64-year-old  female with history of known coronary artery disease, with previous myocardial infarction followed by stenting of the proximal LAD in 2016, presents today for regular cardiology follow-up.      On today's visit she denies any complaints of chest pains or shortness of breath.  Overall she feels pretty good.  Her last cardiac catheterization on 9/8/2023 revealed a patent stent in the proximal LAD and moderate stenosis in the mid LAD.  She has been managed medically since then and has been doing fine.  She states her blood pressure at home tends to run better in the 120s over 70s after she takes her medications.    Cardiac Catheterization/Vascular Study    Accession Number: [id number]   Date of Study: 9/8/23   Ordering Provider: Suresh Marin MD   Clinical Indications: Chest pain, unspecified type [R07.9 (ICD-10-CM)]        Performing Physician  Performing Staff   Primary: Suresh Marin MD    Scrub Person: Mami Morales   Documenter: Elisha Pena   Invasive Nurse: Rome Norman RN        Coronary anatomy findings:     LM:  Is a large calibre vessel , normal take off from left cusp, divides into LAD and Lcx. No significant stenosis     LAD: Stent in the proximal mid LAD was patent, the mid LAD had moderate 50% stenosis unchanged from prior, distal LAD had mild diffuse disease, the diagonal arteries had no significant stenosis     LCX: Moderate calibre vessel, mild luminal irregularities, focal 30% stenosis, OM branches distally small calibre vessel with no stenosis     RCA: Large calibre, dominant artery, normal take off from right cusp.  Entire RCA had mild diffuse 40-50% stenosis, R PDA and PL branches had mild diffuse disease.      Left Ventriculography:     LV systolic function was normal with visual estimated EF of 60%. No wall motion abnormalities.   No significant mitral regurgitation noted.      LVEDP: 8 mmHg  No gradient across the aortic valve on pull back.     EBL: Less than 10cc     Final Impression:  Moderate non obstructive CAD  Normal LV systolic function      Recommendations:  Aggressive guideline directed medical management for CAD        No Known Allergies:    Current Outpatient Medications:     aspirin 81 MG chewable tablet, Chew & swallow 1 tablet every day., Disp: 100 tablet, Rfl: 3    atorvastatin (LIPITOR) 80 MG tablet, Take 1 tablet by mouth Daily., Disp: 90 tablet, Rfl: 3    carvedilol (COREG) 25 MG tablet, Take 1 & 1/2 tablets by mouth 2 (Two) Times a Day With Meals., Disp: 180 tablet, Rfl: 3    clopidogrel (PLAVIX) 75 MG tablet, Take 1 tablet by mouth daily, Disp: 90 tablet, Rfl: 4    empagliflozin (Jardiance) 25 MG tablet tablet, Take 1 tablet by mouth every morning., Disp: 90 tablet, Rfl: 3    isosorbide mononitrate (IMDUR) 60 MG 24 hr tablet, Take 1 and 1/2 tablets by mouth every day in the morning, Disp: 180 tablet, Rfl: 3    metFORMIN (GLUCOPHAGE) 1000 MG tablet, Take 1 tablet by mouth 2 times every day with morning and evening meals., Disp: 180 tablet, Rfl: 11    Multiple Vitamin (MULTI-VITAMIN DAILY  PO), Take  by mouth., Disp: , Rfl:     nitroglycerin (NITROSTAT) 0.4 MG SL tablet, Place 1 tablet under the tongue Every 5 (Five) Minutes As Needed for Chest Pain (max of 3 doses in 15 minutes. If no relief, go to ER., Disp: 25 tablet, Rfl: 12    Omega-3 Fatty Acids (fish oil) 1000 MG capsule capsule, Take 1 capsule by mouth Daily., Disp: 30 capsule, Rfl: 5    valsartan (DIOVAN) 160 MG tablet, Take 1 tablet by mouth Daily., Disp: 90 tablet, Rfl: 3    alendronate (FOSAMAX) 70 MG tablet, Take 1 tablet by mouth every week in the morning, at least 30 minutes before first food, beverage, or medication of day., Disp: 12 tablet, Rfl: 1    amoxicillin (AMOXIL) 500 MG capsule, Take 1 capsule by mouth Every 12 (Twelve) Hours., Disp: 20 capsule, Rfl: 0    fluticasone (FLONASE) 50 MCG/ACT nasal spray, Use 1 - 2 sprays in each nostril every day  as needed, Disp: 16 mL, Rfl: 0    metFORMIN (GLUCOPHAGE) 1000 MG tablet, Take 1 tablet by mouth 2 times daily with morning and evening meals., Disp: 180 tablet, Rfl: 11    neomycin-colistin-hydrocortisone-thonzonium (Cortisporin-TC) 3.3-3-10-0.5 MG/ML otic suspension, Instill 5 drops into the affected ear(s) 3 times every day for 10 days., Disp: 30 mL, Rfl: 0    neomycin-polymyxin-hydrocortisone (CORTISPORIN) 3.5-81017-1 otic suspension, Instill 5 drops into affected ear(s) 3 times daily for 10 days. SHAKE WELL, Disp: 10 mL, Rfl: 0    Tirzepatide (Mounjaro) 15 MG/0.5ML solution pen-injector pen, Inject 15 mg under the skin once every week., Disp: 6 mL, Rfl: 2    vitamin D (ERGOCALCIFEROL) 1.25 MG (86145 UT) capsule capsule, Take 1 capsule by mouth once weekly., Disp: 15 capsule, Rfl: 3    The following portions of the patient's history were reviewed and updated as appropriate: allergies, current medications, past family history, past medical history, past social history, past surgical history and problem list.    Social History     Tobacco Use    Smoking status: Former     Current  "packs/day: 0.00     Average packs/day: 0.3 packs/day for 1 year (0.3 ttl pk-yrs)     Types: Cigarettes     Start date: 1988     Quit date: 1989     Years since quittin.4    Smokeless tobacco: Never   Vaping Use    Vaping status: Never Used   Substance Use Topics    Alcohol use: No    Drug use: Never     Review of Systems   Constitutional: Negative for chills and fever.   HENT:  Negative for nosebleeds and sore throat.    Respiratory:  Negative for cough, hemoptysis and wheezing.    Gastrointestinal:  Negative for abdominal pain, hematemesis, hematochezia, melena, nausea and vomiting.   Genitourinary:  Negative for dysuria and hematuria.   Neurological:  Negative for headaches.     Objective   Vitals:    24 1516   BP: 149/91   BP Location: Left arm   Patient Position: Sitting   Cuff Size: Adult   Pulse: 94   SpO2: 97%   Weight: 97.1 kg (214 lb)   Height: 167.6 cm (65.98\")     Body mass index is 34.56 kg/m².    Vitals reviewed.   Constitutional:       Appearance: Well-developed.   Eyes:      Conjunctiva/sclera: Conjunctivae normal.   HENT:      Head: Normocephalic.   Neck:      Thyroid: No thyromegaly.      Vascular: No JVD.      Trachea: No tracheal deviation.   Pulmonary:      Effort: No respiratory distress.      Breath sounds: Normal breath sounds. No wheezing. No rales.   Cardiovascular:      PMI at left midclavicular line. Normal rate. Regular rhythm. Normal S1. Normal S2.       Murmurs: There is no murmur.      No gallop.  No click. No rub.   Pulses:     Intact distal pulses.   Edema:     Peripheral edema absent.   Abdominal:      General: Bowel sounds are normal.      Palpations: Abdomen is soft. There is no abdominal mass.      Tenderness: There is no abdominal tenderness.   Musculoskeletal:      Cervical back: Normal range of motion and neck supple. Skin:     General: Skin is warm and dry.   Neurological:      Mental Status: Alert and oriented to person, place, and time.      Cranial " Nerves: No cranial nerve deficit.       Lab Results   Component Value Date     05/30/2024    K 4.6 05/30/2024     (H) 05/30/2024    CO2 22.2 05/30/2024    BUN 12 05/30/2024    CREATININE 0.97 05/30/2024    GLUCOSE 118 (H) 05/30/2024    CALCIUM 9.0 05/30/2024    AST 26 05/30/2024    ALT 34 (H) 05/30/2024    ALKPHOS 39 05/30/2024    LABIL2 1.6 12/11/2015     Lab Results   Component Value Date    CKTOTAL 35 10/29/2017     Lab Results   Component Value Date    WBC 5.7 09/26/2024    HGB 13.0 09/26/2024    HCT 41.7 09/26/2024     09/26/2024     Lab Results   Component Value Date    INR 0.95 08/07/2023    INR 0.98 10/28/2017    INR 0.89 11/29/2016     Lab Results   Component Value Date    MG 1.6 08/07/2023     Lab Results   Component Value Date    TSH 2.160 05/30/2024    CHLPL 213 (H) 03/14/2016    TRIG 215 (H) 05/30/2024    HDL 40 05/30/2024    LDL 49 05/30/2024      Lab Results   Component Value Date    BNP 27.0 12/29/2016       ECG 12 Lead    Date/Time: 11/25/2024 3:15 PM  Performed by: Lebron Ogden MD    Authorized by: Lebron Ogden MD  Comparison: compared with previous ECG from 4/1/2024  Similar to previous ECG  Rhythm: sinus rhythm  Conduction: right bundle branch block  Other findings: non-specific ST-T wave changes            Assessment & Plan    Diagnosis Plan   1. ASCVD (arteriosclerotic cardiovascular disease), s/p acute anterior STEMI in 2016, s/p acute coronary intervention with a 3.25 x 23 mm Alpine CURRY on 11/29/2016.        2. Essential hypertension.       3. Dyslipidemia, on atorvastatin 80 mg daily.          Recommendations  For coronary artery disease, continue with aspirin, clopidogrel and atorvastatin.  For her hypertension, continue with carvedilol which she states she is taking 25 mg p.o. twice a day and valsartan 160 mg daily  I have told her to keep a check on her blood pressure 3 times a day and if it tends to run high in the evening, may add valsartan 80 mg (half a  tablet of the 160 mg) in the evening.    Return in about 4 months (around 3/25/2025).    As always, Dwight  I appreciate very much the opportunity to participate in the cardiovascular care of your patients. Please do not hesitate to call me with any questions with regards to Jannette Onofre's evaluation and management.       With Best Regards,        Lebron Ogden MD, [facility]    Please note that portions of this note were completed with a voice recognition program.

## 2024-11-25 NOTE — LETTER
November 25, 2024     Akhil Huerta MD  [address] [address]    Patient: Jannette Onofre   YOB: 1960   Date of Visit: 11/25/2024     Dear Dwight:       Thank you for referring Jannette Onofre to me for evaluation. Below are the relevant portions of my assessment and plan of care.    If you have questions, please do not hesitate to call me. I look forward to following Jannette along with you.         Sincerely,        Lebron Ogden MD        CC: No Recipients    Lebron Ogden MD  11/25/24 2032  Sign when Signing Visit  Akhil Huerta MD  Jannette Onofre  1960 11/25/2024    Patient Active Problem List   Diagnosis   • Acute ST elevation myocardial infarction (STEMI) of anterior wall, 11/29/16 s/p stenting proxismal % occlusion, clinically stable.   • Type 2 diabetes mellitus   • Chest pain   • ASCVD (arteriosclerotic cardiovascular disease)   • Fatigue   • Hyperlipidemia   • Essential hypertension   • Morbid obesity   • Dyspnea on exertion (NYHA class 2-3)   • Angina, class III   • Dyspepsia   • BRICE on CPAP   • Hypoxia   • Renal insufficiency       Dear Dwight:    Subjective    Jannette Onofre is a 64 y.o. female with the problems as listed above, presents    Chief complaint: Follow-up of coronary artery disease with previous myocardial infarction, status post stenting    History of Present Illness: Ms. Jannette Onofre is a very pleasant 64-year-old  female with history of known coronary artery disease, with previous myocardial infarction followed by stenting of the proximal LAD in 2016, presents today for regular cardiology follow-up.      On today's visit she denies any complaints of chest pains or shortness of breath.  Overall she feels pretty good.  Her last cardiac catheterization on 9/8/2023 revealed a patent stent in the proximal LAD and moderate stenosis in the mid LAD.  She has been managed medically since then and has been doing fine.  She states her blood  pressure at home tends to run better in the 120s over 70s after she takes her medications.    Cardiac Catheterization/Vascular Study    Accession Number: [id number]   Date of Study: 9/8/23   Ordering Provider: Suresh Marin MD   Clinical Indications: Chest pain, unspecified type [R07.9 (ICD-10-CM)]        Performing Physician  Performing Staff   Primary: Suresh Marin MD    Scrub Person: Mami Morales   Documenter: Elisha Pena   Invasive Nurse: Rome Norman RN        Coronary anatomy findings:     LM: Is a large calibre vessel , normal take off from left cusp, divides into LAD and Lcx. No significant stenosis     LAD: Stent in the proximal mid LAD was patent, the mid LAD had moderate 50% stenosis unchanged from prior, distal LAD had mild diffuse disease, the diagonal arteries had no significant stenosis     LCX: Moderate calibre vessel, mild luminal irregularities, focal 30% stenosis, OM branches distally small calibre vessel with no stenosis     RCA: Large calibre, dominant artery, normal take off from right cusp.  Entire RCA had mild diffuse 40-50% stenosis, R PDA and PL branches had mild diffuse disease.      Left Ventriculography:     LV systolic function was normal with visual estimated EF of 60%. No wall motion abnormalities.   No significant mitral regurgitation noted.      LVEDP: 8 mmHg  No gradient across the aortic valve on pull back.     EBL: Less than 10cc     Final Impression:  Moderate non obstructive CAD  Normal LV systolic function      Recommendations:  Aggressive guideline directed medical management for CAD        No Known Allergies:    Current Outpatient Medications:   •  aspirin 81 MG chewable tablet, Chew & swallow 1 tablet every day., Disp: 100 tablet, Rfl: 3  •  atorvastatin (LIPITOR) 80 MG tablet, Take 1 tablet by mouth Daily., Disp: 90 tablet, Rfl: 3  •  carvedilol (COREG) 25 MG tablet, Take 1 & 1/2 tablets by mouth 2 (Two) Times a Day With  Meals., Disp: 180 tablet, Rfl: 3  •  clopidogrel (PLAVIX) 75 MG tablet, Take 1 tablet by mouth daily, Disp: 90 tablet, Rfl: 4  •  empagliflozin (Jardiance) 25 MG tablet tablet, Take 1 tablet by mouth every morning., Disp: 90 tablet, Rfl: 3  •  isosorbide mononitrate (IMDUR) 60 MG 24 hr tablet, Take 1 and 1/2 tablets by mouth every day in the morning, Disp: 180 tablet, Rfl: 3  •  metFORMIN (GLUCOPHAGE) 1000 MG tablet, Take 1 tablet by mouth 2 times every day with morning and evening meals., Disp: 180 tablet, Rfl: 11  •  Multiple Vitamin (MULTI-VITAMIN DAILY PO), Take  by mouth., Disp: , Rfl:   •  nitroglycerin (NITROSTAT) 0.4 MG SL tablet, Place 1 tablet under the tongue Every 5 (Five) Minutes As Needed for Chest Pain (max of 3 doses in 15 minutes. If no relief, go to ER., Disp: 25 tablet, Rfl: 12  •  Omega-3 Fatty Acids (fish oil) 1000 MG capsule capsule, Take 1 capsule by mouth Daily., Disp: 30 capsule, Rfl: 5  •  valsartan (DIOVAN) 160 MG tablet, Take 1 tablet by mouth Daily., Disp: 90 tablet, Rfl: 3  •  alendronate (FOSAMAX) 70 MG tablet, Take 1 tablet by mouth every week in the morning, at least 30 minutes before first food, beverage, or medication of day., Disp: 12 tablet, Rfl: 1  •  amoxicillin (AMOXIL) 500 MG capsule, Take 1 capsule by mouth Every 12 (Twelve) Hours., Disp: 20 capsule, Rfl: 0  •  fluticasone (FLONASE) 50 MCG/ACT nasal spray, Use 1 - 2 sprays in each nostril every day  as needed, Disp: 16 mL, Rfl: 0  •  metFORMIN (GLUCOPHAGE) 1000 MG tablet, Take 1 tablet by mouth 2 times daily with morning and evening meals., Disp: 180 tablet, Rfl: 11  •  neomycin-colistin-hydrocortisone-thonzonium (Cortisporin-TC) 3.3-3-10-0.5 MG/ML otic suspension, Instill 5 drops into the affected ear(s) 3 times every day for 10 days., Disp: 30 mL, Rfl: 0  •  neomycin-polymyxin-hydrocortisone (CORTISPORIN) 3.5-73511-1 otic suspension, Instill 5 drops into affected ear(s) 3 times daily for 10 days. SHAKE WELL, Disp: 10 mL,  "Rfl: 0  •  Tirzepatide (Mounjaro) 15 MG/0.5ML solution pen-injector pen, Inject 15 mg under the skin once every week., Disp: 6 mL, Rfl: 2  •  vitamin D (ERGOCALCIFEROL) 1.25 MG (92880 UT) capsule capsule, Take 1 capsule by mouth once weekly., Disp: 15 capsule, Rfl: 3    The following portions of the patient's history were reviewed and updated as appropriate: allergies, current medications, past family history, past medical history, past social history, past surgical history and problem list.    Social History     Tobacco Use   • Smoking status: Former     Current packs/day: 0.00     Average packs/day: 0.3 packs/day for 1 year (0.3 ttl pk-yrs)     Types: Cigarettes     Start date: 1988     Quit date: 1989     Years since quittin.4   • Smokeless tobacco: Never   Vaping Use   • Vaping status: Never Used   Substance Use Topics   • Alcohol use: No   • Drug use: Never     Review of Systems   Constitutional: Negative for chills and fever.   HENT:  Negative for nosebleeds and sore throat.    Respiratory:  Negative for cough, hemoptysis and wheezing.    Gastrointestinal:  Negative for abdominal pain, hematemesis, hematochezia, melena, nausea and vomiting.   Genitourinary:  Negative for dysuria and hematuria.   Neurological:  Negative for headaches.     Objective  Vitals:    24 1516   BP: 149/91   BP Location: Left arm   Patient Position: Sitting   Cuff Size: Adult   Pulse: 94   SpO2: 97%   Weight: 97.1 kg (214 lb)   Height: 167.6 cm (65.98\")     Body mass index is 34.56 kg/m².    Vitals reviewed.   Constitutional:       Appearance: Well-developed.   Eyes:      Conjunctiva/sclera: Conjunctivae normal.   HENT:      Head: Normocephalic.   Neck:      Thyroid: No thyromegaly.      Vascular: No JVD.      Trachea: No tracheal deviation.   Pulmonary:      Effort: No respiratory distress.      Breath sounds: Normal breath sounds. No wheezing. No rales.   Cardiovascular:      PMI at left midclavicular line. Normal " rate. Regular rhythm. Normal S1. Normal S2.       Murmurs: There is no murmur.      No gallop.  No click. No rub.   Pulses:     Intact distal pulses.   Edema:     Peripheral edema absent.   Abdominal:      General: Bowel sounds are normal.      Palpations: Abdomen is soft. There is no abdominal mass.      Tenderness: There is no abdominal tenderness.   Musculoskeletal:      Cervical back: Normal range of motion and neck supple. Skin:     General: Skin is warm and dry.   Neurological:      Mental Status: Alert and oriented to person, place, and time.      Cranial Nerves: No cranial nerve deficit.       Lab Results   Component Value Date     05/30/2024    K 4.6 05/30/2024     (H) 05/30/2024    CO2 22.2 05/30/2024    BUN 12 05/30/2024    CREATININE 0.97 05/30/2024    GLUCOSE 118 (H) 05/30/2024    CALCIUM 9.0 05/30/2024    AST 26 05/30/2024    ALT 34 (H) 05/30/2024    ALKPHOS 39 05/30/2024    LABIL2 1.6 12/11/2015     Lab Results   Component Value Date    CKTOTAL 35 10/29/2017     Lab Results   Component Value Date    WBC 5.7 09/26/2024    HGB 13.0 09/26/2024    HCT 41.7 09/26/2024     09/26/2024     Lab Results   Component Value Date    INR 0.95 08/07/2023    INR 0.98 10/28/2017    INR 0.89 11/29/2016     Lab Results   Component Value Date    MG 1.6 08/07/2023     Lab Results   Component Value Date    TSH 2.160 05/30/2024    CHLPL 213 (H) 03/14/2016    TRIG 215 (H) 05/30/2024    HDL 40 05/30/2024    LDL 49 05/30/2024      Lab Results   Component Value Date    BNP 27.0 12/29/2016       ECG 12 Lead    Date/Time: 11/25/2024 3:15 PM  Performed by: Lebron Ogden MD    Authorized by: Lebron Ogden MD  Comparison: compared with previous ECG from 4/1/2024  Similar to previous ECG  Rhythm: sinus rhythm  Conduction: right bundle branch block  Other findings: non-specific ST-T wave changes            Assessment & Plan   Diagnosis Plan   1. ASCVD (arteriosclerotic cardiovascular disease), s/p acute  anterior STEMI in 2016, s/p acute coronary intervention with a 3.25 x 23 mm [place] CURRY on 11/29/2016.        2. Essential hypertension.       3. Dyslipidemia, on atorvastatin 80 mg daily.          Recommendations  For coronary artery disease, continue with aspirin, clopidogrel and atorvastatin.  For her hypertension, continue with carvedilol which she states she is taking 25 mg p.o. twice a day and valsartan 160 mg daily  I have told her to keep a check on her blood pressure 3 times a day and if it tends to run high in the evening, may add valsartan 80 mg (half a tablet of the 160 mg) in the evening.    Return in about 4 months (around 3/25/2025).    As always, Dwight  I appreciate very much the opportunity to participate in the cardiovascular care of your patients. Please do not hesitate to call me with any questions with regards to Jannette EVONNE Tsanggs's evaluation and management.       With Best Regards,        Lebron Ogden MD, [facility]    Please note that portions of this note were completed with a voice recognition program.

## 2024-12-09 ENCOUNTER — TELEPHONE (OUTPATIENT)
Dept: CARDIOLOGY | Facility: CLINIC | Age: 64
End: 2024-12-09
Payer: MEDICARE

## 2024-12-09 NOTE — TELEPHONE ENCOUNTER
Patient called and stated her BP has still been running high. 160s-170s/90s-103    She wants to know what she should do.

## 2025-01-27 ENCOUNTER — HOSPITAL ENCOUNTER (EMERGENCY)
Facility: HOSPITAL | Age: 65
Discharge: HOME | End: 2025-01-27
Attending: STUDENT IN AN ORGANIZED HEALTH CARE EDUCATION/TRAINING PROGRAM | Admitting: STUDENT IN AN ORGANIZED HEALTH CARE EDUCATION/TRAINING PROGRAM
Payer: MEDICARE

## 2025-01-27 ENCOUNTER — OFFICE VISIT (OUTPATIENT)
Dept: UROLOGY | Facility: CLINIC | Age: 65
End: 2025-01-27
Payer: MEDICARE

## 2025-01-27 ENCOUNTER — TELEPHONE (OUTPATIENT)
Dept: CARDIOLOGY | Facility: CLINIC | Age: 65
End: 2025-01-27
Payer: MEDICARE

## 2025-01-27 ENCOUNTER — APPOINTMENT (OUTPATIENT)
Dept: CT IMAGING | Facility: HOSPITAL | Age: 65
End: 2025-01-27
Payer: MEDICARE

## 2025-01-27 VITALS
WEIGHT: 203 LBS | HEART RATE: 107 BPM | SYSTOLIC BLOOD PRESSURE: 185 MMHG | TEMPERATURE: 97.4 F | HEIGHT: 67 IN | OXYGEN SATURATION: 99 % | RESPIRATION RATE: 14 BRPM | DIASTOLIC BLOOD PRESSURE: 117 MMHG | BODY MASS INDEX: 31.86 KG/M2

## 2025-01-27 VITALS
BODY MASS INDEX: 32.88 KG/M2 | WEIGHT: 204.6 LBS | SYSTOLIC BLOOD PRESSURE: 113 MMHG | HEIGHT: 66 IN | HEART RATE: 102 BPM | DIASTOLIC BLOOD PRESSURE: 76 MMHG

## 2025-01-27 DIAGNOSIS — N20.1 URETERAL STONE: Primary | ICD-10-CM

## 2025-01-27 DIAGNOSIS — N20.0 RENAL CALCULUS, LEFT: Primary | ICD-10-CM

## 2025-01-27 LAB
ALBUMIN SERPL-MCNC: 4.1 G/DL (ref 3.5–5.2)
ALBUMIN/GLOB SERPL: 1.4 G/DL
ALP SERPL-CCNC: 59 U/L (ref 39–117)
ALT SERPL W P-5'-P-CCNC: 31 U/L (ref 1–33)
ANION GAP SERPL CALCULATED.3IONS-SCNC: 13.4 MMOL/L (ref 5–15)
AST SERPL-CCNC: 25 U/L (ref 1–32)
BACTERIA UR QL AUTO: ABNORMAL /HPF
BASOPHILS # BLD AUTO: 0.04 10*3/MM3 (ref 0–0.2)
BASOPHILS NFR BLD AUTO: 0.4 % (ref 0–1.5)
BILIRUB SERPL-MCNC: 0.7 MG/DL (ref 0–1.2)
BILIRUB UR QL STRIP: NEGATIVE
BUN SERPL-MCNC: 14 MG/DL (ref 8–23)
BUN/CREAT SERPL: 9.8 (ref 7–25)
CALCIUM SPEC-SCNC: 9.6 MG/DL (ref 8.6–10.5)
CHLORIDE SERPL-SCNC: 109 MMOL/L (ref 98–107)
CLARITY UR: CLEAR
CO2 SERPL-SCNC: 22.6 MMOL/L (ref 22–29)
COLOR UR: YELLOW
CREAT SERPL-MCNC: 1.43 MG/DL (ref 0.57–1)
D-LACTATE SERPL-SCNC: 1.6 MMOL/L (ref 0.5–2)
DEPRECATED RDW RBC AUTO: 44.1 FL (ref 37–54)
EGFRCR SERPLBLD CKD-EPI 2021: 40.8 ML/MIN/1.73
EOSINOPHIL # BLD AUTO: 0.06 10*3/MM3 (ref 0–0.4)
EOSINOPHIL NFR BLD AUTO: 0.6 % (ref 0.3–6.2)
ERYTHROCYTE [DISTWIDTH] IN BLOOD BY AUTOMATED COUNT: 13.2 % (ref 12.3–15.4)
GLOBULIN UR ELPH-MCNC: 2.9 GM/DL
GLUCOSE SERPL-MCNC: 199 MG/DL (ref 65–99)
GLUCOSE UR STRIP-MCNC: ABNORMAL MG/DL
HCT VFR BLD AUTO: 42.2 % (ref 34–46.6)
HGB BLD-MCNC: 13.3 G/DL (ref 12–15.9)
HGB UR QL STRIP.AUTO: ABNORMAL
HOLD SPECIMEN: NORMAL
HOLD SPECIMEN: NORMAL
HYALINE CASTS UR QL AUTO: ABNORMAL /LPF
IMM GRANULOCYTES # BLD AUTO: 0.01 10*3/MM3 (ref 0–0.05)
IMM GRANULOCYTES NFR BLD AUTO: 0.1 % (ref 0–0.5)
KETONES UR QL STRIP: ABNORMAL
LEUKOCYTE ESTERASE UR QL STRIP.AUTO: NEGATIVE
LIPASE SERPL-CCNC: 83 U/L (ref 13–60)
LYMPHOCYTES # BLD AUTO: 1.41 10*3/MM3 (ref 0.7–3.1)
LYMPHOCYTES NFR BLD AUTO: 15.3 % (ref 19.6–45.3)
MCH RBC QN AUTO: 28.9 PG (ref 26.6–33)
MCHC RBC AUTO-ENTMCNC: 31.5 G/DL (ref 31.5–35.7)
MCV RBC AUTO: 91.5 FL (ref 79–97)
MONOCYTES # BLD AUTO: 0.54 10*3/MM3 (ref 0.1–0.9)
MONOCYTES NFR BLD AUTO: 5.8 % (ref 5–12)
NEUTROPHILS NFR BLD AUTO: 7.18 10*3/MM3 (ref 1.7–7)
NEUTROPHILS NFR BLD AUTO: 77.8 % (ref 42.7–76)
NITRITE UR QL STRIP: NEGATIVE
NRBC BLD AUTO-RTO: 0 /100 WBC (ref 0–0.2)
PH UR STRIP.AUTO: <=5 [PH] (ref 5–8)
PLATELET # BLD AUTO: 160 10*3/MM3 (ref 140–450)
PMV BLD AUTO: 9.8 FL (ref 6–12)
POTASSIUM SERPL-SCNC: 4.5 MMOL/L (ref 3.5–5.2)
PROT SERPL-MCNC: 7 G/DL (ref 6–8.5)
PROT UR QL STRIP: NEGATIVE
RBC # BLD AUTO: 4.61 10*6/MM3 (ref 3.77–5.28)
RBC # UR STRIP: ABNORMAL /HPF
REF LAB TEST METHOD: ABNORMAL
SODIUM SERPL-SCNC: 145 MMOL/L (ref 136–145)
SP GR UR STRIP: >1.03 (ref 1–1.03)
SQUAMOUS #/AREA URNS HPF: ABNORMAL /HPF
UROBILINOGEN UR QL STRIP: ABNORMAL
WBC # UR STRIP: ABNORMAL /HPF
WBC NRBC COR # BLD AUTO: 9.24 10*3/MM3 (ref 3.4–10.8)
WHOLE BLOOD HOLD COAG: NORMAL
WHOLE BLOOD HOLD SPECIMEN: NORMAL

## 2025-01-27 PROCEDURE — 3078F DIAST BP <80 MM HG: CPT | Performed by: UROLOGY

## 2025-01-27 PROCEDURE — 80053 COMPREHEN METABOLIC PANEL: CPT | Performed by: STUDENT IN AN ORGANIZED HEALTH CARE EDUCATION/TRAINING PROGRAM

## 2025-01-27 PROCEDURE — 99203 OFFICE O/P NEW LOW 30 MIN: CPT | Performed by: UROLOGY

## 2025-01-27 PROCEDURE — 1159F MED LIST DOCD IN RCRD: CPT | Performed by: UROLOGY

## 2025-01-27 PROCEDURE — 96372 THER/PROPH/DIAG INJ SC/IM: CPT

## 2025-01-27 PROCEDURE — 83605 ASSAY OF LACTIC ACID: CPT | Performed by: STUDENT IN AN ORGANIZED HEALTH CARE EDUCATION/TRAINING PROGRAM

## 2025-01-27 PROCEDURE — 74176 CT ABD & PELVIS W/O CONTRAST: CPT | Performed by: RADIOLOGY

## 2025-01-27 PROCEDURE — 99284 EMERGENCY DEPT VISIT MOD MDM: CPT

## 2025-01-27 PROCEDURE — 3074F SYST BP LT 130 MM HG: CPT | Performed by: UROLOGY

## 2025-01-27 PROCEDURE — 81001 URINALYSIS AUTO W/SCOPE: CPT | Performed by: STUDENT IN AN ORGANIZED HEALTH CARE EDUCATION/TRAINING PROGRAM

## 2025-01-27 PROCEDURE — 74176 CT ABD & PELVIS W/O CONTRAST: CPT

## 2025-01-27 PROCEDURE — 25010000002 HYDROMORPHONE PER 4 MG: Performed by: STUDENT IN AN ORGANIZED HEALTH CARE EDUCATION/TRAINING PROGRAM

## 2025-01-27 PROCEDURE — 1160F RVW MEDS BY RX/DR IN RCRD: CPT | Performed by: UROLOGY

## 2025-01-27 PROCEDURE — 36415 COLL VENOUS BLD VENIPUNCTURE: CPT

## 2025-01-27 PROCEDURE — 83690 ASSAY OF LIPASE: CPT | Performed by: STUDENT IN AN ORGANIZED HEALTH CARE EDUCATION/TRAINING PROGRAM

## 2025-01-27 PROCEDURE — 85025 COMPLETE CBC W/AUTO DIFF WBC: CPT | Performed by: STUDENT IN AN ORGANIZED HEALTH CARE EDUCATION/TRAINING PROGRAM

## 2025-01-27 PROCEDURE — 63710000001 ONDANSETRON ODT 4 MG TABLET DISPERSIBLE: Performed by: STUDENT IN AN ORGANIZED HEALTH CARE EDUCATION/TRAINING PROGRAM

## 2025-01-27 RX ORDER — HYDROCODONE BITARTRATE AND ACETAMINOPHEN 5; 325 MG/1; MG/1
1 TABLET ORAL EVERY 6 HOURS PRN
Qty: 12 TABLET | Refills: 0 | Status: SHIPPED | OUTPATIENT
Start: 2025-01-27 | End: 2025-01-31 | Stop reason: HOSPADM

## 2025-01-27 RX ORDER — HYDROMORPHONE HYDROCHLORIDE 1 MG/ML
0.5 INJECTION, SOLUTION INTRAMUSCULAR; INTRAVENOUS; SUBCUTANEOUS ONCE
Status: DISCONTINUED | OUTPATIENT
Start: 2025-01-27 | End: 2025-01-27

## 2025-01-27 RX ORDER — ONDANSETRON 2 MG/ML
4 INJECTION INTRAMUSCULAR; INTRAVENOUS ONCE
Status: DISCONTINUED | OUTPATIENT
Start: 2025-01-27 | End: 2025-01-27

## 2025-01-27 RX ORDER — SODIUM CHLORIDE 0.9 % (FLUSH) 0.9 %
10 SYRINGE (ML) INJECTION AS NEEDED
Status: DISCONTINUED | OUTPATIENT
Start: 2025-01-27 | End: 2025-01-27 | Stop reason: HOSPADM

## 2025-01-27 RX ORDER — TAMSULOSIN HYDROCHLORIDE 0.4 MG/1
1 CAPSULE ORAL DAILY
Qty: 30 CAPSULE | Refills: 0 | Status: SHIPPED | OUTPATIENT
Start: 2025-01-27

## 2025-01-27 RX ORDER — CEFDINIR 300 MG/1
300 CAPSULE ORAL 2 TIMES DAILY
Qty: 20 CAPSULE | Refills: 0 | Status: SHIPPED | OUTPATIENT
Start: 2025-01-27 | End: 2025-02-06

## 2025-01-27 RX ORDER — ONDANSETRON 4 MG/1
4 TABLET, ORALLY DISINTEGRATING ORAL ONCE
Status: COMPLETED | OUTPATIENT
Start: 2025-01-27 | End: 2025-01-27

## 2025-01-27 RX ORDER — HYDROMORPHONE HYDROCHLORIDE 1 MG/ML
0.5 INJECTION, SOLUTION INTRAMUSCULAR; INTRAVENOUS; SUBCUTANEOUS ONCE
Status: COMPLETED | OUTPATIENT
Start: 2025-01-27 | End: 2025-01-27

## 2025-01-27 RX ORDER — ONDANSETRON 4 MG/1
4 TABLET, ORALLY DISINTEGRATING ORAL EVERY 6 HOURS PRN
Qty: 12 TABLET | Refills: 0 | Status: SHIPPED | OUTPATIENT
Start: 2025-01-27 | End: 2025-01-29

## 2025-01-27 RX ADMIN — ONDANSETRON 4 MG: 4 TABLET, ORALLY DISINTEGRATING ORAL at 11:22

## 2025-01-27 RX ADMIN — HYDROMORPHONE HYDROCHLORIDE 0.5 MG: 1 INJECTION, SOLUTION INTRAMUSCULAR; INTRAVENOUS; SUBCUTANEOUS at 11:22

## 2025-01-27 NOTE — TELEPHONE ENCOUNTER
Cardiac Risk Assessment  Procedure: lithotripsy to break up kidney stones  LS: 11/25/24  F/up: 3/31/25  Stress: 8/15/23  Echo: 7/11/22  Cath: 9/8/23      Form filled out and placed on Ally's desk.

## 2025-01-27 NOTE — H&P (VIEW-ONLY)
Chief Complaint:      Chief Complaint   Patient presents with    Kidney Stone       HPI:   64 y.o. female.  With her first stone being a proximal 5 mm left stone.  She is a diabetic she had a heart attack in 2016 she is on Plavix and aspirin which she stopped immediately.  Renal calculus-we discussed the presence of the stone. We discussed the various therapeutic options available including percutaneous nephrostolithotomy, ureteroscopy and extracorporeal shockwave  lithotripsy.  We discussed the risks of lithotripsy including the passage of stones, the development of a large string of stones in the distal ureter known as Steinstrasse.  In the 3% incidence of that we will need to proceed with a ureteroscopy for obstructing fragments.  Extremely rare incidence of renal hematoma and the significance of this.  We discussed percutaneous nephrostolithotomy and its use as well as the risks and benefits such as the need for postoperative hospitalization, and the risk of damage to the kidney and the remote risk of a nephrectomy.  We also discussed the use of ureteroscopy in the upper tracts.  That this is as a decreased success rate to completely remove the stones on the first option and that very likely a stent will be required requiring an additional procedure for removal.  We discussed the absolute relative indicators for intervention including the presence of sepsis and pain we cannot control ais the primary need for urgent intervention.  We discussed placement of a stent if indicated and the management of the stent as well.  Past Medical History:     Past Medical History:   Diagnosis Date    Anxiety     Coronary artery disease     s/p LAD stenting, follows w/ Dr. Ogden    Depression     Diabetes mellitus     dx 1998, initially improved s/p AGB but now back on insulin     Dyspepsia     Dyspnea on exertion     Fatigue     Hyperlipidemia     Hypertension     Melanoma     1998    Morbid obesity     Myocardial infarction  "11/24/2016    s/p LAD stenting, on ASA 325mg + Plavix    BRICE on CPAP     noncompliant w/ device, \"lucia me\"    Umbilical hernia     multiple recurrent incisional hernias at the umbilicus, left undisturbed during LSG, can consider general surgery repair in the future, if needed.    Wears glasses        Current Meds:     Current Outpatient Medications   Medication Sig Dispense Refill    alendronate (FOSAMAX) 70 MG tablet Take 1 tablet by mouth every week in the morning, at least 30 minutes before first food, beverage, or medication of day. 12 tablet 1    aspirin 81 MG chewable tablet Chew & swallow 1 tablet every day. 100 tablet 3    atorvastatin (LIPITOR) 80 MG tablet Take 1 tablet by mouth Daily. 90 tablet 3    carvedilol (COREG) 25 MG tablet Take 1 & 1/2 tablets by mouth 2 (Two) Times a Day With Meals. 180 tablet 3    cefdinir (OMNICEF) 300 MG capsule Take 1 capsule by mouth 2 (Two) Times a Day for 10 days. 20 capsule 0    clopidogrel (PLAVIX) 75 MG tablet Take 1 tablet by mouth daily 90 tablet 4    empagliflozin (Jardiance) 25 MG tablet tablet Take 1 tablet by mouth every morning. 90 tablet 3    fluticasone (FLONASE) 50 MCG/ACT nasal spray Use 1 - 2 sprays in each nostril every day  as needed 16 mL 0    HYDROcodone-acetaminophen (NORCO) 5-325 MG per tablet Take 1 tablet by mouth Every 6 (Six) Hours As Needed for Mild Pain. 12 tablet 0    isosorbide mononitrate (IMDUR) 60 MG 24 hr tablet Take 1 and 1/2 tablets by mouth every day in the morning 180 tablet 3    metFORMIN (GLUCOPHAGE) 1000 MG tablet Take 1 tablet by mouth 2 times daily with morning and evening meals. 180 tablet 11    metFORMIN (GLUCOPHAGE) 1000 MG tablet Take 1 tablet by mouth 2 times every day with morning and evening meals. 180 tablet 11    Multiple Vitamin (MULTI-VITAMIN DAILY PO) Take  by mouth.      neomycin-colistin-hydrocortisone-thonzonium (Cortisporin-TC) 3.3-3-10-0.5 MG/ML otic suspension Instill 5 drops into the affected ear(s) 3 times " every day for 10 days. 30 mL 0    neomycin-polymyxin-hydrocortisone (CORTISPORIN) 3.5-39646-6 otic suspension Instill 5 drops into affected ear(s) 3 times daily for 10 days. SHAKE WELL 10 mL 0    nitroglycerin (NITROSTAT) 0.4 MG SL tablet Place 1 tablet under the tongue Every 5 (Five) Minutes As Needed for Chest Pain (max of 3 doses in 15 minutes. If no relief, go to ER. 25 tablet 12    Omega-3 Fatty Acids (fish oil) 1000 MG capsule capsule Take 1 capsule by mouth Daily. 30 capsule 5    ondansetron ODT (ZOFRAN-ODT) 4 MG disintegrating tablet Place 1 tablet on the tongue Every 6 (Six) Hours As Needed for Nausea or Vomiting. 12 tablet 0    tamsulosin (FLOMAX) 0.4 MG capsule 24 hr capsule Take 1 capsule by mouth Daily. 30 capsule 0    Tirzepatide (Mounjaro) 15 MG/0.5ML solution pen-injector pen Inject 15 mg under the skin once every week. 6 mL 2    valsartan (DIOVAN) 160 MG tablet Take 1 tablet by mouth Daily. 90 tablet 3    vitamin D (ERGOCALCIFEROL) 1.25 MG (19145 UT) capsule capsule Take 1 capsule by mouth once weekly. 15 capsule 3     No current facility-administered medications for this visit.        Allergies:      No Known Allergies     Past Surgical History:     Past Surgical History:   Procedure Laterality Date    CARDIAC CATHETERIZATION N/A 11/29/2016    Procedure: Left Heart Cath;  Surgeon: Akhil Zuñiga MD;  Location: [facility] CATH INVASIVE LOCATION;  Service:     CARDIAC CATHETERIZATION N/A 10/31/2017    Procedure: Left Heart Cath;  Surgeon: Lebron Ogden MD;  Location: [facility] CATH INVASIVE LOCATION;  Service:     CARDIAC CATHETERIZATION  10/31/2017    Procedure: Functional Flow Rodney;  Surgeon: Denilson Pena MD;  Location: [facility] CATH INVASIVE LOCATION;  Service:     CARDIAC CATHETERIZATION      2/8/02, 3/5/07, 3/23/11    CARDIAC CATHETERIZATION N/A 9/8/2023    Procedure: Left Heart Cath;  Surgeon: Suresh Marin MD;  Location: [facility] CATH INVASIVE LOCATION;  Service: Cardiology;   Laterality: N/A;    CATARACT EXTRACTION Right     CATARACT EXTRACTION Left 2005     RIGHT 05     SECTION      COLONOSCOPY      routine/ COLONGUARD     ENDOSCOPY      3/09, ,     ENDOSCOPY N/A 2019    Procedure: ESOPHAGOGASTRODUODENOSCOPY;  Surgeon: Ranjeet Dunbar MD;  Location:  [facility];  Service: Bariatric    FINGER/THUMB CLOSED REDUCTION Left 8/3/2017    Procedure: CLOSED REDUCTION PERCUTANEOUS PINNING LEFT FIFTH METACARPAL SHAFT FRACTURE;  Surgeon: Oscar León MD;  Location:  [facility];  Service:     GASTRIC BANDING REMOVAL  2019    w/ Dr. Dunbar    GASTRIC SLEEVE LAPAROSCOPIC N/A 2019    Procedure: GASTRIC SLEEVE LAPAROSCOPIC;  Surgeon: Ranjeet Dunbar MD;  Location:  [facility];  Service: Bariatric    LAPAROSCOPIC CHOLECYSTECTOMY      w/ UHR    LAPAROSCOPIC GASTRIC BANDING  2013    s/p LAGB by Dr Russell    OTHER SURGICAL HISTORY      melanoma removal/back    [place] RT/LT HEART CATHETERS N/A 2016    Procedure: Percutaneous Coronary Intervention;  Surgeon: Akhil Zuñiga MD;  Location:  [facility] CATH INVASIVE LOCATION;  Service: Cardiology    SEPTOPLASTY      UMBILICAL HERNIA REPAIR      no mesh       Social History:     Social History     Socioeconomic History    Marital status:    Tobacco Use    Smoking status: Former     Current packs/day: 0.00     Average packs/day: 0.3 packs/day for 1 year (0.3 ttl pk-yrs)     Types: Cigarettes     Start date: 1988     Quit date: 1989     Years since quittin.6    Smokeless tobacco: Never   Vaping Use    Vaping status: Never Used   Substance and Sexual Activity    Alcohol use: No    Drug use: Never    Sexual activity: Defer       Family History:     Family History   Problem Relation Age of Onset    Diabetes Mother     Heart disease Mother     Hyperlipidemia Mother     Hypertension Mother     Skin cancer Mother     COPD Father     Heart disease Father      Hyperlipidemia Father     Heart attack Father     Alzheimer's disease Father     Diabetes Maternal Aunt     Diabetes Maternal Grandmother     Heart disease Maternal Grandfather     Heart attack Maternal Grandfather     Hypertension Maternal Grandfather     Hypertension Sister     Melanoma Brother     Breast cancer Neg Hx        Review of Systems:     Review of Systems   Constitutional: Negative.  Negative for activity change, appetite change, chills, diaphoresis, fatigue and unexpected weight change.   HENT:  Negative for congestion, dental problem, drooling, ear discharge, ear pain, facial swelling, hearing loss, mouth sores, nosebleeds, postnasal drip, rhinorrhea, sinus pressure, sneezing, sore throat, tinnitus, trouble swallowing and voice change.    Eyes: Negative.  Negative for photophobia, pain, discharge, redness, itching and visual disturbance.   Respiratory: Negative.  Negative for apnea, cough, choking, chest tightness, shortness of breath, wheezing and stridor.    Cardiovascular: Negative.  Negative for chest pain, palpitations and leg swelling.   Gastrointestinal: Negative.  Negative for abdominal distention, abdominal pain, anal bleeding, blood in stool, constipation, diarrhea, nausea, rectal pain and vomiting.   Endocrine: Negative.  Negative for cold intolerance, heat intolerance, polydipsia, polyphagia and polyuria.   Musculoskeletal: Negative.  Negative for arthralgias, back pain, gait problem, joint swelling, myalgias, neck pain and neck stiffness.   Skin: Negative.  Negative for color change, pallor, rash and wound.   Allergic/Immunologic: Negative.  Negative for environmental allergies, food allergies and immunocompromised state.   Neurological: Negative.  Negative for dizziness, tremors, seizures, syncope, facial asymmetry, speech difficulty, weakness, light-headedness, numbness and headaches.   Hematological: Negative.  Negative for adenopathy. Does not bruise/bleed easily.    Psychiatric/Behavioral:  Negative for agitation, behavioral problems, confusion, decreased concentration, dysphoric mood, hallucinations, self-injury, sleep disturbance and suicidal ideas. The patient is not nervous/anxious and is not hyperactive.    All other systems reviewed and are negative.      Physical Exam:     Physical Exam  Constitutional:       Appearance: She is well-developed.   HENT:      Head: Normocephalic and atraumatic.      Right Ear: External ear normal.      Left Ear: External ear normal.   Eyes:      Conjunctiva/sclera: Conjunctivae normal.      Pupils: Pupils are equal, round, and reactive to light.   Cardiovascular:      Rate and Rhythm: Normal rate and regular rhythm.      Heart sounds: Normal heart sounds.   Pulmonary:      Effort: Pulmonary effort is normal.      Breath sounds: Normal breath sounds.   Abdominal:      General: Bowel sounds are normal. There is no distension.      Palpations: Abdomen is soft. There is no mass.      Tenderness: There is no abdominal tenderness. There is no guarding or rebound.   Genitourinary:     Vagina: No vaginal discharge.   Musculoskeletal:         General: Normal range of motion.   Skin:     General: Skin is warm and dry.   Neurological:      Mental Status: She is alert.      Deep Tendon Reflexes: Reflexes are normal and symmetric.   Psychiatric:         Behavior: Behavior normal.         Thought Content: Thought content normal.         Judgment: Judgment normal.         I have reviewed the following portions of the patient's history: Allergies, current medications, past family history, past medical history, past social history, past surgical history, problem list, and ROS and confirm it is accurate.    Recent Image (CT and/or KUB):      CT Abdomen and Pelvis: No results found for this or any previous visit.       CT Stone Protocol: No results found for this or any previous visit.       KUB: No results found for this or any previous visit.       Labs  (past 3 months):      Admission on 01/27/2025, Discharged on 01/27/2025   Component Date Value Ref Range Status    Glucose 01/27/2025 199 (H)  65 - 99 mg/dL Final    BUN 01/27/2025 14  8 - 23 mg/dL Final    Creatinine 01/27/2025 1.43 (H)  0.57 - 1.00 mg/dL Final    Sodium 01/27/2025 145  136 - 145 mmol/L Final    Potassium 01/27/2025 4.5  3.5 - 5.2 mmol/L Final    Chloride 01/27/2025 109 (H)  98 - 107 mmol/L Final    CO2 01/27/2025 22.6  22.0 - 29.0 mmol/L Final    Calcium 01/27/2025 9.6  8.6 - 10.5 mg/dL Final    Total Protein 01/27/2025 7.0  6.0 - 8.5 g/dL Final    Albumin 01/27/2025 4.1  3.5 - 5.2 g/dL Final    ALT (SGPT) 01/27/2025 31  1 - 33 U/L Final    AST (SGOT) 01/27/2025 25  1 - 32 U/L Final    Alkaline Phosphatase 01/27/2025 59  39 - 117 U/L Final    Total Bilirubin 01/27/2025 0.7  0.0 - 1.2 mg/dL Final    Globulin 01/27/2025 2.9  gm/dL Final    A/G Ratio 01/27/2025 1.4  g/dL Final    BUN/Creatinine Ratio 01/27/2025 9.8  7.0 - 25.0 Final    Anion Gap 01/27/2025 13.4  5.0 - 15.0 mmol/L Final    eGFR 01/27/2025 40.8 (L)  >60.0 mL/min/1.73 Final    Lipase 01/27/2025 83 (H)  13 - 60 U/L Final    Color, UA 01/27/2025 Yellow  Yellow, Straw Final    Appearance, UA 01/27/2025 Clear  Clear Final    pH, UA 01/27/2025 <=5.0  5.0 - 8.0 Final    Specific Gravity, UA 01/27/2025 >1.030 (H)  1.005 - 1.030 Final    Glucose, UA 01/27/2025 >=1000 mg/dL (3+) (A)  Negative Final    Ketones, UA 01/27/2025 Trace (A)  Negative Final    Bilirubin, UA 01/27/2025 Negative  Negative Final    Blood, UA 01/27/2025 Trace (A)  Negative Final    Protein, UA 01/27/2025 Negative  Negative Final    Leuk Esterase, UA 01/27/2025 Negative  Negative Final    Nitrite, UA 01/27/2025 Negative  Negative Final    Urobilinogen, UA 01/27/2025 0.2 E.U./dL  0.2 - 1.0 E.U./dL Final    Lactate 01/27/2025 1.6  0.5 - 2.0 mmol/L Final    Extra Tube 01/27/2025 Hold for add-ons.   Final    Auto resulted.    Extra Tube 01/27/2025 hold for add-on   Final    Auto  resulted    Extra Tube 01/27/2025 Hold for add-ons.   Final    Auto resulted.    Extra Tube 01/27/2025 Hold for add-ons.   Final    Auto resulted    WBC 01/27/2025 9.24  3.40 - 10.80 10*3/mm3 Final    RBC 01/27/2025 4.61  3.77 - 5.28 10*6/mm3 Final    Hemoglobin 01/27/2025 13.3  12.0 - 15.9 g/dL Final    Hematocrit 01/27/2025 42.2  34.0 - 46.6 % Final    MCV 01/27/2025 91.5  79.0 - 97.0 fL Final    MCH 01/27/2025 28.9  26.6 - 33.0 pg Final    MCHC 01/27/2025 31.5  31.5 - 35.7 g/dL Final    RDW 01/27/2025 13.2  12.3 - 15.4 % Final    RDW-SD 01/27/2025 44.1  37.0 - 54.0 fl Final    MPV 01/27/2025 9.8  6.0 - 12.0 fL Final    Platelets 01/27/2025 160  140 - 450 10*3/mm3 Final    Neutrophil % 01/27/2025 77.8 (H)  42.7 - 76.0 % Final    Lymphocyte % 01/27/2025 15.3 (L)  19.6 - 45.3 % Final    Monocyte % 01/27/2025 5.8  5.0 - 12.0 % Final    Eosinophil % 01/27/2025 0.6  0.3 - 6.2 % Final    Basophil % 01/27/2025 0.4  0.0 - 1.5 % Final    Immature Grans % 01/27/2025 0.1  0.0 - 0.5 % Final    Neutrophils, Absolute 01/27/2025 7.18 (H)  1.70 - 7.00 10*3/mm3 Final    Lymphocytes, Absolute 01/27/2025 1.41  0.70 - 3.10 10*3/mm3 Final    Monocytes, Absolute 01/27/2025 0.54  0.10 - 0.90 10*3/mm3 Final    Eosinophils, Absolute 01/27/2025 0.06  0.00 - 0.40 10*3/mm3 Final    Basophils, Absolute 01/27/2025 0.04  0.00 - 0.20 10*3/mm3 Final    Immature Grans, Absolute 01/27/2025 0.01  0.00 - 0.05 10*3/mm3 Final    nRBC 01/27/2025 0.0  0.0 - 0.2 /100 WBC Final    RBC, UA 01/27/2025 3-5 (A)  None Seen, 0-2 /HPF Final    WBC, UA 01/27/2025 0-2  None Seen, 0-2 /HPF Final    Bacteria, UA 01/27/2025 Trace (A)  None Seen /HPF Final    Squamous Epithelial Cells, UA 01/27/2025 3-6 (A)  None Seen, 0-2 /HPF Final    Hyaline Casts, UA 01/27/2025 None Seen  None Seen /LPF Final    Methodology 01/27/2025 Manual Light Microscopy   Final        Procedure:       Assessment/Plan:   Renal calculus-we discussed the presence of the stone. We discussed the  various therapeutic options available including percutaneous nephrostolithotomy, ureteroscopy and extracorporeal shockwave  lithotripsy.  We discussed the risks of lithotripsy including the passage of stones, the development of a large string of stones in the distal ureter known as Steinstrasse.  In the 3% incidence of that we will need to proceed with a ureteroscopy for obstructing fragments.  Extremely rare incidence of renal hematoma and the significance of this.  We discussed percutaneous nephrostolithotomy and its use as well as the risks and benefits such as the need for postoperative hospitalization, and the risk of damage to the kidney and the remote risk of a nephrectomy.  We also discussed the use of ureteroscopy in the upper tracts.  That this is as a decreased success rate to completely remove the stones on the first option and that very likely a stent will be required requiring an additional procedure for removal.  We discussed the absolute relative indicators for intervention including the presence of sepsis and pain we cannot control ais the primary need for urgent intervention.  We discussed placement of a stent if indicated and the management of the stent as well.    Patient reports that she is not currently experiencing any symptoms of urinary incontinence.                    This document has been electronically signed by CYNDY ALLEN MD January 27, 2025 13:49 EST    Dictated Utilizing Dragon Dictation: Part of this note may be an electronic transcription/translation of spoken language to printed text using the Dragon Dictation System.

## 2025-01-27 NOTE — PROGRESS NOTES
Chief Complaint:      Chief Complaint   Patient presents with    Kidney Stone       HPI:   64 y.o. female.  With her first stone being a proximal 5 mm left stone.  She is a diabetic she had a heart attack in 2016 she is on Plavix and aspirin which she stopped immediately.  Renal calculus-we discussed the presence of the stone. We discussed the various therapeutic options available including percutaneous nephrostolithotomy, ureteroscopy and extracorporeal shockwave  lithotripsy.  We discussed the risks of lithotripsy including the passage of stones, the development of a large string of stones in the distal ureter known as Steinstrasse.  In the 3% incidence of that we will need to proceed with a ureteroscopy for obstructing fragments.  Extremely rare incidence of renal hematoma and the significance of this.  We discussed percutaneous nephrostolithotomy and its use as well as the risks and benefits such as the need for postoperative hospitalization, and the risk of damage to the kidney and the remote risk of a nephrectomy.  We also discussed the use of ureteroscopy in the upper tracts.  That this is as a decreased success rate to completely remove the stones on the first option and that very likely a stent will be required requiring an additional procedure for removal.  We discussed the absolute relative indicators for intervention including the presence of sepsis and pain we cannot control ais the primary need for urgent intervention.  We discussed placement of a stent if indicated and the management of the stent as well.  Past Medical History:     Past Medical History:   Diagnosis Date    Anxiety     Coronary artery disease     s/p LAD stenting, follows w/ Dr. Ogden    Depression     Diabetes mellitus     dx 1998, initially improved s/p AGB but now back on insulin     Dyspepsia     Dyspnea on exertion     Fatigue     Hyperlipidemia     Hypertension     Melanoma     1998    Morbid obesity     Myocardial infarction  "11/24/2016    s/p LAD stenting, on ASA 325mg + Plavix    BRICE on CPAP     noncompliant w/ device, \"lucia me\"    Umbilical hernia     multiple recurrent incisional hernias at the umbilicus, left undisturbed during LSG, can consider general surgery repair in the future, if needed.    Wears glasses        Current Meds:     Current Outpatient Medications   Medication Sig Dispense Refill    alendronate (FOSAMAX) 70 MG tablet Take 1 tablet by mouth every week in the morning, at least 30 minutes before first food, beverage, or medication of day. 12 tablet 1    aspirin 81 MG chewable tablet Chew & swallow 1 tablet every day. 100 tablet 3    atorvastatin (LIPITOR) 80 MG tablet Take 1 tablet by mouth Daily. 90 tablet 3    carvedilol (COREG) 25 MG tablet Take 1 & 1/2 tablets by mouth 2 (Two) Times a Day With Meals. 180 tablet 3    cefdinir (OMNICEF) 300 MG capsule Take 1 capsule by mouth 2 (Two) Times a Day for 10 days. 20 capsule 0    clopidogrel (PLAVIX) 75 MG tablet Take 1 tablet by mouth daily 90 tablet 4    empagliflozin (Jardiance) 25 MG tablet tablet Take 1 tablet by mouth every morning. 90 tablet 3    fluticasone (FLONASE) 50 MCG/ACT nasal spray Use 1 - 2 sprays in each nostril every day  as needed 16 mL 0    HYDROcodone-acetaminophen (NORCO) 5-325 MG per tablet Take 1 tablet by mouth Every 6 (Six) Hours As Needed for Mild Pain. 12 tablet 0    isosorbide mononitrate (IMDUR) 60 MG 24 hr tablet Take 1 and 1/2 tablets by mouth every day in the morning 180 tablet 3    metFORMIN (GLUCOPHAGE) 1000 MG tablet Take 1 tablet by mouth 2 times daily with morning and evening meals. 180 tablet 11    metFORMIN (GLUCOPHAGE) 1000 MG tablet Take 1 tablet by mouth 2 times every day with morning and evening meals. 180 tablet 11    Multiple Vitamin (MULTI-VITAMIN DAILY PO) Take  by mouth.      neomycin-colistin-hydrocortisone-thonzonium (Cortisporin-TC) 3.3-3-10-0.5 MG/ML otic suspension Instill 5 drops into the affected ear(s) 3 times " every day for 10 days. 30 mL 0    neomycin-polymyxin-hydrocortisone (CORTISPORIN) 3.5-87742-1 otic suspension Instill 5 drops into affected ear(s) 3 times daily for 10 days. SHAKE WELL 10 mL 0    nitroglycerin (NITROSTAT) 0.4 MG SL tablet Place 1 tablet under the tongue Every 5 (Five) Minutes As Needed for Chest Pain (max of 3 doses in 15 minutes. If no relief, go to ER. 25 tablet 12    Omega-3 Fatty Acids (fish oil) 1000 MG capsule capsule Take 1 capsule by mouth Daily. 30 capsule 5    ondansetron ODT (ZOFRAN-ODT) 4 MG disintegrating tablet Place 1 tablet on the tongue Every 6 (Six) Hours As Needed for Nausea or Vomiting. 12 tablet 0    tamsulosin (FLOMAX) 0.4 MG capsule 24 hr capsule Take 1 capsule by mouth Daily. 30 capsule 0    Tirzepatide (Mounjaro) 15 MG/0.5ML solution pen-injector pen Inject 15 mg under the skin once every week. 6 mL 2    valsartan (DIOVAN) 160 MG tablet Take 1 tablet by mouth Daily. 90 tablet 3    vitamin D (ERGOCALCIFEROL) 1.25 MG (50361 UT) capsule capsule Take 1 capsule by mouth once weekly. 15 capsule 3     No current facility-administered medications for this visit.        Allergies:      No Known Allergies     Past Surgical History:     Past Surgical History:   Procedure Laterality Date    CARDIAC CATHETERIZATION N/A 11/29/2016    Procedure: Left Heart Cath;  Surgeon: Akhil Zuñiga MD;  Location: [facility] CATH INVASIVE LOCATION;  Service:     CARDIAC CATHETERIZATION N/A 10/31/2017    Procedure: Left Heart Cath;  Surgeon: Lebron Ogden MD;  Location: [facility] CATH INVASIVE LOCATION;  Service:     CARDIAC CATHETERIZATION  10/31/2017    Procedure: Functional Flow [place];  Surgeon: Denilson Pena MD;  Location: [facility] CATH INVASIVE LOCATION;  Service:     CARDIAC CATHETERIZATION      2/8/02, 3/5/07, 3/23/11    CARDIAC CATHETERIZATION N/A 9/8/2023    Procedure: Left Heart Cath;  Surgeon: Suresh Marin MD;  Location: [facility] CATH INVASIVE LOCATION;  Service: Cardiology;   Laterality: N/A;    CATARACT EXTRACTION Right     CATARACT EXTRACTION Left 2005     RIGHT 05     SECTION      COLONOSCOPY      routine/ COLONGUARD     ENDOSCOPY      3/09, ,     ENDOSCOPY N/A 2019    Procedure: ESOPHAGOGASTRODUODENOSCOPY;  Surgeon: Ranjeet Dunbar MD;  Location:  [facility];  Service: Bariatric    FINGER/THUMB CLOSED REDUCTION Left 8/3/2017    Procedure: CLOSED REDUCTION PERCUTANEOUS PINNING LEFT FIFTH METACARPAL SHAFT FRACTURE;  Surgeon: Oscar León MD;  Location:  [facility];  Service:     GASTRIC BANDING REMOVAL  2019    w/ Dr. Dunbar    GASTRIC SLEEVE LAPAROSCOPIC N/A 2019    Procedure: GASTRIC SLEEVE LAPAROSCOPIC;  Surgeon: Ranjeet Dunbar MD;  Location:  [facility];  Service: Bariatric    LAPAROSCOPIC CHOLECYSTECTOMY      w/ [facility]    LAPAROSCOPIC GASTRIC BANDING  2013    s/p LAGB by Dr Russell    OTHER SURGICAL HISTORY      melanoma removal/back    RI RT/LT HEART CATHETERS N/A 2016    Procedure: Percutaneous Coronary Intervention;  Surgeon: Akhil Zuñiga MD;  Location:  [facility] CATH INVASIVE LOCATION;  Service: Cardiology    SEPTOPLASTY      UMBILICAL HERNIA REPAIR      no mesh       Social History:     Social History     Socioeconomic History    Marital status:    Tobacco Use    Smoking status: Former     Current packs/day: 0.00     Average packs/day: 0.3 packs/day for 1 year (0.3 ttl pk-yrs)     Types: Cigarettes     Start date: 1988     Quit date: 1989     Years since quittin.6    Smokeless tobacco: Never   Vaping Use    Vaping status: Never Used   Substance and Sexual Activity    Alcohol use: No    Drug use: Never    Sexual activity: Defer       Family History:     Family History   Problem Relation Age of Onset    Diabetes Mother     Heart disease Mother     Hyperlipidemia Mother     Hypertension Mother     Skin cancer Mother     COPD Father     Heart disease Father      Hyperlipidemia Father     Heart attack Father     Alzheimer's disease Father     Diabetes Maternal Aunt     Diabetes Maternal Grandmother     Heart disease Maternal Grandfather     Heart attack Maternal Grandfather     Hypertension Maternal Grandfather     Hypertension Sister     Melanoma Brother     Breast cancer Neg Hx        Review of Systems:     Review of Systems   Constitutional: Negative.  Negative for activity change, appetite change, chills, diaphoresis, fatigue and unexpected weight change.   HENT:  Negative for congestion, dental problem, drooling, ear discharge, ear pain, facial swelling, hearing loss, mouth sores, nosebleeds, postnasal drip, rhinorrhea, sinus pressure, sneezing, sore throat, tinnitus, trouble swallowing and voice change.    Eyes: Negative.  Negative for photophobia, pain, discharge, redness, itching and visual disturbance.   Respiratory: Negative.  Negative for apnea, cough, choking, chest tightness, shortness of breath, wheezing and stridor.    Cardiovascular: Negative.  Negative for chest pain, palpitations and leg swelling.   Gastrointestinal: Negative.  Negative for abdominal distention, abdominal pain, anal bleeding, blood in stool, constipation, diarrhea, nausea, rectal pain and vomiting.   Endocrine: Negative.  Negative for cold intolerance, heat intolerance, polydipsia, polyphagia and polyuria.   Musculoskeletal: Negative.  Negative for arthralgias, back pain, gait problem, joint swelling, myalgias, neck pain and neck stiffness.   Skin: Negative.  Negative for color change, pallor, rash and wound.   Allergic/Immunologic: Negative.  Negative for environmental allergies, food allergies and immunocompromised state.   Neurological: Negative.  Negative for dizziness, tremors, seizures, syncope, facial asymmetry, speech difficulty, weakness, light-headedness, numbness and headaches.   Hematological: Negative.  Negative for adenopathy. Does not bruise/bleed easily.    Psychiatric/Behavioral:  Negative for agitation, behavioral problems, confusion, decreased concentration, dysphoric mood, hallucinations, self-injury, sleep disturbance and suicidal ideas. The patient is not nervous/anxious and is not hyperactive.    All other systems reviewed and are negative.      Physical Exam:     Physical Exam  Constitutional:       Appearance: She is well-developed.   HENT:      Head: Normocephalic and atraumatic.      Right Ear: External ear normal.      Left Ear: External ear normal.   Eyes:      Conjunctiva/sclera: Conjunctivae normal.      Pupils: Pupils are equal, round, and reactive to light.   Cardiovascular:      Rate and Rhythm: Normal rate and regular rhythm.      Heart sounds: Normal heart sounds.   Pulmonary:      Effort: Pulmonary effort is normal.      Breath sounds: Normal breath sounds.   Abdominal:      General: Bowel sounds are normal. There is no distension.      Palpations: Abdomen is soft. There is no mass.      Tenderness: There is no abdominal tenderness. There is no guarding or rebound.   Genitourinary:     Vagina: No vaginal discharge.   Musculoskeletal:         General: Normal range of motion.   Skin:     General: Skin is warm and dry.   Neurological:      Mental Status: She is alert.      Deep Tendon Reflexes: Reflexes are normal and symmetric.   Psychiatric:         Behavior: Behavior normal.         Thought Content: Thought content normal.         Judgment: Judgment normal.         I have reviewed the following portions of the patient's history: Allergies, current medications, past family history, past medical history, past social history, past surgical history, problem list, and ROS and confirm it is accurate.    Recent Image (CT and/or KUB):      CT Abdomen and Pelvis: No results found for this or any previous visit.       CT Stone Protocol: No results found for this or any previous visit.       KUB: No results found for this or any previous visit.       Labs  (past 3 months):      Admission on 01/27/2025, Discharged on 01/27/2025   Component Date Value Ref Range Status    Glucose 01/27/2025 199 (H)  65 - 99 mg/dL Final    BUN 01/27/2025 14  8 - 23 mg/dL Final    Creatinine 01/27/2025 1.43 (H)  0.57 - 1.00 mg/dL Final    Sodium 01/27/2025 145  136 - 145 mmol/L Final    Potassium 01/27/2025 4.5  3.5 - 5.2 mmol/L Final    Chloride 01/27/2025 109 (H)  98 - 107 mmol/L Final    CO2 01/27/2025 22.6  22.0 - 29.0 mmol/L Final    Calcium 01/27/2025 9.6  8.6 - 10.5 mg/dL Final    Total Protein 01/27/2025 7.0  6.0 - 8.5 g/dL Final    Albumin 01/27/2025 4.1  3.5 - 5.2 g/dL Final    ALT (SGPT) 01/27/2025 31  1 - 33 U/L Final    AST (SGOT) 01/27/2025 25  1 - 32 U/L Final    Alkaline Phosphatase 01/27/2025 59  39 - 117 U/L Final    Total Bilirubin 01/27/2025 0.7  0.0 - 1.2 mg/dL Final    Globulin 01/27/2025 2.9  gm/dL Final    A/G Ratio 01/27/2025 1.4  g/dL Final    BUN/Creatinine Ratio 01/27/2025 9.8  7.0 - 25.0 Final    Anion Gap 01/27/2025 13.4  5.0 - 15.0 mmol/L Final    eGFR 01/27/2025 40.8 (L)  >60.0 mL/min/1.73 Final    Lipase 01/27/2025 83 (H)  13 - 60 U/L Final    Color, UA 01/27/2025 Yellow  Yellow, Straw Final    Appearance, UA 01/27/2025 Clear  Clear Final    pH, UA 01/27/2025 <=5.0  5.0 - 8.0 Final    Specific Gravity, UA 01/27/2025 >1.030 (H)  1.005 - 1.030 Final    Glucose, UA 01/27/2025 >=1000 mg/dL (3+) (A)  Negative Final    Ketones, UA 01/27/2025 Trace (A)  Negative Final    Bilirubin, UA 01/27/2025 Negative  Negative Final    Blood, UA 01/27/2025 Trace (A)  Negative Final    Protein, UA 01/27/2025 Negative  Negative Final    Leuk Esterase, UA 01/27/2025 Negative  Negative Final    Nitrite, UA 01/27/2025 Negative  Negative Final    Urobilinogen, UA 01/27/2025 0.2 E.U./dL  0.2 - 1.0 E.U./dL Final    Lactate 01/27/2025 1.6  0.5 - 2.0 mmol/L Final    Extra Tube 01/27/2025 Hold for add-ons.   Final    Auto resulted.    Extra Tube 01/27/2025 hold for add-on   Final    Auto  resulted    Extra Tube 01/27/2025 Hold for add-ons.   Final    Auto resulted.    Extra Tube 01/27/2025 Hold for add-ons.   Final    Auto resulted    WBC 01/27/2025 9.24  3.40 - 10.80 10*3/mm3 Final    RBC 01/27/2025 4.61  3.77 - 5.28 10*6/mm3 Final    Hemoglobin 01/27/2025 13.3  12.0 - 15.9 g/dL Final    Hematocrit 01/27/2025 42.2  34.0 - 46.6 % Final    MCV 01/27/2025 91.5  79.0 - 97.0 fL Final    MCH 01/27/2025 28.9  26.6 - 33.0 pg Final    MCHC 01/27/2025 31.5  31.5 - 35.7 g/dL Final    RDW 01/27/2025 13.2  12.3 - 15.4 % Final    RDW-SD 01/27/2025 44.1  37.0 - 54.0 fl Final    MPV 01/27/2025 9.8  6.0 - 12.0 fL Final    Platelets 01/27/2025 160  140 - 450 10*3/mm3 Final    Neutrophil % 01/27/2025 77.8 (H)  42.7 - 76.0 % Final    Lymphocyte % 01/27/2025 15.3 (L)  19.6 - 45.3 % Final    Monocyte % 01/27/2025 5.8  5.0 - 12.0 % Final    Eosinophil % 01/27/2025 0.6  0.3 - 6.2 % Final    Basophil % 01/27/2025 0.4  0.0 - 1.5 % Final    Immature Grans % 01/27/2025 0.1  0.0 - 0.5 % Final    Neutrophils, Absolute 01/27/2025 7.18 (H)  1.70 - 7.00 10*3/mm3 Final    Lymphocytes, Absolute 01/27/2025 1.41  0.70 - 3.10 10*3/mm3 Final    Monocytes, Absolute 01/27/2025 0.54  0.10 - 0.90 10*3/mm3 Final    Eosinophils, Absolute 01/27/2025 0.06  0.00 - 0.40 10*3/mm3 Final    Basophils, Absolute 01/27/2025 0.04  0.00 - 0.20 10*3/mm3 Final    Immature Grans, Absolute 01/27/2025 0.01  0.00 - 0.05 10*3/mm3 Final    nRBC 01/27/2025 0.0  0.0 - 0.2 /100 WBC Final    RBC, UA 01/27/2025 3-5 (A)  None Seen, 0-2 /HPF Final    WBC, UA 01/27/2025 0-2  None Seen, 0-2 /HPF Final    Bacteria, UA 01/27/2025 Trace (A)  None Seen /HPF Final    Squamous Epithelial Cells, UA 01/27/2025 3-6 (A)  None Seen, 0-2 /HPF Final    Hyaline Casts, UA 01/27/2025 None Seen  None Seen /LPF Final    Methodology 01/27/2025 Manual Light Microscopy   Final        Procedure:       Assessment/Plan:   Renal calculus-we discussed the presence of the stone. We discussed the  various therapeutic options available including percutaneous nephrostolithotomy, ureteroscopy and extracorporeal shockwave  lithotripsy.  We discussed the risks of lithotripsy including the passage of stones, the development of a large string of stones in the distal ureter known as Steinstrasse.  In the 3% incidence of that we will need to proceed with a ureteroscopy for obstructing fragments.  Extremely rare incidence of renal hematoma and the significance of this.  We discussed percutaneous nephrostolithotomy and its use as well as the risks and benefits such as the need for postoperative hospitalization, and the risk of damage to the kidney and the remote risk of a nephrectomy.  We also discussed the use of ureteroscopy in the upper tracts.  That this is as a decreased success rate to completely remove the stones on the first option and that very likely a stent will be required requiring an additional procedure for removal.  We discussed the absolute relative indicators for intervention including the presence of sepsis and pain we cannot control ais the primary need for urgent intervention.  We discussed placement of a stent if indicated and the management of the stent as well.    Patient reports that she is not currently experiencing any symptoms of urinary incontinence.                    This document has been electronically signed by CYNDY ALLEN MD January 27, 2025 13:49 EST    Dictated Utilizing Dragon Dictation: Part of this note may be an electronic transcription/translation of spoken language to printed text using the Dragon Dictation System.

## 2025-01-27 NOTE — ED PROVIDER NOTES
"Subjective   History of Present Illness  59-year-old male patient with a past medical history of hypertension, hyperlipidemia, coronary artery disease, and diabetes presents to the emergency room today with complaints of left-sided flank pain that is radiating to her left lower quadrant.  She states that this pain started last night.  States states that she did not get any rest except for getting up and walking around.  She states she has never had pain like this before.  She adamantly denies any chest pain or shortness of breath.    History provided by:  Patient   used: No        Review of Systems   Constitutional: Negative.    HENT: Negative.     Eyes: Negative.    Respiratory: Negative.     Cardiovascular: Negative.    Gastrointestinal: Negative.    Endocrine: Negative.    Genitourinary:  Positive for flank pain.   Skin: Negative.    Allergic/Immunologic: Negative.    Neurological: Negative.    Hematological: Negative.    Psychiatric/Behavioral: Negative.     All other systems reviewed and are negative.      Past Medical History:   Diagnosis Date    Anxiety     Coronary artery disease     s/p LAD stenting, follows w/ Dr. Ogden    Depression     Diabetes mellitus     dx 1998, initially improved s/p AGB but now back on insulin     Dyspepsia     Dyspnea on exertion     Fatigue     Hyperlipidemia     Hypertension     Melanoma     1998    Morbid obesity     Myocardial infarction 11/24/2016    s/p LAD stenting, on ASA 325mg + Plavix    BRICE on CPAP     noncompliant w/ device, \"lucia me\"    Umbilical hernia     multiple recurrent incisional hernias at the umbilicus, left undisturbed during LSG, can consider general surgery repair in the future, if needed.    Wears glasses        No Known Allergies    Past Surgical History:   Procedure Laterality Date    CARDIAC CATHETERIZATION N/A 11/29/2016    Procedure: Left Heart Cath;  Surgeon: Akhil Zuñiga MD;  Location: [facility] CATH INVASIVE LOCATION;  " Service:     CARDIAC CATHETERIZATION N/A 10/31/2017    Procedure: Left Heart Cath;  Surgeon: Lebron Ogden MD;  Location:  [facility] CATH INVASIVE LOC[facility];  Service:     CARDIAC CATHETERIZATION  10/31/2017    Procedure: Functional Flow [facility];  Surgeon: Denilson Pena MD;  Location:  [facility] CATH INVASIVE [facility];  Service:     CARDIAC CATHETERIZATION      02, 3/5/07, 3/23/11    CARDIAC CATHETERIZATION N/A 2023    Procedure: Left Heart Cath;  Surgeon: Suresh Marin MD;  Location:  [facility] CATH INVASIVE LOCATION;  Service: Cardiology;  Laterality: N/A;    CATARACT EXTRACTION Right     CATARACT EXTRACTION Left      RIGHT 05     SECTION      COLONOSCOPY      routine/ COLONGUARD     ENDOSCOPY      3/09, ,     ENDOSCOPY N/A 2019    Procedure: ESOPHAGOGASTRODUODENOSCOPY;  Surgeon: Ranjeet Dunbar MD;  Location:  [facility];  Service: Bariatric    FINGER/THUMB CLOSED REDUCTION Left 8/3/2017    Procedure: CLOSED REDUCTION PERCUTANEOUS PINNING LEFT FIFTH METACARPAL SHAFT FRACTURE;  Surgeon: Oscar León MD;  Location:  [facility] OR;  Service:     GASTRIC BANDING REMOVAL  2019    w/ Dr. Dunbar    GASTRIC SLEEVE LAPAROSCOPIC N/A 2019    Procedure: GASTRIC SLEEVE LAPAROSCOPIC;  Surgeon: Ranjeet Dunbar MD;  Location:  [facility];  Service: Bariatric    LAPAROSCOPIC CHOLECYSTECTOMY      w/ UHR    LAPAROSCOPIC GASTRIC BANDING  2013    s/p LAGB by Dr Russell    OTHER SURGICAL HISTORY      melanoma removal/back    WA RT/LT HEART CATHETERS N/A 2016    Procedure: Percutaneous Coronary Intervention;  Surgeon: Akhil Zuñiga MD;  Location:  [facility] CATH INVASIVE [facility];  Service: Cardiology    SEPTOPLASTY      UMBILICAL HERNIA REPAIR      no mesh       Family History   Problem Relation Age of Onset    Diabetes Mother     Heart disease Mother     Hyperlipidemia Mother     Hypertension Mother     Skin cancer Mother      COPD Father     Heart disease Father     Hyperlipidemia Father     Heart attack Father     Alzheimer's disease Father     Diabetes Maternal Aunt     Diabetes Maternal Grandmother     Heart disease Maternal Grandfather     Heart attack Maternal Grandfather     Hypertension Maternal Grandfather     Hypertension Sister     Melanoma Brother     Breast cancer Neg Hx        Social History     Socioeconomic History    Marital status:    Tobacco Use    Smoking status: Former     Current packs/day: 0.00     Average packs/day: 0.3 packs/day for 1 year (0.3 ttl pk-yrs)     Types: Cigarettes     Start date: 1988     Quit date: 1989     Years since quittin.6    Smokeless tobacco: Never   Vaping Use    Vaping status: Never Used   Substance and Sexual Activity    Alcohol use: No    Drug use: Never    Sexual activity: Defer           Objective   Physical Exam  Vitals and nursing note reviewed.   Constitutional:       General: She is not in acute distress.     Appearance: Normal appearance. She is normal weight. She is not ill-appearing, toxic-appearing or diaphoretic.   HENT:      Head: Normocephalic and atraumatic.      Right Ear: External ear normal.      Left Ear: External ear normal.      Nose: Nose normal.      Mouth/Throat:      Mouth: Mucous membranes are moist.      Pharynx: Oropharynx is clear.   Eyes:      Extraocular Movements: Extraocular movements intact.      Conjunctiva/sclera: Conjunctivae normal.      Pupils: Pupils are equal, round, and reactive to light.   Cardiovascular:      Rate and Rhythm: Normal rate and regular rhythm.      Pulses: Normal pulses.      Heart sounds: Normal heart sounds.   Pulmonary:      Effort: Pulmonary effort is normal.      Breath sounds: Normal breath sounds.   Abdominal:      General: Abdomen is flat. Bowel sounds are normal.      Palpations: Abdomen is soft.      Tenderness: There is left CVA tenderness.   Musculoskeletal:         General: Normal range of motion.       Cervical back: Normal range of motion and neck supple.   Skin:     General: Skin is warm and dry.      Capillary Refill: Capillary refill takes less than 2 seconds.   Neurological:      General: No focal deficit present.      Mental Status: She is alert and oriented to person, place, and time. Mental status is at baseline.   Psychiatric:         Mood and Affect: Mood normal.         Behavior: Behavior normal.         Thought Content: Thought content normal.         Judgment: Judgment normal.         Procedures           ED Course  ED Course as of 01/27/25 1053   Mon Jan 27, 2025   1035 CT Abdomen Pelvis Without Contrast [ML]   1049 PT will be seen by Dr. Sanders's office today at 1:40. She wishes to be discharged at this time and does not want to get an IV. She will f/u with sergio.  Discussed sx and red flags that would warrant return to the ED.  [ML]      ED Course User Index  [ML] Ana Lilia Contreras PA                                     CT Abdomen Pelvis Without Contrast    Result Date: 1/27/2025  1.  Mild left hydronephrosis secondary to 5 mm left UPJ region stone. 1.  No additional renal or ureteral stones and no right side obstructive uropathy noted. 2 3.  Trace pericardial effusion. 4.  Cholecystectomy. 5.  Fat only containing supraumbilical hernia. 6.  Mild sigmoid diverticulosis without diverticulitis. 7. Other nonacute findings above.  This report was finalized on 1/27/2025 10:28 AM by Dr. Hermann Mccauley MD.     Results for orders placed or performed during the hospital encounter of 01/27/25   Comprehensive Metabolic Panel    Collection Time: 01/27/25  9:33 AM    Specimen: Arm, Right; Blood   Result Value Ref Range    Glucose 199 (H) 65 - 99 mg/dL    BUN 14 8 - 23 mg/dL    Creatinine 1.43 (H) 0.57 - 1.00 mg/dL    Sodium 145 136 - 145 mmol/L    Potassium 4.5 3.5 - 5.2 mmol/L    Chloride 109 (H) 98 - 107 mmol/L    CO2 22.6 22.0 - 29.0 mmol/L    Calcium 9.6 8.6 - 10.5 mg/dL    Total Protein 7.0 6.0  - 8.5 g/dL    Albumin 4.1 3.5 - 5.2 g/dL    ALT (SGPT) 31 1 - 33 U/L    AST (SGOT) 25 1 - 32 U/L    Alkaline Phosphatase 59 39 - 117 U/L    Total Bilirubin 0.7 0.0 - 1.2 mg/dL    Globulin 2.9 gm/dL    A/G Ratio 1.4 g/dL    BUN/Creatinine Ratio 9.8 7.0 - 25.0    Anion Gap 13.4 5.0 - 15.0 mmol/L    eGFR 40.8 (L) >60.0 mL/min/1.73   Lipase    Collection Time: 01/27/25  9:33 AM    Specimen: Arm, Right; Blood   Result Value Ref Range    Lipase 83 (H) 13 - 60 U/L   Lactic Acid, Plasma    Collection Time: 01/27/25  9:33 AM    Specimen: Arm, Right; Blood   Result Value Ref Range    Lactate 1.6 0.5 - 2.0 mmol/L   CBC Auto Differential    Collection Time: 01/27/25  9:33 AM    Specimen: Arm, Right; Blood   Result Value Ref Range    WBC 9.24 3.40 - 10.80 10*3/mm3    RBC 4.61 3.77 - 5.28 10*6/mm3    Hemoglobin 13.3 12.0 - 15.9 g/dL    Hematocrit 42.2 34.0 - 46.6 %    MCV 91.5 79.0 - 97.0 fL    MCH 28.9 26.6 - 33.0 pg    MCHC 31.5 31.5 - 35.7 g/dL    RDW 13.2 12.3 - 15.4 %    RDW-SD 44.1 37.0 - 54.0 fl    MPV 9.8 6.0 - 12.0 fL    Platelets 160 140 - 450 10*3/mm3    Neutrophil % 77.8 (H) 42.7 - 76.0 %    Lymphocyte % 15.3 (L) 19.6 - 45.3 %    Monocyte % 5.8 5.0 - 12.0 %    Eosinophil % 0.6 0.3 - 6.2 %    Basophil % 0.4 0.0 - 1.5 %    Immature Grans % 0.1 0.0 - 0.5 %    Neutrophils, Absolute 7.18 (H) 1.70 - 7.00 10*3/mm3    Lymphocytes, Absolute 1.41 0.70 - 3.10 10*3/mm3    Monocytes, Absolute 0.54 0.10 - 0.90 10*3/mm3    Eosinophils, Absolute 0.06 0.00 - 0.40 10*3/mm3    Basophils, Absolute 0.04 0.00 - 0.20 10*3/mm3    Immature Grans, Absolute 0.01 0.00 - 0.05 10*3/mm3    nRBC 0.0 0.0 - 0.2 /100 WBC   Green Top (Gel)    Collection Time: 01/27/25  9:33 AM   Result Value Ref Range    Extra Tube Hold for add-ons.    Lavender Top    Collection Time: 01/27/25  9:33 AM   Result Value Ref Range    Extra Tube hold for add-on    Gold Top - SST    Collection Time: 01/27/25  9:33 AM   Result Value Ref Range    Extra Tube Hold for add-ons.     Light Blue Top    Collection Time: 01/27/25  9:33 AM   Result Value Ref Range    Extra Tube Hold for add-ons.    Urinalysis With Microscopic If Indicated (No Culture) - Urine, Clean Catch    Collection Time: 01/27/25  9:35 AM    Specimen: Urine, Clean Catch   Result Value Ref Range    Color, UA Yellow Yellow, Straw    Appearance, UA Clear Clear    pH, UA <=5.0 5.0 - 8.0    Specific Gravity, UA >1.030 (H) 1.005 - 1.030    Glucose, UA >=1000 mg/dL (3+) (A) Negative    Ketones, UA Trace (A) Negative    Bilirubin, UA Negative Negative    Blood, UA Trace (A) Negative    Protein, UA Negative Negative    Leuk Esterase, UA Negative Negative    Nitrite, UA Negative Negative    Urobilinogen, UA 0.2 E.U./dL 0.2 - 1.0 E.U./dL                      Medical Decision Making  59-year-old male patient with a past medical history of hypertension, hyperlipidemia, coronary artery disease, and diabetes presents to the emergency room today with complaints of left-sided flank pain that is radiating to her left lower quadrant.  She states that this pain started last night.  States states that she did not get any rest except for getting up and walking around.  She states she has never had pain like this before.  She adamantly denies any chest pain or shortness of breath.  ED stay has been uncomplicated.  Patient department stating that started today.  She will be discharged to go there directly.  Discussed symptoms and red flags that would warrant return to the emergency room.    Problems Addressed:  Ureteral stone: complicated acute illness or injury    Amount and/or Complexity of Data Reviewed  Labs: ordered.  Radiology: ordered. Decision-making details documented in ED Course.    Risk  Prescription drug management.        Final diagnoses:   Ureteral stone       ED Disposition  ED Disposition       ED Disposition   Discharge    Condition   Stable    Comment   --               Akhil Huerta MD  [address]  [address]  [phone]    Schedule an appointment as soon as possible for a visit in 1 day      Clement Sanders MD  [address] [facility] [address]  [facility] [address]  [phone]    Go today  1:40         Medication List        New Prescriptions      cefdinir 300 MG capsule  Commonly known as: OMNICEF  Take 1 capsule by mouth 2 (Two) Times a Day for 10 days.     ondansetron ODT 4 MG disintegrating tablet  Commonly known as: ZOFRAN-ODT  Place 1 tablet on the tongue Every 6 (Six) Hours As Needed for Nausea or Vomiting.     tamsulosin 0.4 MG capsule 24 hr capsule  Commonly known as: FLOMAX  Take 1 capsule by mouth Daily.               Where to Get Your Medications        [facility] - [address] [facility] [address] - [phone] [place] [phone]   [address] [facility] [place] [address]      Phone: [phone]   cefdinir 300 MG capsule  ondansetron ODT 4 MG disintegrating tablet  tamsulosin 0.4 MG capsule 24 hr capsule            Ana Lilia Contreras PA  01/27/25 8148

## 2025-01-27 NOTE — ED TRIAGE NOTES
MEDICAL SCREENING:    Reason for Visit: flank pain, lower ab pain    Patient initially seen in triage.  The patient was advised further evaluation and diagnostic testing will be needed, some of the treatment and testing will be initiated in the lobby in order to begin the process.  The patient will be returned to the waiting area for the time being and possibly be re-assessed by a subsequent ED provider.  The patient will be brought back to the treatment area in as timely manner as possible.     Hypertension is  stable .  Continue current medications.  Ambulatory blood pressure monitoring.  Blood pressure will be reassessed at the next regular appointment.  Continue Valsartan daily and Carvedilol and Torsemide twice daily.  Continue Hydralazine 3 times daily and Doxazosin nightly.

## 2025-01-29 ENCOUNTER — TELEPHONE (OUTPATIENT)
Dept: UROLOGY | Facility: CLINIC | Age: 65
End: 2025-01-29
Payer: MEDICARE

## 2025-01-29 NOTE — TELEPHONE ENCOUNTER
Patient would like to know if there could be a different pain medication called in.  The Norco did not help at all with the pain.

## 2025-01-30 ENCOUNTER — PATIENT ROUNDING (BHMG ONLY) (OUTPATIENT)
Dept: UROLOGY | Facility: CLINIC | Age: 65
End: 2025-01-30
Payer: MEDICARE

## 2025-01-31 ENCOUNTER — ANESTHESIA EVENT (OUTPATIENT)
Dept: PERIOP | Facility: HOSPITAL | Age: 65
End: 2025-01-31
Payer: MEDICARE

## 2025-01-31 ENCOUNTER — ANESTHESIA (OUTPATIENT)
Dept: PERIOP | Facility: HOSPITAL | Age: 65
End: 2025-01-31
Payer: MEDICARE

## 2025-01-31 ENCOUNTER — APPOINTMENT (OUTPATIENT)
Dept: GENERAL RADIOLOGY | Facility: HOSPITAL | Age: 65
End: 2025-01-31
Payer: MEDICARE

## 2025-01-31 ENCOUNTER — HOSPITAL ENCOUNTER (OUTPATIENT)
Facility: HOSPITAL | Age: 65
Discharge: HOME | End: 2025-01-31
Attending: UROLOGY | Admitting: UROLOGY
Payer: MEDICARE

## 2025-01-31 VITALS
WEIGHT: 201 LBS | DIASTOLIC BLOOD PRESSURE: 72 MMHG | TEMPERATURE: 98.5 F | HEART RATE: 108 BPM | BODY MASS INDEX: 32.3 KG/M2 | HEIGHT: 66 IN | SYSTOLIC BLOOD PRESSURE: 135 MMHG | RESPIRATION RATE: 18 BRPM | OXYGEN SATURATION: 97 %

## 2025-01-31 DIAGNOSIS — N20.0 RENAL CALCULUS: Primary | ICD-10-CM

## 2025-01-31 DIAGNOSIS — N20.0 RENAL CALCULUS, LEFT: Primary | ICD-10-CM

## 2025-01-31 LAB — GLUCOSE BLDC GLUCOMTR-MCNC: 126 MG/DL (ref 70–130)

## 2025-01-31 PROCEDURE — 25010000002 ONDANSETRON PER 1 MG: Performed by: NURSE ANESTHETIST, CERTIFIED REGISTERED

## 2025-01-31 PROCEDURE — 25010000002 FENTANYL CITRATE (PF) 50 MCG/ML SOLUTION: Performed by: NURSE ANESTHETIST, CERTIFIED REGISTERED

## 2025-01-31 PROCEDURE — 74018 RADEX ABDOMEN 1 VIEW: CPT | Performed by: RADIOLOGY

## 2025-01-31 PROCEDURE — 25010000002 PROPOFOL 200 MG/20ML EMULSION: Performed by: NURSE ANESTHETIST, CERTIFIED REGISTERED

## 2025-01-31 PROCEDURE — 50590 FRAGMENTING OF KIDNEY STONE: CPT | Performed by: UROLOGY

## 2025-01-31 PROCEDURE — 25010000002 LIDOCAINE PF 2% 2 % SOLUTION: Performed by: NURSE ANESTHETIST, CERTIFIED REGISTERED

## 2025-01-31 PROCEDURE — 74018 RADEX ABDOMEN 1 VIEW: CPT

## 2025-01-31 PROCEDURE — 25010000002 GENTAMICIN PER 80 MG: Performed by: UROLOGY

## 2025-01-31 PROCEDURE — 82948 REAGENT STRIP/BLOOD GLUCOSE: CPT

## 2025-01-31 RX ORDER — FAMOTIDINE 10 MG/ML
INJECTION, SOLUTION INTRAVENOUS AS NEEDED
Status: DISCONTINUED | OUTPATIENT
Start: 2025-01-31 | End: 2025-01-31 | Stop reason: SURG

## 2025-01-31 RX ORDER — FENTANYL CITRATE 50 UG/ML
INJECTION, SOLUTION INTRAMUSCULAR; INTRAVENOUS AS NEEDED
Status: DISCONTINUED | OUTPATIENT
Start: 2025-01-31 | End: 2025-01-31 | Stop reason: SURG

## 2025-01-31 RX ORDER — HYDROCODONE BITARTRATE AND ACETAMINOPHEN 10; 325 MG/1; MG/1
1 TABLET ORAL EVERY 6 HOURS PRN
Qty: 16 TABLET | Refills: 0 | Status: SHIPPED | OUTPATIENT
Start: 2025-01-31

## 2025-01-31 RX ORDER — PROPOFOL 10 MG/ML
INJECTION, EMULSION INTRAVENOUS AS NEEDED
Status: DISCONTINUED | OUTPATIENT
Start: 2025-01-31 | End: 2025-01-31 | Stop reason: SURG

## 2025-01-31 RX ORDER — IPRATROPIUM BROMIDE AND ALBUTEROL SULFATE 2.5; .5 MG/3ML; MG/3ML
3 SOLUTION RESPIRATORY (INHALATION) ONCE AS NEEDED
Status: DISCONTINUED | OUTPATIENT
Start: 2025-01-31 | End: 2025-01-31 | Stop reason: HOSPADM

## 2025-01-31 RX ORDER — SODIUM CHLORIDE, SODIUM LACTATE, POTASSIUM CHLORIDE, CALCIUM CHLORIDE 600; 310; 30; 20 MG/100ML; MG/100ML; MG/100ML; MG/100ML
100 INJECTION, SOLUTION INTRAVENOUS ONCE AS NEEDED
Status: DISCONTINUED | OUTPATIENT
Start: 2025-01-31 | End: 2025-01-31 | Stop reason: HOSPADM

## 2025-01-31 RX ORDER — PHENYLEPHRINE HCL IN 0.9% NACL 1 MG/10 ML
SYRINGE (ML) INTRAVENOUS AS NEEDED
Status: DISCONTINUED | OUTPATIENT
Start: 2025-01-31 | End: 2025-01-31 | Stop reason: SURG

## 2025-01-31 RX ORDER — GENTAMICIN SULFATE 80 MG/100ML
80 INJECTION, SOLUTION INTRAVENOUS
Status: COMPLETED | OUTPATIENT
Start: 2025-02-01 | End: 2025-01-31

## 2025-01-31 RX ORDER — ONDANSETRON 2 MG/ML
4 INJECTION INTRAMUSCULAR; INTRAVENOUS AS NEEDED
Status: DISCONTINUED | OUTPATIENT
Start: 2025-01-31 | End: 2025-01-31 | Stop reason: HOSPADM

## 2025-01-31 RX ORDER — LIDOCAINE HYDROCHLORIDE 20 MG/ML
INJECTION, SOLUTION EPIDURAL; INFILTRATION; INTRACAUDAL; PERINEURAL AS NEEDED
Status: DISCONTINUED | OUTPATIENT
Start: 2025-01-31 | End: 2025-01-31 | Stop reason: SURG

## 2025-01-31 RX ORDER — FENTANYL CITRATE 50 UG/ML
50 INJECTION, SOLUTION INTRAMUSCULAR; INTRAVENOUS
Status: DISCONTINUED | OUTPATIENT
Start: 2025-01-31 | End: 2025-01-31 | Stop reason: HOSPADM

## 2025-01-31 RX ORDER — OXYCODONE AND ACETAMINOPHEN 5; 325 MG/1; MG/1
1 TABLET ORAL ONCE AS NEEDED
Status: DISCONTINUED | OUTPATIENT
Start: 2025-01-31 | End: 2025-01-31 | Stop reason: HOSPADM

## 2025-01-31 RX ORDER — KETOROLAC TROMETHAMINE 30 MG/ML
30 INJECTION, SOLUTION INTRAMUSCULAR; INTRAVENOUS EVERY 6 HOURS PRN
Status: DISCONTINUED | OUTPATIENT
Start: 2025-01-31 | End: 2025-01-31 | Stop reason: HOSPADM

## 2025-01-31 RX ORDER — ONDANSETRON 2 MG/ML
INJECTION INTRAMUSCULAR; INTRAVENOUS AS NEEDED
Status: DISCONTINUED | OUTPATIENT
Start: 2025-01-31 | End: 2025-01-31 | Stop reason: SURG

## 2025-01-31 RX ADMIN — Medication 100 MCG: at 15:18

## 2025-01-31 RX ADMIN — FAMOTIDINE 20 MG: 10 INJECTION, SOLUTION INTRAVENOUS at 14:57

## 2025-01-31 RX ADMIN — GENTAMICIN SULFATE 80 MG: 80 INJECTION, SOLUTION INTRAVENOUS at 14:57

## 2025-01-31 RX ADMIN — Medication 100 MCG: at 15:16

## 2025-01-31 RX ADMIN — FENTANYL CITRATE 50 MCG: 50 INJECTION INTRAMUSCULAR; INTRAVENOUS at 15:02

## 2025-01-31 RX ADMIN — ONDANSETRON 4 MG: 2 INJECTION INTRAMUSCULAR; INTRAVENOUS at 15:06

## 2025-01-31 RX ADMIN — Medication 100 MCG: at 15:14

## 2025-01-31 RX ADMIN — PROPOFOL 100 MG: 10 INJECTION, EMULSION INTRAVENOUS at 15:01

## 2025-01-31 RX ADMIN — LIDOCAINE HYDROCHLORIDE 60 MG: 20 INJECTION, SOLUTION EPIDURAL; INFILTRATION; INTRACAUDAL; PERINEURAL at 15:01

## 2025-01-31 RX ADMIN — FENTANYL CITRATE 50 MCG: 50 INJECTION INTRAMUSCULAR; INTRAVENOUS at 15:06

## 2025-01-31 NOTE — ANESTHESIA PREPROCEDURE EVALUATION
Anesthesia Evaluation     Patient summary reviewed and Nursing notes reviewed   NPO Solid Status: > 8 hours  NPO Liquid Status: > 8 hours           Airway   Mallampati: II  TM distance: >3 FB  Neck ROM: full  Possible difficult intubation  Dental - normal exam     Pulmonary - normal exam    breath sounds clear to auscultation  (+) ,shortness of breath, sleep apnea  Cardiovascular - normal exam  Exercise tolerance: good (4-7 METS)    ECG reviewed  PT is on anticoagulation therapy  Patient on routine beta blocker and Beta blocker given within 24 hours of surgery  Rhythm: regular  Rate: normal    (+) hypertension, past MI , CAD, cardiac stents (off asa/plavix 8 days) , angina, hyperlipidemia    ROS comment: RBBB  Echo 8/17: normal EF, no valve disease  Stress test 7/18: low risk study for ischemia      Neuro/Psych  (+) psychiatric history Anxiety  GI/Hepatic/Renal/Endo    (+) obesity, renal disease- stones, diabetes mellitus  (-) morbid obesity    Musculoskeletal (-) negative ROS    Abdominal  - normal exam    Abdomen: soft.   Substance History - negative use     OB/GYN negative ob/gyn ROS         Other      history of cancer    ROS/Med Hx Other: 9/8/23       LM: Is a large calibre vessel , normal take off from left cusp, divides into LAD and Lcx. No significant stenosis     LAD: Stent in the proximal mid LAD was patent, the mid LAD had moderate 50% stenosis unchanged from prior, distal LAD had mild diffuse disease, the diagonal arteries had no significant stenosis     LCX: Moderate calibre vessel, mild luminal irregularities, focal 30% stenosis, OM branches distally small calibre vessel with no stenosis     RCA: Large calibre, dominant artery, normal take off from right cusp.  Entire RCA had mild diffuse 40-50% stenosis, R PDA and PL branches had mild diffuse disease.      Left Ventriculography:     LV systolic function was normal with visual estimated EF of 60%. No wall motion abnormalities.   No significant mitral  regurgitation noted.      LVEDP: 8 mmHg  No gradient across the aortic valve on pull back.                      Anesthesia Plan    ASA 3     general     (TAP blocks)  intravenous induction     Anesthetic plan, risks, benefits, and alternatives have been provided, discussed and informed consent has been obtained with: patient.  Pre-procedure education provided  Use of blood products discussed with  Consented to blood products.    Plan discussed with CRNA.

## 2025-01-31 NOTE — ANESTHESIA POSTPROCEDURE EVALUATION
Patient: Jannette Onofre    Procedure Summary       Date: 01/31/25 Room / Location: [facility] OR 09 /  [facility] OR    Anesthesia Start: 1457 Anesthesia Stop: 1525    Procedure: EXTRACORPOREAL SHOCKWAVE LITHOTRIPSY (Left) Diagnosis:       Renal calculus, left      (Renal calculus, left [N20.0])    Surgeons: Clement Sanders MD Provider: Terry Sims MD    Anesthesia Type: general ASA Status: 3            Anesthesia Type: general    Vitals  Vitals Value Taken Time   /80 01/31/25 1553   Temp 98.7 °F (37.1 °C) 01/31/25 1530   Pulse 101 01/31/25 1558   Resp 15 01/31/25 1555   SpO2 97 % 01/31/25 1558   Vitals shown include unfiled device data.        Post Anesthesia Care and Evaluation    Patient location during evaluation: PACU  Patient participation: complete - patient participated  Level of consciousness: awake  Pain score: 0  Pain management: satisfactory to patient    Airway patency: patent  Anesthetic complications: No anesthetic complications  PONV Status: none  Cardiovascular status: hemodynamically stable  Respiratory status: nasal cannula  Hydration status: acceptable

## 2025-01-31 NOTE — ANESTHESIA PROCEDURE NOTES
Airway  Urgency: elective    Date/Time: 1/31/2025 3:02 PM  Airway not difficult    General Information and Staff    Patient location during procedure: OR  Anesthesiologist: Terry Sims MD  CRNA/CAA: Shelby Franco CRNA    Indications and Patient Condition  Indications for airway management: airway protection    Preoxygenated: yes  Mask difficulty assessment: 0 - not attempted    Final Airway Details  Final airway type: supraglottic airway      Successful airway: classic  Size 4     Number of attempts at approach: 1  Assessment: lips, teeth, and gum same as pre-op    Additional Comments  LMA placed with no trauma noted. Patient tolerated well. Good seal. Secured.

## 2025-01-31 NOTE — OP NOTE
EXTRACORPOREAL SHOCKWAVE LITHOTRIPSY  Procedure Note    Jannette Onofre  1/31/2025    Pre-op Diagnosis:   Renal calculus, left [N20.0]    Post-op Diagnosis:     Post-Op Diagnosis Codes:     * Renal calculus, left [N20.0]    Procedure/CPT® Codes:  MS LITHOTRIPSY XTRCORP SHOCK WAVE [76430]  64-year-old white female with a painful left UPJ stone ESWL-the patient is a candidate for extracorporeal shockwave lithotripsy.  We discussed the type of stone and the complications associated with the procedure including, but not limited to, pain in the flank, hematoma, spontaneous renal hemorrhage, inadequate fragmentation of stones, the need for passage of the stones, the need for concomitant additional procedures in the range of 24%, the risk of a distal fragment in the range of 3% requiring ureteroscopic removal, and the fact that sometimes a stent is indicated based on the size and the density of the stone as determined on the CAT scan.  Additionally, we discussed percutaneous nephrostolithotomy.  Including the mini PERC.  With its attendant risks of anesthesia bleeding infection and the fact that is a invasive procedure with the remote possibility of a nephrectomy.  We also discussed the use of ureteroscopy which is a rigid or flexible instrument placed up into the kidney to break up stones with the laser beam and very likely a postop stent and a high likelihood of additional concomitant procedures.  Following an informed consent, he was brought to the operative suite and underwent induction of general endotracheal anesthetic.  The stone was localized at F2 and a total of 2000 shockwaves was administered without complication.  There was excellent fragmentation  He was awake and alert and returned to recovery room.       Procedure(s):  EXTRACORPOREAL SHOCKWAVE LITHOTRIPSY    Surgeon(s):  Clement Sanders MD    Anesthesia: see anesthesia record    Staff:   Circulator: Crys Paz RN  Scrub Person: Caridad Scales  SURESH; Lissy Sanderson  Vendor Representative: Gavin Plata    Estimated Blood Loss: none  Urine Voided: * No values recorded between 1/31/2025  2:57 PM and 1/31/2025  3:20 PM *    Specimens:                None      Drains: None    Findings: Excellent fragmentation    Blood: N/A    Complications: None    Grafts and Implants: None    Clement Sanders MD     Date: 1/31/2025  Time: 15:20 EST

## 2025-02-03 NOTE — TELEPHONE ENCOUNTER
Placed on Dr. Ogden's desk for review.     reviewed her clearance and signed off on it. Pt was advised.

## 2025-02-06 ENCOUNTER — OFFICE VISIT (OUTPATIENT)
Dept: UROLOGY | Facility: CLINIC | Age: 65
End: 2025-02-06
Payer: MEDICARE

## 2025-02-06 ENCOUNTER — HOSPITAL ENCOUNTER (OUTPATIENT)
Dept: GENERAL RADIOLOGY | Facility: HOSPITAL | Age: 65
Discharge: HOME | End: 2025-02-06
Admitting: UROLOGY
Payer: MEDICARE

## 2025-02-06 VITALS
HEART RATE: 84 BPM | SYSTOLIC BLOOD PRESSURE: 117 MMHG | HEIGHT: 66 IN | DIASTOLIC BLOOD PRESSURE: 64 MMHG | WEIGHT: 201.6 LBS | BODY MASS INDEX: 32.4 KG/M2

## 2025-02-06 DIAGNOSIS — N20.0 RENAL CALCULUS, LEFT: ICD-10-CM

## 2025-02-06 DIAGNOSIS — N20.0 RENAL CALCULUS, LEFT: Primary | ICD-10-CM

## 2025-02-06 PROCEDURE — 1159F MED LIST DOCD IN RCRD: CPT | Performed by: UROLOGY

## 2025-02-06 PROCEDURE — 74018 RADEX ABDOMEN 1 VIEW: CPT

## 2025-02-06 PROCEDURE — 74018 RADEX ABDOMEN 1 VIEW: CPT | Performed by: RADIOLOGY

## 2025-02-06 PROCEDURE — 3078F DIAST BP <80 MM HG: CPT | Performed by: UROLOGY

## 2025-02-06 PROCEDURE — 3074F SYST BP LT 130 MM HG: CPT | Performed by: UROLOGY

## 2025-02-06 PROCEDURE — 1160F RVW MEDS BY RX/DR IN RCRD: CPT | Performed by: UROLOGY

## 2025-02-06 PROCEDURE — 99024 POSTOP FOLLOW-UP VISIT: CPT | Performed by: UROLOGY

## 2025-02-06 NOTE — PROGRESS NOTES
"117/64[facility] Complaint:      Chief Complaint   Patient presents with    Nephrolithiasis     1 week follow up        HPI:   64 y.o. female status post lithotripsy of a painful UPJ stone doing great feeling great having no complaints or problems.  Her KUB is negative  Past Medical History:     Past Medical History:   Diagnosis Date    Anxiety     Coronary artery disease     s/p LAD stenting, follows w/ Dr. Ogden    Depression     Diabetes mellitus     dx 1998, initially improved s/p AGB but now back on insulin     Dyspepsia     Dyspnea on exertion     Fatigue     Hyperlipidemia     Hypertension     Melanoma     1998    Morbid obesity     Myocardial infarction 11/24/2016    s/p LAD stenting, on ASA 325mg + Plavix    BRICE on CPAP     noncompliant w/ device, \"lucia me\"    Umbilical hernia     multiple recurrent incisional hernias at the umbilicus, left undisturbed during LSG, can consider general surgery repair in the future, if needed.    Wears glasses        Current Meds:     Current Outpatient Medications   Medication Sig Dispense Refill    aspirin 81 MG chewable tablet Chew & swallow 1 tablet every day. 100 tablet 3    atorvastatin (LIPITOR) 80 MG tablet Take 1 tablet by mouth Daily. 90 tablet 3    carvedilol (COREG) 25 MG tablet Take 1 & 1/2 tablets by mouth 2 (Two) Times a Day With Meals. 180 tablet 3    cefdinir (OMNICEF) 300 MG capsule Take 1 capsule by mouth 2 (Two) Times a Day for 10 days. 20 capsule 0    clopidogrel (PLAVIX) 75 MG tablet Take 1 tablet by mouth daily 90 tablet 4    empagliflozin (Jardiance) 25 MG tablet tablet Take 1 tablet by mouth every morning. 90 tablet 3    HYDROcodone-acetaminophen (NORCO)  MG per tablet Take 1 tablet by mouth Every 6 (Six) Hours As Needed for Moderate Pain (Pain). 16 tablet 0    metFORMIN (GLUCOPHAGE) 1000 MG tablet Take 1 tablet by mouth 2 times daily with morning and evening meals. 180 tablet 11    metFORMIN (GLUCOPHAGE) 1000 MG tablet Take 1 tablet by mouth 2 " times every day with morning and evening meals. 180 tablet 11    Multiple Vitamin (MULTI-VITAMIN DAILY PO) Take  by mouth.      neomycin-polymyxin-hydrocortisone (CORTISPORIN) 3.5-54337-1 otic suspension Instill 5 drops into affected ear(s) 3 times daily for 10 days. SHAKE WELL 10 mL 0    nitroglycerin (NITROSTAT) 0.4 MG SL tablet Place 1 tablet under the tongue Every 5 (Five) Minutes As Needed for Chest Pain (max of 3 doses in 15 minutes. If no relief, go to ER. 25 tablet 12    tamsulosin (FLOMAX) 0.4 MG capsule 24 hr capsule Take 1 capsule by mouth Daily. 30 capsule 0    Tirzepatide (Mounjaro) 15 MG/0.5ML solution pen-injector pen Inject 15 mg under the skin once every week. 6 mL 2    valsartan (DIOVAN) 160 MG tablet Take 1 tablet by mouth Daily. 90 tablet 3    vitamin D (ERGOCALCIFEROL) 1.25 MG (50995 UT) capsule capsule Take 1 capsule by mouth once weekly. 15 capsule 3     No current facility-administered medications for this visit.        Allergies:      No Known Allergies     Past Surgical History:     Past Surgical History:   Procedure Laterality Date    CARDIAC CATHETERIZATION N/A 2016    Procedure: Left Heart Cath;  Surgeon: Akhil Zuñiga MD;  Location: [facility] CATH INVASIVE LOCATION;  Service:     CARDIAC CATHETERIZATION N/A 10/31/2017    Procedure: Left Heart Cath;  Surgeon: Lebron Ogden MD;  Location: [facility] CATH INVASIVE LOCATION;  Service:     CARDIAC CATHETERIZATION  10/31/2017    Procedure: Functional Flow [place];  Surgeon: Denilson Pena MD;  Location: [facility] CATH INVASIVE LOCATION;  Service:     CARDIAC CATHETERIZATION      02, 3/5/07, 3/23/11    CARDIAC CATHETERIZATION N/A 2023    Procedure: Left Heart Cath;  Surgeon: Suresh Marin MD;  Location: [facility] CATH INVASIVE LOCATION;  Service: Cardiology;  Laterality: N/A;    CATARACT EXTRACTION Right     CATARACT EXTRACTION Left      RIGHT 05     SECTION      COLONOSCOPY      routine/  COLONGUARD     ENDOSCOPY      3/09, ,     ENDOSCOPY N/A 2019    Procedure: ESOPHAGOGASTRODUODENOSCOPY;  Surgeon: Ranjeet Dunbar MD;  Location:  [facility];  Service: Bariatric    EXTRACORPOREAL SHOCK WAVE LITHOTRIPSY (ESWL) Left 2025    Procedure: EXTRACORPOREAL SHOCKWAVE LITHOTRIPSY;  Surgeon: Clement Sanders MD;  Location:  [facility];  Service: Urology;  Laterality: Left;    FINGER/THUMB CLOSED REDUCTION Left 8/3/2017    Procedure: CLOSED REDUCTION PERCUTANEOUS PINNING LEFT FIFTH METACARPAL SHAFT FRACTURE;  Surgeon: Oscar León MD;  Location:  [facility];  Service:     GASTRIC BANDING REMOVAL  2019    w/ Dr. Dunbar    GASTRIC SLEEVE LAPAROSCOPIC N/A 2019    Procedure: GASTRIC SLEEVE LAPAROSCOPIC;  Surgeon: Ranjeet Dunbar MD;  Location:  [facility];  Service: Bariatric    LAPAROSCOPIC CHOLECYSTECTOMY      w/ [facility]    LAPAROSCOPIC GASTRIC BANDING  2013    s/p LAGB by Dr Russell    OTHER SURGICAL HISTORY      melanoma removal/back    CA RT/LT HEART CATHETERS N/A 2016    Procedure: Percutaneous Coronary Intervention;  Surgeon: Akhil Zuñiga MD;  Location:  [facility] CATH INVASIVE LOCATION;  Service: Cardiology    SEPTOPLASTY      UMBILICAL HERNIA REPAIR      no mesh       Social History:     Social History     Socioeconomic History    Marital status:    Tobacco Use    Smoking status: Former     Current packs/day: 0.00     Average packs/day: 0.3 packs/day for 1 year (0.3 ttl pk-yrs)     Types: Cigarettes     Start date: 1988     Quit date: 1989     Years since quittin.6    Smokeless tobacco: Never   Vaping Use    Vaping status: Never Used   Substance and Sexual Activity    Alcohol use: No    Drug use: Never    Sexual activity: Defer       Family History:     Family History   Problem Relation Age of Onset    Diabetes Mother     Heart disease Mother     Hyperlipidemia Mother     Hypertension Mother     Skin cancer Mother      COPD Father     Heart disease Father     Hyperlipidemia Father     Heart attack Father     Alzheimer's disease Father     Diabetes Maternal Aunt     Diabetes Maternal Grandmother     Heart disease Maternal Grandfather     Heart attack Maternal Grandfather     Hypertension Maternal Grandfather     Hypertension Sister     Melanoma Brother     Breast cancer Neg Hx        Review of Systems:     Review of Systems   Constitutional: Negative.  Negative for activity change, appetite change, chills, diaphoresis, fatigue and unexpected weight change.   HENT:  Negative for congestion, dental problem, drooling, ear discharge, ear pain, facial swelling, hearing loss, mouth sores, nosebleeds, postnasal drip, rhinorrhea, sinus pressure, sneezing, sore throat, tinnitus, trouble swallowing and voice change.    Eyes: Negative.  Negative for photophobia, pain, discharge, redness, itching and visual disturbance.   Respiratory: Negative.  Negative for apnea, cough, choking, chest tightness, shortness of breath, wheezing and stridor.    Cardiovascular: Negative.  Negative for chest pain, palpitations and leg swelling.   Gastrointestinal: Negative.  Negative for abdominal distention, abdominal pain, anal bleeding, blood in stool, constipation, diarrhea, nausea, rectal pain and vomiting.   Endocrine: Negative.  Negative for cold intolerance, heat intolerance, polydipsia, polyphagia and polyuria.   Musculoskeletal: Negative.  Negative for arthralgias, back pain, gait problem, joint swelling, myalgias, neck pain and neck stiffness.   Skin: Negative.  Negative for color change, pallor, rash and wound.   Allergic/Immunologic: Negative.  Negative for environmental allergies, food allergies and immunocompromised state.   Neurological: Negative.  Negative for dizziness, tremors, seizures, syncope, facial asymmetry, speech difficulty, weakness, light-headedness, numbness and headaches.   Hematological: Negative.  Negative for adenopathy. Does  not bruise/bleed easily.   Psychiatric/Behavioral:  Negative for agitation, behavioral problems, confusion, decreased concentration, dysphoric mood, hallucinations, self-injury, sleep disturbance and suicidal ideas. The patient is not nervous/anxious and is not hyperactive.    All other systems reviewed and are negative.      Physical Exam:     Physical Exam  Constitutional:       Appearance: She is well-developed.   HENT:      Head: Normocephalic and atraumatic.      Right Ear: External ear normal.      Left Ear: External ear normal.   Eyes:      Conjunctiva/sclera: Conjunctivae normal.      Pupils: Pupils are equal, round, and reactive to light.   Cardiovascular:      Rate and Rhythm: Normal rate and regular rhythm.      Heart sounds: Normal heart sounds.   Pulmonary:      Effort: Pulmonary effort is normal.      Breath sounds: Normal breath sounds.   Abdominal:      General: Bowel sounds are normal. There is no distension.      Palpations: Abdomen is soft. There is no mass.      Tenderness: There is no abdominal tenderness. There is no guarding or rebound.   Genitourinary:     Vagina: No vaginal discharge.   Musculoskeletal:         General: Normal range of motion.   Skin:     General: Skin is warm and dry.   Neurological:      Mental Status: She is alert.      Deep Tendon Reflexes: Reflexes are normal and symmetric.   Psychiatric:         Behavior: Behavior normal.         Thought Content: Thought content normal.         Judgment: Judgment normal.         I have reviewed the following portions of the patient's history: Allergies, current medications, past family history, past medical history, past social history, past surgical history, problem list, and ROS and confirm it is accurate.    Recent Image (CT and/or KUB):      CT Abdomen and Pelvis: No results found for this or any previous visit.       CT Stone Protocol: No results found for this or any previous visit.       KUB: Results for orders placed during the  hospital encounter of 02/06/25    XR Abdomen KUB    Narrative  EXAM:  XR Abdomen, 1 View    EXAM DATE:  2/6/2025 11:56 AM    CLINICAL HISTORY:  kidney stone; N20.0-Calculus of kidney    TECHNIQUE:  Frontal supine view of the abdomen/pelvis.    COMPARISON:  1/31/2025    FINDINGS:  Gastrointestinal tract:  Unremarkable.  No dilation.  Organs:  Previously described stone in the region of the proximal left  ureter is not evident on current exam.  Bones/joints:  Unremarkable.  No acute fracture.    Impression  Previously described stone in the region of the proximal left ureter  is not evident on current exam.    This report was finalized on 2/6/2025 12:07 PM by Dr. Hermann Mccauley MD.       Labs (past 3 months):      Admission on 01/31/2025, Discharged on 01/31/2025   Component Date Value Ref Range Status    Glucose 01/31/2025 126  70 - 130 mg/dL Final   Admission on 01/27/2025, Discharged on 01/27/2025   Component Date Value Ref Range Status    Glucose 01/27/2025 199 (H)  65 - 99 mg/dL Final    BUN 01/27/2025 14  8 - 23 mg/dL Final    Creatinine 01/27/2025 1.43 (H)  0.57 - 1.00 mg/dL Final    Sodium 01/27/2025 145  136 - 145 mmol/L Final    Potassium 01/27/2025 4.5  3.5 - 5.2 mmol/L Final    Chloride 01/27/2025 109 (H)  98 - 107 mmol/L Final    CO2 01/27/2025 22.6  22.0 - 29.0 mmol/L Final    Calcium 01/27/2025 9.6  8.6 - 10.5 mg/dL Final    Total Protein 01/27/2025 7.0  6.0 - 8.5 g/dL Final    Albumin 01/27/2025 4.1  3.5 - 5.2 g/dL Final    ALT (SGPT) 01/27/2025 31  1 - 33 U/L Final    AST (SGOT) 01/27/2025 25  1 - 32 U/L Final    Alkaline Phosphatase 01/27/2025 59  39 - 117 U/L Final    Total Bilirubin 01/27/2025 0.7  0.0 - 1.2 mg/dL Final    Globulin 01/27/2025 2.9  gm/dL Final    A/G Ratio 01/27/2025 1.4  g/dL Final    BUN/Creatinine Ratio 01/27/2025 9.8  7.0 - 25.0 Final    Anion Gap 01/27/2025 13.4  5.0 - 15.0 mmol/L Final    eGFR 01/27/2025 40.8 (L)  >60.0 mL/min/1.73 Final    Lipase 01/27/2025 83 (H)  13 - 60  U/L Final    Color, UA 01/27/2025 Yellow  Yellow, Straw Final    Appearance, UA 01/27/2025 Clear  Clear Final    pH, UA 01/27/2025 <=5.0  5.0 - 8.0 Final    Specific Gravity, UA 01/27/2025 >1.030 (H)  1.005 - 1.030 Final    Glucose, UA 01/27/2025 >=1000 mg/dL (3+) (A)  Negative Final    Ketones, UA 01/27/2025 Trace (A)  Negative Final    Bilirubin, UA 01/27/2025 Negative  Negative Final    Blood, UA 01/27/2025 Trace (A)  Negative Final    Protein, UA 01/27/2025 Negative  Negative Final    Leuk Esterase, UA 01/27/2025 Negative  Negative Final    Nitrite, UA 01/27/2025 Negative  Negative Final    Urobilinogen, UA 01/27/2025 0.2 E.U./dL  0.2 - 1.0 E.U./dL Final    Lactate 01/27/2025 1.6  0.5 - 2.0 mmol/L Final    Extra Tube 01/27/2025 Hold for add-ons.   Final    Auto resulted.    Extra Tube 01/27/2025 hold for add-on   Final    Auto resulted    Extra Tube 01/27/2025 Hold for add-ons.   Final    Auto resulted.    Extra Tube 01/27/2025 Hold for add-ons.   Final    Auto resulted    WBC 01/27/2025 9.24  3.40 - 10.80 10*3/mm3 Final    RBC 01/27/2025 4.61  3.77 - 5.28 10*6/mm3 Final    Hemoglobin 01/27/2025 13.3  12.0 - 15.9 g/dL Final    Hematocrit 01/27/2025 42.2  34.0 - 46.6 % Final    MCV 01/27/2025 91.5  79.0 - 97.0 fL Final    MCH 01/27/2025 28.9  26.6 - 33.0 pg Final    MCHC 01/27/2025 31.5  31.5 - 35.7 g/dL Final    RDW 01/27/2025 13.2  12.3 - 15.4 % Final    RDW-SD 01/27/2025 44.1  37.0 - 54.0 fl Final    MPV 01/27/2025 9.8  6.0 - 12.0 fL Final    Platelets 01/27/2025 160  140 - 450 10*3/mm3 Final    Neutrophil % 01/27/2025 77.8 (H)  42.7 - 76.0 % Final    Lymphocyte % 01/27/2025 15.3 (L)  19.6 - 45.3 % Final    Monocyte % 01/27/2025 5.8  5.0 - 12.0 % Final    Eosinophil % 01/27/2025 0.6  0.3 - 6.2 % Final    Basophil % 01/27/2025 0.4  0.0 - 1.5 % Final    Immature Grans % 01/27/2025 0.1  0.0 - 0.5 % Final    Neutrophils, Absolute 01/27/2025 7.18 (H)  1.70 - 7.00 10*3/mm3 Final    Lymphocytes, Absolute 01/27/2025  1.41  0.70 - 3.10 10*3/mm3 Final    Monocytes, Absolute 01/27/2025 0.54  0.10 - 0.90 10*3/mm3 Final    Eosinophils, Absolute 01/27/2025 0.06  0.00 - 0.40 10*3/mm3 Final    Basophils, Absolute 01/27/2025 0.04  0.00 - 0.20 10*3/mm3 Final    Immature Grans, Absolute 01/27/2025 0.01  0.00 - 0.05 10*3/mm3 Final    nRBC 01/27/2025 0.0  0.0 - 0.2 /100 WBC Final    RBC, UA 01/27/2025 3-5 (A)  None Seen, 0-2 /HPF Final    WBC, UA 01/27/2025 0-2  None Seen, 0-2 /HPF Final    Bacteria, UA 01/27/2025 Trace (A)  None Seen /HPF Final    Squamous Epithelial Cells, UA 01/27/2025 3-6 (A)  None Seen, 0-2 /HPF Final    Hyaline Casts, UA 01/27/2025 None Seen  None Seen /LPF Final    Methodology 01/27/2025 Manual Light Microscopy   Final        Procedure:       Assessment/Plan:   Renal calculus-we discussed the presence of the stone. We discussed the various therapeutic options available including percutaneous nephrostolithotomy, ureteroscopy and extracorporeal shockwave  lithotripsy.  We discussed the risks of lithotripsy including the passage of stones, the development of a large string of stones in the distal ureter known as Steinstrasse.  In the 3% incidence of that we will need to proceed with a ureteroscopy for obstructing fragments.  Extremely rare incidence of renal hematoma and the significance of this.  We discussed percutaneous nephrostolithotomy and its use as well as the risks and benefits such as the need for postoperative hospitalization, and the risk of damage to the kidney and the remote risk of a nephrectomy.  We also discussed the use of ureteroscopy in the upper tracts.  That this is as a decreased success rate to completely remove the stones on the first option and that very likely a stent will be required requiring an additional procedure for removal.  We discussed the absolute relative indicators for intervention including the presence of sepsis and pain we cannot control ais the primary need for urgent  intervention.  We discussed placement of a stent if indicated and the management of the stent as well.  Her KUB is negative she is now stone free.  Metabolic stone disease-discussed her stone analysis, discussed work-up including a 24-hour Litholink where indicated.  Discussed medical therapy including thiazide and Urocit-K that are specific to specific types of stones, discussed increasing fluids and avoidance of cola products and anything containing high amounts of oxalates      Patient reports that she is not currently experiencing any symptoms of urinary incontinence.              This document has been electronically signed by CYNDY ALLEN MD February 6, 2025 12:55 EST    Dictated Utilizing Dragon Dictation: Part of this note may be an electronic transcription/translation of spoken language to printed text using the Dragon Dictation System.

## 2025-03-31 ENCOUNTER — OFFICE VISIT (OUTPATIENT)
Dept: CARDIOLOGY | Facility: CLINIC | Age: 65
End: 2025-03-31
Payer: MEDICARE

## 2025-03-31 VITALS
SYSTOLIC BLOOD PRESSURE: 150 MMHG | WEIGHT: 204.4 LBS | RESPIRATION RATE: 16 BRPM | HEIGHT: 66 IN | HEART RATE: 100 BPM | DIASTOLIC BLOOD PRESSURE: 97 MMHG | OXYGEN SATURATION: 99 % | BODY MASS INDEX: 32.85 KG/M2

## 2025-03-31 DIAGNOSIS — I10 ESSENTIAL HYPERTENSION: ICD-10-CM

## 2025-03-31 DIAGNOSIS — E78.5 DYSLIPIDEMIA: ICD-10-CM

## 2025-03-31 DIAGNOSIS — I25.10 ASCVD (ARTERIOSCLEROTIC CARDIOVASCULAR DISEASE): Primary | ICD-10-CM

## 2025-03-31 NOTE — LETTER
April 1, 2025     Akhil Huerta MD  [address] [place] [address]    Patient: Jannette Onofre   YOB: 1960   Date of Visit: 3/31/2025     Dear Akhil Huerta MD:       Thank you for referring Jannette Onofre to me for evaluation. Below are the relevant portions of my assessment and plan of care.    If you have questions, please do not hesitate to call me. I look forward to following Jannette along with you.         Sincerely,        Lebron Ogden MD        CC: No Recipients    Lebron Ogden MD  04/01/25 1402  Sign when Signing Visit  Akhil Huerta MD  Jannette Onofre  1960 03/31/2025    Patient Active Problem List   Diagnosis   • Acute ST elevation myocardial infarction (STEMI) of anterior wall, 11/29/16 s/p stenting proxismal % occlusion, clinically stable.   • Type 2 diabetes mellitus   • Chest pain   • ASCVD (arteriosclerotic cardiovascular disease)   • Fatigue   • Hyperlipidemia   • Essential hypertension   • Morbid obesity   • Dyspnea on exertion (NYHA class 2-3)   • Angina, class III   • Dyspepsia   • BRICE on CPAP   • Hypoxia   • Renal insufficiency   • Renal calculus, left   • Renal calculus       Dear Akhil Huerta MD:    Subjective     Jannette Onofre is a 64 y.o. female with the problems as listed above, presents    Chief complaint: Follow-up of coronary artery disease.    History of Present Illness: Ms. Jannette Onofre is a very pleasant 64-year-old  female with history of known coronary artery disease with previous myocardial infarction followed by stenting of the proximal LAD in 2016, is here today for regular cardiology follow-up.  On today's visit she denies any complaints of chest pains or shortness of breath and actually feels better than before after detention.  She has been active going to fitness center 4 days a week and feels to have more strength and stamina.  She states her blood pressure runs around 130s over 80s.    No Known Allergies:    Current  Outpatient Medications:   •  aspirin 81 MG chewable tablet, Chew & swallow 1 tablet every day., Disp: 100 tablet, Rfl: 3  •  atorvastatin (LIPITOR) 80 MG tablet, Take 1 tablet by mouth Daily., Disp: 90 tablet, Rfl: 3  •  carvedilol (COREG) 25 MG tablet, Take 1 & 1/2 tablets by mouth 2 (Two) Times a Day With Meals. (Patient taking differently: Take 1 tablet by mouth 2 (Two) Times a Day With Meals.), Disp: 180 tablet, Rfl: 3  •  clopidogrel (PLAVIX) 75 MG tablet, Take 1 tablet by mouth daily, Disp: 90 tablet, Rfl: 4  •  empagliflozin (Jardiance) 25 MG tablet tablet, Take 1 tablet by mouth every morning., Disp: 90 tablet, Rfl: 3  •  metFORMIN (GLUCOPHAGE) 1000 MG tablet, Take 1 tablet by mouth 2 times every day with morning and evening meals., Disp: 180 tablet, Rfl: 11  •  Multiple Vitamin (MULTI-VITAMIN DAILY PO), Take  by mouth., Disp: , Rfl:   •  nitroglycerin (NITROSTAT) 0.4 MG SL tablet, Place 1 tablet under the tongue Every 5 (Five) Minutes As Needed for Chest Pain (max of 3 doses in 15 minutes. If no relief, go to ER., Disp: 25 tablet, Rfl: 12  •  Tirzepatide (Mounjaro) 15 MG/0.5ML solution pen-injector pen, Inject 15 mg under the skin once every week., Disp: 6 mL, Rfl: 2  •  valsartan (DIOVAN) 160 MG tablet, Take 1 tablet by mouth Daily., Disp: 90 tablet, Rfl: 3  •  HYDROcodone-acetaminophen (NORCO)  MG per tablet, Take 1 tablet by mouth Every 6 (Six) Hours As Needed for Moderate Pain (Pain). (Patient not taking: Reported on 3/31/2025), Disp: 16 tablet, Rfl: 0  •  metFORMIN (GLUCOPHAGE) 1000 MG tablet, Take 1 tablet by mouth 2 times daily with morning and evening meals., Disp: 180 tablet, Rfl: 11  •  neomycin-polymyxin-hydrocortisone (CORTISPORIN) 3.5-11235-5 otic suspension, Instill 5 drops into affected ear(s) 3 times daily for 10 days. SHAKE WELL, Disp: 10 mL, Rfl: 0  •  tamsulosin (FLOMAX) 0.4 MG capsule 24 hr capsule, Take 1 capsule by mouth Daily. (Patient not taking: Reported on 3/31/2025), Disp:  "30 capsule, Rfl: 0  •  vitamin D (ERGOCALCIFEROL) 1.25 MG (10759 UT) capsule capsule, Take 1 capsule by mouth once weekly. (Patient not taking: Reported on 3/31/2025), Disp: 15 capsule, Rfl: 3    The following portions of the patient's history were reviewed and updated as appropriate: allergies, current medications, past family history, past medical history, past social history, past surgical history and problem list.    Social History     Tobacco Use   • Smoking status: Former     Current packs/day: 0.00     Average packs/day: 0.3 packs/day for 1 year (0.3 ttl pk-yrs)     Types: Cigarettes     Start date: 1988     Quit date: 1989     Years since quittin.7   • Smokeless tobacco: Never   Vaping Use   • Vaping status: Never Used   Substance Use Topics   • Alcohol use: No   • Drug use: Never     Review of Systems   Constitutional: Negative for chills and fever.   HENT:  Negative for nosebleeds and sore throat.    Respiratory:  Negative for cough, hemoptysis and wheezing.    Gastrointestinal:  Negative for abdominal pain, hematemesis, hematochezia, melena, nausea and vomiting.   Genitourinary:  Negative for dysuria and hematuria.   Neurological:  Negative for headaches.     Objective   Vitals:    25 1330   BP: 150/97   Pulse: 100   Resp: 16   SpO2: 99%   Weight: 92.7 kg (204 lb 6.4 oz)   Height: 167.6 cm (66\")     Body mass index is 32.99 kg/m².    Vitals reviewed.   Constitutional:       Appearance: Well-developed.   Eyes:      Conjunctiva/sclera: Conjunctivae normal.   HENT:      Head: Normocephalic.   Neck:      Thyroid: No thyromegaly.      Vascular: No JVD.      Trachea: No tracheal deviation.   Pulmonary:      Effort: No respiratory distress.      Breath sounds: Normal breath sounds. No wheezing. No rales.   Cardiovascular:      PMI at left midclavicular line. Normal rate. Regular rhythm. Normal S1. Normal S2.       Murmurs: There is no murmur.      No gallop.  No click. No rub.   Pulses:     " Intact distal pulses.   Edema:     Peripheral edema absent.   Abdominal:      General: Bowel sounds are normal.      Palpations: Abdomen is soft. There is no abdominal mass.      Tenderness: There is no abdominal tenderness.   Musculoskeletal:      Cervical back: Normal range of motion and neck supple. Skin:     General: Skin is warm and dry.   Neurological:      Mental Status: Alert and oriented to person, place, and time.      Cranial Nerves: No cranial nerve deficit.       Lab Results   Component Value Date     01/27/2025    K 4.5 01/27/2025     (H) 01/27/2025    CO2 22.6 01/27/2025    BUN 14 01/27/2025    CREATININE 1.43 (H) 01/27/2025    GLUCOSE 199 (H) 01/27/2025    CALCIUM 9.6 01/27/2025    AST 25 01/27/2025    ALT 31 01/27/2025    ALKPHOS 59 01/27/2025     Lab Results   Component Value Date    CKTOTAL 35 10/29/2017     Lab Results   Component Value Date    WBC 9.8 01/27/2025    HGB 13.8 01/27/2025    HCT 42.6 01/27/2025     01/27/2025     Lab Results   Component Value Date    INR 0.95 08/07/2023    INR 0.98 10/28/2017    INR 0.89 11/29/2016     Lab Results   Component Value Date    MG 1.6 08/07/2023     Lab Results   Component Value Date    TSH 2.160 05/30/2024    CHLPL 213 (H) 03/14/2016    TRIG 215 (H) 05/30/2024    HDL 40 05/30/2024    LDL 49 05/30/2024      Lab Results   Component Value Date    BNP 27.0 12/29/2016       Assessment & Plan    Diagnosis Plan   1. ASCVD (arteriosclerotic cardiovascular disease), s/p acute anterior STEMI in 2016, s/p acute coronary intervention with a 3.25 x 23 mm Alpine CURRY on 11/29/2016.        2. Dyslipidemia, on atorvastatin 80 mg daily.        3. Essential hypertension          Recommendations  For her CAD, continue with aspirin and clopidogrel.  For her hypertension, since her blood pressure is relatively controlled at home, will continue with carvedilol and valsartan at current doses and keep a close check on the blood pressure at home twice a day  and take it to the next provider's visit.  For dyslipidemia, continue with atorvastatin.  She states that Dr. Huerta monitors her lipid panel closely and was told that her lipid profile is good.    Follow-up in 6 months.      As always, Dwight I appreciate very much the opportunity to participate in the cardiovascular care of your patients. Please do not hesitate to call me with any questions with regards to Jannette EVONNE Tsanggs's evaluation and management.       With Best Regards,        Lebron Ogden MD, [facility]    Please note that portions of this note were completed with a voice recognition program.

## 2025-03-31 NOTE — PROGRESS NOTES
Akhil Huerta MD  Jannette Onofre  1960 03/31/2025    Patient Active Problem List   Diagnosis    Acute ST elevation myocardial infarction (STEMI) of anterior wall, 11/29/16 s/p stenting proxismal % occlusion, clinically stable.    Type 2 diabetes mellitus    Chest pain    ASCVD (arteriosclerotic cardiovascular disease)    Fatigue    Hyperlipidemia    Essential hypertension    Morbid obesity    Dyspnea on exertion (NYHA class 2-3)    Angina, class III    Dyspepsia    BRICE on CPAP    Hypoxia    Renal insufficiency    Renal calculus, left    Renal calculus       Dear Akhil Huerta MD:    Subjective     Jannette Onofre is a 64 y.o. female with the problems as listed above, presents    Chief complaint: Follow-up of coronary artery disease.    History of Present Illness: Ms. Jannette Onofre is a very pleasant 64-year-old  female with history of known coronary artery disease with previous myocardial infarction followed by stenting of the proximal LAD in 2016, is here today for regular cardiology follow-up.  On today's visit she denies any complaints of chest pains or shortness of breath and actually feels better than before after penitentiary.  She has been active going to fitness [employer] 4 days a week and feels to have more strength and stamina.  She states her blood pressure runs around 130s over 80s.    No Known Allergies:    Current Outpatient Medications:     aspirin 81 MG chewable tablet, Chew & swallow 1 tablet every day., Disp: 100 tablet, Rfl: 3    atorvastatin (LIPITOR) 80 MG tablet, Take 1 tablet by mouth Daily., Disp: 90 tablet, Rfl: 3    carvedilol (COREG) 25 MG tablet, Take 1 & 1/2 tablets by mouth 2 (Two) Times a Day With Meals. (Patient taking differently: Take 1 tablet by mouth 2 (Two) Times a Day With Meals.), Disp: 180 tablet, Rfl: 3    clopidogrel (PLAVIX) 75 MG tablet, Take 1 tablet by mouth daily, Disp: 90 tablet, Rfl: 4    empagliflozin (Jardiance) 25 MG tablet tablet, Take 1 tablet  by mouth every morning., Disp: 90 tablet, Rfl: 3    metFORMIN (GLUCOPHAGE) 1000 MG tablet, Take 1 tablet by mouth 2 times every day with morning and evening meals., Disp: 180 tablet, Rfl: 11    Multiple Vitamin (MULTI-VITAMIN DAILY PO), Take  by mouth., Disp: , Rfl:     nitroglycerin (NITROSTAT) 0.4 MG SL tablet, Place 1 tablet under the tongue Every 5 (Five) Minutes As Needed for Chest Pain (max of 3 doses in 15 minutes. If no relief, go to ER., Disp: 25 tablet, Rfl: 12    Tirzepatide (Mounjaro) 15 MG/0.5ML solution pen-injector pen, Inject 15 mg under the skin once every week., Disp: 6 mL, Rfl: 2    valsartan (DIOVAN) 160 MG tablet, Take 1 tablet by mouth Daily., Disp: 90 tablet, Rfl: 3    HYDROcodone-acetaminophen (NORCO)  MG per tablet, Take 1 tablet by mouth Every 6 (Six) Hours As Needed for Moderate Pain (Pain). (Patient not taking: Reported on 3/31/2025), Disp: 16 tablet, Rfl: 0    metFORMIN (GLUCOPHAGE) 1000 MG tablet, Take 1 tablet by mouth 2 times daily with morning and evening meals., Disp: 180 tablet, Rfl: 11    neomycin-polymyxin-hydrocortisone (CORTISPORIN) 3.5-58668-3 otic suspension, Instill 5 drops into affected ear(s) 3 times daily for 10 days. SHAKE WELL, Disp: 10 mL, Rfl: 0    tamsulosin (FLOMAX) 0.4 MG capsule 24 hr capsule, Take 1 capsule by mouth Daily. (Patient not taking: Reported on 3/31/2025), Disp: 30 capsule, Rfl: 0    vitamin D (ERGOCALCIFEROL) 1.25 MG (60898 UT) capsule capsule, Take 1 capsule by mouth once weekly. (Patient not taking: Reported on 3/31/2025), Disp: 15 capsule, Rfl: 3    The following portions of the patient's history were reviewed and updated as appropriate: allergies, current medications, past family history, past medical history, past social history, past surgical history and problem list.    Social History     Tobacco Use    Smoking status: Former     Current packs/day: 0.00     Average packs/day: 0.3 packs/day for 1 year (0.3 ttl pk-yrs)     Types: Cigarettes  "    Start date: 1988     Quit date: 1989     Years since quittin.7    Smokeless tobacco: Never   Vaping Use    Vaping status: Never Used   Substance Use Topics    Alcohol use: No    Drug use: Never     Review of Systems   Constitutional: Negative for chills and fever.   HENT:  Negative for nosebleeds and sore throat.    Respiratory:  Negative for cough, hemoptysis and wheezing.    Gastrointestinal:  Negative for abdominal pain, hematemesis, hematochezia, melena, nausea and vomiting.   Genitourinary:  Negative for dysuria and hematuria.   Neurological:  Negative for headaches.     Objective   Vitals:    25 1330   BP: 150/97   Pulse: 100   Resp: 16   SpO2: 99%   Weight: 92.7 kg (204 lb 6.4 oz)   Height: 167.6 cm (66\")     Body mass index is 32.99 kg/m².    Vitals reviewed.   Constitutional:       Appearance: Well-developed.   Eyes:      Conjunctiva/sclera: Conjunctivae normal.   HENT:      Head: Normocephalic.   Neck:      Thyroid: No thyromegaly.      Vascular: No JVD.      Trachea: No tracheal deviation.   Pulmonary:      Effort: No respiratory distress.      Breath sounds: Normal breath sounds. No wheezing. No rales.   Cardiovascular:      PMI at left midclavicular line. Normal rate. Regular rhythm. Normal S1. Normal S2.       Murmurs: There is no murmur.      No gallop.  No click. No rub.   Pulses:     Intact distal pulses.   Edema:     Peripheral edema absent.   Abdominal:      General: Bowel sounds are normal.      Palpations: Abdomen is soft. There is no abdominal mass.      Tenderness: There is no abdominal tenderness.   Musculoskeletal:      Cervical back: Normal range of motion and neck supple. Skin:     General: Skin is warm and dry.   Neurological:      Mental Status: Alert and oriented to person, place, and time.      Cranial Nerves: No cranial nerve deficit.       Lab Results   Component Value Date     2025    K 4.5 2025     (H) 2025    CO2 22.6 " 01/27/2025    BUN 14 01/27/2025    CREATININE 1.43 (H) 01/27/2025    GLUCOSE 199 (H) 01/27/2025    CALCIUM 9.6 01/27/2025    AST 25 01/27/2025    ALT 31 01/27/2025    ALKPHOS 59 01/27/2025     Lab Results   Component Value Date    CKTOTAL 35 10/29/2017     Lab Results   Component Value Date    WBC 9.8 01/27/2025    HGB 13.8 01/27/2025    HCT 42.6 01/27/2025     01/27/2025     Lab Results   Component Value Date    INR 0.95 08/07/2023    INR 0.98 10/28/2017    INR 0.89 11/29/2016     Lab Results   Component Value Date    MG 1.6 08/07/2023     Lab Results   Component Value Date    TSH 2.160 05/30/2024    CHLPL 213 (H) 03/14/2016    TRIG 215 (H) 05/30/2024    HDL 40 05/30/2024    LDL 49 05/30/2024      Lab Results   Component Value Date    BNP 27.0 12/29/2016       Assessment & Plan    Diagnosis Plan   1. ASCVD (arteriosclerotic cardiovascular disease), s/p acute anterior STEMI in 2016, s/p acute coronary intervention with a 3.25 x 23 mm Alpine CURRY on 11/29/2016.        2. Dyslipidemia, on atorvastatin 80 mg daily.        3. Essential hypertension          Recommendations  For her CAD, continue with aspirin and clopidogrel.  For her hypertension, since her blood pressure is relatively controlled at home, will continue with carvedilol and valsartan at current doses and keep a close check on the blood pressure at home twice a day and take it to the next provider's visit.  For dyslipidemia, continue with atorvastatin.  She states that Dr. Huerta monitors her lipid panel closely and was told that her lipid profile is good.    Follow-up in 6 months.      As always, Akhil Huerta MD  I appreciate very much the opportunity to participate in the cardiovascular care of your patients. Please do not hesitate to call me with any questions with regards to Jannette Onofre's evaluation and management.       With Best Regards,        Lebron Ogden MD, [facility]    Please note that portions of this note were completed with a voice  recognition program.

## 2025-04-23 ENCOUNTER — TRANSCRIBE ORDERS (OUTPATIENT)
Dept: ADMINISTRATIVE | Facility: HOSPITAL | Age: 65
End: 2025-04-23
Payer: MEDICARE

## 2025-04-23 DIAGNOSIS — Z12.31 VISIT FOR SCREENING MAMMOGRAM: Primary | ICD-10-CM

## 2025-05-08 ENCOUNTER — HOSPITAL ENCOUNTER (OUTPATIENT)
Facility: HOSPITAL | Age: 65
Discharge: HOME | End: 2025-05-08
Admitting: INTERNAL MEDICINE
Payer: MEDICARE

## 2025-05-08 DIAGNOSIS — Z12.31 VISIT FOR SCREENING MAMMOGRAM: ICD-10-CM

## 2025-05-08 PROCEDURE — 77063 BREAST TOMOSYNTHESIS BI: CPT | Performed by: RADIOLOGY

## 2025-05-08 PROCEDURE — 77067 SCR MAMMO BI INCL CAD: CPT | Performed by: RADIOLOGY

## 2025-05-08 PROCEDURE — 77063 BREAST TOMOSYNTHESIS BI: CPT

## 2025-05-08 PROCEDURE — 77067 SCR MAMMO BI INCL CAD: CPT

## 2025-08-22 ENCOUNTER — HOSPITAL ENCOUNTER (OUTPATIENT)
Facility: HOSPITAL | Age: 65
Discharge: HOME | End: 2025-08-22
Payer: MEDICARE

## 2025-08-22 ENCOUNTER — TRANSCRIBE ORDERS (OUTPATIENT)
Dept: ADMINISTRATIVE | Facility: HOSPITAL | Age: 65
End: 2025-08-22
Payer: MEDICARE

## 2025-08-22 DIAGNOSIS — G44.52 NEW DAILY PERSISTENT HEADACHE: ICD-10-CM

## 2025-08-22 DIAGNOSIS — G44.52 NEW DAILY PERSISTENT HEADACHE: Primary | ICD-10-CM

## 2025-08-22 PROCEDURE — 70450 CT HEAD/BRAIN W/O DYE: CPT

## (undated) DEVICE — Device: Brand: MEDEX

## (undated) DEVICE — PK BARIATRIC 10

## (undated) DEVICE — PK CATH CARD 70

## (undated) DEVICE — CATH F5 INF PIG145 110CM 6SH: Brand: INFINITI

## (undated) DEVICE — 3 RING SUTURE PASSER - 16 CM: Brand: 3 RING SUTURE PASSER - 16 CM

## (undated) DEVICE — GOWN,NON-REINFORCED,SIRUS,SET IN SLV,XXL: Brand: MEDLINE

## (undated) DEVICE — LN FLTR ORL/NASL MICROSTREAM NONINTUB A/LNG

## (undated) DEVICE — ST EXT IV SMARTSITE 2VLV SP M LL 5ML IV1

## (undated) DEVICE — GW MOVE CORE .035 145CM

## (undated) DEVICE — UNDERCAST PADDING: Brand: DEROYAL

## (undated) DEVICE — DISPOSABLE TOURNIQUET CUFF SINGLE BLADDER, SINGLE PORT AND LUER LOCK CONNECTOR: Brand: COLOR CUFF

## (undated) DEVICE — DRSNG SURESITE WNDW 4X4.5

## (undated) DEVICE — INTENDED USE FOR SURGICAL MARKING ON INTACT SKIN, ALSO PROVIDES A PERMANENT METHOD OF IDENTIFYING OBJECTS IN THE OPERATING ROOM: Brand: WRITESITE® REGULAR TIP SKIN MARKER

## (undated) DEVICE — APPL HEMOS FOR DELIVERY FLOSEAL

## (undated) DEVICE — SPNG GZ WOVN 4X4IN 12PLY 10/BX STRL

## (undated) DEVICE — GW PRESSUREWIRE AERIS W/ AGILE TP 175CM

## (undated) DEVICE — FLTR PLUMEPORT LAP W/CONN STRL

## (undated) DEVICE — ENDOPATH XCEL UNIVERSAL TROCAR STABLILITY SLEEVES: Brand: ENDOPATH XCEL

## (undated) DEVICE — GW INQW FIX/CORE PTFE J/3MM .035 260CM

## (undated) DEVICE — GLIDESHEATH SLENDER STAINLESS STEEL KIT: Brand: GLIDESHEATH SLENDER

## (undated) DEVICE — MARYLAND JAW LAPAROSCOPIC SEALER/DIVIDER COATED: Brand: LIGASURE

## (undated) DEVICE — GLV SURG SENSICARE MICRO PF LF 8.5 STRL

## (undated) DEVICE — 3M™ STERI-STRIP™ REINFORCED ADHESIVE SKIN CLOSURES, R1547, 1/2 IN X 4 IN (12 MM X 100 MM), 6 STRIPS/ENVELOPE: Brand: 3M™ STERI-STRIP™

## (undated) DEVICE — CATH VENT MIV RADL PIG LNG TIP 5F 110CM

## (undated) DEVICE — SKIN AFFIX SURG ADHESIVE 72/CS 0.55ML: Brand: MEDLINE

## (undated) DEVICE — MODEL AT P65, P/N 701554-001KIT CONTENTS: HAND CONTROLLER, 3-WAY HIGH-PRESSURE STOPCOCK WITH ROTATING END AND PREMIUM HIGH-PRESSURE TUBING: Brand: ANGIOTOUCH® KIT

## (undated) DEVICE — TISSUE RETRIEVAL SYSTEM: Brand: INZII RETRIEVAL SYSTEM

## (undated) DEVICE — ENDOPATH XCEL BLADELESS TROCARS WITH STABILITY SLEEVES: Brand: ENDOPATH XCEL

## (undated) DEVICE — SHT AIR TRANSFR COMFRT GLIDE LT LAT 40X80IN

## (undated) DEVICE — COVER,LIGHT HANDLE,FLX,1/PK: Brand: MEDLINE INDUSTRIES, INC.

## (undated) DEVICE — LN INJ CONTRST FLXCIL HP F/M LL 1200PSI10

## (undated) DEVICE — Device

## (undated) DEVICE — RADIFOCUS OPTITORQUE ANGIOGRAPHIC CATHETER: Brand: OPTITORQUE

## (undated) DEVICE — [HIGH FLOW INSUFFLATOR,  DO NOT USE IF PACKAGE IS DAMAGED,  KEEP DRY,  KEEP AWAY FROM SUNLIGHT,  PROTECT FROM HEAT AND RADIOACTIVE SOURCES.]: Brand: PNEUMOSURE

## (undated) DEVICE — ADULT DISPOSABLE SINGLE-PATIENT USE PULSE OXIMETER SENSOR: Brand: NONIN

## (undated) DEVICE — TROCAR: Brand: KII FIOS FIRST ENTRY

## (undated) DEVICE — UNDRPD COMFRT GLD DRYPAD 36X57IN

## (undated) DEVICE — DEV INFL MONARCH 25W

## (undated) DEVICE — ST EXT IV SMRTSTE 2VLV FIX M LL 6ML 41

## (undated) DEVICE — DRAPE, RADIAL, STERILE: Brand: MEDLINE

## (undated) DEVICE — DRSNG ADAPTIC 3X8

## (undated) DEVICE — MYNXGRIP 5F: Brand: MYNXGRIP

## (undated) DEVICE — A2000 MULTI-USE SYRINGE KIT, P/N 701277-003KIT CONTENTS: 100ML CONTRAST RESERVOIR AND TUBING WITH CONTRAST SPIKE AND CLAMP: Brand: A2000 MULTI-USE SYRINGE KIT

## (undated) DEVICE — ENDOPATH 5MM ENDOSCOPIC BLUNT TIP DISSECTORS (12 POUCHES CONTAINING 3 DISSECTORS EACH): Brand: ENDOPATH

## (undated) DEVICE — POWER SHELL: Brand: SIGNIA

## (undated) DEVICE — CLMP STD 25CM DISP

## (undated) DEVICE — SHEATH INTRO SUPERSHEATH JWIRE .035 5F 11CM

## (undated) DEVICE — ST INF PRI SMRTSTE 20DRP 2VLV 24ML 117

## (undated) DEVICE — DRN PENRS 1/2X18IN LTX

## (undated) DEVICE — PK ORTHO MINOR 40

## (undated) DEVICE — TR BAND RADIAL ARTERY COMPRESSION DEVICE: Brand: TR BAND

## (undated) DEVICE — CATH F5 INF JL 4 100CM: Brand: INFINITI

## (undated) DEVICE — APPL CHLORAPREP W/TINT 26ML BLU

## (undated) DEVICE — GLV SURG SENSICARE MICRO PF LF 9 STRL

## (undated) DEVICE — HOLDER: Brand: DEROYAL

## (undated) DEVICE — RUNWAY RADL W/TOP PAD

## (undated) DEVICE — DRP C/ARM W/BAND W/CLIPS 41X74IN

## (undated) DEVICE — GC 5F 056 JL 4 STAND TIP: Brand: CORDIS

## (undated) DEVICE — ANTIBACTERIAL VIOLET BRAIDED (POLYGLACTIN 910), SYNTHETIC ABSORBABLE SUTURE: Brand: COATED VICRYL

## (undated) DEVICE — CATH F5 INF 3DRC 100CM: Brand: INFINITI

## (undated) DEVICE — APL DUPLOSPRAYER MIS 40CM

## (undated) DEVICE — PAD, DEFIB, ADULT, RADIOTRANS, ZOLL: Brand: MEDLINE

## (undated) DEVICE — BNDG ELAS MATRX  2IN 5YD LF STRL

## (undated) DEVICE — CANNULA,OXY,ADULT,SUPER SOFT,W/14'TUB,UC: Brand: MEDLINE INDUSTRIES, INC.

## (undated) DEVICE — GW INQWIRE FC PTFE J/3MM .035 180

## (undated) DEVICE — ADULT, RADIOTRANSPARENT ELEMENT, COMPATIBLE W/ ZOLL: Brand: DEFIBRILLATION ELECTRODES